# Patient Record
Sex: FEMALE | Race: WHITE | Employment: OTHER
[De-identification: names, ages, dates, MRNs, and addresses within clinical notes are randomized per-mention and may not be internally consistent; named-entity substitution may affect disease eponyms.]

---

## 2017-01-12 PROBLEM — R31.29 MICROHEMATURIA: Status: ACTIVE | Noted: 2017-01-12

## 2017-02-09 ENCOUNTER — OFFICE VISIT (OUTPATIENT)
Dept: GASTROENTEROLOGY | Facility: CLINIC | Age: 62
End: 2017-02-09

## 2017-02-09 VITALS
HEART RATE: 66 BPM | TEMPERATURE: 97.8 F | SYSTOLIC BLOOD PRESSURE: 143 MMHG | HEIGHT: 63 IN | DIASTOLIC BLOOD PRESSURE: 90 MMHG | OXYGEN SATURATION: 100 % | BODY MASS INDEX: 32.9 KG/M2 | WEIGHT: 185.7 LBS

## 2017-02-09 DIAGNOSIS — R10.33 PERIUMBILICAL ABDOMINAL PAIN: ICD-10-CM

## 2017-02-09 DIAGNOSIS — K56.609 SMALL BOWEL OBSTRUCTION (HCC): ICD-10-CM

## 2017-02-09 DIAGNOSIS — R19.7 DIARRHEA, UNSPECIFIED TYPE: Primary | ICD-10-CM

## 2017-02-09 PROCEDURE — 99214 OFFICE O/P EST MOD 30 MIN: CPT | Performed by: INTERNAL MEDICINE

## 2017-02-09 RX ORDER — SERTRALINE HYDROCHLORIDE 100 MG/1
100 TABLET, FILM COATED ORAL DAILY
COMMUNITY
Start: 2017-02-08

## 2017-02-09 ASSESSMENT — ENCOUNTER SYMPTOMS
RESPIRATORY NEGATIVE: 1
DIARRHEA: 1
BLOOD IN STOOL: 0
RECTAL PAIN: 0
FACIAL SWELLING: 0
CONSTIPATION: 0
RHINORRHEA: 0
SORE THROAT: 0
TROUBLE SWALLOWING: 1
BACK PAIN: 1
NAUSEA: 0
ANAL BLEEDING: 0
SHORTNESS OF BREATH: 0
ABDOMINAL PAIN: 1
COUGH: 0
SINUS PRESSURE: 0
ABDOMINAL DISTENTION: 0

## 2017-02-21 ENCOUNTER — TELEPHONE (OUTPATIENT)
Dept: GASTROENTEROLOGY | Facility: CLINIC | Age: 62
End: 2017-02-21

## 2017-10-21 ENCOUNTER — HOSPITAL ENCOUNTER (EMERGENCY)
Age: 62
Discharge: HOME | End: 2017-10-21
Attending: EMERGENCY MEDICINE
Payer: COMMERCIAL

## 2017-10-21 ENCOUNTER — APPOINTMENT (OUTPATIENT)
Dept: CT IMAGING | Age: 62
End: 2017-10-21
Payer: COMMERCIAL

## 2017-10-21 VITALS
OXYGEN SATURATION: 99 % | RESPIRATION RATE: 20 BRPM | BODY MASS INDEX: 30.74 KG/M2 | SYSTOLIC BLOOD PRESSURE: 129 MMHG | HEIGHT: 63 IN | TEMPERATURE: 97.9 F | WEIGHT: 173.5 LBS | DIASTOLIC BLOOD PRESSURE: 82 MMHG | HEART RATE: 78 BPM

## 2017-10-21 DIAGNOSIS — A08.4 VIRAL GASTROENTERITIS: Primary | ICD-10-CM

## 2017-10-21 LAB
ABSOLUTE EOS #: 0.3 K/UL (ref 0–0.4)
ABSOLUTE IMMATURE GRANULOCYTE: ABNORMAL K/UL (ref 0–0.3)
ABSOLUTE LYMPH #: 2.8 K/UL (ref 1–4.8)
ABSOLUTE MONO #: 0.6 K/UL (ref 0.2–0.8)
ANION GAP SERPL CALCULATED.3IONS-SCNC: 17 MMOL/L (ref 9–17)
BASOPHILS # BLD: 0 %
BASOPHILS ABSOLUTE: 0 K/UL (ref 0–0.2)
BUN BLDV-MCNC: 17 MG/DL (ref 8–23)
BUN/CREAT BLD: 13 (ref 9–20)
CALCIUM SERPL-MCNC: 10.3 MG/DL (ref 8.6–10.4)
CHLORIDE BLD-SCNC: 103 MMOL/L (ref 98–107)
CO2: 17 MMOL/L (ref 20–31)
CREAT SERPL-MCNC: 1.26 MG/DL (ref 0.5–0.9)
DIFFERENTIAL TYPE: ABNORMAL
EOSINOPHILS RELATIVE PERCENT: 2 %
GFR AFRICAN AMERICAN: 52 ML/MIN
GFR NON-AFRICAN AMERICAN: 43 ML/MIN
GFR SERPL CREATININE-BSD FRML MDRD: ABNORMAL ML/MIN/{1.73_M2}
GFR SERPL CREATININE-BSD FRML MDRD: ABNORMAL ML/MIN/{1.73_M2}
GLUCOSE BLD-MCNC: 108 MG/DL (ref 70–99)
HCT VFR BLD CALC: 46.5 % (ref 36–46)
HEMOGLOBIN: 15.4 G/DL (ref 12–16)
IMMATURE GRANULOCYTES: ABNORMAL %
LYMPHOCYTES # BLD: 24 %
MCH RBC QN AUTO: 28.8 PG (ref 26–34)
MCHC RBC AUTO-ENTMCNC: 33.1 G/DL (ref 31–37)
MCV RBC AUTO: 86.9 FL (ref 80–100)
MONOCYTES # BLD: 5 %
PDW BLD-RTO: 15.6 % (ref 11.5–14.5)
PLATELET # BLD: 316 K/UL (ref 130–400)
PLATELET ESTIMATE: ABNORMAL
PMV BLD AUTO: 8.5 FL (ref 6–12)
POTASSIUM SERPL-SCNC: 5.3 MMOL/L (ref 3.7–5.3)
RBC # BLD: 5.36 M/UL (ref 4–5.2)
RBC # BLD: ABNORMAL 10*6/UL
SEG NEUTROPHILS: 69 %
SEGMENTED NEUTROPHILS ABSOLUTE COUNT: 7.9 K/UL (ref 1.8–7.7)
SODIUM BLD-SCNC: 137 MMOL/L (ref 135–144)
WBC # BLD: 11.6 K/UL (ref 3.5–11)
WBC # BLD: ABNORMAL 10*3/UL

## 2017-10-21 PROCEDURE — 74177 CT ABD & PELVIS W/CONTRAST: CPT

## 2017-10-21 PROCEDURE — 85025 COMPLETE CBC W/AUTO DIFF WBC: CPT

## 2017-10-21 PROCEDURE — 96374 THER/PROPH/DIAG INJ IV PUSH: CPT

## 2017-10-21 PROCEDURE — 6360000004 HC RX CONTRAST MEDICATION: Performed by: EMERGENCY MEDICINE

## 2017-10-21 PROCEDURE — 6360000002 HC RX W HCPCS: Performed by: EMERGENCY MEDICINE

## 2017-10-21 PROCEDURE — 80048 BASIC METABOLIC PNL TOTAL CA: CPT

## 2017-10-21 PROCEDURE — 2580000003 HC RX 258: Performed by: EMERGENCY MEDICINE

## 2017-10-21 PROCEDURE — 99284 EMERGENCY DEPT VISIT MOD MDM: CPT

## 2017-10-21 RX ORDER — ONDANSETRON 4 MG/1
4 TABLET, ORALLY DISINTEGRATING ORAL EVERY 6 HOURS PRN
Qty: 12 TABLET | Refills: 0 | Status: SHIPPED | OUTPATIENT
Start: 2017-10-21 | End: 2018-09-27

## 2017-10-21 RX ORDER — ONDANSETRON 2 MG/ML
4 INJECTION INTRAMUSCULAR; INTRAVENOUS ONCE
Status: COMPLETED | OUTPATIENT
Start: 2017-10-21 | End: 2017-10-21

## 2017-10-21 RX ORDER — 0.9 % SODIUM CHLORIDE 0.9 %
50 INTRAVENOUS SOLUTION INTRAVENOUS ONCE
Status: COMPLETED | OUTPATIENT
Start: 2017-10-21 | End: 2017-10-21

## 2017-10-21 RX ORDER — SODIUM CHLORIDE 9 MG/ML
INJECTION, SOLUTION INTRAVENOUS CONTINUOUS
Status: DISCONTINUED | OUTPATIENT
Start: 2017-10-21 | End: 2017-10-21 | Stop reason: HOSPADM

## 2017-10-21 RX ORDER — SODIUM CHLORIDE 0.9 % (FLUSH) 0.9 %
10 SYRINGE (ML) INJECTION
Status: COMPLETED | OUTPATIENT
Start: 2017-10-21 | End: 2017-10-21

## 2017-10-21 RX ADMIN — SODIUM CHLORIDE 50 ML: 9 INJECTION, SOLUTION INTRAVENOUS at 10:33

## 2017-10-21 RX ADMIN — ONDANSETRON 4 MG: 2 INJECTION INTRAMUSCULAR; INTRAVENOUS at 10:07

## 2017-10-21 RX ADMIN — IOPAMIDOL 125 ML: 755 INJECTION, SOLUTION INTRAVENOUS at 10:34

## 2017-10-21 RX ADMIN — Medication 10 ML: at 10:34

## 2017-10-21 RX ADMIN — SODIUM CHLORIDE 1000 ML: 9 INJECTION, SOLUTION INTRAVENOUS at 10:03

## 2017-10-21 ASSESSMENT — ENCOUNTER SYMPTOMS
CONSTIPATION: 0
COLOR CHANGE: 0
EYE DISCHARGE: 0
DIARRHEA: 0
ABDOMINAL PAIN: 1
EYE REDNESS: 0
SHORTNESS OF BREATH: 0
FACIAL SWELLING: 0
COUGH: 0
NAUSEA: 1
VOMITING: 0

## 2017-10-21 ASSESSMENT — PAIN SCALES - GENERAL
PAINLEVEL_OUTOF10: 8
PAINLEVEL_OUTOF10: 7

## 2017-10-21 NOTE — LETTER
[facility] ED  [place] [id number]  Phone: [phone]             October 21, 2017    Patient: Christian Renner   YOB: 1955   Date of Visit: 10/21/2017       To Whom It May Concern:    Roshan Rehman was seen and treated in our emergency department on 10/21/2017. She is to be off work 10/21 and 1/22. Can return to work on 10/23.       Sincerely,   Dr Marlon Gomez        Signature:__________________________________

## 2017-10-21 NOTE — ED PROVIDER NOTES
[facility])        SURGICAL HISTORY           Procedure Laterality Date    ABDOMEN SURGERY      APPENDECTOMY      CHOLECYSTECTOMY      COLECTOMY  2003    COLONOSCOPY  7/30/2003    Dr Emily uMller diverticulosis and signs of diverticulitis    COLONOSCOPY  5/21/2010    stricture at about  35 cm from the anus. Could not advance even gastroscope    COLONOSCOPY  10/17/2014    small bowel and rectum normal    SMALL INTESTINE SURGERY      entire large intestine removed, entire small intestine intact    TUBAL LIGATION      UPPER GASTROINTESTINAL ENDOSCOPY  10/17/2014    normal         FAMILY HISTORY           Problem Relation Age of Onset    Cancer Mother     Heart Disease Father      Family Status   Relation Status    Mother     Father         SOCIAL HISTORY      reports that she has been smoking Cigarettes. She has a 20.00 pack-year smoking history. She has never used smokeless tobacco. She reports that she does not drink alcohol or use drugs. REVIEW OF SYSTEMS    (2-9 systems for level 4, 10 or more for level 5)     Review of Systems   Constitutional: Negative for chills, fatigue and fever. HENT: Negative for congestion, ear discharge and facial swelling. Eyes: Negative for discharge and redness. Respiratory: Negative for cough and shortness of breath. Cardiovascular: Negative for chest pain. Gastrointestinal: Positive for abdominal pain and nausea. Negative for constipation, diarrhea and vomiting. Genitourinary: Negative for dysuria and hematuria. Musculoskeletal: Negative for arthralgias. Skin: Negative for color change and rash. Neurological: Positive for dizziness. Negative for syncope, numbness and headaches. Hematological: Negative for adenopathy. Psychiatric/Behavioral: Negative for confusion. The patient is not nervous/anxious. Except as noted above the remainder of the review of systems was reviewed and negative.      PHYSICAL EXAM    (up to 7 for level 4, 8 or more for level 5)     Vitals:    10/21/17 0943   BP: 129/82   Pulse: 78   Resp: 20   Temp: 97.9 °F (36.6 °C)   TempSrc: Oral   SpO2: 99%   Weight: 173 lb 8 oz (78.7 kg)   Height: 5' 3\" (1.6 m)       Physical Exam   Constitutional: She is oriented to person, place, and time. She appears well-developed and well-nourished. No distress. HENT:   Head: Normocephalic and atraumatic. Eyes: Right eye exhibits no discharge. Left eye exhibits no discharge. No scleral icterus. Neck: Neck supple. Cardiovascular: Normal rate and regular rhythm. Pulmonary/Chest: Effort normal and breath sounds normal. No stridor. No respiratory distress. She has no wheezes. She has no rales. Abdominal: Soft. She exhibits no distension and no mass. There is tenderness. There is no rebound and no guarding. She has mild diffuse tenderness across her lower abdomen. Musculoskeletal: Normal range of motion. Lymphadenopathy:     She has no cervical adenopathy. Neurological: She is alert and oriented to person, place, and time. Skin: Skin is warm and dry. No rash noted. She is not diaphoretic. No erythema. Psychiatric: She has a normal mood and affect. Her behavior is normal.   Vitals reviewed.         DIAGNOSTIC RESULTS     EKG: All EKG's are interpreted by the Emergency Department Physician who either signs or Co-signs this chart in the absence of a cardiologist.    RADIOLOGY:   Non-plain film images such as CT, Ultrasound and MRI are read by the radiologist. [place] radiographic images are visualized and preliminarily interpreted by the emergency physician with the below findings:    Interpretation per the Radiologist below, if available at the time of this note:    Ct Abdomen Pelvis W Iv Contrast    Result Date: 10/21/2017  EXAMINATION: CT OF THE ABDOMEN AND PELVIS WITH CONTRAST 10/21/2017 10:52 am TECHNIQUE: CT of the abdomen and pelvis was performed with the administration of intravenous contrast. Multiplanar reformatted images are

## 2018-01-12 ENCOUNTER — APPOINTMENT (OUTPATIENT)
Dept: CT IMAGING | Age: 63
DRG: 683 | End: 2018-01-12

## 2018-01-12 ENCOUNTER — HOSPITAL ENCOUNTER (INPATIENT)
Age: 63
LOS: 3 days | Discharge: HOME | DRG: 683 | End: 2018-01-15
Attending: FAMILY MEDICINE | Admitting: INTERNAL MEDICINE

## 2018-01-12 DIAGNOSIS — E86.0 DEHYDRATION: Primary | ICD-10-CM

## 2018-01-12 LAB
ABSOLUTE EOS #: 0.2 K/UL (ref 0–0.4)
ABSOLUTE IMMATURE GRANULOCYTE: ABNORMAL K/UL (ref 0–0.3)
ABSOLUTE LYMPH #: 3.6 K/UL (ref 1–4.8)
ABSOLUTE MONO #: 0.7 K/UL (ref 0.2–0.8)
ALBUMIN SERPL-MCNC: 4.9 G/DL (ref 3.5–5.2)
ALBUMIN/GLOBULIN RATIO: ABNORMAL (ref 1–2.5)
ALP BLD-CCNC: 106 U/L (ref 35–104)
ALT SERPL-CCNC: 13 U/L (ref 5–33)
AMYLASE: 128 U/L (ref 28–100)
ANION GAP SERPL CALCULATED.3IONS-SCNC: 24 MMOL/L (ref 9–17)
AST SERPL-CCNC: 16 U/L
BASOPHILS # BLD: 1 % (ref 0–2)
BASOPHILS ABSOLUTE: 0.1 K/UL (ref 0–0.2)
BILIRUB SERPL-MCNC: 0.26 MG/DL (ref 0.3–1.2)
BUN BLDV-MCNC: 49 MG/DL (ref 8–23)
BUN/CREAT BLD: 17 (ref 9–20)
CALCIUM SERPL-MCNC: 9.8 MG/DL (ref 8.6–10.4)
CHLORIDE BLD-SCNC: 97 MMOL/L (ref 98–107)
CO2: 13 MMOL/L (ref 20–31)
CREAT SERPL-MCNC: 2.81 MG/DL (ref 0.5–0.9)
DIFFERENTIAL TYPE: ABNORMAL
EOSINOPHILS RELATIVE PERCENT: 2 % (ref 1–4)
GFR AFRICAN AMERICAN: 21 ML/MIN
GFR NON-AFRICAN AMERICAN: 17 ML/MIN
GFR SERPL CREATININE-BSD FRML MDRD: ABNORMAL ML/MIN/{1.73_M2}
GFR SERPL CREATININE-BSD FRML MDRD: ABNORMAL ML/MIN/{1.73_M2}
GLUCOSE BLD-MCNC: 109 MG/DL (ref 70–99)
HCT VFR BLD CALC: 50 % (ref 36–46)
HEMOGLOBIN: 16.6 G/DL (ref 12–16)
IMMATURE GRANULOCYTES: ABNORMAL %
LACTIC ACID: 0.6 MMOL/L (ref 0.5–2.2)
LACTIC ACID: 1 MMOL/L (ref 0.5–2.2)
LIPASE: 64 U/L (ref 13–60)
LYMPHOCYTES # BLD: 31 % (ref 24–44)
MCH RBC QN AUTO: 29.2 PG (ref 26–34)
MCHC RBC AUTO-ENTMCNC: 33.3 G/DL (ref 31–37)
MCV RBC AUTO: 87.6 FL (ref 80–100)
MONOCYTES # BLD: 6 % (ref 1–7)
PDW BLD-RTO: 15.4 % (ref 11.5–14.5)
PLATELET # BLD: 361 K/UL (ref 130–400)
PLATELET ESTIMATE: ABNORMAL
PMV BLD AUTO: 8.8 FL (ref 6–12)
POTASSIUM SERPL-SCNC: 4.7 MMOL/L (ref 3.7–5.3)
RBC # BLD: 5.7 M/UL (ref 4–5.2)
RBC # BLD: ABNORMAL 10*6/UL
SEG NEUTROPHILS: 60 % (ref 36–66)
SEGMENTED NEUTROPHILS ABSOLUTE COUNT: 7.2 K/UL (ref 1.8–7.7)
SODIUM BLD-SCNC: 134 MMOL/L (ref 135–144)
TOTAL PROTEIN: 9 G/DL (ref 6.4–8.3)
WBC # BLD: 11.8 K/UL (ref 3.5–11)
WBC # BLD: ABNORMAL 10*3/UL

## 2018-01-12 PROCEDURE — 2060000000 HC ICU INTERMEDIATE R&B

## 2018-01-12 PROCEDURE — 83690 ASSAY OF LIPASE: CPT

## 2018-01-12 PROCEDURE — 6370000000 HC RX 637 (ALT 250 FOR IP): Performed by: INTERNAL MEDICINE

## 2018-01-12 PROCEDURE — 96375 TX/PRO/DX INJ NEW DRUG ADDON: CPT

## 2018-01-12 PROCEDURE — 96374 THER/PROPH/DIAG INJ IV PUSH: CPT

## 2018-01-12 PROCEDURE — 6360000002 HC RX W HCPCS: Performed by: FAMILY MEDICINE

## 2018-01-12 PROCEDURE — 6360000004 HC RX CONTRAST MEDICATION: Performed by: FAMILY MEDICINE

## 2018-01-12 PROCEDURE — 80053 COMPREHEN METABOLIC PANEL: CPT

## 2018-01-12 PROCEDURE — 2580000003 HC RX 258: Performed by: FAMILY MEDICINE

## 2018-01-12 PROCEDURE — 83605 ASSAY OF LACTIC ACID: CPT

## 2018-01-12 PROCEDURE — 6360000002 HC RX W HCPCS: Performed by: INTERNAL MEDICINE

## 2018-01-12 PROCEDURE — 74176 CT ABD & PELVIS W/O CONTRAST: CPT

## 2018-01-12 PROCEDURE — 2580000003 HC RX 258: Performed by: INTERNAL MEDICINE

## 2018-01-12 PROCEDURE — 85025 COMPLETE CBC W/AUTO DIFF WBC: CPT

## 2018-01-12 PROCEDURE — 99285 EMERGENCY DEPT VISIT HI MDM: CPT

## 2018-01-12 PROCEDURE — 82150 ASSAY OF AMYLASE: CPT

## 2018-01-12 RX ORDER — HEPARIN SODIUM 5000 [USP'U]/ML
5000 INJECTION, SOLUTION INTRAVENOUS; SUBCUTANEOUS EVERY 8 HOURS SCHEDULED
Status: DISCONTINUED | OUTPATIENT
Start: 2018-01-12 | End: 2018-01-15 | Stop reason: HOSPADM

## 2018-01-12 RX ORDER — 0.9 % SODIUM CHLORIDE 0.9 %
1000 INTRAVENOUS SOLUTION INTRAVENOUS ONCE
Status: COMPLETED | OUTPATIENT
Start: 2018-01-12 | End: 2018-01-12

## 2018-01-12 RX ORDER — ATORVASTATIN CALCIUM 40 MG/1
40 TABLET, FILM COATED ORAL NIGHTLY
Status: DISCONTINUED | OUTPATIENT
Start: 2018-01-12 | End: 2018-01-15 | Stop reason: HOSPADM

## 2018-01-12 RX ORDER — MORPHINE SULFATE 4 MG/ML
4 INJECTION, SOLUTION INTRAMUSCULAR; INTRAVENOUS
Status: COMPLETED | OUTPATIENT
Start: 2018-01-12 | End: 2018-01-13

## 2018-01-12 RX ORDER — ASPIRIN 81 MG/1
81 TABLET ORAL DAILY
Status: DISCONTINUED | OUTPATIENT
Start: 2018-01-13 | End: 2018-01-15 | Stop reason: HOSPADM

## 2018-01-12 RX ORDER — ONDANSETRON 2 MG/ML
4 INJECTION INTRAMUSCULAR; INTRAVENOUS EVERY 30 MIN PRN
Status: COMPLETED | OUTPATIENT
Start: 2018-01-12 | End: 2018-01-13

## 2018-01-12 RX ORDER — METOPROLOL SUCCINATE 25 MG/1
25 TABLET, EXTENDED RELEASE ORAL DAILY
Status: CANCELLED | OUTPATIENT
Start: 2018-01-12

## 2018-01-12 RX ORDER — ONDANSETRON 2 MG/ML
4 INJECTION INTRAMUSCULAR; INTRAVENOUS ONCE
Status: COMPLETED | OUTPATIENT
Start: 2018-01-12 | End: 2018-01-12

## 2018-01-12 RX ORDER — SODIUM CHLORIDE 9 MG/ML
INJECTION, SOLUTION INTRAVENOUS CONTINUOUS
Status: DISCONTINUED | OUTPATIENT
Start: 2018-01-12 | End: 2018-01-15 | Stop reason: HOSPADM

## 2018-01-12 RX ORDER — SODIUM CHLORIDE 0.9 % (FLUSH) 0.9 %
10 SYRINGE (ML) INJECTION PRN
Status: DISCONTINUED | OUTPATIENT
Start: 2018-01-12 | End: 2018-01-15 | Stop reason: HOSPADM

## 2018-01-12 RX ORDER — MORPHINE SULFATE 4 MG/ML
4 INJECTION, SOLUTION INTRAMUSCULAR; INTRAVENOUS
Status: DISCONTINUED | OUTPATIENT
Start: 2018-01-12 | End: 2018-01-15 | Stop reason: HOSPADM

## 2018-01-12 RX ORDER — ONDANSETRON 2 MG/ML
4 INJECTION INTRAMUSCULAR; INTRAVENOUS EVERY 6 HOURS PRN
Status: DISCONTINUED | OUTPATIENT
Start: 2018-01-12 | End: 2018-01-15 | Stop reason: HOSPADM

## 2018-01-12 RX ORDER — SODIUM CHLORIDE 0.9 % (FLUSH) 0.9 %
10 SYRINGE (ML) INJECTION EVERY 12 HOURS SCHEDULED
Status: DISCONTINUED | OUTPATIENT
Start: 2018-01-12 | End: 2018-01-15 | Stop reason: HOSPADM

## 2018-01-12 RX ADMIN — SODIUM CHLORIDE: 9 INJECTION, SOLUTION INTRAVENOUS at 22:08

## 2018-01-12 RX ADMIN — IOHEXOL 50 ML: 240 INJECTION, SOLUTION INTRATHECAL; INTRAVASCULAR; INTRAVENOUS; ORAL at 15:55

## 2018-01-12 RX ADMIN — MORPHINE SULFATE 4 MG: 4 INJECTION INTRAVENOUS at 17:20

## 2018-01-12 RX ADMIN — SODIUM CHLORIDE 1000 ML: 9 INJECTION, SOLUTION INTRAVENOUS at 14:39

## 2018-01-12 RX ADMIN — ATORVASTATIN CALCIUM 40 MG: 40 TABLET, FILM COATED ORAL at 22:36

## 2018-01-12 RX ADMIN — MORPHINE SULFATE 4 MG: 4 INJECTION, SOLUTION INTRAMUSCULAR; INTRAVENOUS at 15:00

## 2018-01-12 RX ADMIN — ONDANSETRON 4 MG: 2 INJECTION, SOLUTION INTRAMUSCULAR; INTRAVENOUS at 17:20

## 2018-01-12 RX ADMIN — HEPARIN SODIUM 5000 UNITS: 5000 INJECTION, SOLUTION INTRAVENOUS; SUBCUTANEOUS at 22:37

## 2018-01-12 RX ADMIN — Medication 10 ML: at 22:36

## 2018-01-12 RX ADMIN — ONDANSETRON 4 MG: 2 INJECTION INTRAMUSCULAR; INTRAVENOUS at 14:59

## 2018-01-12 ASSESSMENT — PAIN SCALES - GENERAL
PAINLEVEL_OUTOF10: 9
PAINLEVEL_OUTOF10: 9
PAINLEVEL_OUTOF10: 8
PAINLEVEL_OUTOF10: 8

## 2018-01-12 ASSESSMENT — PAIN DESCRIPTION - ORIENTATION: ORIENTATION: LOWER;MID

## 2018-01-12 ASSESSMENT — ENCOUNTER SYMPTOMS
VOMITING: 0
NAUSEA: 0
EYES NEGATIVE: 1
RESPIRATORY NEGATIVE: 1
ABDOMINAL PAIN: 1
ALLERGIC/IMMUNOLOGIC NEGATIVE: 1

## 2018-01-12 ASSESSMENT — PAIN DESCRIPTION - FREQUENCY: FREQUENCY: CONTINUOUS

## 2018-01-12 ASSESSMENT — PAIN DESCRIPTION - DESCRIPTORS: DESCRIPTORS: BURNING;CONSTANT;RADIATING

## 2018-01-12 ASSESSMENT — PAIN DESCRIPTION - PAIN TYPE: TYPE: ACUTE PAIN

## 2018-01-12 ASSESSMENT — PAIN DESCRIPTION - LOCATION
LOCATION: ABDOMEN
LOCATION: ABDOMEN

## 2018-01-12 NOTE — ED PROVIDER NOTES
components within normal limits   LIPASE - Abnormal; Notable for the following:     Lipase 64 (*)     All other components within normal limits   COMPREHENSIVE METABOLIC PANEL W/ REFLEX TO MG FOR LOW K - Abnormal; Notable for the following:     BUN 45 (*)     CREATININE 1.95 (*)     Bun/Cre Ratio 23 (*)     Potassium 5.4 (*)     CO2 16 (*)     GFR Non- 26 (*)     GFR  32 (*)     All other components within normal limits   CBC WITH AUTO DIFFERENTIAL - Abnormal; Notable for the following:     RDW 14.9 (*)     Seg Neutrophils 75 (*)     Lymphocytes 18 (*)     All other components within normal limits   ELECROPHORESIS PROTEIN, SERUM WITHOUT REFLEX TO IMMUNOFIXATION - Abnormal; Notable for the following: Total Protein 6.0 (*)     Total Prot. Sum 6.0 (*)     All other components within normal limits   COMP METABOLIC W/ BILI PROFILE - Abnormal; Notable for the following:      Total Bilirubin 0.29 (*)     BUN 25 (*)     Calcium 8.4 (*)     CREATININE 1.15 (*)     Chloride 113 (*)     CO2 16 (*)     GFR Non- 48 (*)     GFR  58 (*)     All other components within normal limits   CBC WITH AUTO DIFFERENTIAL - Abnormal; Notable for the following:     RDW 15.0 (*)     All other components within normal limits   CBC WITH AUTO DIFFERENTIAL - Abnormal; Notable for the following:     RBC 3.94 (*)     Hemoglobin 11.4 (*)     Hematocrit 35.0 (*)     RDW 15.3 (*)     Lymphocytes 45 (*)     All other components within normal limits   COMP METABOLIC W/ BILI PROFILE - Abnormal; Notable for the following:     Alb 3.4 (*)     Total Bilirubin 0.23 (*)     Calcium 8.2 (*)     CREATININE 1.15 (*)     Total Protein 6.1 (*)     Chloride 113 (*)     CO2 16 (*)     GFR Non- 48 (*)     GFR  58 (*)     All other components within normal limits   CULTURE STOOL   C DIFF TOXIN B BY RT PCR   LACTIC ACID   LACTIC ACID   SHRUTHI   PTH, INTACT   PHOSPHORUS

## 2018-01-13 LAB
ABSOLUTE EOS #: 0.1 K/UL (ref 0–0.4)
ABSOLUTE IMMATURE GRANULOCYTE: ABNORMAL K/UL (ref 0–0.3)
ABSOLUTE LYMPH #: 1.9 K/UL (ref 1–4.8)
ABSOLUTE MONO #: 0.6 K/UL (ref 0.2–0.8)
ALBUMIN SERPL-MCNC: 4 G/DL (ref 3.5–5.2)
ALBUMIN/GLOBULIN RATIO: ABNORMAL (ref 1–2.5)
ALP BLD-CCNC: 100 U/L (ref 35–104)
ALT SERPL-CCNC: 20 U/L (ref 5–33)
ANION GAP SERPL CALCULATED.3IONS-SCNC: 15 MMOL/L (ref 9–17)
AST SERPL-CCNC: 26 U/L
BASOPHILS # BLD: 0 % (ref 0–2)
BASOPHILS ABSOLUTE: 0 K/UL (ref 0–0.2)
BILIRUB SERPL-MCNC: 0.35 MG/DL (ref 0.3–1.2)
BUN BLDV-MCNC: 45 MG/DL (ref 8–23)
BUN/CREAT BLD: 23 (ref 9–20)
CALCIUM SERPL-MCNC: 8.7 MG/DL (ref 8.6–10.4)
CHLORIDE BLD-SCNC: 106 MMOL/L (ref 98–107)
CO2: 16 MMOL/L (ref 20–31)
CREAT SERPL-MCNC: 1.95 MG/DL (ref 0.5–0.9)
DIFFERENTIAL TYPE: ABNORMAL
EOSINOPHILS RELATIVE PERCENT: 1 % (ref 1–4)
GFR AFRICAN AMERICAN: 32 ML/MIN
GFR NON-AFRICAN AMERICAN: 26 ML/MIN
GFR SERPL CREATININE-BSD FRML MDRD: ABNORMAL ML/MIN/{1.73_M2}
GFR SERPL CREATININE-BSD FRML MDRD: ABNORMAL ML/MIN/{1.73_M2}
GLUCOSE BLD-MCNC: 86 MG/DL (ref 70–99)
HCT VFR BLD CALC: 41.3 % (ref 36–46)
HEMOGLOBIN: 13.6 G/DL (ref 12–16)
IMMATURE GRANULOCYTES: ABNORMAL %
LYMPHOCYTES # BLD: 18 % (ref 24–44)
MCH RBC QN AUTO: 29.1 PG (ref 26–34)
MCHC RBC AUTO-ENTMCNC: 33 G/DL (ref 31–37)
MCV RBC AUTO: 88.2 FL (ref 80–100)
MONOCYTES # BLD: 6 % (ref 1–7)
PDW BLD-RTO: 14.9 % (ref 11.5–14.5)
PHOSPHORUS: 4.1 MG/DL (ref 2.6–4.5)
PLATELET # BLD: 270 K/UL (ref 130–400)
PLATELET ESTIMATE: ABNORMAL
PMV BLD AUTO: 8.6 FL (ref 6–12)
POTASSIUM SERPL-SCNC: 5.4 MMOL/L (ref 3.7–5.3)
PTH INTACT: 34.08 PG/ML (ref 15–65)
RBC # BLD: 4.68 M/UL (ref 4–5.2)
RBC # BLD: ABNORMAL 10*6/UL
SEG NEUTROPHILS: 75 % (ref 36–66)
SEGMENTED NEUTROPHILS ABSOLUTE COUNT: 7.7 K/UL (ref 1.8–7.7)
SODIUM BLD-SCNC: 137 MMOL/L (ref 135–144)
TOTAL PROTEIN: 7 G/DL (ref 6.4–8.3)
TSH SERPL DL<=0.05 MIU/L-ACNC: 1.34 MIU/L (ref 0.3–5)
WBC # BLD: 10.4 K/UL (ref 3.5–11)
WBC # BLD: ABNORMAL 10*3/UL

## 2018-01-13 PROCEDURE — 83970 ASSAY OF PARATHORMONE: CPT

## 2018-01-13 PROCEDURE — 84166 PROTEIN E-PHORESIS/URINE/CSF: CPT

## 2018-01-13 PROCEDURE — 84156 ASSAY OF PROTEIN URINE: CPT

## 2018-01-13 PROCEDURE — 87505 NFCT AGENT DETECTION GI: CPT

## 2018-01-13 PROCEDURE — 80053 COMPREHEN METABOLIC PANEL: CPT

## 2018-01-13 PROCEDURE — 84100 ASSAY OF PHOSPHORUS: CPT

## 2018-01-13 PROCEDURE — 86038 ANTINUCLEAR ANTIBODIES: CPT

## 2018-01-13 PROCEDURE — 6360000002 HC RX W HCPCS: Performed by: INTERNAL MEDICINE

## 2018-01-13 PROCEDURE — 2060000000 HC ICU INTERMEDIATE R&B

## 2018-01-13 PROCEDURE — 85025 COMPLETE CBC W/AUTO DIFF WBC: CPT

## 2018-01-13 PROCEDURE — 2580000003 HC RX 258: Performed by: INTERNAL MEDICINE

## 2018-01-13 PROCEDURE — 84155 ASSAY OF PROTEIN SERUM: CPT

## 2018-01-13 PROCEDURE — 36415 COLL VENOUS BLD VENIPUNCTURE: CPT

## 2018-01-13 PROCEDURE — 84165 PROTEIN E-PHORESIS SERUM: CPT

## 2018-01-13 PROCEDURE — 6370000000 HC RX 637 (ALT 250 FOR IP): Performed by: INTERNAL MEDICINE

## 2018-01-13 PROCEDURE — 87493 C DIFF AMPLIFIED PROBE: CPT

## 2018-01-13 PROCEDURE — 84443 ASSAY THYROID STIM HORMONE: CPT

## 2018-01-13 PROCEDURE — 6360000002 HC RX W HCPCS: Performed by: FAMILY MEDICINE

## 2018-01-13 RX ORDER — PANTOPRAZOLE SODIUM 40 MG/1
40 TABLET, DELAYED RELEASE ORAL
Status: DISCONTINUED | OUTPATIENT
Start: 2018-01-14 | End: 2018-01-15 | Stop reason: HOSPADM

## 2018-01-13 RX ADMIN — SERTRALINE HYDROCHLORIDE 50 MG: 50 TABLET ORAL at 08:28

## 2018-01-13 RX ADMIN — SODIUM CHLORIDE: 9 INJECTION, SOLUTION INTRAVENOUS at 13:07

## 2018-01-13 RX ADMIN — HEPARIN SODIUM 5000 UNITS: 5000 INJECTION, SOLUTION INTRAVENOUS; SUBCUTANEOUS at 21:55

## 2018-01-13 RX ADMIN — ATORVASTATIN CALCIUM 40 MG: 40 TABLET, FILM COATED ORAL at 21:54

## 2018-01-13 RX ADMIN — ASPIRIN 81 MG: 81 TABLET, COATED ORAL at 08:28

## 2018-01-13 RX ADMIN — MORPHINE SULFATE 4 MG: 4 INJECTION INTRAVENOUS at 00:19

## 2018-01-13 RX ADMIN — HEPARIN SODIUM 5000 UNITS: 5000 INJECTION, SOLUTION INTRAVENOUS; SUBCUTANEOUS at 14:56

## 2018-01-13 RX ADMIN — HEPARIN SODIUM 5000 UNITS: 5000 INJECTION, SOLUTION INTRAVENOUS; SUBCUTANEOUS at 05:54

## 2018-01-13 RX ADMIN — ONDANSETRON 4 MG: 2 INJECTION INTRAMUSCULAR; INTRAVENOUS at 00:18

## 2018-01-13 RX ADMIN — SODIUM CHLORIDE: 9 INJECTION, SOLUTION INTRAVENOUS at 22:05

## 2018-01-13 ASSESSMENT — PAIN DESCRIPTION - LOCATION: LOCATION: ABDOMEN

## 2018-01-13 ASSESSMENT — PAIN SCALES - GENERAL: PAINLEVEL_OUTOF10: 8

## 2018-01-13 ASSESSMENT — PAIN DESCRIPTION - ORIENTATION: ORIENTATION: LOWER

## 2018-01-13 ASSESSMENT — PAIN DESCRIPTION - PAIN TYPE: TYPE: ACUTE PAIN

## 2018-01-13 ASSESSMENT — PAIN DESCRIPTION - FREQUENCY: FREQUENCY: CONTINUOUS

## 2018-01-13 NOTE — H&P
sounds, no masses or organomegaly  Extremities: no edema and good pulses no Jorje sign  Neurologic: Alert and oriented ×3 no focal deficit    Vitals:  /69   Pulse 68   Temp 97.9 °F (36.6 °C) (Oral)   Resp 16   Ht 5' 4\" (1.626 m)   Wt 169 lb (76.7 kg)   SpO2 100%   BMI 29.01 kg/m²       LABS:  CBC:   Lab Results   Component Value Date    WBC 10.4 01/13/2018    RBC 4.68 01/13/2018    RBC 4.09 04/14/2012    HGB 13.6 01/13/2018    HCT 41.3 01/13/2018    MCV 88.2 01/13/2018    MCH 29.1 01/13/2018    MCHC 33.0 01/13/2018    RDW 14.9 01/13/2018     01/13/2018     04/14/2012    MPV 8.6 01/13/2018     CMP:    Lab Results   Component Value Date     01/13/2018    K 5.4 01/13/2018     01/13/2018    CO2 16 01/13/2018    BUN 45 01/13/2018    CREATININE 1.95 01/13/2018    GFRAA 32 01/13/2018    LABGLOM 26 01/13/2018    GLUCOSE 86 01/13/2018    GLUCOSE 123 04/14/2012    PROT 7.0 01/13/2018    LABALBU 4.0 01/13/2018    CALCIUM 8.7 01/13/2018    BILITOT 0.35 01/13/2018    ALKPHOS 100 01/13/2018    AST 26 01/13/2018    ALT 20 01/13/2018         ASSESSMENT:    Acute kidney injury  Diarrhea with abdominal pain  Smoker  Coronary artery disease native heart native arteries no angina  Dehydration  Patient Active Problem List   Diagnosis    Diarrhea    Smoker    CAD (coronary artery disease)    Abdominal pain    SBO (small bowel obstruction)    RADHA (acute kidney injury) ([facility] [address]    Enteritis    Microhematuria    Intestinal obstruction    Periumbilical abdominal pain    Dehydration       PLAN:    Admit to telemetry unit IV fluids we'll check stools for C. diff and C&S home meds reviewed restarted general surgery consultation obtained , advise to avoid nephrotoxic drug see orders DVT prophylaxisCheck TSH check protein electrophoresis pain and a PTH and phosphorus            Louie May MD  5:01 PM  1/13/2018

## 2018-01-13 NOTE — PROGRESS NOTES
Received report from [facility], RN. Patient to arrive to floor shortly.

## 2018-01-13 NOTE — FLOWSHEET NOTE
Patient appeared to be in pain, patient states about her pain. Patient mostly wanted to rest. Patient requests prayer. Patient shares no major needs or worries.  shared in presence and prayer. 01/13/18 1150   Encounter Summary   Services provided to: Patient   Referral/Consult From: [facility] Family members   Continue Visiting (1-13-18)   Complexity of Encounter Moderate   Length of Encounter 15 minutes   Spiritual Assessment Completed Yes   Routine   Type Initial   Assessment Passive   Intervention Explored feelings, thoughts, concerns;Prayer;Discussed illness/injury and it's impact; Discussed belief system/Cheondoism practices/balaji   Outcome Expressed gratitude

## 2018-01-13 NOTE — CONSULTS
[place] [address]      Patient's Name/ Date of Birth/ Gender: Adrian Gamboa / 1955 (58 y.o.) / female     Referring Physician: Louie May MD    Consulting Physician: Dr. Marilou Samuel    Chief Complaint   Patient presents with    Abdominal Pain       History of present Illness: Adrian Gamboa is a 58 y.o. female, seen in consultation for several day history of lower abdominal pain with diarrhea, no sick contact, sudden onset. Known to service for multiple surgeries. Pt reports she has been having watery diarrhea for several days, since admission diarrhea and lower abdominal pain have improved. No nausea or fevers. No other complaints at this time. Past Medical History:  has a past medical history of Anxiety; Arthritis; CAD (coronary artery disease); Chronic kidney disease; COPD (chronic obstructive pulmonary disease) ([facility] Depression; Diverticulosis; GERD (gastroesophageal reflux disease); Heart palpitations; History of blood transfusion; Hyperlipidemia; Hypertension; and PSVT (paroxysmal supraventricular tachycardia) ([facility] Past Surgical History:   Past Surgical History:   Procedure Laterality Date    ABDOMEN SURGERY      APPENDECTOMY      CHOLECYSTECTOMY      COLECTOMY  2003    COLONOSCOPY  7/30/2003    Dr Muna Carpenter diverticulosis and signs of diverticulitis    COLONOSCOPY  5/21/2010    stricture at about  35 cm from the anus. Could not advance even gastroscope    COLONOSCOPY  10/17/2014    small bowel and rectum normal    SMALL INTESTINE SURGERY      entire large intestine removed, entire small intestine intact    TUBAL LIGATION      UPPER GASTROINTESTINAL ENDOSCOPY  10/17/2014    normal       Social History:  reports that she has been smoking Cigarettes. She has a 20.00 pack-year smoking history. She has never used smokeless tobacco. She reports that she does not drink alcohol or use drugs.     Family History: family history includes Cancer in her mother; Heart 01/12/18 2208    Vital Signs:  Vitals:    01/13/18 0444   BP: 110/61   Pulse: 71   Resp: 17   Temp: 97.7 °F (36.5 °C)   SpO2: 100%       Physical Exam:  Vital signs and Nurse's note reviewed  Gen:  A&Ox3, NAD  HEENT: NCAT, PERRLA, EOMI, no scleral icterus, oral mucosa moist  Neck: Trachea midline without obvious masses or lesions  Chest: Symmetric rise with inhalation, no evidence of trauma  CVS: S1S2  Resp: Good bilateral air entry, no audible wheeze or rhonchi  Abd: soft, no distention, mildly tender diffusely in lower abdomen without peritoneal signs. Ext: No clubbing, cyanosis, edema, peripheral pulses 2+ Rad/Fem/DP/PT  CNS: Moves all extremities, no gross focal motor deficits  Skin: No erythema or ulcerations     Labs:   CBC:   Recent Labs      01/12/18   1418   WBC  11.8*   HGB  16.6*   PLT  361     BMP:    Recent Labs      01/12/18   1418   NA  134*   K  4.7   CL  97*   CO2  13*   BUN  49*   CREATININE  2.81*   GLUCOSE  109*     Hepatic:   Recent Labs      01/12/18   1418   AST  16   ALT  13   ALKPHOS  106*   BILITOT  0.26*   LIPASE  64*   AMYLASE  128*     Coagulation:   Recent Labs      01/12/18   1418   PROT  9.0*         Imaging:  Ct Abdomen Pelvis Wo Iv Contrast    Result Date: 1/12/2018  EXAMINATION: CT OF THE ABDOMEN AND PELVIS WITHOUT CONTRAST 1/12/2018 3:55 pm TECHNIQUE: CT of the abdomen and pelvis was performed without the administration of intravenous contrast. Multiplanar reformatted images are provided for review. Dose modulation, iterative reconstruction, and/or weight based adjustment of the mA/kV was utilized to reduce the radiation dose to as low as reasonably achievable. COMPARISON: 10/21/2017 HISTORY: ORDERING SYSTEM PROVIDED HISTORY: ABDOMINAL PAIN TECHNOLOGIST PROVIDED HISTORY: Oral contrast only FINDINGS: Lower Chest: Lung bases are clear. No pleural effusion. Organs: Liver, pancreas, spleen and the adrenal glands are unremarkable. Evidence of a cholecystectomy.   Dilated common bile duct, stable. Status post cholecystectomy. No renal calculi or hydronephrosis. Perinephric fat planes are preserved. GI/Bowel: Evidence of colectomy. Surgical sutures are noted in the region of sigmoid colon related to surgical anastomosis. Mild dilatation of the fluid containing small bowel loops in the abdomen and pelvis, new finding. No mesenteric edema or intraperitoneal fluid is identified. Pelvis: No pelvic mass, lymphadenopathy or free fluid. Urinary bladder is unremarkable. Peritoneum/Retroperitoneum: Abdominal aorta is normal in caliber without evidence for aneurysm. No retroperitoneal lymphadenopathy. Bones/Soft Tissues: Degenerative disc space narrowing at level of L5-S1. Paravertebral soft tissues are unremarkable. 1.  Dilatation of the fluid containing small bowel loops, a new finding. No definite transition point is identified. Evidence of a cholecystectomy and surgical anastomosis at the level of a sigmoid colon. Please correlate with bowel sounds. 2.  Stable common bile duct dilatation. Status post cholecystectomy. Impression:    Clint Sarmiento is a 58 y.o. female with     Lower abdominal pain  Mild leukocytosis without fever  Multiple comorbid conditions    Recommendation:    1. No acute indications for surgical intervention at this time, favor enteritis. Continue IVF hydration, monitor UOP. Monitor on CLD. 2. Encourage ambulation, IS while in bed  3. Supportive care      Katelin Starr  1/13/2018      Attending Physician Statement  I have discussed the case, including pertinent history and exam findings with the resident. I agree with the assessment, plan and orders as documented by the resident. Patient seen and examined.   Continue IV hydration and serial abdominal exams

## 2018-01-13 NOTE — PLAN OF CARE
Problem: Pain:  Goal: Pain level will decrease  Pain level will decrease   Outcome: Ongoing  Patient pain levels are assessed using 0-10 pain rating scale. Patient is given pain medication according to orders (see e-Mar). External stimulus is reduced by keeping lighting low and patient door half-closed.      Problem: Nausea/Vomiting:  Goal: Absence of nausea/vomiting  Absence of nausea/vomiting  Outcome: Ongoing

## 2018-01-14 LAB
ABSOLUTE EOS #: 0.2 K/UL (ref 0–0.4)
ABSOLUTE IMMATURE GRANULOCYTE: ABNORMAL K/UL (ref 0–0.3)
ABSOLUTE LYMPH #: 2 K/UL (ref 1–4.8)
ABSOLUTE MONO #: 0.3 K/UL (ref 0.2–0.8)
ALBUMIN SERPL-MCNC: 3.6 G/DL (ref 3.5–5.2)
ALBUMIN/GLOBULIN RATIO: ABNORMAL (ref 1–2.5)
ALP BLD-CCNC: 95 U/L (ref 35–104)
ALT SERPL-CCNC: 20 U/L (ref 5–33)
ANION GAP SERPL CALCULATED.3IONS-SCNC: 11 MMOL/L (ref 9–17)
AST SERPL-CCNC: 20 U/L
BASOPHILS # BLD: 1 % (ref 0–2)
BASOPHILS ABSOLUTE: 0 K/UL (ref 0–0.2)
BILIRUB SERPL-MCNC: 0.29 MG/DL (ref 0.3–1.2)
BILIRUBIN DIRECT: <0.08 MG/DL
BILIRUBIN, INDIRECT: ABNORMAL MG/DL (ref 0–1)
BUN BLDV-MCNC: 25 MG/DL (ref 8–23)
C DIFFICILE TOXINS, PCR: NORMAL
CALCIUM SERPL-MCNC: 8.4 MG/DL (ref 8.6–10.4)
CAMPYLOBACTER PCR: NORMAL
CHLORIDE BLD-SCNC: 113 MMOL/L (ref 98–107)
CO2: 16 MMOL/L (ref 20–31)
CREAT SERPL-MCNC: 1.15 MG/DL (ref 0.5–0.9)
DIFFERENTIAL TYPE: ABNORMAL
EOSINOPHILS RELATIVE PERCENT: 4 % (ref 1–4)
GFR AFRICAN AMERICAN: 58 ML/MIN
GFR NON-AFRICAN AMERICAN: 48 ML/MIN
GFR SERPL CREATININE-BSD FRML MDRD: ABNORMAL ML/MIN/{1.73_M2}
GFR SERPL CREATININE-BSD FRML MDRD: ABNORMAL ML/MIN/{1.73_M2}
GLUCOSE BLD-MCNC: 81 MG/DL (ref 70–99)
HCT VFR BLD CALC: 37.6 % (ref 36–46)
HEMOGLOBIN: 12.6 G/DL (ref 12–16)
IMMATURE GRANULOCYTES: ABNORMAL %
LYMPHOCYTES # BLD: 42 % (ref 24–44)
MCH RBC QN AUTO: 29.7 PG (ref 26–34)
MCHC RBC AUTO-ENTMCNC: 33.6 G/DL (ref 31–37)
MCV RBC AUTO: 88.4 FL (ref 80–100)
MONOCYTES # BLD: 7 % (ref 1–7)
PDW BLD-RTO: 15 % (ref 11.5–14.5)
PLATELET # BLD: 219 K/UL (ref 130–400)
PLATELET ESTIMATE: ABNORMAL
PMV BLD AUTO: 8.6 FL (ref 6–12)
POTASSIUM SERPL-SCNC: 5.1 MMOL/L (ref 3.7–5.3)
RBC # BLD: 4.26 M/UL (ref 4–5.2)
RBC # BLD: ABNORMAL 10*6/UL
SALMONELLA PCR: NORMAL
SEG NEUTROPHILS: 46 % (ref 36–66)
SEGMENTED NEUTROPHILS ABSOLUTE COUNT: 2.2 K/UL (ref 1.8–7.7)
SHIGATOXIN GENE PCR: NORMAL
SHIGELLA SP PCR: NORMAL
SODIUM BLD-SCNC: 140 MMOL/L (ref 135–144)
SPECIMEN DESCRIPTION: NORMAL
SPECIMEN: NORMAL
TOTAL PROTEIN: 6.4 G/DL (ref 6.4–8.3)
WBC # BLD: 4.7 K/UL (ref 3.5–11)
WBC # BLD: ABNORMAL 10*3/UL

## 2018-01-14 PROCEDURE — 36415 COLL VENOUS BLD VENIPUNCTURE: CPT

## 2018-01-14 PROCEDURE — 80053 COMPREHEN METABOLIC PANEL: CPT

## 2018-01-14 PROCEDURE — 82248 BILIRUBIN DIRECT: CPT

## 2018-01-14 PROCEDURE — 2060000000 HC ICU INTERMEDIATE R&B

## 2018-01-14 PROCEDURE — 6360000002 HC RX W HCPCS: Performed by: INTERNAL MEDICINE

## 2018-01-14 PROCEDURE — 85025 COMPLETE CBC W/AUTO DIFF WBC: CPT

## 2018-01-14 PROCEDURE — 6370000000 HC RX 637 (ALT 250 FOR IP): Performed by: INTERNAL MEDICINE

## 2018-01-14 PROCEDURE — 2580000003 HC RX 258: Performed by: INTERNAL MEDICINE

## 2018-01-14 RX ORDER — PANTOPRAZOLE SODIUM 40 MG/1
40 TABLET, DELAYED RELEASE ORAL DAILY
Status: DISCONTINUED | OUTPATIENT
Start: 2018-01-14 | End: 2018-01-14

## 2018-01-14 RX ORDER — LOPERAMIDE HYDROCHLORIDE 2 MG/1
2 CAPSULE ORAL 3 TIMES DAILY PRN
Status: DISCONTINUED | OUTPATIENT
Start: 2018-01-14 | End: 2018-01-15 | Stop reason: HOSPADM

## 2018-01-14 RX ADMIN — HEPARIN SODIUM 5000 UNITS: 5000 INJECTION, SOLUTION INTRAVENOUS; SUBCUTANEOUS at 06:34

## 2018-01-14 RX ADMIN — ASPIRIN 81 MG: 81 TABLET, COATED ORAL at 08:41

## 2018-01-14 RX ADMIN — SERTRALINE HYDROCHLORIDE 50 MG: 50 TABLET ORAL at 08:41

## 2018-01-14 RX ADMIN — PANTOPRAZOLE SODIUM 40 MG: 40 TABLET, DELAYED RELEASE ORAL at 06:39

## 2018-01-14 RX ADMIN — SODIUM CHLORIDE: 9 INJECTION, SOLUTION INTRAVENOUS at 13:09

## 2018-01-14 RX ADMIN — HEPARIN SODIUM 5000 UNITS: 5000 INJECTION, SOLUTION INTRAVENOUS; SUBCUTANEOUS at 22:39

## 2018-01-14 RX ADMIN — ATORVASTATIN CALCIUM 40 MG: 40 TABLET, FILM COATED ORAL at 22:36

## 2018-01-14 RX ADMIN — HEPARIN SODIUM 5000 UNITS: 5000 INJECTION, SOLUTION INTRAVENOUS; SUBCUTANEOUS at 15:21

## 2018-01-15 VITALS
HEIGHT: 64 IN | HEART RATE: 69 BPM | RESPIRATION RATE: 16 BRPM | WEIGHT: 169 LBS | DIASTOLIC BLOOD PRESSURE: 76 MMHG | BODY MASS INDEX: 28.85 KG/M2 | SYSTOLIC BLOOD PRESSURE: 138 MMHG | OXYGEN SATURATION: 99 % | TEMPERATURE: 98.4 F

## 2018-01-15 LAB
ABSOLUTE EOS #: 0.2 K/UL (ref 0–0.4)
ABSOLUTE IMMATURE GRANULOCYTE: ABNORMAL K/UL (ref 0–0.3)
ABSOLUTE LYMPH #: 2.4 K/UL (ref 1–4.8)
ABSOLUTE MONO #: 0.3 K/UL (ref 0.2–0.8)
ALBUMIN (CALCULATED): 3.7 G/DL (ref 3.2–5.2)
ALBUMIN PERCENT: 62 % (ref 45–65)
ALBUMIN SERPL-MCNC: 3.4 G/DL (ref 3.5–5.2)
ALBUMIN/GLOBULIN RATIO: ABNORMAL (ref 1–2.5)
ALP BLD-CCNC: 81 U/L (ref 35–104)
ALPHA 1 PERCENT: 3 % (ref 3–6)
ALPHA 2 PERCENT: 13 % (ref 6–13)
ALPHA-1-GLOBULIN: 0.2 G/DL (ref 0.1–0.4)
ALPHA-2-GLOBULIN: 0.8 G/DL (ref 0.5–0.9)
ALT SERPL-CCNC: 15 U/L (ref 5–33)
ANION GAP SERPL CALCULATED.3IONS-SCNC: 11 MMOL/L (ref 9–17)
ANTI-NUCLEAR ANTIBODY (ANA): NEGATIVE
AST SERPL-CCNC: 15 U/L
BASOPHILS # BLD: 1 % (ref 0–2)
BASOPHILS ABSOLUTE: 0 K/UL (ref 0–0.2)
BETA GLOBULIN: 0.7 G/DL (ref 0.5–1.1)
BETA PERCENT: 12 % (ref 11–19)
BILIRUB SERPL-MCNC: 0.23 MG/DL (ref 0.3–1.2)
BILIRUBIN DIRECT: <0.08 MG/DL
BILIRUBIN, INDIRECT: ABNORMAL MG/DL (ref 0–1)
BUN BLDV-MCNC: 17 MG/DL (ref 8–23)
CALCIUM SERPL-MCNC: 8.2 MG/DL (ref 8.6–10.4)
CHLORIDE BLD-SCNC: 113 MMOL/L (ref 98–107)
CO2: 16 MMOL/L (ref 20–31)
CREAT SERPL-MCNC: 1.15 MG/DL (ref 0.5–0.9)
DIFFERENTIAL TYPE: ABNORMAL
EOSINOPHILS RELATIVE PERCENT: 4 % (ref 1–4)
GAMMA GLOBULIN %: 10 % (ref 9–20)
GAMMA GLOBULIN: 0.6 G/DL (ref 0.5–1.5)
GFR AFRICAN AMERICAN: 58 ML/MIN
GFR NON-AFRICAN AMERICAN: 48 ML/MIN
GFR SERPL CREATININE-BSD FRML MDRD: ABNORMAL ML/MIN/{1.73_M2}
GFR SERPL CREATININE-BSD FRML MDRD: ABNORMAL ML/MIN/{1.73_M2}
GLUCOSE BLD-MCNC: 82 MG/DL (ref 70–99)
HCT VFR BLD CALC: 35 % (ref 36–46)
HEMOGLOBIN: 11.4 G/DL (ref 12–16)
IMMATURE GRANULOCYTES: ABNORMAL %
LYMPHOCYTES # BLD: 45 % (ref 24–44)
MCH RBC QN AUTO: 28.9 PG (ref 26–34)
MCHC RBC AUTO-ENTMCNC: 32.6 G/DL (ref 31–37)
MCV RBC AUTO: 88.8 FL (ref 80–100)
MONOCYTES # BLD: 6 % (ref 1–7)
P E INTERPRETATION, U: NORMAL
PATHOLOGIST: ABNORMAL
PATHOLOGIST: NORMAL
PDW BLD-RTO: 15.3 % (ref 11.5–14.5)
PLATELET # BLD: 213 K/UL (ref 130–400)
PLATELET ESTIMATE: ABNORMAL
PMV BLD AUTO: 8.6 FL (ref 6–12)
POTASSIUM SERPL-SCNC: 4.4 MMOL/L (ref 3.7–5.3)
PROTEIN ELECTROPHORESIS, SERUM: ABNORMAL
RBC # BLD: 3.94 M/UL (ref 4–5.2)
RBC # BLD: ABNORMAL 10*6/UL
SEG NEUTROPHILS: 44 % (ref 36–66)
SEGMENTED NEUTROPHILS ABSOLUTE COUNT: 2.3 K/UL (ref 1.8–7.7)
SODIUM BLD-SCNC: 140 MMOL/L (ref 135–144)
SPECIMEN TYPE: NORMAL
TOTAL PROT. SUM,%: 100 % (ref 98–102)
TOTAL PROT. SUM: 6 G/DL (ref 6.3–8.2)
TOTAL PROTEIN: 6 G/DL (ref 6.4–8.3)
TOTAL PROTEIN: 6.1 G/DL (ref 6.4–8.3)
URINE TOTAL PROTEIN: 14 MG/DL
WBC # BLD: 5.2 K/UL (ref 3.5–11)
WBC # BLD: ABNORMAL 10*3/UL

## 2018-01-15 PROCEDURE — 6370000000 HC RX 637 (ALT 250 FOR IP): Performed by: INTERNAL MEDICINE

## 2018-01-15 PROCEDURE — 6360000002 HC RX W HCPCS: Performed by: INTERNAL MEDICINE

## 2018-01-15 PROCEDURE — 2580000003 HC RX 258: Performed by: INTERNAL MEDICINE

## 2018-01-15 PROCEDURE — 80053 COMPREHEN METABOLIC PANEL: CPT

## 2018-01-15 PROCEDURE — 85025 COMPLETE CBC W/AUTO DIFF WBC: CPT

## 2018-01-15 PROCEDURE — 36415 COLL VENOUS BLD VENIPUNCTURE: CPT

## 2018-01-15 PROCEDURE — 82248 BILIRUBIN DIRECT: CPT

## 2018-01-15 RX ORDER — LOPERAMIDE HYDROCHLORIDE 2 MG/1
2 CAPSULE ORAL 3 TIMES DAILY PRN
Qty: 20 CAPSULE | Refills: 0 | Status: SHIPPED | OUTPATIENT
Start: 2018-01-15 | End: 2018-01-22

## 2018-01-15 RX ORDER — SODIUM BICARBONATE 650 MG/1
650 TABLET ORAL 2 TIMES DAILY
Qty: 60 TABLET | Refills: 0 | Status: SHIPPED | OUTPATIENT
Start: 2018-01-15 | End: 2018-06-01 | Stop reason: ALTCHOICE

## 2018-01-15 RX ADMIN — HEPARIN SODIUM 5000 UNITS: 5000 INJECTION, SOLUTION INTRAVENOUS; SUBCUTANEOUS at 14:51

## 2018-01-15 RX ADMIN — SERTRALINE 75 MG: 25 TABLET, FILM COATED ORAL at 08:59

## 2018-01-15 RX ADMIN — HEPARIN SODIUM 5000 UNITS: 5000 INJECTION, SOLUTION INTRAVENOUS; SUBCUTANEOUS at 06:29

## 2018-01-15 RX ADMIN — SODIUM CHLORIDE: 9 INJECTION, SOLUTION INTRAVENOUS at 08:59

## 2018-01-15 RX ADMIN — PANTOPRAZOLE SODIUM 40 MG: 40 TABLET, DELAYED RELEASE ORAL at 06:29

## 2018-01-15 RX ADMIN — ASPIRIN 81 MG: 81 TABLET, COATED ORAL at 08:59

## 2018-01-15 NOTE — PLAN OF CARE
Problem: Risk for Impaired Skin Integrity  Goal: Tissue integrity - skin and mucous membranes  Structural intactness and normal physiological function of skin and  mucous membranes. Outcome: Ongoing  Skin No rashes or nodules noted. . Mucus membranes moist. Patient turned and repositioned as needed. Continue to monitor skin for breakdown.

## 2018-01-16 NOTE — DISCHARGE SUMMARY
Physician Discharge Summary     Patient ID:  Itzel Fallon  [id number]  58 y.o.  1955    Admit date: 1/12/2018    Discharge date and time:  1/15/2018    Admission Diagnoses:   Patient Active Problem List   Diagnosis    Diarrhea    Smoker    CAD (coronary artery disease)    Abdominal pain    SBO (small bowel obstruction)    [facility]) ([facility] [address]    Enteritis    Microhematuria    Intestinal obstruction    Periumbilical abdominal pain    Dehydration       Discharge Diagnoses: Acute kidney injury  Dehydration  Chronic diarrhea  Coronary artery disease native heart native arteries no angina  Chronic smoker  Non-anion gap metabolic acidosis    Consults: general surgery    Procedures: None    Hospital Course: Patient admitted with severe diarrhea and dehydration IV fluids and stool for C. diff and C&S were obtained were both negative clear liquid diet and advanced to regular diet patient did fairly well with no major complications during her stay was discharged discharge in a stable improved condition chart surgery consultation was obtained    Discharge Exam:  See progress note from today    Disposition: home  Stable improved  Patient Instructions:   Current Discharge Medication List      START taking these medications    Details   loperamide (IMODIUM) 2 MG capsule Take 1 capsule by mouth 3 times daily as needed for Diarrhea  Qty: 20 capsule, Refills: 0      sodium bicarbonate 650 MG tablet Take 1 tablet by mouth 2 times daily  Qty: 60 tablet, Refills: 0         CONTINUE these medications which have NOT CHANGED    Details   ondansetron (ZOFRAN ODT) 4 MG disintegrating tablet Take 1 tablet by mouth every 6 hours as needed for Nausea or Vomiting  Qty: 12 tablet, Refills: 0      sertraline (ZOLOFT) 50 MG tablet Take 1.5 tablets by mouth daily Takes 1.5 tabs (=75mg) daily      ASPIR-LOW 81 MG EC tablet Take 81 mg by mouth daily      metoprolol succinate (TOPROL XL) 25 MG extended release tablet Take 25

## 2018-04-11 PROBLEM — E86.0 DEHYDRATION: Status: RESOLVED | Noted: 2018-01-12 | Resolved: 2018-04-11

## 2018-06-01 ENCOUNTER — APPOINTMENT (OUTPATIENT)
Dept: CT IMAGING | Age: 63
DRG: 247 | End: 2018-06-01
Payer: MEDICAID

## 2018-06-01 ENCOUNTER — HOSPITAL ENCOUNTER (INPATIENT)
Age: 63
LOS: 3 days | Discharge: HOME | DRG: 247 | End: 2018-06-04
Attending: EMERGENCY MEDICINE | Admitting: INTERNAL MEDICINE
Payer: MEDICAID

## 2018-06-01 ENCOUNTER — APPOINTMENT (OUTPATIENT)
Dept: GENERAL RADIOLOGY | Age: 63
DRG: 247 | End: 2018-06-01
Payer: MEDICAID

## 2018-06-01 DIAGNOSIS — K56.609 SMALL BOWEL OBSTRUCTION (HCC): Primary | ICD-10-CM

## 2018-06-01 LAB
-: ABNORMAL
ABSOLUTE EOS #: 0.2 K/UL (ref 0–0.4)
ABSOLUTE IMMATURE GRANULOCYTE: ABNORMAL K/UL (ref 0–0.3)
ABSOLUTE LYMPH #: 2.2 K/UL (ref 1–4.8)
ABSOLUTE MONO #: 0.6 K/UL (ref 0.2–0.8)
ALBUMIN SERPL-MCNC: 4.1 G/DL (ref 3.5–5.2)
ALBUMIN/GLOBULIN RATIO: NORMAL (ref 1–2.5)
ALP BLD-CCNC: 97 U/L (ref 35–104)
ALT SERPL-CCNC: 15 U/L (ref 5–33)
AMORPHOUS: ABNORMAL
ANION GAP SERPL CALCULATED.3IONS-SCNC: 16 MMOL/L (ref 9–17)
AST SERPL-CCNC: 20 U/L
BACTERIA: ABNORMAL
BASOPHILS # BLD: 1 % (ref 0–2)
BASOPHILS ABSOLUTE: 0.1 K/UL (ref 0–0.2)
BILIRUB SERPL-MCNC: 0.78 MG/DL (ref 0.3–1.2)
BILIRUBIN DIRECT: 0.1 MG/DL
BILIRUBIN URINE: NEGATIVE
BILIRUBIN, INDIRECT: 0.68 MG/DL (ref 0–1)
BNP INTERPRETATION: ABNORMAL
BUN BLDV-MCNC: 18 MG/DL (ref 8–23)
BUN/CREAT BLD: 11 (ref 9–20)
CALCIUM SERPL-MCNC: 9.7 MG/DL (ref 8.6–10.4)
CASTS UA: ABNORMAL /LPF
CHLORIDE BLD-SCNC: 102 MMOL/L (ref 98–107)
CO2: 16 MMOL/L (ref 20–31)
COLOR: YELLOW
COMMENT UA: ABNORMAL
CREAT SERPL-MCNC: 1.63 MG/DL (ref 0.5–0.9)
CRYSTALS, UA: ABNORMAL /HPF
DIFFERENTIAL TYPE: ABNORMAL
EKG ATRIAL RATE: 79 BPM
EKG P AXIS: 70 DEGREES
EKG P-R INTERVAL: 136 MS
EKG Q-T INTERVAL: 366 MS
EKG QRS DURATION: 68 MS
EKG QTC CALCULATION (BAZETT): 419 MS
EKG R AXIS: 52 DEGREES
EKG T AXIS: 64 DEGREES
EKG VENTRICULAR RATE: 79 BPM
EOSINOPHILS RELATIVE PERCENT: 2 % (ref 1–4)
EPITHELIAL CELLS UA: ABNORMAL /HPF (ref 0–5)
GFR AFRICAN AMERICAN: 39 ML/MIN
GFR NON-AFRICAN AMERICAN: 32 ML/MIN
GFR SERPL CREATININE-BSD FRML MDRD: ABNORMAL ML/MIN/{1.73_M2}
GFR SERPL CREATININE-BSD FRML MDRD: ABNORMAL ML/MIN/{1.73_M2}
GLOBULIN: NORMAL G/DL (ref 1.5–3.8)
GLUCOSE BLD-MCNC: 95 MG/DL (ref 70–99)
GLUCOSE URINE: NEGATIVE
HCT VFR BLD CALC: 42.2 % (ref 36–46)
HEMOGLOBIN: 14 G/DL (ref 12–16)
IMMATURE GRANULOCYTES: ABNORMAL %
KETONES, URINE: NEGATIVE
LEUKOCYTE ESTERASE, URINE: ABNORMAL
LIPASE: 22 U/L (ref 13–60)
LYMPHOCYTES # BLD: 20 % (ref 24–44)
MAGNESIUM: 2.3 MG/DL (ref 1.6–2.6)
MCH RBC QN AUTO: 29.3 PG (ref 26–34)
MCHC RBC AUTO-ENTMCNC: 33.2 G/DL (ref 31–37)
MCV RBC AUTO: 88.3 FL (ref 80–100)
MONOCYTES # BLD: 5 % (ref 1–7)
MUCUS: ABNORMAL
NITRITE, URINE: NEGATIVE
NRBC AUTOMATED: ABNORMAL PER 100 WBC
OTHER OBSERVATIONS UA: ABNORMAL
PDW BLD-RTO: 14.4 % (ref 11.5–14.5)
PH UA: 5 (ref 5–8)
PLATELET # BLD: 308 K/UL (ref 130–400)
PLATELET ESTIMATE: ABNORMAL
PMV BLD AUTO: ABNORMAL FL (ref 6–12)
POTASSIUM SERPL-SCNC: 5 MMOL/L (ref 3.7–5.3)
PRO-BNP: 654 PG/ML
PROTEIN UA: NEGATIVE
RBC # BLD: 4.78 M/UL (ref 4–5.2)
RBC # BLD: ABNORMAL 10*6/UL
RBC UA: ABNORMAL /HPF (ref 0–2)
RENAL EPITHELIAL, UA: ABNORMAL /HPF
SEG NEUTROPHILS: 72 % (ref 36–66)
SEGMENTED NEUTROPHILS ABSOLUTE COUNT: 8 K/UL (ref 1.8–7.7)
SODIUM BLD-SCNC: 134 MMOL/L (ref 135–144)
SPECIFIC GRAVITY UA: 1 (ref 1–1.03)
TOTAL PROTEIN: 7.6 G/DL (ref 6.4–8.3)
TRICHOMONAS: ABNORMAL
TURBIDITY: CLEAR
URINE HGB: ABNORMAL
UROBILINOGEN, URINE: NORMAL
WBC # BLD: 11.1 K/UL (ref 3.5–11)
WBC # BLD: ABNORMAL 10*3/UL
WBC UA: ABNORMAL /HPF (ref 0–5)
YEAST: ABNORMAL

## 2018-06-01 PROCEDURE — 99285 EMERGENCY DEPT VISIT HI MDM: CPT

## 2018-06-01 PROCEDURE — 2580000003 HC RX 258: Performed by: INTERNAL MEDICINE

## 2018-06-01 PROCEDURE — 83735 ASSAY OF MAGNESIUM: CPT

## 2018-06-01 PROCEDURE — 96374 THER/PROPH/DIAG INJ IV PUSH: CPT

## 2018-06-01 PROCEDURE — 81001 URINALYSIS AUTO W/SCOPE: CPT

## 2018-06-01 PROCEDURE — 80076 HEPATIC FUNCTION PANEL: CPT

## 2018-06-01 PROCEDURE — 80048 BASIC METABOLIC PNL TOTAL CA: CPT

## 2018-06-01 PROCEDURE — 93005 ELECTROCARDIOGRAM TRACING: CPT

## 2018-06-01 PROCEDURE — 87086 URINE CULTURE/COLONY COUNT: CPT

## 2018-06-01 PROCEDURE — 6360000002 HC RX W HCPCS: Performed by: NURSE PRACTITIONER

## 2018-06-01 PROCEDURE — 83880 ASSAY OF NATRIURETIC PEPTIDE: CPT

## 2018-06-01 PROCEDURE — 74176 CT ABD & PELVIS W/O CONTRAST: CPT

## 2018-06-01 PROCEDURE — 2060000000 HC ICU INTERMEDIATE R&B

## 2018-06-01 PROCEDURE — 96376 TX/PRO/DX INJ SAME DRUG ADON: CPT

## 2018-06-01 PROCEDURE — 74022 RADEX COMPL AQT ABD SERIES: CPT

## 2018-06-01 PROCEDURE — 96375 TX/PRO/DX INJ NEW DRUG ADDON: CPT

## 2018-06-01 PROCEDURE — 85025 COMPLETE CBC W/AUTO DIFF WBC: CPT

## 2018-06-01 PROCEDURE — 2580000003 HC RX 258: Performed by: NURSE PRACTITIONER

## 2018-06-01 PROCEDURE — 83690 ASSAY OF LIPASE: CPT

## 2018-06-01 PROCEDURE — 6360000002 HC RX W HCPCS: Performed by: INTERNAL MEDICINE

## 2018-06-01 RX ORDER — 0.9 % SODIUM CHLORIDE 0.9 %
10 VIAL (ML) INJECTION DAILY
Status: DISCONTINUED | OUTPATIENT
Start: 2018-06-02 | End: 2018-06-04 | Stop reason: HOSPADM

## 2018-06-01 RX ORDER — 0.9 % SODIUM CHLORIDE 0.9 %
1000 INTRAVENOUS SOLUTION INTRAVENOUS ONCE
Status: COMPLETED | OUTPATIENT
Start: 2018-06-01 | End: 2018-06-01

## 2018-06-01 RX ORDER — FENTANYL CITRATE 50 UG/ML
50 INJECTION, SOLUTION INTRAMUSCULAR; INTRAVENOUS ONCE
Status: COMPLETED | OUTPATIENT
Start: 2018-06-01 | End: 2018-06-01

## 2018-06-01 RX ORDER — ISOSORBIDE MONONITRATE 30 MG/1
30 TABLET, EXTENDED RELEASE ORAL DAILY
Status: ON HOLD | COMMUNITY
End: 2020-01-30

## 2018-06-01 RX ORDER — LISINOPRIL 2.5 MG/1
2.5 TABLET ORAL DAILY
Status: ON HOLD | COMMUNITY
End: 2018-06-04 | Stop reason: HOSPADM

## 2018-06-01 RX ORDER — ALBUTEROL SULFATE 2.5 MG/3ML
2.5 SOLUTION RESPIRATORY (INHALATION)
Status: DISCONTINUED | OUTPATIENT
Start: 2018-06-01 | End: 2018-06-04 | Stop reason: HOSPADM

## 2018-06-01 RX ORDER — PANTOPRAZOLE SODIUM 40 MG/10ML
40 INJECTION, POWDER, LYOPHILIZED, FOR SOLUTION INTRAVENOUS DAILY
Status: DISCONTINUED | OUTPATIENT
Start: 2018-06-02 | End: 2018-06-02

## 2018-06-01 RX ORDER — SODIUM CHLORIDE 0.9 % (FLUSH) 0.9 %
10 SYRINGE (ML) INJECTION PRN
Status: DISCONTINUED | OUTPATIENT
Start: 2018-06-01 | End: 2018-06-04 | Stop reason: HOSPADM

## 2018-06-01 RX ORDER — SODIUM CHLORIDE 0.9 % (FLUSH) 0.9 %
10 SYRINGE (ML) INJECTION EVERY 12 HOURS SCHEDULED
Status: DISCONTINUED | OUTPATIENT
Start: 2018-06-01 | End: 2018-06-04 | Stop reason: HOSPADM

## 2018-06-01 RX ORDER — ONDANSETRON 4 MG/1
4 TABLET, ORALLY DISINTEGRATING ORAL EVERY 6 HOURS PRN
Status: DISCONTINUED | OUTPATIENT
Start: 2018-06-01 | End: 2018-06-04 | Stop reason: HOSPADM

## 2018-06-01 RX ORDER — ONDANSETRON 2 MG/ML
4 INJECTION INTRAMUSCULAR; INTRAVENOUS EVERY 6 HOURS PRN
Status: DISCONTINUED | OUTPATIENT
Start: 2018-06-01 | End: 2018-06-04 | Stop reason: HOSPADM

## 2018-06-01 RX ORDER — MORPHINE SULFATE 4 MG/ML
4 INJECTION, SOLUTION INTRAMUSCULAR; INTRAVENOUS ONCE
Status: DISCONTINUED | OUTPATIENT
Start: 2018-06-01 | End: 2018-06-01

## 2018-06-01 RX ORDER — FENTANYL CITRATE 50 UG/ML
25 INJECTION, SOLUTION INTRAMUSCULAR; INTRAVENOUS EVERY 4 HOURS PRN
Status: DISCONTINUED | OUTPATIENT
Start: 2018-06-01 | End: 2018-06-04 | Stop reason: HOSPADM

## 2018-06-01 RX ORDER — ATORVASTATIN CALCIUM 80 MG/1
80 TABLET, FILM COATED ORAL DAILY
COMMUNITY

## 2018-06-01 RX ORDER — HEPARIN SODIUM 5000 [USP'U]/ML
5000 INJECTION, SOLUTION INTRAVENOUS; SUBCUTANEOUS EVERY 8 HOURS SCHEDULED
Status: DISCONTINUED | OUTPATIENT
Start: 2018-06-01 | End: 2018-06-04 | Stop reason: HOSPADM

## 2018-06-01 RX ORDER — SODIUM CHLORIDE 9 MG/ML
INJECTION, SOLUTION INTRAVENOUS CONTINUOUS
Status: DISCONTINUED | OUTPATIENT
Start: 2018-06-01 | End: 2018-06-04 | Stop reason: HOSPADM

## 2018-06-01 RX ORDER — ONDANSETRON 2 MG/ML
4 INJECTION INTRAMUSCULAR; INTRAVENOUS EVERY 6 HOURS PRN
Status: DISCONTINUED | OUTPATIENT
Start: 2018-06-01 | End: 2018-06-01

## 2018-06-01 RX ORDER — ONDANSETRON 2 MG/ML
4 INJECTION INTRAMUSCULAR; INTRAVENOUS ONCE
Status: COMPLETED | OUTPATIENT
Start: 2018-06-01 | End: 2018-06-01

## 2018-06-01 RX ADMIN — Medication 10 ML: at 22:52

## 2018-06-01 RX ADMIN — FENTANYL CITRATE 50 MCG: 50 INJECTION, SOLUTION INTRAMUSCULAR; INTRAVENOUS at 16:36

## 2018-06-01 RX ADMIN — SODIUM CHLORIDE 1000 ML: 9 INJECTION, SOLUTION INTRAVENOUS at 12:19

## 2018-06-01 RX ADMIN — HEPARIN SODIUM 5000 UNITS: 5000 INJECTION, SOLUTION INTRAVENOUS; SUBCUTANEOUS at 22:58

## 2018-06-01 RX ADMIN — ONDANSETRON 4 MG: 2 INJECTION INTRAMUSCULAR; INTRAVENOUS at 12:20

## 2018-06-01 RX ADMIN — FENTANYL CITRATE 25 MCG: 50 INJECTION, SOLUTION INTRAMUSCULAR; INTRAVENOUS at 22:50

## 2018-06-01 RX ADMIN — SODIUM CHLORIDE: 9 INJECTION, SOLUTION INTRAVENOUS at 22:48

## 2018-06-01 RX ADMIN — FENTANYL CITRATE 50 MCG: 50 INJECTION, SOLUTION INTRAMUSCULAR; INTRAVENOUS at 13:18

## 2018-06-01 RX ADMIN — SODIUM CHLORIDE 1000 ML: 9 INJECTION, SOLUTION INTRAVENOUS at 15:20

## 2018-06-01 ASSESSMENT — ENCOUNTER SYMPTOMS
BACK PAIN: 0
SORE THROAT: 0
VOMITING: 0
PHOTOPHOBIA: 0
ABDOMINAL PAIN: 1
SHORTNESS OF BREATH: 1
COLOR CHANGE: 0
NAUSEA: 0
COUGH: 0
DIARRHEA: 1
EYE PAIN: 0

## 2018-06-01 ASSESSMENT — PAIN DESCRIPTION - DESCRIPTORS
DESCRIPTORS: CONSTANT;CRAMPING
DESCRIPTORS: ACHING;DULL

## 2018-06-01 ASSESSMENT — PAIN SCALES - GENERAL
PAINLEVEL_OUTOF10: 9
PAINLEVEL_OUTOF10: 8
PAINLEVEL_OUTOF10: 9
PAINLEVEL_OUTOF10: 7
PAINLEVEL_OUTOF10: 9
PAINLEVEL_OUTOF10: 8

## 2018-06-01 ASSESSMENT — PAIN DESCRIPTION - PROGRESSION
CLINICAL_PROGRESSION: RESOLVED
CLINICAL_PROGRESSION: NOT CHANGED
CLINICAL_PROGRESSION: NOT CHANGED
CLINICAL_PROGRESSION: GRADUALLY IMPROVING

## 2018-06-01 ASSESSMENT — PAIN DESCRIPTION - PAIN TYPE
TYPE: ACUTE PAIN
TYPE: ACUTE PAIN

## 2018-06-01 ASSESSMENT — PAIN DESCRIPTION - LOCATION
LOCATION: ABDOMEN
LOCATION: ABDOMEN

## 2018-06-02 PROBLEM — R10.33 PERIUMBILICAL ABDOMINAL PAIN: Status: RESOLVED | Noted: 2017-02-09 | Resolved: 2018-06-02

## 2018-06-02 PROBLEM — R31.29 MICROHEMATURIA: Status: RESOLVED | Noted: 2017-01-12 | Resolved: 2018-06-02

## 2018-06-02 LAB
ABSOLUTE EOS #: 0.2 K/UL (ref 0–0.4)
ABSOLUTE IMMATURE GRANULOCYTE: ABNORMAL K/UL (ref 0–0.3)
ABSOLUTE LYMPH #: 1.9 K/UL (ref 1–4.8)
ABSOLUTE MONO #: 0.4 K/UL (ref 0.2–0.8)
ALBUMIN SERPL-MCNC: 3.5 G/DL (ref 3.5–5.2)
ALBUMIN/GLOBULIN RATIO: ABNORMAL (ref 1–2.5)
ALP BLD-CCNC: 79 U/L (ref 35–104)
ALT SERPL-CCNC: 11 U/L (ref 5–33)
ANION GAP SERPL CALCULATED.3IONS-SCNC: 13 MMOL/L (ref 9–17)
AST SERPL-CCNC: 15 U/L
BASOPHILS # BLD: 1 % (ref 0–2)
BASOPHILS ABSOLUTE: 0.1 K/UL (ref 0–0.2)
BILIRUB SERPL-MCNC: 0.32 MG/DL (ref 0.3–1.2)
BUN BLDV-MCNC: 22 MG/DL (ref 8–23)
BUN/CREAT BLD: 18 (ref 9–20)
CALCIUM SERPL-MCNC: 8.4 MG/DL (ref 8.6–10.4)
CHLORIDE BLD-SCNC: 112 MMOL/L (ref 98–107)
CO2: 14 MMOL/L (ref 20–31)
CREAT SERPL-MCNC: 1.24 MG/DL (ref 0.5–0.9)
CULTURE: NORMAL
CULTURE: NORMAL
DIFFERENTIAL TYPE: ABNORMAL
EOSINOPHILS RELATIVE PERCENT: 4 % (ref 1–4)
GFR AFRICAN AMERICAN: 53 ML/MIN
GFR NON-AFRICAN AMERICAN: 44 ML/MIN
GFR SERPL CREATININE-BSD FRML MDRD: ABNORMAL ML/MIN/{1.73_M2}
GFR SERPL CREATININE-BSD FRML MDRD: ABNORMAL ML/MIN/{1.73_M2}
GLUCOSE BLD-MCNC: 81 MG/DL (ref 70–99)
HCT VFR BLD CALC: 34.8 % (ref 36–46)
HEMOGLOBIN: 11.9 G/DL (ref 12–16)
IMMATURE GRANULOCYTES: ABNORMAL %
LYMPHOCYTES # BLD: 32 % (ref 24–44)
Lab: NORMAL
MCH RBC QN AUTO: 29.7 PG (ref 26–34)
MCHC RBC AUTO-ENTMCNC: 34.1 G/DL (ref 31–37)
MCV RBC AUTO: 86.9 FL (ref 80–100)
MONOCYTES # BLD: 7 % (ref 1–7)
MYOGLOBIN: 25 NG/ML (ref 25–58)
MYOGLOBIN: 28 NG/ML (ref 25–58)
MYOGLOBIN: 28 NG/ML (ref 25–58)
NRBC AUTOMATED: ABNORMAL PER 100 WBC
PDW BLD-RTO: 14.4 % (ref 11.5–14.5)
PLATELET # BLD: 233 K/UL (ref 130–400)
PLATELET ESTIMATE: ABNORMAL
PMV BLD AUTO: ABNORMAL FL (ref 6–12)
POTASSIUM SERPL-SCNC: 5 MMOL/L (ref 3.7–5.3)
RBC # BLD: 4.01 M/UL (ref 4–5.2)
RBC # BLD: ABNORMAL 10*6/UL
SEG NEUTROPHILS: 56 % (ref 36–66)
SEGMENTED NEUTROPHILS ABSOLUTE COUNT: 3.4 K/UL (ref 1.8–7.7)
SODIUM BLD-SCNC: 139 MMOL/L (ref 135–144)
SPECIMEN DESCRIPTION: NORMAL
SPECIMEN DESCRIPTION: NORMAL
STATUS: NORMAL
TOTAL PROTEIN: 6.6 G/DL (ref 6.4–8.3)
TROPONIN INTERP: ABNORMAL
TROPONIN T: 0.33 NG/ML
TROPONIN T: 0.42 NG/ML
TROPONIN T: 0.43 NG/ML
WBC # BLD: 6 K/UL (ref 3.5–11)
WBC # BLD: ABNORMAL 10*3/UL

## 2018-06-02 PROCEDURE — 80053 COMPREHEN METABOLIC PANEL: CPT

## 2018-06-02 PROCEDURE — C9113 INJ PANTOPRAZOLE SODIUM, VIA: HCPCS | Performed by: INTERNAL MEDICINE

## 2018-06-02 PROCEDURE — 85025 COMPLETE CBC W/AUTO DIFF WBC: CPT

## 2018-06-02 PROCEDURE — 36415 COLL VENOUS BLD VENIPUNCTURE: CPT

## 2018-06-02 PROCEDURE — 84484 ASSAY OF TROPONIN QUANT: CPT

## 2018-06-02 PROCEDURE — 6360000002 HC RX W HCPCS: Performed by: INTERNAL MEDICINE

## 2018-06-02 PROCEDURE — 2580000003 HC RX 258: Performed by: INTERNAL MEDICINE

## 2018-06-02 PROCEDURE — 6370000000 HC RX 637 (ALT 250 FOR IP): Performed by: INTERNAL MEDICINE

## 2018-06-02 PROCEDURE — 83874 ASSAY OF MYOGLOBIN: CPT

## 2018-06-02 PROCEDURE — 2060000000 HC ICU INTERMEDIATE R&B

## 2018-06-02 RX ORDER — METOPROLOL SUCCINATE 25 MG/1
25 TABLET, EXTENDED RELEASE ORAL DAILY
Status: DISCONTINUED | OUTPATIENT
Start: 2018-06-02 | End: 2018-06-04 | Stop reason: HOSPADM

## 2018-06-02 RX ORDER — ATORVASTATIN CALCIUM 80 MG/1
80 TABLET, FILM COATED ORAL DAILY
Status: DISCONTINUED | OUTPATIENT
Start: 2018-06-02 | End: 2018-06-04 | Stop reason: HOSPADM

## 2018-06-02 RX ORDER — ISOSORBIDE MONONITRATE 30 MG/1
30 TABLET, EXTENDED RELEASE ORAL DAILY
Status: DISCONTINUED | OUTPATIENT
Start: 2018-06-02 | End: 2018-06-04 | Stop reason: HOSPADM

## 2018-06-02 RX ORDER — METOPROLOL SUCCINATE 25 MG/1
25 TABLET, EXTENDED RELEASE ORAL DAILY
Status: DISCONTINUED | OUTPATIENT
Start: 2018-06-02 | End: 2018-06-02

## 2018-06-02 RX ORDER — PANTOPRAZOLE SODIUM 40 MG/1
40 TABLET, DELAYED RELEASE ORAL
Status: DISCONTINUED | OUTPATIENT
Start: 2018-06-03 | End: 2018-06-04 | Stop reason: HOSPADM

## 2018-06-02 RX ADMIN — Medication 10 ML: at 21:12

## 2018-06-02 RX ADMIN — FENTANYL CITRATE 25 MCG: 50 INJECTION, SOLUTION INTRAMUSCULAR; INTRAVENOUS at 09:39

## 2018-06-02 RX ADMIN — Medication 10 ML: at 10:00

## 2018-06-02 RX ADMIN — TICAGRELOR 90 MG: 90 TABLET ORAL at 09:41

## 2018-06-02 RX ADMIN — ATORVASTATIN CALCIUM 80 MG: 80 TABLET, FILM COATED ORAL at 15:43

## 2018-06-02 RX ADMIN — HEPARIN SODIUM 5000 UNITS: 5000 INJECTION, SOLUTION INTRAVENOUS; SUBCUTANEOUS at 05:40

## 2018-06-02 RX ADMIN — Medication 10 ML: at 09:42

## 2018-06-02 RX ADMIN — HEPARIN SODIUM 5000 UNITS: 5000 INJECTION, SOLUTION INTRAVENOUS; SUBCUTANEOUS at 21:13

## 2018-06-02 RX ADMIN — FENTANYL CITRATE 25 MCG: 50 INJECTION, SOLUTION INTRAMUSCULAR; INTRAVENOUS at 17:49

## 2018-06-02 RX ADMIN — FENTANYL CITRATE 25 MCG: 50 INJECTION, SOLUTION INTRAMUSCULAR; INTRAVENOUS at 21:12

## 2018-06-02 RX ADMIN — METOPROLOL SUCCINATE 25 MG: 25 TABLET, FILM COATED, EXTENDED RELEASE ORAL at 15:43

## 2018-06-02 RX ADMIN — TICAGRELOR 90 MG: 90 TABLET ORAL at 21:13

## 2018-06-02 RX ADMIN — SODIUM CHLORIDE: 9 INJECTION, SOLUTION INTRAVENOUS at 05:40

## 2018-06-02 RX ADMIN — SODIUM CHLORIDE: 9 INJECTION, SOLUTION INTRAVENOUS at 20:20

## 2018-06-02 RX ADMIN — FENTANYL CITRATE 25 MCG: 50 INJECTION, SOLUTION INTRAMUSCULAR; INTRAVENOUS at 02:28

## 2018-06-02 RX ADMIN — HEPARIN SODIUM 5000 UNITS: 5000 INJECTION, SOLUTION INTRAVENOUS; SUBCUTANEOUS at 15:44

## 2018-06-02 RX ADMIN — PANTOPRAZOLE SODIUM 40 MG: 40 INJECTION, POWDER, FOR SOLUTION INTRAVENOUS at 09:41

## 2018-06-02 RX ADMIN — SERTRALINE 75 MG: 25 TABLET, FILM COATED ORAL at 15:41

## 2018-06-02 RX ADMIN — ISOSORBIDE MONONITRATE 30 MG: 30 TABLET ORAL at 15:43

## 2018-06-02 ASSESSMENT — PAIN SCALES - GENERAL
PAINLEVEL_OUTOF10: 9
PAINLEVEL_OUTOF10: 8
PAINLEVEL_OUTOF10: 8
PAINLEVEL_OUTOF10: 9
PAINLEVEL_OUTOF10: 9

## 2018-06-02 ASSESSMENT — PAIN DESCRIPTION - LOCATION
LOCATION: ABDOMEN

## 2018-06-02 ASSESSMENT — PAIN DESCRIPTION - PAIN TYPE
TYPE: ACUTE PAIN
TYPE: ACUTE PAIN
TYPE: CHRONIC PAIN

## 2018-06-02 ASSESSMENT — PAIN DESCRIPTION - PROGRESSION: CLINICAL_PROGRESSION: RESOLVED

## 2018-06-02 ASSESSMENT — PAIN DESCRIPTION - DESCRIPTORS
DESCRIPTORS: ACHING;DULL
DESCRIPTORS: ACHING;DULL

## 2018-06-03 PROBLEM — R77.8 ELEVATED TROPONIN: Status: ACTIVE | Noted: 2018-06-03

## 2018-06-03 LAB
ABSOLUTE EOS #: 0.2 K/UL (ref 0–0.4)
ABSOLUTE IMMATURE GRANULOCYTE: ABNORMAL K/UL (ref 0–0.3)
ABSOLUTE LYMPH #: 1.4 K/UL (ref 1–4.8)
ABSOLUTE MONO #: 0.4 K/UL (ref 0.2–0.8)
ANION GAP SERPL CALCULATED.3IONS-SCNC: 14 MMOL/L (ref 9–17)
BASOPHILS # BLD: 1 % (ref 0–2)
BASOPHILS ABSOLUTE: 0.1 K/UL (ref 0–0.2)
BUN BLDV-MCNC: 14 MG/DL (ref 8–23)
BUN/CREAT BLD: 14 (ref 9–20)
CALCIUM SERPL-MCNC: 8.3 MG/DL (ref 8.6–10.4)
CHLORIDE BLD-SCNC: 111 MMOL/L (ref 98–107)
CO2: 13 MMOL/L (ref 20–31)
CREAT SERPL-MCNC: 1.02 MG/DL (ref 0.5–0.9)
DIFFERENTIAL TYPE: ABNORMAL
EOSINOPHILS RELATIVE PERCENT: 3 % (ref 1–4)
GFR AFRICAN AMERICAN: >60 ML/MIN
GFR NON-AFRICAN AMERICAN: 55 ML/MIN
GFR SERPL CREATININE-BSD FRML MDRD: ABNORMAL ML/MIN/{1.73_M2}
GFR SERPL CREATININE-BSD FRML MDRD: ABNORMAL ML/MIN/{1.73_M2}
GLUCOSE BLD-MCNC: 84 MG/DL (ref 70–99)
HCT VFR BLD CALC: 34.5 % (ref 36–46)
HEMOGLOBIN: 11.7 G/DL (ref 12–16)
IMMATURE GRANULOCYTES: ABNORMAL %
LYMPHOCYTES # BLD: 19 % (ref 24–44)
MCH RBC QN AUTO: 30.1 PG (ref 26–34)
MCHC RBC AUTO-ENTMCNC: 33.9 G/DL (ref 31–37)
MCV RBC AUTO: 88.8 FL (ref 80–100)
MONOCYTES # BLD: 5 % (ref 1–7)
NRBC AUTOMATED: ABNORMAL PER 100 WBC
PDW BLD-RTO: 14.4 % (ref 11.5–14.5)
PLATELET # BLD: 226 K/UL (ref 130–400)
PLATELET ESTIMATE: ABNORMAL
PMV BLD AUTO: ABNORMAL FL (ref 6–12)
POTASSIUM SERPL-SCNC: 4.4 MMOL/L (ref 3.7–5.3)
RBC # BLD: 3.88 M/UL (ref 4–5.2)
RBC # BLD: ABNORMAL 10*6/UL
SEG NEUTROPHILS: 72 % (ref 36–66)
SEGMENTED NEUTROPHILS ABSOLUTE COUNT: 5.4 K/UL (ref 1.8–7.7)
SODIUM BLD-SCNC: 138 MMOL/L (ref 135–144)
WBC # BLD: 7.5 K/UL (ref 3.5–11)
WBC # BLD: ABNORMAL 10*3/UL

## 2018-06-03 PROCEDURE — 2060000000 HC ICU INTERMEDIATE R&B

## 2018-06-03 PROCEDURE — 6370000000 HC RX 637 (ALT 250 FOR IP): Performed by: INTERNAL MEDICINE

## 2018-06-03 PROCEDURE — 6370000000 HC RX 637 (ALT 250 FOR IP): Performed by: SURGERY

## 2018-06-03 PROCEDURE — 2580000003 HC RX 258: Performed by: INTERNAL MEDICINE

## 2018-06-03 PROCEDURE — 36415 COLL VENOUS BLD VENIPUNCTURE: CPT

## 2018-06-03 PROCEDURE — 6360000002 HC RX W HCPCS: Performed by: INTERNAL MEDICINE

## 2018-06-03 PROCEDURE — 93005 ELECTROCARDIOGRAM TRACING: CPT

## 2018-06-03 PROCEDURE — 80048 BASIC METABOLIC PNL TOTAL CA: CPT

## 2018-06-03 PROCEDURE — 85025 COMPLETE CBC W/AUTO DIFF WBC: CPT

## 2018-06-03 RX ORDER — SODIUM BICARBONATE 650 MG/1
650 TABLET ORAL 2 TIMES DAILY
Status: DISCONTINUED | OUTPATIENT
Start: 2018-06-03 | End: 2018-06-04 | Stop reason: HOSPADM

## 2018-06-03 RX ADMIN — FENTANYL CITRATE 25 MCG: 50 INJECTION, SOLUTION INTRAMUSCULAR; INTRAVENOUS at 01:47

## 2018-06-03 RX ADMIN — ISOSORBIDE MONONITRATE 30 MG: 30 TABLET ORAL at 10:45

## 2018-06-03 RX ADMIN — PANTOPRAZOLE SODIUM 40 MG: 40 TABLET, DELAYED RELEASE ORAL at 10:45

## 2018-06-03 RX ADMIN — HEPARIN SODIUM 5000 UNITS: 5000 INJECTION, SOLUTION INTRAVENOUS; SUBCUTANEOUS at 15:28

## 2018-06-03 RX ADMIN — Medication 10 ML: at 21:04

## 2018-06-03 RX ADMIN — SODIUM BICARBONATE 650 MG: 650 TABLET ORAL at 21:02

## 2018-06-03 RX ADMIN — TICAGRELOR 90 MG: 90 TABLET ORAL at 21:02

## 2018-06-03 RX ADMIN — BENZOCAINE AND MENTHOL 1 LOZENGE: 15; 3.6 LOZENGE ORAL at 09:48

## 2018-06-03 RX ADMIN — TICAGRELOR 90 MG: 90 TABLET ORAL at 10:45

## 2018-06-03 RX ADMIN — HEPARIN SODIUM 5000 UNITS: 5000 INJECTION, SOLUTION INTRAVENOUS; SUBCUTANEOUS at 21:04

## 2018-06-03 RX ADMIN — HEPARIN SODIUM 5000 UNITS: 5000 INJECTION, SOLUTION INTRAVENOUS; SUBCUTANEOUS at 05:52

## 2018-06-03 RX ADMIN — METOPROLOL SUCCINATE 25 MG: 25 TABLET, FILM COATED, EXTENDED RELEASE ORAL at 10:45

## 2018-06-03 RX ADMIN — FENTANYL CITRATE 25 MCG: 50 INJECTION, SOLUTION INTRAMUSCULAR; INTRAVENOUS at 05:54

## 2018-06-03 RX ADMIN — SERTRALINE 75 MG: 25 TABLET, FILM COATED ORAL at 10:45

## 2018-06-03 RX ADMIN — SODIUM BICARBONATE 650 MG: 650 TABLET ORAL at 15:28

## 2018-06-03 RX ADMIN — ATORVASTATIN CALCIUM 80 MG: 80 TABLET, FILM COATED ORAL at 10:44

## 2018-06-03 ASSESSMENT — PAIN SCALES - GENERAL
PAINLEVEL_OUTOF10: 0
PAINLEVEL_OUTOF10: 5
PAINLEVEL_OUTOF10: 8
PAINLEVEL_OUTOF10: 0

## 2018-06-03 ASSESSMENT — PAIN DESCRIPTION - DESCRIPTORS: DESCRIPTORS: ACHING;DULL

## 2018-06-03 ASSESSMENT — PAIN DESCRIPTION - PAIN TYPE: TYPE: CHRONIC PAIN

## 2018-06-03 ASSESSMENT — PAIN DESCRIPTION - LOCATION: LOCATION: ABDOMEN

## 2018-06-04 VITALS
SYSTOLIC BLOOD PRESSURE: 118 MMHG | HEIGHT: 64 IN | WEIGHT: 172.1 LBS | HEART RATE: 48 BPM | RESPIRATION RATE: 19 BRPM | DIASTOLIC BLOOD PRESSURE: 67 MMHG | BODY MASS INDEX: 29.38 KG/M2 | TEMPERATURE: 98.4 F | OXYGEN SATURATION: 100 %

## 2018-06-04 LAB
ANION GAP SERPL CALCULATED.3IONS-SCNC: 15 MMOL/L (ref 9–17)
BUN BLDV-MCNC: 16 MG/DL (ref 8–23)
BUN/CREAT BLD: 16 (ref 9–20)
CALCIUM SERPL-MCNC: 8.6 MG/DL (ref 8.6–10.4)
CHLORIDE BLD-SCNC: 111 MMOL/L (ref 98–107)
CO2: 14 MMOL/L (ref 20–31)
CREAT SERPL-MCNC: 1.02 MG/DL (ref 0.5–0.9)
EKG ATRIAL RATE: 55 BPM
EKG P AXIS: 34 DEGREES
EKG P-R INTERVAL: 152 MS
EKG Q-T INTERVAL: 444 MS
EKG QRS DURATION: 74 MS
EKG QTC CALCULATION (BAZETT): 424 MS
EKG R AXIS: -8 DEGREES
EKG T AXIS: 3 DEGREES
EKG VENTRICULAR RATE: 55 BPM
GFR AFRICAN AMERICAN: >60 ML/MIN
GFR NON-AFRICAN AMERICAN: 55 ML/MIN
GFR SERPL CREATININE-BSD FRML MDRD: ABNORMAL ML/MIN/{1.73_M2}
GFR SERPL CREATININE-BSD FRML MDRD: ABNORMAL ML/MIN/{1.73_M2}
GLUCOSE BLD-MCNC: 81 MG/DL (ref 70–99)
POTASSIUM SERPL-SCNC: 4.1 MMOL/L (ref 3.7–5.3)
SODIUM BLD-SCNC: 140 MMOL/L (ref 135–144)

## 2018-06-04 PROCEDURE — G8988 SELF CARE GOAL STATUS: HCPCS

## 2018-06-04 PROCEDURE — 97161 PT EVAL LOW COMPLEX 20 MIN: CPT

## 2018-06-04 PROCEDURE — 97535 SELF CARE MNGMENT TRAINING: CPT

## 2018-06-04 PROCEDURE — 6360000002 HC RX W HCPCS: Performed by: INTERNAL MEDICINE

## 2018-06-04 PROCEDURE — G8978 MOBILITY CURRENT STATUS: HCPCS

## 2018-06-04 PROCEDURE — 2580000003 HC RX 258: Performed by: INTERNAL MEDICINE

## 2018-06-04 PROCEDURE — 97116 GAIT TRAINING THERAPY: CPT

## 2018-06-04 PROCEDURE — 80048 BASIC METABOLIC PNL TOTAL CA: CPT

## 2018-06-04 PROCEDURE — G8980 MOBILITY D/C STATUS: HCPCS

## 2018-06-04 PROCEDURE — 97165 OT EVAL LOW COMPLEX 30 MIN: CPT

## 2018-06-04 PROCEDURE — 6370000000 HC RX 637 (ALT 250 FOR IP): Performed by: INTERNAL MEDICINE

## 2018-06-04 PROCEDURE — G8989 SELF CARE D/C STATUS: HCPCS

## 2018-06-04 PROCEDURE — G8979 MOBILITY GOAL STATUS: HCPCS

## 2018-06-04 PROCEDURE — G8987 SELF CARE CURRENT STATUS: HCPCS

## 2018-06-04 PROCEDURE — 36415 COLL VENOUS BLD VENIPUNCTURE: CPT

## 2018-06-04 RX ORDER — SODIUM BICARBONATE 650 MG/1
650 TABLET ORAL 2 TIMES DAILY
Qty: 60 TABLET | Refills: 0 | Status: ON HOLD | OUTPATIENT
Start: 2018-06-04 | End: 2019-07-15 | Stop reason: SDUPTHER

## 2018-06-04 RX ADMIN — METOPROLOL SUCCINATE 25 MG: 25 TABLET, FILM COATED, EXTENDED RELEASE ORAL at 09:15

## 2018-06-04 RX ADMIN — HEPARIN SODIUM 5000 UNITS: 5000 INJECTION, SOLUTION INTRAVENOUS; SUBCUTANEOUS at 05:48

## 2018-06-04 RX ADMIN — TICAGRELOR 90 MG: 90 TABLET ORAL at 09:14

## 2018-06-04 RX ADMIN — Medication 10 ML: at 09:19

## 2018-06-04 RX ADMIN — SODIUM BICARBONATE 650 MG: 650 TABLET ORAL at 09:14

## 2018-06-04 RX ADMIN — SERTRALINE 75 MG: 25 TABLET, FILM COATED ORAL at 09:14

## 2018-06-04 RX ADMIN — HEPARIN SODIUM 5000 UNITS: 5000 INJECTION, SOLUTION INTRAVENOUS; SUBCUTANEOUS at 13:43

## 2018-06-04 RX ADMIN — PANTOPRAZOLE SODIUM 40 MG: 40 TABLET, DELAYED RELEASE ORAL at 05:48

## 2018-06-04 RX ADMIN — ISOSORBIDE MONONITRATE 30 MG: 30 TABLET ORAL at 09:14

## 2018-06-04 RX ADMIN — ATORVASTATIN CALCIUM 80 MG: 80 TABLET, FILM COATED ORAL at 09:15

## 2018-07-03 PROBLEM — R77.8 ELEVATED TROPONIN: Status: RESOLVED | Noted: 2018-06-03 | Resolved: 2018-07-03

## 2018-09-27 ENCOUNTER — APPOINTMENT (OUTPATIENT)
Dept: CT IMAGING | Age: 63
DRG: 247 | End: 2018-09-27
Payer: MEDICAID

## 2018-09-27 ENCOUNTER — HOSPITAL ENCOUNTER (INPATIENT)
Age: 63
LOS: 4 days | Discharge: HOME | DRG: 247 | End: 2018-10-01
Attending: EMERGENCY MEDICINE | Admitting: INTERNAL MEDICINE
Payer: MEDICAID

## 2018-09-27 DIAGNOSIS — R10.9 ABDOMINAL PAIN, UNSPECIFIED ABDOMINAL LOCATION: Primary | ICD-10-CM

## 2018-09-27 DIAGNOSIS — K56.609 SBO (SMALL BOWEL OBSTRUCTION) (HCC): ICD-10-CM

## 2018-09-27 DIAGNOSIS — E86.0 DEHYDRATION: ICD-10-CM

## 2018-09-27 LAB
-: ABNORMAL
-: NORMAL
ABSOLUTE EOS #: 0.29 K/UL (ref 0–0.4)
ABSOLUTE IMMATURE GRANULOCYTE: ABNORMAL K/UL (ref 0–0.3)
ABSOLUTE LYMPH #: 3.26 K/UL (ref 1–4.8)
ABSOLUTE MONO #: 0.77 K/UL (ref 0.2–0.8)
ALBUMIN SERPL-MCNC: 4.3 G/DL (ref 3.5–5.2)
ALBUMIN/GLOBULIN RATIO: NORMAL (ref 1–2.5)
ALP BLD-CCNC: 97 U/L (ref 35–104)
ALT SERPL-CCNC: 14 U/L (ref 5–33)
AMORPHOUS: ABNORMAL
ANION GAP SERPL CALCULATED.3IONS-SCNC: 13 MMOL/L (ref 9–17)
ANION GAP SERPL CALCULATED.3IONS-SCNC: 17 MMOL/L (ref 9–17)
AST SERPL-CCNC: 14 U/L
BACTERIA: ABNORMAL
BASOPHILS # BLD: 0 %
BASOPHILS ABSOLUTE: 0 K/UL (ref 0–0.2)
BILIRUB SERPL-MCNC: 0.32 MG/DL (ref 0.3–1.2)
BILIRUBIN DIRECT: 0.1 MG/DL
BILIRUBIN URINE: NEGATIVE
BILIRUBIN, INDIRECT: 0.22 MG/DL (ref 0–1)
BUN BLDV-MCNC: 31 MG/DL (ref 8–23)
BUN BLDV-MCNC: 32 MG/DL (ref 8–23)
BUN/CREAT BLD: 17 (ref 9–20)
BUN/CREAT BLD: 24 (ref 9–20)
C DIFFICILE TOXINS, PCR: NORMAL
CALCIUM SERPL-MCNC: 8.8 MG/DL (ref 8.6–10.4)
CALCIUM SERPL-MCNC: 9.3 MG/DL (ref 8.6–10.4)
CAMPYLOBACTER PCR: NORMAL
CASTS UA: ABNORMAL /LPF
CHLORIDE BLD-SCNC: 102 MMOL/L (ref 98–107)
CHLORIDE BLD-SCNC: 107 MMOL/L (ref 98–107)
CO2: 13 MMOL/L (ref 20–31)
CO2: 16 MMOL/L (ref 20–31)
COLOR: YELLOW
COMMENT UA: ABNORMAL
CREAT SERPL-MCNC: 1.31 MG/DL (ref 0.5–0.9)
CREAT SERPL-MCNC: 1.89 MG/DL (ref 0.5–0.9)
CRYSTALS, UA: ABNORMAL /HPF
DIFFERENTIAL TYPE: ABNORMAL
EOSINOPHILS RELATIVE PERCENT: 3 % (ref 1–4)
EPITHELIAL CELLS UA: ABNORMAL /HPF (ref 0–5)
GFR AFRICAN AMERICAN: 33 ML/MIN
GFR AFRICAN AMERICAN: 50 ML/MIN
GFR NON-AFRICAN AMERICAN: 27 ML/MIN
GFR NON-AFRICAN AMERICAN: 41 ML/MIN
GFR SERPL CREATININE-BSD FRML MDRD: ABNORMAL ML/MIN/{1.73_M2}
GLOBULIN: NORMAL G/DL (ref 1.5–3.8)
GLUCOSE BLD-MCNC: 80 MG/DL (ref 70–99)
GLUCOSE BLD-MCNC: 99 MG/DL (ref 70–99)
GLUCOSE URINE: NEGATIVE
HCT VFR BLD CALC: 44.3 % (ref 36–46)
HEMOGLOBIN: 15.4 G/DL (ref 12–16)
IMMATURE GRANULOCYTES: ABNORMAL %
KETONES, URINE: NEGATIVE
LEUKOCYTE ESTERASE, URINE: NEGATIVE
LIPASE: 40 U/L (ref 13–60)
LYMPHOCYTES # BLD: 34 % (ref 24–44)
MCH RBC QN AUTO: 29.1 PG (ref 26–34)
MCHC RBC AUTO-ENTMCNC: 34.8 G/DL (ref 31–37)
MCV RBC AUTO: 83.6 FL (ref 80–100)
MONOCYTES # BLD: 8 % (ref 1–7)
MUCUS: ABNORMAL
NITRITE, URINE: POSITIVE
NRBC AUTOMATED: ABNORMAL PER 100 WBC
OTHER OBSERVATIONS UA: ABNORMAL
PDW BLD-RTO: 15.8 % (ref 11.5–14.5)
PH UA: 5.5 (ref 5–8)
PLATELET # BLD: 498 K/UL (ref 130–400)
PLATELET ESTIMATE: ABNORMAL
PMV BLD AUTO: 10.7 FL (ref 6–12)
POTASSIUM SERPL-SCNC: 4.3 MMOL/L (ref 3.7–5.3)
POTASSIUM SERPL-SCNC: 5.9 MMOL/L (ref 3.7–5.3)
PROTEIN UA: NEGATIVE
RBC # BLD: 5.3 M/UL (ref 4–5.2)
RBC # BLD: ABNORMAL 10*6/UL
RBC UA: ABNORMAL /HPF (ref 0–2)
REASON FOR REJECTION: NORMAL
RENAL EPITHELIAL, UA: ABNORMAL /HPF
SALMONELLA PCR: NORMAL
SEG NEUTROPHILS: 55 % (ref 36–66)
SEGMENTED NEUTROPHILS ABSOLUTE COUNT: 5.28 K/UL (ref 1.8–7.7)
SHIGATOXIN GENE PCR: NORMAL
SHIGELLA SP PCR: NORMAL
SODIUM BLD-SCNC: 131 MMOL/L (ref 135–144)
SODIUM BLD-SCNC: 137 MMOL/L (ref 135–144)
SPECIFIC GRAVITY UA: 1.01 (ref 1–1.03)
SPECIMEN DESCRIPTION: NORMAL
SPECIMEN: NORMAL
TOTAL PROTEIN: 7.7 G/DL (ref 6.4–8.3)
TRICHOMONAS: ABNORMAL
TURBIDITY: ABNORMAL
URINE HGB: ABNORMAL
UROBILINOGEN, URINE: NORMAL
WBC # BLD: 9.6 K/UL (ref 3.5–11)
WBC # BLD: ABNORMAL 10*3/UL
WBC UA: ABNORMAL /HPF (ref 0–5)
YEAST: ABNORMAL
ZZ NTE CLEAN UP: ORDERED TEST: NORMAL
ZZ NTE WITH NAME CLEAN UP: SPECIMEN SOURCE: NORMAL

## 2018-09-27 PROCEDURE — 6360000002 HC RX W HCPCS: Performed by: EMERGENCY MEDICINE

## 2018-09-27 PROCEDURE — 80048 BASIC METABOLIC PNL TOTAL CA: CPT

## 2018-09-27 PROCEDURE — 0D9670Z DRAINAGE OF STOMACH WITH DRAINAGE DEVICE, VIA NATURAL OR ARTIFICIAL OPENING: ICD-10-PCS | Performed by: STUDENT IN AN ORGANIZED HEALTH CARE EDUCATION/TRAINING PROGRAM

## 2018-09-27 PROCEDURE — 6360000002 HC RX W HCPCS: Performed by: INTERNAL MEDICINE

## 2018-09-27 PROCEDURE — 96361 HYDRATE IV INFUSION ADD-ON: CPT

## 2018-09-27 PROCEDURE — 87493 C DIFF AMPLIFIED PROBE: CPT

## 2018-09-27 PROCEDURE — 6370000000 HC RX 637 (ALT 250 FOR IP): Performed by: EMERGENCY MEDICINE

## 2018-09-27 PROCEDURE — 87077 CULTURE AEROBIC IDENTIFY: CPT

## 2018-09-27 PROCEDURE — 83690 ASSAY OF LIPASE: CPT

## 2018-09-27 PROCEDURE — 96374 THER/PROPH/DIAG INJ IV PUSH: CPT

## 2018-09-27 PROCEDURE — 80076 HEPATIC FUNCTION PANEL: CPT

## 2018-09-27 PROCEDURE — 99285 EMERGENCY DEPT VISIT HI MDM: CPT

## 2018-09-27 PROCEDURE — 87505 NFCT AGENT DETECTION GI: CPT

## 2018-09-27 PROCEDURE — 96376 TX/PRO/DX INJ SAME DRUG ADON: CPT

## 2018-09-27 PROCEDURE — C9113 INJ PANTOPRAZOLE SODIUM, VIA: HCPCS | Performed by: INTERNAL MEDICINE

## 2018-09-27 PROCEDURE — 81001 URINALYSIS AUTO W/SCOPE: CPT

## 2018-09-27 PROCEDURE — 96375 TX/PRO/DX INJ NEW DRUG ADDON: CPT

## 2018-09-27 PROCEDURE — 87186 SC STD MICRODIL/AGAR DIL: CPT

## 2018-09-27 PROCEDURE — 87086 URINE CULTURE/COLONY COUNT: CPT

## 2018-09-27 PROCEDURE — 74176 CT ABD & PELVIS W/O CONTRAST: CPT

## 2018-09-27 PROCEDURE — 2580000003 HC RX 258: Performed by: EMERGENCY MEDICINE

## 2018-09-27 PROCEDURE — 85025 COMPLETE CBC W/AUTO DIFF WBC: CPT

## 2018-09-27 PROCEDURE — 36415 COLL VENOUS BLD VENIPUNCTURE: CPT

## 2018-09-27 PROCEDURE — 2580000003 HC RX 258: Performed by: INTERNAL MEDICINE

## 2018-09-27 PROCEDURE — 2060000000 HC ICU INTERMEDIATE R&B

## 2018-09-27 PROCEDURE — 6370000000 HC RX 637 (ALT 250 FOR IP): Performed by: STUDENT IN AN ORGANIZED HEALTH CARE EDUCATION/TRAINING PROGRAM

## 2018-09-27 RX ORDER — 0.9 % SODIUM CHLORIDE 0.9 %
1000 INTRAVENOUS SOLUTION INTRAVENOUS ONCE
Status: COMPLETED | OUTPATIENT
Start: 2018-09-27 | End: 2018-09-27

## 2018-09-27 RX ORDER — SODIUM CHLORIDE 0.9 % (FLUSH) 0.9 %
10 SYRINGE (ML) INJECTION PRN
Status: DISCONTINUED | OUTPATIENT
Start: 2018-09-27 | End: 2018-09-27 | Stop reason: SDUPTHER

## 2018-09-27 RX ORDER — PANTOPRAZOLE SODIUM 40 MG/10ML
40 INJECTION, POWDER, LYOPHILIZED, FOR SOLUTION INTRAVENOUS DAILY
Status: DISCONTINUED | OUTPATIENT
Start: 2018-09-27 | End: 2018-10-01 | Stop reason: HOSPADM

## 2018-09-27 RX ORDER — SODIUM CHLORIDE 0.9 % (FLUSH) 0.9 %
10 SYRINGE (ML) INJECTION PRN
Status: DISCONTINUED | OUTPATIENT
Start: 2018-09-27 | End: 2018-10-01 | Stop reason: HOSPADM

## 2018-09-27 RX ORDER — ONDANSETRON 2 MG/ML
4 INJECTION INTRAMUSCULAR; INTRAVENOUS EVERY 6 HOURS PRN
Status: DISCONTINUED | OUTPATIENT
Start: 2018-09-27 | End: 2018-10-01 | Stop reason: HOSPADM

## 2018-09-27 RX ORDER — SODIUM CHLORIDE 0.9 % (FLUSH) 0.9 %
10 SYRINGE (ML) INJECTION EVERY 12 HOURS SCHEDULED
Status: DISCONTINUED | OUTPATIENT
Start: 2018-09-27 | End: 2018-10-01 | Stop reason: HOSPADM

## 2018-09-27 RX ORDER — FENTANYL CITRATE 50 UG/ML
50 INJECTION, SOLUTION INTRAMUSCULAR; INTRAVENOUS ONCE
Status: COMPLETED | OUTPATIENT
Start: 2018-09-27 | End: 2018-09-27

## 2018-09-27 RX ORDER — ONDANSETRON 4 MG/1
4 TABLET, ORALLY DISINTEGRATING ORAL EVERY 6 HOURS PRN
Status: DISCONTINUED | OUTPATIENT
Start: 2018-09-27 | End: 2018-10-01 | Stop reason: HOSPADM

## 2018-09-27 RX ORDER — 0.9 % SODIUM CHLORIDE 0.9 %
10 VIAL (ML) INJECTION DAILY
Status: DISCONTINUED | OUTPATIENT
Start: 2018-09-27 | End: 2018-10-01 | Stop reason: HOSPADM

## 2018-09-27 RX ORDER — FENTANYL CITRATE 50 UG/ML
25 INJECTION, SOLUTION INTRAMUSCULAR; INTRAVENOUS
Status: DISCONTINUED | OUTPATIENT
Start: 2018-09-27 | End: 2018-10-01 | Stop reason: HOSPADM

## 2018-09-27 RX ORDER — LISINOPRIL 2.5 MG/1
5 TABLET ORAL NIGHTLY
COMMUNITY
End: 2022-06-24

## 2018-09-27 RX ORDER — ALBUTEROL SULFATE 2.5 MG/3ML
2.5 SOLUTION RESPIRATORY (INHALATION)
Status: DISCONTINUED | OUTPATIENT
Start: 2018-09-27 | End: 2018-10-01 | Stop reason: HOSPADM

## 2018-09-27 RX ORDER — ONDANSETRON 2 MG/ML
4 INJECTION INTRAMUSCULAR; INTRAVENOUS EVERY 6 HOURS PRN
Status: DISCONTINUED | OUTPATIENT
Start: 2018-09-27 | End: 2018-09-27 | Stop reason: SDUPTHER

## 2018-09-27 RX ORDER — SODIUM CHLORIDE 9 MG/ML
INJECTION, SOLUTION INTRAVENOUS CONTINUOUS
Status: DISCONTINUED | OUTPATIENT
Start: 2018-09-27 | End: 2018-10-01 | Stop reason: HOSPADM

## 2018-09-27 RX ORDER — ONDANSETRON 2 MG/ML
4 INJECTION INTRAMUSCULAR; INTRAVENOUS ONCE
Status: COMPLETED | OUTPATIENT
Start: 2018-09-27 | End: 2018-09-27

## 2018-09-27 RX ORDER — SODIUM CHLORIDE 9 MG/ML
INJECTION, SOLUTION INTRAVENOUS CONTINUOUS
Status: DISCONTINUED | OUTPATIENT
Start: 2018-09-27 | End: 2018-09-27

## 2018-09-27 RX ORDER — SODIUM CHLORIDE 0.9 % (FLUSH) 0.9 %
10 SYRINGE (ML) INJECTION EVERY 12 HOURS SCHEDULED
Status: DISCONTINUED | OUTPATIENT
Start: 2018-09-27 | End: 2018-09-27 | Stop reason: SDUPTHER

## 2018-09-27 RX ADMIN — SODIUM CHLORIDE: 9 INJECTION, SOLUTION INTRAVENOUS at 12:46

## 2018-09-27 RX ADMIN — FENTANYL CITRATE 50 MCG: 50 INJECTION, SOLUTION INTRAMUSCULAR; INTRAVENOUS at 11:48

## 2018-09-27 RX ADMIN — PANTOPRAZOLE SODIUM 40 MG: 40 INJECTION, POWDER, FOR SOLUTION INTRAVENOUS at 20:43

## 2018-09-27 RX ADMIN — FENTANYL CITRATE 25 MCG: 50 INJECTION, SOLUTION INTRAMUSCULAR; INTRAVENOUS at 23:32

## 2018-09-27 RX ADMIN — ONDANSETRON 4 MG: 2 INJECTION INTRAMUSCULAR; INTRAVENOUS at 11:46

## 2018-09-27 RX ADMIN — Medication 10 ML: at 20:43

## 2018-09-27 RX ADMIN — LIDOCAINE HYDROCHLORIDE 5 ML: 20 JELLY TOPICAL at 15:10

## 2018-09-27 RX ADMIN — SODIUM CHLORIDE 1000 ML: 9 INJECTION, SOLUTION INTRAVENOUS at 11:45

## 2018-09-27 RX ADMIN — CHLORASEPTIC 1 SPRAY: 1.5 LIQUID ORAL at 20:47

## 2018-09-27 RX ADMIN — SODIUM CHLORIDE: 9 INJECTION, SOLUTION INTRAVENOUS at 20:30

## 2018-09-27 RX ADMIN — FENTANYL CITRATE 50 MCG: 50 INJECTION, SOLUTION INTRAMUSCULAR; INTRAVENOUS at 14:22

## 2018-09-27 RX ADMIN — FENTANYL CITRATE 25 MCG: 50 INJECTION, SOLUTION INTRAMUSCULAR; INTRAVENOUS at 20:29

## 2018-09-27 ASSESSMENT — PAIN SCALES - GENERAL
PAINLEVEL_OUTOF10: 8
PAINLEVEL_OUTOF10: 9
PAINLEVEL_OUTOF10: 3
PAINLEVEL_OUTOF10: 10
PAINLEVEL_OUTOF10: 8
PAINLEVEL_OUTOF10: 4
PAINLEVEL_OUTOF10: 6

## 2018-09-27 ASSESSMENT — PAIN DESCRIPTION - FREQUENCY: FREQUENCY: CONTINUOUS

## 2018-09-27 ASSESSMENT — PAIN DESCRIPTION - PROGRESSION
CLINICAL_PROGRESSION: RAPIDLY IMPROVING
CLINICAL_PROGRESSION: GRADUALLY IMPROVING

## 2018-09-27 ASSESSMENT — PAIN DESCRIPTION - DESCRIPTORS: DESCRIPTORS: STABBING;CONSTANT

## 2018-09-27 ASSESSMENT — PAIN SCALES - WONG BAKER: WONGBAKER_NUMERICALRESPONSE: 10

## 2018-09-27 ASSESSMENT — PAIN DESCRIPTION - LOCATION: LOCATION: ABDOMEN

## 2018-09-27 NOTE — CONSULTS
about  35 cm from the anus. Could not advance even gastroscope    COLONOSCOPY  10/17/2014    small bowel and rectum normal    SMALL INTESTINE SURGERY      entire large intestine removed, entire small intestine intact    TUBAL LIGATION      UPPER GASTROINTESTINAL ENDOSCOPY  10/17/2014    normal       Medications Prior to Admission:   Prescriptions Prior to Admission: lisinopril (PRINIVIL;ZESTRIL) 2.5 MG tablet, Take 2.5 mg by mouth nightly  albuterol-ipratropium (COMBIVENT RESPIMAT)  MCG/ACT AERS inhaler, Inhale 1 puff into the lungs 3 times daily (patient states to be only using as needed due to inconvenience 9/27/18)  ALBUTEROL IN, Inhale 1 vial into the lungs daily as needed (exacerbation) Nebulized solution in nebulizer. ticagrelor (BRILINTA) 90 MG TABS tablet, Take 90 mg by mouth 2 times daily   isosorbide mononitrate (IMDUR) 30 MG extended release tablet, Take 30 mg by mouth daily  atorvastatin (LIPITOR) 80 MG tablet, Take 80 mg by mouth daily  sertraline (ZOLOFT) 50 MG tablet, Take 1.5 tablets by mouth daily Takes 1.5 tabs (=75mg) daily  ASPIR-LOW 81 MG EC tablet, Take 81 mg by mouth daily  metoprolol succinate (TOPROL XL) 25 MG extended release tablet, Take 25 mg by mouth daily  pantoprazole (PROTONIX) 40 MG tablet, Take 40 mg by mouth daily  sodium bicarbonate 650 MG tablet, Take 1 tablet by mouth 2 times daily    Allergies:  Keflex [cephalexin]; Atenolol; Codeine; Dilaudid [hydromorphone hcl]; Other; Percocet [oxycodone-acetaminophen]; Phenergan [promethazine hcl]; Sodium hypochlorite; Erythromycin; and Prednisone    Social History:   Social History     Social History    Marital status:      Spouse name: N/A    Number of children: N/A    Years of education: N/A     Occupational History    Not on file.      Social History Main Topics    Smoking status: Former Smoker     Packs/day: 0.50     Years: 40.00     Types: Cigarettes    Smokeless tobacco: Never Used      Comment: pt states she administration of intravenous contrast. Multiplanar reformatted images are provided for review. Dose modulation, iterative reconstruction, and/or weight based adjustment of the mA/kV was utilized to reduce the radiation dose to as low as reasonably achievable. COMPARISON: 06/01/2018 HISTORY: ORDERING SYSTEM PROVIDED HISTORY: abd pain, rule out obstruction FINDINGS: Lower Chest: The visualized heart and lungs show no acute abnormalities. Organs: Cholecystectomy. The liver, spleen, pancreas, kidneys, adrenal glands show no significant abnormalities. GI/Bowel: There is limited evaluation due to absence of oral contrast.  The under distended stomach is grossly normal.  Multiple dilated fluid-filled small bowel loops noted in the mid abdomen and pelvis not too dissimilar from prior although slightly more prominent on the current study. .  No clear transition point although there are some small bowel loops which are not as dilated. .  There is a history of total colectomy. Bowel anastomosis is in the lower pelvis. Pelvis: Uterus grossly normal.  Urinary bladder unremarkable. Peritoneum/Retroperitoneum: No free intraperitoneal fluid. No significant lymphadenopathy. Bones/Soft Tissues: No acute abnormality of the bones. The superficial soft tissues show no significant abnormalities. Multiple fluid-filled mildly dilated small bowel loops in the mid abdomen and pelvis similar to prior but slightly more prominent. There are some other small bowel loops which are not as distended however it is difficult to clearly define a transition point. A low grade obstruction is possible although a chronic ileus pattern is likely or even an enteritis in the appropriate clinical setting. If there remains concern for small bowel obstruction, a small bowel follow-through barium study exam may be considered. ASSESSMENT   Active Problems:    SBO (small bowel obstruction)  Resolved Problems:    * No resolved hospital problems.

## 2018-09-27 NOTE — ED NOTES
Writer attempted to call report to PCU but RN unavailable at this time and to call writer back. Aneesh Hinton, Charge RN notified.       Elvis Peterson RN  09/27/18 [id number]

## 2018-09-27 NOTE — PROGRESS NOTES
(ZOLOFT) 50 MG tablet Take 1.5 tablets by mouth daily Takes 1.5 tabs (=75mg) daily       ASPIR-LOW 81 MG EC tablet Take 81 mg by mouth daily       metoprolol succinate (TOPROL XL) 25 MG extended release tablet Take 25 mg by mouth daily       pantoprazole (PROTONIX) 40 MG tablet Take 40 mg by mouth daily       sodium bicarbonate 650 MG tablet Take 1 tablet by mouth 2 times daily

## 2018-09-27 NOTE — H&P
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   9 stents total    Chronic kidney disease     COPD (chronic obstructive pulmonary disease) (HCC)     Depression     Diverticulosis     GERD (gastroesophageal reflux disease)     Heart attack ([facility] [address] 05/2018    Heart palpitations     History of blood transfusion     Hyperlipidemia     Hypertension     PSVT (paroxysmal supraventricular tachycardia) ([facility] [address] [facility]          Past Surgical History:   Procedure Laterality Date    ABDOMEN SURGERY      APPENDECTOMY      CAROTID STENT PLACEMENT      CHOLECYSTECTOMY      COLECTOMY  2003    COLONOSCOPY  7/30/2003    Dr Liss Keating diverticulosis and signs of diverticulitis    COLONOSCOPY  5/21/2010    stricture at about  35 cm from the anus.   Could not advance even gastroscope    COLONOSCOPY  10/17/2014    small bowel and rectum normal    SMALL INTESTINE SURGERY      entire large intestine removed, entire small intestine intact    TUBAL LIGATION      UPPER GASTROINTESTINAL ENDOSCOPY  10/17/2014    normal       Medications Prior to Admission:    Prescriptions Prior to Admission: lisinopril (PRINIVIL;ZESTRIL) 2.5 MG tablet, Take 2.5 mg by mouth nightly  albuterol-ipratropium (COMBIVENT RESPIMAT)  MCG/ACT AERS inhaler, Inhale 1 puff into the lungs 3 times daily alert and oriented to person, place and time and in no acute distress  Skin: warm and dry, no rash or erythema  Head: normocephalic and atraumatic  Eyes: pupils equal, round, and reactive to light, conjunctivae normal and sclera anicteric    Neck: neck supple and non tender without mass   Pulmonary/Chest: clear to auscultation bilaterally- no wheezes, rales or rhonchi, normal air movement, no respiratory distress  Cardiovascular: normal rate, regular rhythm, normal S1 and S2, no gallops, intact distal pulses and no carotid bruits  Abdomen: soft, diffuse tenderness no rebound normal bowel sounds, no masses or organomegaly  Extremities: no edema and  Good pulses no Jorje sign    Neurologic: Alert and oriented ×3 with no focal deficit    Vitals:  /65   Pulse 67   Temp 97.9 °F (36.6 °C) (Axillary)   Resp 18   Ht 5' 4\" (1.626 m)   Wt 172 lb 14.4 oz (78.4 kg)   SpO2 99%   BMI 29.68 kg/m²         LABS:  CBC:   Lab Results   Component Value Date    WBC 9.6 09/27/2018    RBC 5.30 09/27/2018    RBC 4.09 04/14/2012    HGB 15.4 09/27/2018    HCT 44.3 09/27/2018    MCV 83.6 09/27/2018    MCH 29.1 09/27/2018    MCHC 34.8 09/27/2018    RDW 15.8 09/27/2018     09/27/2018     04/14/2012    MPV 10.7 09/27/2018     CMP:    Lab Results   Component Value Date     09/27/2018    K 5.9 09/27/2018     09/27/2018    CO2 16 09/27/2018    BUN 32 09/27/2018    CREATININE 1.89 09/27/2018    GFRAA 33 09/27/2018    LABGLOM 27 09/27/2018    GLUCOSE 99 09/27/2018    GLUCOSE 123 04/14/2012    PROT 7.7 09/27/2018    LABALBU 4.3 09/27/2018    CALCIUM 9.3 09/27/2018    BILITOT 0.32 09/27/2018    ALKPHOS 97 09/27/2018    AST 14 09/27/2018    ALT 14 09/27/2018         ASSESSMENT:    Acute kidney injury  Partial small bowel obstruction  Coronary artery disease stable  Abdominal pain  Metabolic acidosis  Chronic diarrhea  Patient Active Problem List   Diagnosis    Acute kidney injury ([facility] [address]    CAD (coronary artery

## 2018-09-27 NOTE — ED PROVIDER NOTES
within normal range or not returned as of this dictation. EMERGENCY DEPARTMENT COURSE and DIFFERENTIAL DIAGNOSIS/MDM:   Vitals:    Vitals:    09/27/18 1104   BP: 110/73   Pulse: 93   Resp: 16   Temp: 97.5 °F (36.4 °C)   TempSrc: Oral   SpO2: 99%   Weight: 172 lb 14.4 oz (78.4 kg)   Height: 5' 4\" (1.626 m)       3:15 PM:  Blood work reveals worsening of kidney function consistent with dehydration. Abdomen series ileus are most likely bowel obstruction with no perforation. Patient is started and IV fluids and her pain is treated symptomatically. The plan is to insert a nasogastric 2 for gastric decompression. Urinalysis who reports nitrite positive urine which will be cultured. Patient does not report any urinary symptoms at this time. MDM    CRITICAL CARE TIME   Total Critical Care time was 30 minutes, excluding separately reportable procedures. There was a high probability of clinically significant/life threatening deterioration in the patient's condition which required my urgent intervention. CONSULTS:  IP CONSULT TO INTERNAL MEDICINE  IP CONSULT TO GENERAL SURGERY    PROCEDURES:  Unless otherwise noted below, none     Procedures    FINAL IMPRESSION      1. Abdominal pain, unspecified abdominal location    2. SBO (small bowel obstruction) ([facility] [address]    3. Dehydration          DISPOSITION/PLAN   DISPOSITION Admitted 09/27/2018 03:36:55 PM      PATIENT REFERRED TO:  Evonne Sesay MD  [address] [place] [id number]  [phone]            DISCHARGE MEDICATIONS:  New Prescriptions    No medications on file          Problem List:  Patient Active Problem List   Diagnosis Code    Smoker F17.200    Acute kidney injury ([facility] [address] N17.9    CAD (coronary artery disease) I25.10    Abdominal pain R10.9    SBO (small bowel obstruction) T80.031    Metabolic acidosis V26.2           Summation      Patient Course:  Stable. Admitted.     ED Medications administered this visit:    Medications   0.9 % sodium

## 2018-09-28 ENCOUNTER — APPOINTMENT (OUTPATIENT)
Dept: GENERAL RADIOLOGY | Age: 63
DRG: 247 | End: 2018-09-28
Payer: MEDICAID

## 2018-09-28 LAB
ABSOLUTE EOS #: 0.2 K/UL (ref 0–0.4)
ABSOLUTE IMMATURE GRANULOCYTE: ABNORMAL K/UL (ref 0–0.3)
ABSOLUTE LYMPH #: 2.2 K/UL (ref 1–4.8)
ABSOLUTE MONO #: 0.5 K/UL (ref 0.2–0.8)
ALBUMIN SERPL-MCNC: 3.7 G/DL (ref 3.5–5.2)
ALBUMIN/GLOBULIN RATIO: ABNORMAL (ref 1–2.5)
ALP BLD-CCNC: 78 U/L (ref 35–104)
ALT SERPL-CCNC: 11 U/L (ref 5–33)
ANION GAP SERPL CALCULATED.3IONS-SCNC: 10 MMOL/L (ref 9–17)
AST SERPL-CCNC: 14 U/L
BASOPHILS # BLD: 1 % (ref 0–2)
BASOPHILS ABSOLUTE: 0.1 K/UL (ref 0–0.2)
BILIRUB SERPL-MCNC: 0.24 MG/DL (ref 0.3–1.2)
BUN BLDV-MCNC: 29 MG/DL (ref 8–23)
BUN/CREAT BLD: 23 (ref 9–20)
CALCIUM SERPL-MCNC: 8.2 MG/DL (ref 8.6–10.4)
CHLORIDE BLD-SCNC: 113 MMOL/L (ref 98–107)
CO2: 15 MMOL/L (ref 20–31)
CREAT SERPL-MCNC: 1.28 MG/DL (ref 0.5–0.9)
DIFFERENTIAL TYPE: ABNORMAL
EKG ATRIAL RATE: 63 BPM
EKG P AXIS: 56 DEGREES
EKG P-R INTERVAL: 154 MS
EKG Q-T INTERVAL: 426 MS
EKG QRS DURATION: 78 MS
EKG QTC CALCULATION (BAZETT): 435 MS
EKG R AXIS: 18 DEGREES
EKG T AXIS: 30 DEGREES
EKG VENTRICULAR RATE: 63 BPM
EOSINOPHILS RELATIVE PERCENT: 3 % (ref 1–4)
GFR AFRICAN AMERICAN: 51 ML/MIN
GFR NON-AFRICAN AMERICAN: 42 ML/MIN
GFR SERPL CREATININE-BSD FRML MDRD: ABNORMAL ML/MIN/{1.73_M2}
GFR SERPL CREATININE-BSD FRML MDRD: ABNORMAL ML/MIN/{1.73_M2}
GLUCOSE BLD-MCNC: 80 MG/DL (ref 70–99)
HCT VFR BLD CALC: 35.9 % (ref 36–46)
HEMOGLOBIN: 12.1 G/DL (ref 12–16)
IMMATURE GRANULOCYTES: ABNORMAL %
LV EF: 60 %
LVEF MODALITY: NORMAL
LYMPHOCYTES # BLD: 33 % (ref 24–44)
MCH RBC QN AUTO: 29.3 PG (ref 26–34)
MCHC RBC AUTO-ENTMCNC: 33.8 G/DL (ref 31–37)
MCV RBC AUTO: 86.6 FL (ref 80–100)
MONOCYTES # BLD: 8 % (ref 1–7)
NRBC AUTOMATED: ABNORMAL PER 100 WBC
PDW BLD-RTO: 15.2 % (ref 11.5–14.5)
PLATELET # BLD: 224 K/UL (ref 130–400)
PLATELET ESTIMATE: ABNORMAL
PMV BLD AUTO: 8.5 FL (ref 6–12)
POTASSIUM SERPL-SCNC: 5 MMOL/L (ref 3.7–5.3)
RBC # BLD: 4.15 M/UL (ref 4–5.2)
RBC # BLD: ABNORMAL 10*6/UL
SEG NEUTROPHILS: 55 % (ref 36–66)
SEGMENTED NEUTROPHILS ABSOLUTE COUNT: 3.7 K/UL (ref 1.8–7.7)
SODIUM BLD-SCNC: 138 MMOL/L (ref 135–144)
TOTAL PROTEIN: 6.4 G/DL (ref 6.4–8.3)
TROPONIN INTERP: NORMAL
TROPONIN T: <0.03 NG/ML
WBC # BLD: 6.7 K/UL (ref 3.5–11)
WBC # BLD: ABNORMAL 10*3/UL

## 2018-09-28 PROCEDURE — 84484 ASSAY OF TROPONIN QUANT: CPT

## 2018-09-28 PROCEDURE — 36415 COLL VENOUS BLD VENIPUNCTURE: CPT

## 2018-09-28 PROCEDURE — 2580000003 HC RX 258: Performed by: INTERNAL MEDICINE

## 2018-09-28 PROCEDURE — 2060000000 HC ICU INTERMEDIATE R&B

## 2018-09-28 PROCEDURE — 6370000000 HC RX 637 (ALT 250 FOR IP): Performed by: INTERNAL MEDICINE

## 2018-09-28 PROCEDURE — 6370000000 HC RX 637 (ALT 250 FOR IP): Performed by: NURSE PRACTITIONER

## 2018-09-28 PROCEDURE — 93306 TTE W/DOPPLER COMPLETE: CPT

## 2018-09-28 PROCEDURE — 6360000002 HC RX W HCPCS: Performed by: INTERNAL MEDICINE

## 2018-09-28 PROCEDURE — 99254 IP/OBS CNSLTJ NEW/EST MOD 60: CPT | Performed by: INTERNAL MEDICINE

## 2018-09-28 PROCEDURE — 85025 COMPLETE CBC W/AUTO DIFF WBC: CPT

## 2018-09-28 PROCEDURE — 74022 RADEX COMPL AQT ABD SERIES: CPT

## 2018-09-28 PROCEDURE — C9113 INJ PANTOPRAZOLE SODIUM, VIA: HCPCS | Performed by: INTERNAL MEDICINE

## 2018-09-28 PROCEDURE — 80053 COMPREHEN METABOLIC PANEL: CPT

## 2018-09-28 PROCEDURE — 93005 ELECTROCARDIOGRAM TRACING: CPT

## 2018-09-28 RX ORDER — CHOLESTYRAMINE LIGHT 4 G/5.7G
4 POWDER, FOR SUSPENSION ORAL 2 TIMES DAILY
Status: DISCONTINUED | OUTPATIENT
Start: 2018-09-28 | End: 2018-09-29

## 2018-09-28 RX ORDER — ASPIRIN 300 MG/1
300 SUPPOSITORY RECTAL DAILY
Status: DISCONTINUED | OUTPATIENT
Start: 2018-09-28 | End: 2018-09-29

## 2018-09-28 RX ORDER — CIPROFLOXACIN 2 MG/ML
200 INJECTION, SOLUTION INTRAVENOUS EVERY 12 HOURS
Status: DISCONTINUED | OUTPATIENT
Start: 2018-09-28 | End: 2018-09-30

## 2018-09-28 RX ORDER — ASPIRIN 300 MG/1
300 SUPPOSITORY RECTAL EVERY 6 HOURS PRN
Status: DISCONTINUED | OUTPATIENT
Start: 2018-09-28 | End: 2018-09-29

## 2018-09-28 RX ADMIN — CHOLESTYRAMINE 4 G: 4 POWDER, FOR SUSPENSION ORAL at 11:30

## 2018-09-28 RX ADMIN — FENTANYL CITRATE 25 MCG: 50 INJECTION, SOLUTION INTRAMUSCULAR; INTRAVENOUS at 03:42

## 2018-09-28 RX ADMIN — FENTANYL CITRATE 25 MCG: 50 INJECTION, SOLUTION INTRAMUSCULAR; INTRAVENOUS at 11:48

## 2018-09-28 RX ADMIN — SODIUM CHLORIDE: 9 INJECTION, SOLUTION INTRAVENOUS at 16:58

## 2018-09-28 RX ADMIN — FENTANYL CITRATE 25 MCG: 50 INJECTION, SOLUTION INTRAMUSCULAR; INTRAVENOUS at 06:14

## 2018-09-28 RX ADMIN — Medication 10 ML: at 08:38

## 2018-09-28 RX ADMIN — FENTANYL CITRATE 25 MCG: 50 INJECTION, SOLUTION INTRAMUSCULAR; INTRAVENOUS at 22:46

## 2018-09-28 RX ADMIN — Medication 10 ML: at 08:39

## 2018-09-28 RX ADMIN — SODIUM CHLORIDE: 9 INJECTION, SOLUTION INTRAVENOUS at 00:54

## 2018-09-28 RX ADMIN — ONDANSETRON HYDROCHLORIDE 4 MG: 2 INJECTION, SOLUTION INTRAMUSCULAR; INTRAVENOUS at 19:55

## 2018-09-28 RX ADMIN — FENTANYL CITRATE 25 MCG: 50 INJECTION, SOLUTION INTRAMUSCULAR; INTRAVENOUS at 19:55

## 2018-09-28 RX ADMIN — Medication 10 ML: at 19:56

## 2018-09-28 RX ADMIN — FENTANYL CITRATE 25 MCG: 50 INJECTION, SOLUTION INTRAMUSCULAR; INTRAVENOUS at 08:37

## 2018-09-28 RX ADMIN — CHOLESTYRAMINE 4 G: 4 POWDER, FOR SUSPENSION ORAL at 21:27

## 2018-09-28 RX ADMIN — FENTANYL CITRATE 25 MCG: 50 INJECTION, SOLUTION INTRAMUSCULAR; INTRAVENOUS at 16:55

## 2018-09-28 RX ADMIN — ASPIRIN 300 MG: 300 SUPPOSITORY RECTAL at 10:26

## 2018-09-28 RX ADMIN — PANTOPRAZOLE SODIUM 40 MG: 40 INJECTION, POWDER, FOR SOLUTION INTRAVENOUS at 08:38

## 2018-09-28 RX ADMIN — FENTANYL CITRATE 25 MCG: 50 INJECTION, SOLUTION INTRAMUSCULAR; INTRAVENOUS at 13:49

## 2018-09-28 RX ADMIN — ONDANSETRON HYDROCHLORIDE 4 MG: 2 INJECTION, SOLUTION INTRAMUSCULAR; INTRAVENOUS at 11:48

## 2018-09-28 RX ADMIN — CIPROFLOXACIN 200 MG: 2 INJECTION, SOLUTION INTRAVENOUS at 08:37

## 2018-09-28 RX ADMIN — CIPROFLOXACIN 200 MG: 2 INJECTION, SOLUTION INTRAVENOUS at 20:02

## 2018-09-28 ASSESSMENT — PAIN DESCRIPTION - DESCRIPTORS
DESCRIPTORS: ACHING;CRAMPING

## 2018-09-28 ASSESSMENT — PAIN DESCRIPTION - LOCATION
LOCATION: ABDOMEN

## 2018-09-28 ASSESSMENT — PAIN DESCRIPTION - ORIENTATION
ORIENTATION: ANTERIOR

## 2018-09-28 ASSESSMENT — PAIN SCALES - GENERAL
PAINLEVEL_OUTOF10: 0
PAINLEVEL_OUTOF10: 9
PAINLEVEL_OUTOF10: 8
PAINLEVEL_OUTOF10: 10
PAINLEVEL_OUTOF10: 8
PAINLEVEL_OUTOF10: 4
PAINLEVEL_OUTOF10: 5
PAINLEVEL_OUTOF10: 7
PAINLEVEL_OUTOF10: 0
PAINLEVEL_OUTOF10: 0
PAINLEVEL_OUTOF10: 9
PAINLEVEL_OUTOF10: 5
PAINLEVEL_OUTOF10: 7
PAINLEVEL_OUTOF10: 0
PAINLEVEL_OUTOF10: 0
PAINLEVEL_OUTOF10: 7

## 2018-09-28 ASSESSMENT — PAIN DESCRIPTION - PAIN TYPE
TYPE: ACUTE PAIN

## 2018-09-28 ASSESSMENT — PAIN DESCRIPTION - FREQUENCY
FREQUENCY: CONTINUOUS
FREQUENCY: INTERMITTENT

## 2018-09-28 NOTE — CONSULTS
(exacerbation) Nebulized solution in nebulizer. Yes Historical Provider, MD   ticagrelor (BRILINTA) 90 MG TABS tablet Take 90 mg by mouth 2 times daily    Yes Historical Provider, MD   isosorbide mononitrate (IMDUR) 30 MG extended release tablet Take 30 mg by mouth daily   Yes Historical Provider, MD   atorvastatin (LIPITOR) 80 MG tablet Take 80 mg by mouth daily   Yes Historical Provider, MD   sertraline (ZOLOFT) 50 MG tablet Take 1.5 tablets by mouth daily Takes 1.5 tabs (=75mg) daily 2/8/17  Yes Historical Provider, MD   ASPIR-LOW 81 MG EC tablet Take 81 mg by mouth daily 11/22/16  Yes Historical Provider, MD   metoprolol succinate (TOPROL XL) 25 MG extended release tablet Take 25 mg by mouth daily 11/22/16  Yes Historical Provider, MD   pantoprazole (PROTONIX) 40 MG tablet Take 40 mg by mouth daily 11/22/16  Yes Historical Provider, MD   sodium bicarbonate 650 MG tablet Take 1 tablet by mouth 2 times daily 6/4/18   Melly Valles MD       CURRENT MEDICATIONS:  Scheduled Meds:   [START ON 9/29/2018] influenza virus vaccine  0.5 mL Intramuscular Once    ciprofloxacin  200 mg Intravenous Q12H    sodium chloride flush  10 mL Intravenous 2 times per day    pantoprazole  40 mg Intravenous Daily    And    sodium chloride (PF)  10 mL Intravenous Daily     Continuous Infusions:   sodium chloride 150 mL/hr at 09/28/18 0054     PRN Meds:lidocaine, sodium chloride flush, phenol, fentanNYL, albuterol, magnesium hydroxide, ondansetron **OR** ondansetron    SOCIAL HISTORY:     Tobacco:   reports that she has quit smoking. Her smoking use included Cigarettes. She has a 20.00 pack-year smoking history. She has never used smokeless tobacco.  Alcohol:   reports that she does not drink alcohol. Illicit drugs:  reports that she does not use drugs.     FAMILY HISTORY:     Family History   Problem Relation Age of Onset    Cancer Mother     Heart Disease Father        REVIEW OF SYSTEMS:    Constitutional: No fever, no chills, no lethargy, no weakness. HEENT:  No headache, otalgia, itchy eyes, nasal discharge or sore throat. Cardiac:  No chest pain, dyspnea, orthopnea or PND. Chest:   No cough, phlegm or wheezing. Abdomen:  See gi hpi  Neuro:  No focal weakness, abnormal movements or seizure like activity. Skin:   No rashes, no itching. :   No hematuria, no pyuria, no dysuria, no flank pain. Extremities:  No swelling or joint pains. ROS was otherwise negative except as mentioned in the [address] PHYSICAL EXAM:    BP (!) 122/57   Pulse 76   Temp 98.1 °F (36.7 °C) (Oral)   Resp 16   Ht 5' 4\" (1.626 m)   Wt 172 lb 14.4 oz (78.4 kg)   SpO2 98%   BMI 29.68 kg/m²     GENERAL:   Well developed, Well nourished, No apparent distress  HEAD:   Normocephalic, Atraumatic  EENT:   EOMI, Sclera not icteric, Oropharynx moist  NECK:   Supple, Trachea midline  LUNGS:  CTA Bilaterally  HEART:  RRR, No murmur  ABDOMEN:   Soft, bilateral lower quad is tender, Nondistended, BS WNL ng with maroon colored drainage  EXT:   No clubbing. No cyanosis. No edema. SKIN:   No rashes. No jaundice. No stigmata of liver disease.     MUSC/SKEL:   Adequate muscle bulk for patient's age, No significant synovitis, No deformities  NEURO:  A&O x Three, CN II- XII grossly intact    LABS AND IMAGING:    CBC  Recent Labs      09/27/18   1144  09/28/18   0528   WBC  9.6  6.7   HGB  15.4  12.1   HCT  44.3  35.9*   MCV  83.6  86.6   PLT  498*  224       BMP  Recent Labs      09/27/18   1210  09/27/18   2030  09/28/18   0528   NA  131*  137  138   K  5.9*  4.3  5.0   CL  102  107  113*   CO2  16*  13*  15*   BUN  32*  31*  29*   CREATININE  1.89*  1.31*  1.28*   GLUCOSE  99  80  80   CALCIUM  9.3  8.8  8.2*       LFTS  Recent Labs      09/27/18   1210  09/28/18   0528   ALKPHOS  97  78   ALT  14  11   AST  14  14   PROT  7.7  6.4   BILITOT  0.32  0.24*   BILIDIR  0.10   --    LABALBU  4.3  3.7   Results for Matt Bhat (MRN [id number]) as of 9/28/2018 08:11   Ref. Range 9/27/2018 14:14   CULTURE STOOL Unknown Rpt   Campylobacter PCR Latest Ref Range: CAMNEG  NEGATIVE: No Camp. ..   C DIFF TOXIN B BY RT PCR Unknown Rpt   Shigatoxin Gene PCR Latest Ref Range: STXNEG  NEGATIVE: No Shig. .. Shigella Sp PCR Latest Ref Range: SHINEG  NEGATIVE: No Shig. .. C Difficile tox, pcr Latest Ref Range: CDTNEG  NEGATIVE: C diffi. .. AMYLASE/LIPASE/AMMONIA  Recent Labs      09/27/18   1210   LIPASE  40     FINDINGS:ct abd 9/27/18   Lower Chest: The visualized heart and lungs show no acute abnormalities.       Organs: Cholecystectomy.  The liver, spleen, pancreas, kidneys, adrenal   glands show no significant abnormalities.       GI/Bowel: There is limited evaluation due to absence of oral contrast.  The   under distended stomach is grossly normal.  Multiple dilated fluid-filled   small bowel loops noted in the mid abdomen and pelvis not too dissimilar from   prior although slightly more prominent on the current study. Hao Sly clear   transition point although there are some small bowel loops which are not as   dilated. Joon Saez is a history of total colectomy.  Bowel anastomosis is in   the lower pelvis.       Pelvis: Uterus grossly normal.  Urinary bladder unremarkable.       Peritoneum/Retroperitoneum: No free intraperitoneal fluid.  No significant   lymphadenopathy.       Bones/Soft Tissues: No acute abnormality of the bones.  The superficial soft   tissues show no significant abnormalities.           Impression   Multiple fluid-filled mildly dilated small bowel loops in the mid abdomen and   pelvis similar to prior but slightly more prominent.  There are some other   small bowel loops which are not as distended however it is difficult to   clearly define a transition point.  A low grade obstruction is possible   although a chronic ileus pattern is likely or even an enteritis in the   appropriate clinical setting.  If there remains concern for small bowel   obstruction, a cholestyramine  She may need MRE or CTE later to confirm the length of for small bowel and there is any small bowel disease        Electronically signed by Riki Pallas, MD

## 2018-09-29 ENCOUNTER — APPOINTMENT (OUTPATIENT)
Dept: GENERAL RADIOLOGY | Age: 63
DRG: 247 | End: 2018-09-29
Payer: MEDICAID

## 2018-09-29 LAB
ABSOLUTE EOS #: 0.2 K/UL (ref 0–0.4)
ABSOLUTE IMMATURE GRANULOCYTE: ABNORMAL K/UL (ref 0–0.3)
ABSOLUTE LYMPH #: 1.9 K/UL (ref 1–4.8)
ABSOLUTE MONO #: 0.4 K/UL (ref 0.2–0.8)
ANION GAP SERPL CALCULATED.3IONS-SCNC: 11 MMOL/L (ref 9–17)
BASOPHILS # BLD: 1 % (ref 0–2)
BASOPHILS ABSOLUTE: 0 K/UL (ref 0–0.2)
BUN BLDV-MCNC: 20 MG/DL (ref 8–23)
BUN/CREAT BLD: 20 (ref 9–20)
CALCIUM SERPL-MCNC: 8 MG/DL (ref 8.6–10.4)
CHLORIDE BLD-SCNC: 113 MMOL/L (ref 98–107)
CO2: 15 MMOL/L (ref 20–31)
CREAT SERPL-MCNC: 1.01 MG/DL (ref 0.5–0.9)
CULTURE: ABNORMAL
DIFFERENTIAL TYPE: ABNORMAL
EOSINOPHILS RELATIVE PERCENT: 2 % (ref 1–4)
GFR AFRICAN AMERICAN: >60 ML/MIN
GFR NON-AFRICAN AMERICAN: 55 ML/MIN
GFR SERPL CREATININE-BSD FRML MDRD: ABNORMAL ML/MIN/{1.73_M2}
GFR SERPL CREATININE-BSD FRML MDRD: ABNORMAL ML/MIN/{1.73_M2}
GLUCOSE BLD-MCNC: 64 MG/DL (ref 70–99)
HCT VFR BLD CALC: 32.7 % (ref 36–46)
HEMOGLOBIN: 10.9 G/DL (ref 12–16)
IMMATURE GRANULOCYTES: ABNORMAL %
LACTIC ACID: 1.2 MMOL/L (ref 0.5–2.2)
LYMPHOCYTES # BLD: 29 % (ref 24–44)
Lab: ABNORMAL
MCH RBC QN AUTO: 29 PG (ref 26–34)
MCHC RBC AUTO-ENTMCNC: 33.3 G/DL (ref 31–37)
MCV RBC AUTO: 87.2 FL (ref 80–100)
MONOCYTES # BLD: 6 % (ref 1–7)
NRBC AUTOMATED: ABNORMAL PER 100 WBC
ORGANISM: ABNORMAL
PDW BLD-RTO: 13.9 % (ref 11.5–14.5)
PLATELET # BLD: 205 K/UL (ref 130–400)
PLATELET ESTIMATE: ABNORMAL
PMV BLD AUTO: ABNORMAL FL (ref 6–12)
POTASSIUM SERPL-SCNC: 4.4 MMOL/L (ref 3.7–5.3)
RBC # BLD: 3.75 M/UL (ref 4–5.2)
RBC # BLD: ABNORMAL 10*6/UL
SEG NEUTROPHILS: 62 % (ref 36–66)
SEGMENTED NEUTROPHILS ABSOLUTE COUNT: 4 K/UL (ref 1.8–7.7)
SODIUM BLD-SCNC: 139 MMOL/L (ref 135–144)
SPECIMEN DESCRIPTION: ABNORMAL
STATUS: ABNORMAL
WBC # BLD: 6.5 K/UL (ref 3.5–11)
WBC # BLD: ABNORMAL 10*3/UL

## 2018-09-29 PROCEDURE — 6360000002 HC RX W HCPCS: Performed by: INTERNAL MEDICINE

## 2018-09-29 PROCEDURE — 36415 COLL VENOUS BLD VENIPUNCTURE: CPT

## 2018-09-29 PROCEDURE — 6370000000 HC RX 637 (ALT 250 FOR IP): Performed by: INTERNAL MEDICINE

## 2018-09-29 PROCEDURE — 99233 SBSQ HOSP IP/OBS HIGH 50: CPT | Performed by: INTERNAL MEDICINE

## 2018-09-29 PROCEDURE — 85025 COMPLETE CBC W/AUTO DIFF WBC: CPT

## 2018-09-29 PROCEDURE — G0008 ADMIN INFLUENZA VIRUS VAC: HCPCS | Performed by: INTERNAL MEDICINE

## 2018-09-29 PROCEDURE — 83605 ASSAY OF LACTIC ACID: CPT

## 2018-09-29 PROCEDURE — 2060000000 HC ICU INTERMEDIATE R&B

## 2018-09-29 PROCEDURE — 2580000003 HC RX 258: Performed by: INTERNAL MEDICINE

## 2018-09-29 PROCEDURE — 72100 X-RAY EXAM L-S SPINE 2/3 VWS: CPT

## 2018-09-29 PROCEDURE — 90686 IIV4 VACC NO PRSV 0.5 ML IM: CPT | Performed by: INTERNAL MEDICINE

## 2018-09-29 PROCEDURE — 80048 BASIC METABOLIC PNL TOTAL CA: CPT

## 2018-09-29 PROCEDURE — 6370000000 HC RX 637 (ALT 250 FOR IP): Performed by: NURSE PRACTITIONER

## 2018-09-29 PROCEDURE — C9113 INJ PANTOPRAZOLE SODIUM, VIA: HCPCS | Performed by: INTERNAL MEDICINE

## 2018-09-29 RX ORDER — ATORVASTATIN CALCIUM 80 MG/1
80 TABLET, FILM COATED ORAL DAILY
Status: DISCONTINUED | OUTPATIENT
Start: 2018-09-29 | End: 2018-10-01 | Stop reason: HOSPADM

## 2018-09-29 RX ORDER — IPRATROPIUM BROMIDE AND ALBUTEROL SULFATE 2.5; .5 MG/3ML; MG/3ML
1 SOLUTION RESPIRATORY (INHALATION) 3 TIMES DAILY
Status: CANCELLED | OUTPATIENT
Start: 2018-09-29

## 2018-09-29 RX ORDER — CYCLOBENZAPRINE HCL 5 MG
5 TABLET ORAL 2 TIMES DAILY PRN
Status: DISCONTINUED | OUTPATIENT
Start: 2018-09-29 | End: 2018-10-01 | Stop reason: HOSPADM

## 2018-09-29 RX ORDER — LISINOPRIL 2.5 MG/1
2.5 TABLET ORAL NIGHTLY
Status: DISCONTINUED | OUTPATIENT
Start: 2018-09-29 | End: 2018-10-01 | Stop reason: HOSPADM

## 2018-09-29 RX ORDER — ISOSORBIDE MONONITRATE 30 MG/1
30 TABLET, EXTENDED RELEASE ORAL DAILY
Status: DISCONTINUED | OUTPATIENT
Start: 2018-09-29 | End: 2018-10-01 | Stop reason: HOSPADM

## 2018-09-29 RX ORDER — ASPIRIN 81 MG/1
81 TABLET ORAL DAILY
Status: DISCONTINUED | OUTPATIENT
Start: 2018-09-29 | End: 2018-10-01 | Stop reason: HOSPADM

## 2018-09-29 RX ADMIN — FENTANYL CITRATE 25 MCG: 50 INJECTION, SOLUTION INTRAMUSCULAR; INTRAVENOUS at 08:57

## 2018-09-29 RX ADMIN — CIPROFLOXACIN 200 MG: 2 INJECTION, SOLUTION INTRAVENOUS at 08:52

## 2018-09-29 RX ADMIN — FENTANYL CITRATE 25 MCG: 50 INJECTION, SOLUTION INTRAMUSCULAR; INTRAVENOUS at 05:53

## 2018-09-29 RX ADMIN — SERTRALINE 75 MG: 25 TABLET, FILM COATED ORAL at 13:10

## 2018-09-29 RX ADMIN — PANTOPRAZOLE SODIUM 40 MG: 40 INJECTION, POWDER, FOR SOLUTION INTRAVENOUS at 08:54

## 2018-09-29 RX ADMIN — ASPIRIN 300 MG: 300 SUPPOSITORY RECTAL at 08:54

## 2018-09-29 RX ADMIN — CIPROFLOXACIN 200 MG: 2 INJECTION, SOLUTION INTRAVENOUS at 20:30

## 2018-09-29 RX ADMIN — Medication 10 ML: at 08:55

## 2018-09-29 RX ADMIN — INFLUENZA A VIRUS A/MICHIGAN/45/2015 X-275 (H1N1) ANTIGEN (FORMALDEHYDE INACTIVATED), INFLUENZA A VIRUS A/SINGAPORE/INFIMH-16-0019/2016 IVR-186 (H3N2) ANTIGEN (FORMALDEHYDE INACTIVATED), INFLUENZA B VIRUS B/PHUKET/3073/2013 ANTIGEN (FORMALDEHYDE INACTIVATED), AND INFLUENZA B VIRUS B/MARYLAND/15/2016 BX-69A ANTIGEN (FORMALDEHYDE INACTIVATED) 0.5 ML: 15; 15; 15; 15 INJECTION, SUSPENSION INTRAMUSCULAR at 16:19

## 2018-09-29 RX ADMIN — CHOLESTYRAMINE 4 G: 4 POWDER, FOR SUSPENSION ORAL at 08:57

## 2018-09-29 RX ADMIN — Medication 10 ML: at 08:57

## 2018-09-29 RX ADMIN — Medication 10 ML: at 20:29

## 2018-09-29 RX ADMIN — LISINOPRIL 2.5 MG: 2.5 TABLET ORAL at 20:29

## 2018-09-29 RX ADMIN — FENTANYL CITRATE 25 MCG: 50 INJECTION, SOLUTION INTRAMUSCULAR; INTRAVENOUS at 03:53

## 2018-09-29 RX ADMIN — ATORVASTATIN CALCIUM 80 MG: 80 TABLET, FILM COATED ORAL at 20:30

## 2018-09-29 RX ADMIN — ISOSORBIDE MONONITRATE 30 MG: 30 TABLET ORAL at 13:10

## 2018-09-29 RX ADMIN — FENTANYL CITRATE 25 MCG: 50 INJECTION, SOLUTION INTRAMUSCULAR; INTRAVENOUS at 00:53

## 2018-09-29 RX ADMIN — FENTANYL CITRATE 25 MCG: 50 INJECTION, SOLUTION INTRAMUSCULAR; INTRAVENOUS at 12:08

## 2018-09-29 RX ADMIN — TICAGRELOR 90 MG: 90 TABLET ORAL at 20:30

## 2018-09-29 RX ADMIN — CYCLOBENZAPRINE HYDROCHLORIDE 5 MG: 5 TABLET, FILM COATED ORAL at 13:16

## 2018-09-29 ASSESSMENT — PAIN DESCRIPTION - FREQUENCY
FREQUENCY: CONTINUOUS
FREQUENCY: CONTINUOUS

## 2018-09-29 ASSESSMENT — PAIN SCALES - GENERAL
PAINLEVEL_OUTOF10: 0
PAINLEVEL_OUTOF10: 0
PAINLEVEL_OUTOF10: 8
PAINLEVEL_OUTOF10: 7
PAINLEVEL_OUTOF10: 0
PAINLEVEL_OUTOF10: 0
PAINLEVEL_OUTOF10: 9
PAINLEVEL_OUTOF10: 8
PAINLEVEL_OUTOF10: 10

## 2018-09-29 ASSESSMENT — PAIN DESCRIPTION - LOCATION
LOCATION: ABDOMEN
LOCATION: ABDOMEN

## 2018-09-29 ASSESSMENT — PAIN DESCRIPTION - ORIENTATION
ORIENTATION: ANTERIOR
ORIENTATION: ANTERIOR

## 2018-09-29 ASSESSMENT — PAIN DESCRIPTION - DESCRIPTORS
DESCRIPTORS: ACHING;CRAMPING
DESCRIPTORS: ACHING;CRAMPING

## 2018-09-29 ASSESSMENT — PAIN DESCRIPTION - PAIN TYPE
TYPE: ACUTE PAIN
TYPE: ACUTE PAIN

## 2018-09-29 NOTE — PROGRESS NOTES
influenza virus vaccine  0.5 mL Intramuscular Once    ciprofloxacin  200 mg Intravenous Q12H    cholestyramine light  4 g Per NG tube BID    sodium chloride flush  10 mL Intravenous 2 times per day    pantoprazole  40 mg Intravenous Daily    And    sodium chloride (PF)  10 mL Intravenous Daily       IV Infusions (if any):   sodium chloride 50 mL/hr at 09/29/18 1309       Diagnostics:   Telemetry:Sinus rhythm with occasional PVCs  Labs:   CBC:  Recent Labs      09/28/18   0528  09/29/18   0546   WBC  6.7  6.5   HGB  12.1  10.9*   HCT  35.9*  32.7*   PLT  224  205     Magnesium:No results for input(s): MG in the last 72 hours. BMP:  Recent Labs      09/28/18   0528  09/29/18   0546   NA  138  139   K  5.0  4.4   CALCIUM  8.2*  8.0*   CO2  15*  15*   BUN  29*  20   CREATININE  1.28*  1.01*   LABGLOM  42*  55*   GLUCOSE  80  64*     BNP:No results for input(s): BNP in the last 72 hours. PT/INR:No results for input(s): PROTIME, INR in the last 72 hours. APTT:No results for input(s): APTT in the last 72 hours. CARDIAC ENZYMES:  Recent Labs      09/28/18   0830   TROPONINT  <0.03     FASTING LIPID PANEL:  Lab Results   Component Value Date    HDL 34 05/16/2014    TRIG 171 05/16/2014     LIVER PROFILE:  Recent Labs      09/27/18   1210  09/28/18   0528   AST  14  14   ALT  14  11   LABALBU  4.3  3.7   ALKPHOS  97  78   BILITOT  0.32  0.24*   BILIDIR  0.10   --    IBILI  0.22   --    PROT  7.7  6.4   GLOB  NOT REPORTED   --    ALBUMIN  NOT REPORTED  NOT REPORTED        ASSESSMENT:  1. Coronary artery disease with prior stenting done in May 2018 at [facility], 7 stents were deployed according to the patient and her last dose of Brilinta was 2 days ago  2. Small bowel obstruction that seems to be resolving nasogastric tube was removed  3. Normal LV contractibility with ejection fraction of 60% and mild aortic regurgitation on echocardiogram done on 9/28/2018  4.  Dyslipidemia, treated    Patient Active Problem List

## 2018-09-29 NOTE — PROGRESS NOTES
Gastroenterology Progress Note    Smooth Domínguez is a 61 y.o. female patient. Hospitalization day:2    I am seeing the patient today for diarrhea, abdominal distention. SUBJECTIVE:      Patient did not have bowel movements this morning. She is known to have 4-5 bowel movements for a long time. She had subtotal colectomy with ileal rectal anastomosis. For this reason she used to have 4-5 bowel movements on daily basis for a long time. It appears patient is admitted with abdominal distention and managed as bowel obstruction. She has NG tube. At present this is clamped. On further discussion patient stated that she never had abdominal pain nausea vomiting when she came to the hospital.  However, CT scan revealed questionable dilated small bowel loops. Repeat abdominal x-ray yesterday did not reveal signs of bowel obstruction. Physical    VITALS:  BP (!) 114/48 Comment: RN notified @ 0651  Pulse 64   Temp 97.7 °F (36.5 °C) (Oral)   Resp 16   Ht 5' 4\" (1.626 m)   Wt 172 lb 14.4 oz (78.4 kg)   SpO2 99%   BMI 29.68 kg/m²   TEMPERATURE:  Current - Temp: 97.7 °F (36.5 °C); Max - Temp  Av.1 °F (36.7 °C)  Min: 97.7 °F (36.5 °C)  Max: 98.6 °F (37 °C)    General:  Alert and oriented,  No apparent distress  Skin- without jaundice  Eyes: anicteric sclera  Cardiac: RRR, Nl s1s2, without murmurs  Lungs CTA Bilaterally, normal effort  Abdomen soft, ND, NT, no HSM, Bowel sounds normal  Ext: without edema  Neuro: no asterixis   No abdominal distention.   Data    CBC:   Lab Results   Component Value Date    WBC 6.5 2018    RBC 3.75 2018    RBC 4.09 2012    HGB 10.9 2018    HCT 32.7 2018    MCV 87.2 2018    MCH 29.0 2018    MCHC 33.3 2018    RDW 13.9 2018     2018     2012    MPV NOT REPORTED 2018     Hepatic Function Panel:    Lab Results   Component Value Date    ALKPHOS 78 2018    ALT 11 2018    AST 14

## 2018-09-30 PROBLEM — K52.9 CHRONIC DIARRHEA: Status: ACTIVE | Noted: 2018-09-30

## 2018-09-30 LAB
ANION GAP SERPL CALCULATED.3IONS-SCNC: 11 MMOL/L (ref 9–17)
BUN BLDV-MCNC: 11 MG/DL (ref 8–23)
BUN/CREAT BLD: 12 (ref 9–20)
CALCIUM SERPL-MCNC: 8.3 MG/DL (ref 8.6–10.4)
CHLORIDE BLD-SCNC: 112 MMOL/L (ref 98–107)
CO2: 16 MMOL/L (ref 20–31)
CREAT SERPL-MCNC: 0.92 MG/DL (ref 0.5–0.9)
GFR AFRICAN AMERICAN: >60 ML/MIN
GFR NON-AFRICAN AMERICAN: >60 ML/MIN
GFR SERPL CREATININE-BSD FRML MDRD: ABNORMAL ML/MIN/{1.73_M2}
GFR SERPL CREATININE-BSD FRML MDRD: ABNORMAL ML/MIN/{1.73_M2}
GLUCOSE BLD-MCNC: 80 MG/DL (ref 70–99)
MAGNESIUM: 1.9 MG/DL (ref 1.6–2.6)
POTASSIUM SERPL-SCNC: 4.5 MMOL/L (ref 3.7–5.3)
SODIUM BLD-SCNC: 139 MMOL/L (ref 135–144)

## 2018-09-30 PROCEDURE — 6370000000 HC RX 637 (ALT 250 FOR IP): Performed by: INTERNAL MEDICINE

## 2018-09-30 PROCEDURE — 36415 COLL VENOUS BLD VENIPUNCTURE: CPT

## 2018-09-30 PROCEDURE — 83735 ASSAY OF MAGNESIUM: CPT

## 2018-09-30 PROCEDURE — 97530 THERAPEUTIC ACTIVITIES: CPT

## 2018-09-30 PROCEDURE — 1200000000 HC SEMI PRIVATE

## 2018-09-30 PROCEDURE — 97161 PT EVAL LOW COMPLEX 20 MIN: CPT

## 2018-09-30 PROCEDURE — 99232 SBSQ HOSP IP/OBS MODERATE 35: CPT | Performed by: INTERNAL MEDICINE

## 2018-09-30 PROCEDURE — 6370000000 HC RX 637 (ALT 250 FOR IP): Performed by: NURSE PRACTITIONER

## 2018-09-30 PROCEDURE — 2580000003 HC RX 258: Performed by: INTERNAL MEDICINE

## 2018-09-30 PROCEDURE — 97116 GAIT TRAINING THERAPY: CPT

## 2018-09-30 PROCEDURE — C9113 INJ PANTOPRAZOLE SODIUM, VIA: HCPCS | Performed by: INTERNAL MEDICINE

## 2018-09-30 PROCEDURE — 80048 BASIC METABOLIC PNL TOTAL CA: CPT

## 2018-09-30 PROCEDURE — 6360000002 HC RX W HCPCS: Performed by: INTERNAL MEDICINE

## 2018-09-30 RX ORDER — NITROFURANTOIN 25; 75 MG/1; MG/1
100 CAPSULE ORAL EVERY 12 HOURS SCHEDULED
Status: DISCONTINUED | OUTPATIENT
Start: 2018-09-30 | End: 2018-10-01 | Stop reason: HOSPADM

## 2018-09-30 RX ORDER — SODIUM BICARBONATE 650 MG/1
650 TABLET ORAL 2 TIMES DAILY
Status: DISCONTINUED | OUTPATIENT
Start: 2018-09-30 | End: 2018-10-01 | Stop reason: HOSPADM

## 2018-09-30 RX ORDER — METOPROLOL SUCCINATE 25 MG/1
25 TABLET, EXTENDED RELEASE ORAL DAILY
Status: DISCONTINUED | OUTPATIENT
Start: 2018-09-30 | End: 2018-10-01 | Stop reason: HOSPADM

## 2018-09-30 RX ORDER — MAGNESIUM SULFATE 1 G/100ML
1 INJECTION INTRAVENOUS PRN
Status: DISCONTINUED | OUTPATIENT
Start: 2018-09-30 | End: 2018-10-01 | Stop reason: HOSPADM

## 2018-09-30 RX ADMIN — ISOSORBIDE MONONITRATE 30 MG: 30 TABLET ORAL at 08:10

## 2018-09-30 RX ADMIN — NITROFURANTOIN MONOHYDRATE/MACROCRYSTALLINE 100 MG: 25; 75 CAPSULE ORAL at 11:45

## 2018-09-30 RX ADMIN — TICAGRELOR 90 MG: 90 TABLET ORAL at 08:10

## 2018-09-30 RX ADMIN — TICAGRELOR 90 MG: 90 TABLET ORAL at 19:55

## 2018-09-30 RX ADMIN — PANTOPRAZOLE SODIUM 40 MG: 40 INJECTION, POWDER, FOR SOLUTION INTRAVENOUS at 08:11

## 2018-09-30 RX ADMIN — SODIUM BICARBONATE 650 MG: 650 TABLET ORAL at 11:45

## 2018-09-30 RX ADMIN — SERTRALINE 75 MG: 25 TABLET, FILM COATED ORAL at 08:10

## 2018-09-30 RX ADMIN — CYCLOBENZAPRINE HYDROCHLORIDE 5 MG: 5 TABLET, FILM COATED ORAL at 01:40

## 2018-09-30 RX ADMIN — SODIUM BICARBONATE 650 MG: 650 TABLET ORAL at 19:55

## 2018-09-30 RX ADMIN — CIPROFLOXACIN 200 MG: 2 INJECTION, SOLUTION INTRAVENOUS at 08:11

## 2018-09-30 RX ADMIN — SODIUM CHLORIDE: 9 INJECTION, SOLUTION INTRAVENOUS at 01:40

## 2018-09-30 RX ADMIN — NITROFURANTOIN MONOHYDRATE/MACROCRYSTALLINE 100 MG: 25; 75 CAPSULE ORAL at 19:55

## 2018-09-30 RX ADMIN — CYCLOBENZAPRINE HYDROCHLORIDE 5 MG: 5 TABLET, FILM COATED ORAL at 14:49

## 2018-09-30 RX ADMIN — ATORVASTATIN CALCIUM 80 MG: 80 TABLET, FILM COATED ORAL at 19:55

## 2018-09-30 RX ADMIN — LISINOPRIL 2.5 MG: 2.5 TABLET ORAL at 19:55

## 2018-09-30 RX ADMIN — ASPIRIN 81 MG: 81 TABLET, COATED ORAL at 08:10

## 2018-09-30 RX ADMIN — METOPROLOL SUCCINATE 25 MG: 25 TABLET, FILM COATED, EXTENDED RELEASE ORAL at 11:45

## 2018-09-30 RX ADMIN — Medication 10 ML: at 08:14

## 2018-09-30 ASSESSMENT — PAIN DESCRIPTION - PROGRESSION

## 2018-09-30 ASSESSMENT — PAIN SCALES - WONG BAKER
WONGBAKER_NUMERICALRESPONSE: 10

## 2018-09-30 NOTE — PROGRESS NOTES
PM last night and patient was asymptomatic and no recurrence    Labs:   CBC:  Recent Labs      09/28/18   0528  09/29/18   0546   WBC  6.7  6.5   HGB  12.1  10.9*   HCT  35.9*  32.7*   PLT  224  205     Magnesium:No results for input(s): MG in the last 72 hours. BMP:  Recent Labs      09/29/18   0546  09/30/18   0503   NA  139  139   K  4.4  4.5   CALCIUM  8.0*  8.3*   CO2  15*  16*   BUN  20  11   CREATININE  1.01*  0.92*   LABGLOM  55*  >60   GLUCOSE  64*  80     BNP:No results for input(s): BNP in the last 72 hours. PT/INR:No results for input(s): PROTIME, INR in the last 72 hours. APTT:No results for input(s): APTT in the last 72 hours. CARDIAC ENZYMES:  Recent Labs      09/28/18   0830   TROPONINT  <0.03     FASTING LIPID PANEL:  Lab Results   Component Value Date    HDL 34 05/16/2014    TRIG 171 05/16/2014     LIVER PROFILE:  Recent Labs      09/27/18   1210  09/28/18   0528   AST  14  14   ALT  14  11   LABALBU  4.3  3.7   ALKPHOS  97  78   BILITOT  0.32  0.24*   BILIDIR  0.10   --    IBILI  0.22   --    PROT  7.7  6.4   GLOB  NOT REPORTED   --    ALBUMIN  NOT REPORTED  NOT REPORTED        ASSESSMENT:  1. Coronary artery disease with prior stenting done in May 2018 at [facility], 7 stents were deployed according to the patient and Cathlene Balding was restarted yesterday and 9/29/2018 and held for 2 days prior - no clinical angina  2. Small bowel obstruction that seems to be resolving nasogastric tube was removed  3. 6 beat nonsustained V. tach, asymptomatic-preserved LV systolic function and mild aortic regurgitation on echocardiogram done on 9/28/2018  4. Dyslipidemia, treated    PLAN:  1. Continue current cardiac medications. 2. Patient is back on dual antiplatelet therapy  3. Asymptomatic for 6 beat nonsustained V. tach, add magnesium level on AM labs and replace if low per sliding scale and restart her Toprol-XL as at home  4. Continue to monitor  Discussed with patient and Dr. Muna Moy.     [facility], APRN - CNP     I reviewed above clinical information and reviewed case with my NP and agree with her assessment and plan.     Ortega Gottlieb MD

## 2018-09-30 NOTE — PROGRESS NOTES
Gastroenterology Progress Note    Shorty Vaughan is a 61 y.o. female patient. Hospitalization Day:3    I am seeing the patient today for   Abdominal distention, loose bowels. SUBJECTIVE:      Since yesterday patient is tolerating liquid diet. No abdominal pain. Nausea vomiting resolved. She has history of chronic loose bowels. See has subtotal colectomy with ileo anal anastomosis in the past.  I think this is the etiology for her diarrhea. Discussed with the patient regarding  These issues. Physical    VITALS:  /61   Pulse 75   Temp 97.7 °F (36.5 °C) (Oral)   Resp 16   Ht 5' 4\" (1.626 m)   Wt 172 lb 14.4 oz (78.4 kg)   SpO2 98%   BMI 29.68 kg/m²   TEMPERATURE:  Current - Temp: 97.7 °F (36.5 °C); Max - Temp  Av.1 °F (36.7 °C)  Min: 97.7 °F (36.5 °C)  Max: 98.3 °F (36.8 °C)    General:  Alert and oriented,  No apparent   Distress. Skin- without jaundice  Eyes: anicteric sclera  Cardiac: RRR, Nl s1s2, without murmurs  Lungs CTA Bilaterally, normal effort  Abdomen soft, ND, NT, no HSM, Bowel sounds normal  Ext: without edema  Neuro: no asterixis     Data    Data Review:    Recent Labs      18   0528  18   0546   WBC  6.7  6.5   HGB  12.1  10.9*   HCT  35.9*  32.7*   MCV  86.6  87.2   PLT  224  205     Recent Labs      18   0528  18   0546  18   0503   NA  138  139  139   K  5.0  4.4  4.5   CL  113*  113*  112*   CO2  15*  15*  16*   BUN  29*  20  11   CREATININE  1.28*  1.01*  0.92*     Recent Labs      18   0528   AST  14   ALT  11   BILITOT  0.24*   ALKPHOS  78     No results for input(s): LIPASE, AMYLASE in the last 72 hours. No results for input(s): PROTIME, INR in the last 72 hours. No results for input(s): PTT in the last 72 hours.     Radiology Review:        ASSESSMENT:  Principal Problem:    Acute kidney injury ([address]  Active Problems:    CAD (coronary artery disease)    Abdominal pain    SBO (small bowel obstruction)    Metabolic

## 2018-09-30 NOTE — PROGRESS NOTES
y/o female with partial small bowel obstruction vs ileus 2/2 gastroenteritis and UTI. Patient with history of multiple abdominal surgeries with iliorectal anastomosis. Stool studies reviewed, negative.     - UTI   - RADHA 2/2 hypovolemia; improving     Plan  1. UTI: Cipro. Sensitivities show cipro is resistant. Antibiotics per primary team   2. Ok for diet advancement today as tolerated. 3. Strict I&O's  4. GI recs appreciated   5. Will continue non-operative management. From a surgery standpoint, ok for discharge once tolerating low fiber diet. Electronically signed by Ramon Can DO  on 9/30/2018 at 6:20 AM       General Surgery Attending Attestation Note    Patient was seen and examined. I agree with the above resident's exam, assessment and plan.      Feeling much better  Having bowel movements  Abdominal pain has resolved  Back pain is improved with flexeril  Ok to advance to low fiber diet  Surgically stable for discharge  Follow up with Dr. Lili Alfred in 1-2 weeks    [address], [address] [place] [address], [facility], [address]  Office: [phone]  Pager: [phone]

## 2018-09-30 NOTE — PROGRESS NOTES
AST 14 09/28/2018    ALT 11 09/28/2018        Assessment:  Patient Active Problem List   Diagnosis    Diarrhea    Acute [facility] ([facility] [address]    CAD (coronary artery disease)    Abdominal pain    SBO (small bowel obstruction)    Metabolic acidosis     Acute kidney injury resolved C IV fluids  SBO resolves  Diarrhea  Coronary Artery disease native heart native arteries no angina stable home meds restarted  UTI E. coli sensitive to Cipro will DC start Macrobid   None anion gap metabolic acidosis secondary to diarrhea sodium bicarb addedPlan:  Degenerative disc disease lumbar spine.   Much better  Minutes reviewing continue DVT prophylaxis ambulate transferred regular floor,  home  hopefully tomorrow see orders    Esteban Balderrama MD  12:03 PM

## 2018-10-01 VITALS
HEIGHT: 64 IN | BODY MASS INDEX: 30.25 KG/M2 | RESPIRATION RATE: 20 BRPM | TEMPERATURE: 98.2 F | HEART RATE: 66 BPM | SYSTOLIC BLOOD PRESSURE: 119 MMHG | OXYGEN SATURATION: 100 % | WEIGHT: 177.2 LBS | DIASTOLIC BLOOD PRESSURE: 72 MMHG

## 2018-10-01 LAB
ANION GAP SERPL CALCULATED.3IONS-SCNC: 10 MMOL/L (ref 9–17)
BUN BLDV-MCNC: 8 MG/DL (ref 8–23)
BUN/CREAT BLD: 7 (ref 9–20)
CALCIUM SERPL-MCNC: 8.2 MG/DL (ref 8.6–10.4)
CHLORIDE BLD-SCNC: 117 MMOL/L (ref 98–107)
CO2: 17 MMOL/L (ref 20–31)
CREAT SERPL-MCNC: 1.09 MG/DL (ref 0.5–0.9)
GFR AFRICAN AMERICAN: >60 ML/MIN
GFR NON-AFRICAN AMERICAN: 51 ML/MIN
GFR SERPL CREATININE-BSD FRML MDRD: ABNORMAL ML/MIN/{1.73_M2}
GFR SERPL CREATININE-BSD FRML MDRD: ABNORMAL ML/MIN/{1.73_M2}
GLUCOSE BLD-MCNC: 87 MG/DL (ref 70–99)
POTASSIUM SERPL-SCNC: 3.6 MMOL/L (ref 3.7–5.3)
POTASSIUM SERPL-SCNC: 4 MMOL/L (ref 3.7–5.3)
SODIUM BLD-SCNC: 144 MMOL/L (ref 135–144)

## 2018-10-01 PROCEDURE — 99232 SBSQ HOSP IP/OBS MODERATE 35: CPT | Performed by: INTERNAL MEDICINE

## 2018-10-01 PROCEDURE — 80048 BASIC METABOLIC PNL TOTAL CA: CPT

## 2018-10-01 PROCEDURE — C9113 INJ PANTOPRAZOLE SODIUM, VIA: HCPCS | Performed by: INTERNAL MEDICINE

## 2018-10-01 PROCEDURE — 84132 ASSAY OF SERUM POTASSIUM: CPT

## 2018-10-01 PROCEDURE — 6360000002 HC RX W HCPCS: Performed by: INTERNAL MEDICINE

## 2018-10-01 PROCEDURE — 36415 COLL VENOUS BLD VENIPUNCTURE: CPT

## 2018-10-01 PROCEDURE — 2580000003 HC RX 258: Performed by: INTERNAL MEDICINE

## 2018-10-01 PROCEDURE — 6370000000 HC RX 637 (ALT 250 FOR IP): Performed by: INTERNAL MEDICINE

## 2018-10-01 PROCEDURE — 6370000000 HC RX 637 (ALT 250 FOR IP): Performed by: NURSE PRACTITIONER

## 2018-10-01 RX ORDER — NITROFURANTOIN 25; 75 MG/1; MG/1
100 CAPSULE ORAL EVERY 12 HOURS SCHEDULED
Qty: 12 CAPSULE | Refills: 0 | Status: SHIPPED | OUTPATIENT
Start: 2018-10-01 | End: 2018-10-07

## 2018-10-01 RX ORDER — POTASSIUM CHLORIDE 20 MEQ/1
40 TABLET, EXTENDED RELEASE ORAL ONCE
Status: COMPLETED | OUTPATIENT
Start: 2018-10-01 | End: 2018-10-01

## 2018-10-01 RX ADMIN — ISOSORBIDE MONONITRATE 30 MG: 30 TABLET ORAL at 10:36

## 2018-10-01 RX ADMIN — TICAGRELOR 90 MG: 90 TABLET ORAL at 10:40

## 2018-10-01 RX ADMIN — CYCLOBENZAPRINE HYDROCHLORIDE 5 MG: 5 TABLET, FILM COATED ORAL at 06:01

## 2018-10-01 RX ADMIN — METOPROLOL SUCCINATE 25 MG: 25 TABLET, FILM COATED, EXTENDED RELEASE ORAL at 10:36

## 2018-10-01 RX ADMIN — Medication 10 ML: at 10:37

## 2018-10-01 RX ADMIN — NITROFURANTOIN MONOHYDRATE/MACROCRYSTALLINE 100 MG: 25; 75 CAPSULE ORAL at 10:46

## 2018-10-01 RX ADMIN — ASPIRIN 81 MG: 81 TABLET, COATED ORAL at 10:36

## 2018-10-01 RX ADMIN — POTASSIUM CHLORIDE 40 MEQ: 20 TABLET, EXTENDED RELEASE ORAL at 11:29

## 2018-10-01 RX ADMIN — PANTOPRAZOLE SODIUM 40 MG: 40 INJECTION, POWDER, FOR SOLUTION INTRAVENOUS at 10:36

## 2018-10-01 RX ADMIN — SODIUM BICARBONATE 650 MG: 650 TABLET ORAL at 10:36

## 2018-10-01 RX ADMIN — SERTRALINE 75 MG: 25 TABLET, FILM COATED ORAL at 10:36

## 2018-10-01 ASSESSMENT — PAIN SCALES - WONG BAKER
WONGBAKER_NUMERICALRESPONSE: 10

## 2018-10-01 ASSESSMENT — PAIN DESCRIPTION - PROGRESSION
CLINICAL_PROGRESSION: GRADUALLY IMPROVING

## 2018-10-01 NOTE — PLAN OF CARE
Problem: Bowel/Gastric:  Goal: Occurrences of constipation will decrease  Occurrences of constipation will decrease   Outcome: Ongoing  Patient had one loose liquid stool today of 600ml. Encouraged patient to use call light before getting up to use restroom. Water pitcher filled and at patient's bedside. Encourage patient to take frequent drinks in order to maintain adequate hydration. Will continue to monitor patient's elimination pattern.

## 2018-10-01 NOTE — PROGRESS NOTES
CALCIUM 8.2 10/01/2018    BILITOT 0.24 09/28/2018    ALKPHOS 78 09/28/2018    AST 14 09/28/2018    ALT 11 09/28/2018        Assessment:  Patient Active Problem List   Diagnosis    Diarrhea    Smoker    Acute [facility] injury ([facility] [address]    CAD (coronary artery disease)    Abdominal pain    SBO (small bowel obstruction)    Metabolic acidosis    Chronic diarrhea     Partial small bowel obstruction resolved  Acute kidney injury resolved  Hypokalemia K supplement given  Non-anion metabolic acidosis  Coronary artery disease native heart native arteries no angina  UTI without hematuria continue with Macrobid  COPD stable same treatment  Chronic smoker a few cigarettes a day advised to quit smoking options discussed. Patient unwilling to quit  Major depression in remission no suicidal thoughts continue with present treatment  Plan:    It's labs reviewed increase oral intake home today and will see in the office in 1 week.   We will check BMP in 1 week finish the Macrobid see orders      Azucena Sanchez MD  6:42 PM

## 2018-10-01 NOTE — PROGRESS NOTES
Discharge teaching and instructions completed with patient and family using teachback method. AVS reviewed. Printed prescriptions given to patient. Patient voiced understanding regarding prescriptions, follow up appointments, and care of self at home. IV taken out. Heart monitor off. Discharged home with family. All questions answered.

## 2018-10-01 NOTE — CARE COORDINATION
Discharge planning    Patient chart reviewed. Per cardiology to continue with current meds and can be dc home today. GI can be dc also . Gen surgery states can go. Per RN patient is up and ambulating and has no needs for home. Patient has high readmission risk of 18 but according to office they will not make follow up appt unless patient is discharged. Since Dr Kwesi Quan does not round until later unable to make a follow up appt.

## 2018-10-01 NOTE — PROGRESS NOTES
Gastroenterology Progress Note    Shorty Vaughan is a 61 y.o. female patient. Hospitalization Day:4    I am seeing the patient today for abdominal distention, diarrhea. SUBJECTIVE:    Patient tolerating soft diet. She does not have significant diarrhea except that she has frequent bowel movements secondary to subtotal colectomy and ileal anal anastomosis. No nausea vomiting. Abdominal pain resolved. Overall she is doing well. No chest pain, shortness of breath, palpitations, cough etc.  Physical    VITALS:  /67   Pulse 59   Temp 98.1 °F (36.7 °C) (Oral)   Resp 20   Ht 5' 4\" (1.626 m)   Wt 177 lb 9.6 oz (80.6 kg)   SpO2 100%   BMI 30.48 kg/m²   TEMPERATURE:  Current - Temp: 98.1 °F (36.7 °C); Max - Temp  Av °F (36.7 °C)  Min: 97.7 °F (36.5 °C)  Max: 98.4 °F (36.9 °C)    General:  Alert and oriented,  No apparent distress  Skin- without jaundice  Eyes: anicteric sclera  Cardiac: RRR, Nl s1s2, without murmurs  Lungs CTA Bilaterally, normal effort  Abdomen soft, ND, NT, no HSM, Bowel sounds normal  Ext: without edema  Neuro: no asterixis     Data    Data Review:    Recent Labs      18   0546   WBC  6.5   HGB  10.9*   HCT  32.7*   MCV  87.2   PLT  205     Recent Labs      18   0546  18   0503  10/01/18   0538   NA  139  139  144   K  4.4  4.5  3.6*   CL  113*  112*  117*   CO2  15*  16*  17*   BUN  20  11  8   CREATININE  1.01*  0.92*  1.09*     No results for input(s): AST, ALT, ALB, BILIDIR, BILITOT, ALKPHOS in the last 72 hours. No results for input(s): LIPASE, AMYLASE in the last 72 hours. No results for input(s): PROTIME, INR in the last 72 hours. No results for input(s): PTT in the last 72 hours.     Radiology Review:        ASSESSMENT:  Principal Problem:    Acute kidney injury ([address]  Active Problems:    CAD (coronary artery disease)    Abdominal pain    SBO (small bowel obstruction)    Metabolic acidosis    Chronic diarrhea  Resolved Problems:    Smoker      The

## 2018-10-01 NOTE — PLAN OF CARE
Problem: Bowel/Gastric:  Goal: Ability to achieve a regular elimination pattern will improve  Ability to achieve a regular elimination pattern will improve   Outcome: Ongoing      Problem: Pain:  Goal: Control of acute pain  Control of acute pain   Outcome: Ongoing      Problem: Falls - Risk of:  Goal: Will remain free from falls  Will remain free from falls   Outcome: Ongoing  Pt fall risk, fall band present, falling star, safety alarm activated and in use as needed. Bed in lowest position, call light within reach. Hourly rounding performed. Pt encouraged to use call light. See Stuart Estrella fall risk assessment. Patient offered toileting assistance during rounding. Hourly rounds performed.

## 2018-10-07 PROBLEM — N39.0 URINARY TRACT INFECTION WITHOUT HEMATURIA: Status: ACTIVE | Noted: 2018-10-07

## 2018-10-11 ENCOUNTER — OFFICE VISIT (OUTPATIENT)
Dept: GASTROENTEROLOGY | Age: 63
End: 2018-10-11
Payer: MEDICAID

## 2018-10-11 VITALS
DIASTOLIC BLOOD PRESSURE: 80 MMHG | BODY MASS INDEX: 30.71 KG/M2 | WEIGHT: 178.9 LBS | HEART RATE: 82 BPM | SYSTOLIC BLOOD PRESSURE: 130 MMHG

## 2018-10-11 DIAGNOSIS — K52.9 CHRONIC DIARRHEA: Primary | ICD-10-CM

## 2018-10-11 PROCEDURE — 99214 OFFICE O/P EST MOD 30 MIN: CPT | Performed by: INTERNAL MEDICINE

## 2018-10-11 PROCEDURE — G8427 DOCREV CUR MEDS BY ELIG CLIN: HCPCS | Performed by: INTERNAL MEDICINE

## 2018-10-11 PROCEDURE — G8482 FLU IMMUNIZE ORDER/ADMIN: HCPCS | Performed by: INTERNAL MEDICINE

## 2018-10-11 PROCEDURE — 1111F DSCHRG MED/CURRENT MED MERGE: CPT | Performed by: INTERNAL MEDICINE

## 2018-10-11 PROCEDURE — G8417 CALC BMI ABV UP PARAM F/U: HCPCS | Performed by: INTERNAL MEDICINE

## 2018-10-11 PROCEDURE — G8598 ASA/ANTIPLAT THER USED: HCPCS | Performed by: INTERNAL MEDICINE

## 2018-10-11 PROCEDURE — 1036F TOBACCO NON-USER: CPT | Performed by: INTERNAL MEDICINE

## 2018-10-11 PROCEDURE — 3017F COLORECTAL CA SCREEN DOC REV: CPT | Performed by: INTERNAL MEDICINE

## 2018-10-11 ASSESSMENT — ENCOUNTER SYMPTOMS
DIARRHEA: 1
CHOKING: 0
COUGH: 0
NAUSEA: 0
ABDOMINAL PAIN: 0
RECTAL PAIN: 0
TROUBLE SWALLOWING: 1
SORE THROAT: 0
SINUS PAIN: 0
CONSTIPATION: 0
WHEEZING: 0
SINUS PRESSURE: 0
VOMITING: 0
ANAL BLEEDING: 0
BLOOD IN STOOL: 0
ABDOMINAL DISTENTION: 0
SHORTNESS OF BREATH: 1

## 2018-10-11 NOTE — PROGRESS NOTES
Subjective:      Patient ID: Eliu Guillen is a 61 y.o. female. HPI . Dr. Nicole Glass MD our mutual patient Eliu Guillen was seen  for   1. Chronic diarrhea      Patient seen for follow-up of recent hospitalization at [facility].      Patient had subtotal colectomy with ileal rectal anastomosis in the past.  For this reason patient has chronic diarrhea. Typically she has 4-5 bowel movements a day, liquid consistency without hematochezia. She does have intermittent abdominal distention bloating. No weight loss. On occasion she has nocturnal bowel movements. Recently patient was seen at [facility] emergency department and was admitted as small bowel obstruction. However H and did not have typical symptoms of small bowel obstruction including nausea vomiting. At that time she had frequent loose bowels. Imaging studies did reveal questionable narrowing at the anastomosis/ileoanal anastomosis. During this visit patient denies significant issues except frequent bowel movements which appears to be chronic. She is tolerating diet well. No nausea vomiting. Past Medical, Family, and Social History reviewed and does contribute to the patient presenting condition. patient\"s PMH/PSH,SH,PSYCH hx, MEDs, ALLERGIES, and ROS was all reviewed and updated ion the appropriate sections    Review of Systems   Constitutional: Positive for appetite change (don't have one). Negative for activity change. HENT: Positive for trouble swallowing (meats). Negative for postnasal drip, sinus pain, sinus pressure and sore throat. Eyes: Positive for visual disturbance (wears eye glasses). Respiratory: Positive for shortness of breath. Negative for cough, choking and wheezing. Cardiovascular: Negative. Gastrointestinal: Positive for diarrhea. Negative for abdominal distention, abdominal pain, anal bleeding, blood in stool, constipation, nausea, rectal pain and vomiting.    Endocrine: Negative. Genitourinary: Negative. Musculoskeletal: Negative. Skin: Negative. Allergic/Immunologic: Negative for environmental allergies and food allergies. Neurological: Positive for dizziness, light-headedness and numbness. Negative for weakness and headaches. Hematological: Bruises/bleeds easily. Psychiatric/Behavioral: Negative for sleep disturbance. The patient is nervous/anxious. Objective:   Physical Exam   Constitutional: She is oriented to person, place, and time. She appears well-developed and well-nourished. HENT:   Head: Normocephalic and atraumatic. No oral lesions   Eyes: Pupils are equal, round, and reactive to light. Conjunctivae are normal. No scleral icterus. Neck: Normal range of motion. Neck supple. No hepatojugular reflux and no JVD present. No tracheal deviation present. No thyromegaly present. Cardiovascular: Normal rate, regular rhythm, normal heart sounds and intact distal pulses. Pulmonary/Chest: Effort normal and breath sounds normal. No respiratory distress. She has no wheezes. She has no rales. Abdominal: Soft. Bowel sounds are normal. She exhibits no distension, no ascites and no mass. There is no hepatomegaly. There is no tenderness. There is no rebound. No hernia. Musculoskeletal: She exhibits no edema or tenderness. No joint swelling   Lymphadenopathy:     She has no cervical adenopathy. Neurological: She is alert and oriented to person, place, and time. No cranial nerve deficit. Skin: Skin is warm. No bruising, no ecchymosis and no rash noted. No erythema. Psychiatric: Thought content normal.   Nursing note and vitals reviewed. Assessment:       Diagnosis Orders   1. Chronic diarrhea  Tissue Transglutaminase, IgA           Plan:      Patient is explained regarding investigations and labs. Patient is reassured.   Given the CT findings of questionable narrowing at the ileo-rectal anastomosis, patient is advised sigmoidoscopy to

## 2018-10-19 LAB — TISSUE TRANSGLUTAMINASE IGA: <1.9

## 2018-11-06 DIAGNOSIS — K52.9 CHRONIC DIARRHEA: ICD-10-CM

## 2019-03-08 ENCOUNTER — TELEPHONE (OUTPATIENT)
Dept: GASTROENTEROLOGY | Age: 64
End: 2019-03-08

## 2019-03-22 ENCOUNTER — APPOINTMENT (OUTPATIENT)
Dept: GENERAL RADIOLOGY | Age: 64
DRG: 469 | End: 2019-03-22
Payer: COMMERCIAL

## 2019-03-22 ENCOUNTER — HOSPITAL ENCOUNTER (INPATIENT)
Age: 64
LOS: 2 days | Discharge: HOME | DRG: 469 | End: 2019-03-24
Attending: EMERGENCY MEDICINE | Admitting: INTERNAL MEDICINE
Payer: COMMERCIAL

## 2019-03-22 DIAGNOSIS — R19.7 DIARRHEA, UNSPECIFIED TYPE: ICD-10-CM

## 2019-03-22 DIAGNOSIS — R79.89 ELEVATED SERUM CREATININE: Primary | ICD-10-CM

## 2019-03-22 LAB
ABSOLUTE EOS #: 0.3 K/UL (ref 0–0.4)
ABSOLUTE IMMATURE GRANULOCYTE: ABNORMAL K/UL (ref 0–0.3)
ABSOLUTE LYMPH #: 2.4 K/UL (ref 1–4.8)
ABSOLUTE MONO #: 0.5 K/UL (ref 0.2–0.8)
ALBUMIN SERPL-MCNC: 4.8 G/DL (ref 3.5–5.2)
ALBUMIN/GLOBULIN RATIO: ABNORMAL (ref 1–2.5)
ALP BLD-CCNC: 101 U/L (ref 35–104)
ALT SERPL-CCNC: 19 U/L (ref 5–33)
AMYLASE: 101 U/L (ref 28–100)
ANION GAP SERPL CALCULATED.3IONS-SCNC: 19 MMOL/L (ref 9–17)
AST SERPL-CCNC: 22 U/L
BASOPHILS # BLD: 0 % (ref 0–2)
BASOPHILS ABSOLUTE: 0 K/UL (ref 0–0.2)
BILIRUB SERPL-MCNC: 0.37 MG/DL (ref 0.3–1.2)
BILIRUBIN DIRECT: <0.08 MG/DL
BILIRUBIN, INDIRECT: ABNORMAL MG/DL (ref 0–1)
BUN BLDV-MCNC: 23 MG/DL (ref 8–23)
BUN/CREAT BLD: 9 (ref 9–20)
CALCIUM SERPL-MCNC: 10.4 MG/DL (ref 8.6–10.4)
CHLORIDE BLD-SCNC: 102 MMOL/L (ref 98–107)
CO2: 15 MMOL/L (ref 20–31)
CREAT SERPL-MCNC: 2.49 MG/DL (ref 0.5–0.9)
DIFFERENTIAL TYPE: ABNORMAL
EOSINOPHILS RELATIVE PERCENT: 3 % (ref 1–4)
GFR AFRICAN AMERICAN: 24 ML/MIN
GFR NON-AFRICAN AMERICAN: 20 ML/MIN
GFR SERPL CREATININE-BSD FRML MDRD: ABNORMAL ML/MIN/{1.73_M2}
GFR SERPL CREATININE-BSD FRML MDRD: ABNORMAL ML/MIN/{1.73_M2}
GLOBULIN: ABNORMAL G/DL (ref 1.5–3.8)
GLUCOSE BLD-MCNC: 118 MG/DL (ref 70–99)
HCT VFR BLD CALC: 45.7 % (ref 36–46)
HEMOGLOBIN: 15.5 G/DL (ref 12–16)
IMMATURE GRANULOCYTES: ABNORMAL %
LIPASE: 39 U/L (ref 13–60)
LYMPHOCYTES # BLD: 21 % (ref 24–44)
MCH RBC QN AUTO: 29 PG (ref 26–34)
MCHC RBC AUTO-ENTMCNC: 33.9 G/DL (ref 31–37)
MCV RBC AUTO: 85.7 FL (ref 80–100)
MONOCYTES # BLD: 4 % (ref 1–7)
NRBC AUTOMATED: ABNORMAL PER 100 WBC
PDW BLD-RTO: 15.8 % (ref 11.5–14.5)
PLATELET # BLD: 319 K/UL (ref 130–400)
PLATELET ESTIMATE: ABNORMAL
PMV BLD AUTO: 8.8 FL (ref 6–12)
POTASSIUM SERPL-SCNC: 4.9 MMOL/L (ref 3.7–5.3)
RBC # BLD: 5.34 M/UL (ref 4–5.2)
RBC # BLD: ABNORMAL 10*6/UL
SEG NEUTROPHILS: 72 % (ref 36–66)
SEGMENTED NEUTROPHILS ABSOLUTE COUNT: 8.4 K/UL (ref 1.8–7.7)
SODIUM BLD-SCNC: 136 MMOL/L (ref 135–144)
TOTAL PROTEIN: 8.6 G/DL (ref 6.4–8.3)
WBC # BLD: 11.6 K/UL (ref 3.5–11)
WBC # BLD: ABNORMAL 10*3/UL

## 2019-03-22 PROCEDURE — 74022 RADEX COMPL AQT ABD SERIES: CPT

## 2019-03-22 PROCEDURE — 99284 EMERGENCY DEPT VISIT MOD MDM: CPT

## 2019-03-22 PROCEDURE — 6360000002 HC RX W HCPCS: Performed by: EMERGENCY MEDICINE

## 2019-03-22 PROCEDURE — 2580000003 HC RX 258: Performed by: EMERGENCY MEDICINE

## 2019-03-22 PROCEDURE — 80076 HEPATIC FUNCTION PANEL: CPT

## 2019-03-22 PROCEDURE — 85025 COMPLETE CBC W/AUTO DIFF WBC: CPT

## 2019-03-22 PROCEDURE — 1200000000 HC SEMI PRIVATE

## 2019-03-22 PROCEDURE — 6370000000 HC RX 637 (ALT 250 FOR IP): Performed by: INTERNAL MEDICINE

## 2019-03-22 PROCEDURE — 96374 THER/PROPH/DIAG INJ IV PUSH: CPT

## 2019-03-22 PROCEDURE — 6360000002 HC RX W HCPCS: Performed by: INTERNAL MEDICINE

## 2019-03-22 PROCEDURE — 87449 NOS EACH ORGANISM AG IA: CPT

## 2019-03-22 PROCEDURE — 96375 TX/PRO/DX INJ NEW DRUG ADDON: CPT

## 2019-03-22 PROCEDURE — G0378 HOSPITAL OBSERVATION PER HR: HCPCS

## 2019-03-22 PROCEDURE — 83690 ASSAY OF LIPASE: CPT

## 2019-03-22 PROCEDURE — 87324 CLOSTRIDIUM AG IA: CPT

## 2019-03-22 PROCEDURE — 80048 BASIC METABOLIC PNL TOTAL CA: CPT

## 2019-03-22 PROCEDURE — 96361 HYDRATE IV INFUSION ADD-ON: CPT

## 2019-03-22 PROCEDURE — 96376 TX/PRO/DX INJ SAME DRUG ADON: CPT

## 2019-03-22 PROCEDURE — 82150 ASSAY OF AMYLASE: CPT

## 2019-03-22 RX ORDER — PANTOPRAZOLE SODIUM 40 MG/1
40 TABLET, DELAYED RELEASE ORAL DAILY
Status: DISCONTINUED | OUTPATIENT
Start: 2019-03-23 | End: 2019-03-24 | Stop reason: HOSPADM

## 2019-03-22 RX ORDER — RANITIDINE 150 MG/1
1 TABLET ORAL EVERY EVENING
Status: ON HOLD | COMMUNITY
End: 2020-02-02 | Stop reason: HOSPADM

## 2019-03-22 RX ORDER — ALBUTEROL SULFATE 90 UG/1
2 AEROSOL, METERED RESPIRATORY (INHALATION) EVERY 6 HOURS PRN
Status: ON HOLD | COMMUNITY
End: 2019-07-15 | Stop reason: ALTCHOICE

## 2019-03-22 RX ORDER — ISOSORBIDE MONONITRATE 30 MG/1
30 TABLET, EXTENDED RELEASE ORAL DAILY
Status: DISCONTINUED | OUTPATIENT
Start: 2019-03-22 | End: 2019-03-24 | Stop reason: HOSPADM

## 2019-03-22 RX ORDER — NICOTINE 21 MG/24HR
1 PATCH, TRANSDERMAL 24 HOURS TRANSDERMAL DAILY
Status: DISCONTINUED | OUTPATIENT
Start: 2019-03-22 | End: 2019-03-24 | Stop reason: HOSPADM

## 2019-03-22 RX ORDER — ONDANSETRON 4 MG/1
4 TABLET, ORALLY DISINTEGRATING ORAL EVERY 6 HOURS PRN
Status: DISCONTINUED | OUTPATIENT
Start: 2019-03-22 | End: 2019-03-24 | Stop reason: HOSPADM

## 2019-03-22 RX ORDER — SERTRALINE HYDROCHLORIDE 100 MG/1
100 TABLET, FILM COATED ORAL DAILY
Status: DISCONTINUED | OUTPATIENT
Start: 2019-03-22 | End: 2019-03-24 | Stop reason: HOSPADM

## 2019-03-22 RX ORDER — METOPROLOL SUCCINATE 50 MG/1
50 TABLET, EXTENDED RELEASE ORAL DAILY
Status: DISCONTINUED | OUTPATIENT
Start: 2019-03-22 | End: 2019-03-24 | Stop reason: HOSPADM

## 2019-03-22 RX ORDER — IPRATROPIUM BROMIDE AND ALBUTEROL SULFATE 2.5; .5 MG/3ML; MG/3ML
1 SOLUTION RESPIRATORY (INHALATION) EVERY 6 HOURS PRN
Status: DISCONTINUED | OUTPATIENT
Start: 2019-03-22 | End: 2019-03-24 | Stop reason: HOSPADM

## 2019-03-22 RX ORDER — SODIUM CHLORIDE 0.9 % (FLUSH) 0.9 %
10 SYRINGE (ML) INJECTION EVERY 12 HOURS SCHEDULED
Status: DISCONTINUED | OUTPATIENT
Start: 2019-03-22 | End: 2019-03-24 | Stop reason: HOSPADM

## 2019-03-22 RX ORDER — ONDANSETRON 2 MG/ML
4 INJECTION INTRAMUSCULAR; INTRAVENOUS ONCE
Status: COMPLETED | OUTPATIENT
Start: 2019-03-22 | End: 2019-03-22

## 2019-03-22 RX ORDER — SODIUM BICARBONATE 650 MG/1
650 TABLET ORAL 2 TIMES DAILY
Status: DISCONTINUED | OUTPATIENT
Start: 2019-03-22 | End: 2019-03-24 | Stop reason: HOSPADM

## 2019-03-22 RX ORDER — ASPIRIN 81 MG/1
81 TABLET ORAL DAILY
Status: DISCONTINUED | OUTPATIENT
Start: 2019-03-22 | End: 2019-03-24 | Stop reason: HOSPADM

## 2019-03-22 RX ORDER — TRAMADOL HYDROCHLORIDE 50 MG/1
50 TABLET ORAL EVERY 8 HOURS PRN
Status: DISCONTINUED | OUTPATIENT
Start: 2019-03-22 | End: 2019-03-24 | Stop reason: HOSPADM

## 2019-03-22 RX ORDER — ATORVASTATIN CALCIUM 80 MG/1
80 TABLET, FILM COATED ORAL DAILY
Status: DISCONTINUED | OUTPATIENT
Start: 2019-03-22 | End: 2019-03-24 | Stop reason: HOSPADM

## 2019-03-22 RX ORDER — LOPERAMIDE HYDROCHLORIDE 2 MG/1
2 CAPSULE ORAL PRN
Status: ON HOLD | COMMUNITY
End: 2022-06-25

## 2019-03-22 RX ORDER — ONDANSETRON 2 MG/ML
4 INJECTION INTRAMUSCULAR; INTRAVENOUS EVERY 6 HOURS PRN
Status: DISCONTINUED | OUTPATIENT
Start: 2019-03-22 | End: 2019-03-22

## 2019-03-22 RX ORDER — MORPHINE SULFATE 2 MG/ML
1 INJECTION, SOLUTION INTRAMUSCULAR; INTRAVENOUS
Status: DISCONTINUED | OUTPATIENT
Start: 2019-03-22 | End: 2019-03-24 | Stop reason: HOSPADM

## 2019-03-22 RX ORDER — MORPHINE SULFATE 4 MG/ML
4 INJECTION, SOLUTION INTRAMUSCULAR; INTRAVENOUS ONCE
Status: COMPLETED | OUTPATIENT
Start: 2019-03-22 | End: 2019-03-22

## 2019-03-22 RX ORDER — SODIUM CHLORIDE 9 MG/ML
INJECTION, SOLUTION INTRAVENOUS CONTINUOUS
Status: DISCONTINUED | OUTPATIENT
Start: 2019-03-22 | End: 2019-03-24 | Stop reason: HOSPADM

## 2019-03-22 RX ORDER — ONDANSETRON 2 MG/ML
4 INJECTION INTRAMUSCULAR; INTRAVENOUS EVERY 6 HOURS PRN
Status: DISCONTINUED | OUTPATIENT
Start: 2019-03-22 | End: 2019-03-24 | Stop reason: HOSPADM

## 2019-03-22 RX ORDER — SODIUM CHLORIDE 0.9 % (FLUSH) 0.9 %
10 SYRINGE (ML) INJECTION PRN
Status: DISCONTINUED | OUTPATIENT
Start: 2019-03-22 | End: 2019-03-24 | Stop reason: HOSPADM

## 2019-03-22 RX ADMIN — ISOSORBIDE MONONITRATE 30 MG: 30 TABLET ORAL at 21:03

## 2019-03-22 RX ADMIN — TRAMADOL HYDROCHLORIDE 50 MG: 50 TABLET, COATED ORAL at 23:06

## 2019-03-22 RX ADMIN — MORPHINE SULFATE 4 MG: 4 INJECTION INTRAVENOUS at 09:56

## 2019-03-22 RX ADMIN — ATORVASTATIN CALCIUM 80 MG: 80 TABLET, FILM COATED ORAL at 20:57

## 2019-03-22 RX ADMIN — TICAGRELOR 90 MG: 90 TABLET ORAL at 20:58

## 2019-03-22 RX ADMIN — SODIUM CHLORIDE: 9 INJECTION, SOLUTION INTRAVENOUS at 09:52

## 2019-03-22 RX ADMIN — ONDANSETRON 4 MG: 2 INJECTION INTRAMUSCULAR; INTRAVENOUS at 09:56

## 2019-03-22 RX ADMIN — MORPHINE SULFATE 1 MG: 2 INJECTION, SOLUTION INTRAMUSCULAR; INTRAVENOUS at 13:10

## 2019-03-22 RX ADMIN — SERTRALINE 100 MG: 100 TABLET, FILM COATED ORAL at 21:03

## 2019-03-22 RX ADMIN — SODIUM BICARBONATE 650 MG: 650 TABLET ORAL at 20:57

## 2019-03-22 RX ADMIN — ASPIRIN 81 MG: 81 TABLET, COATED ORAL at 20:57

## 2019-03-22 RX ADMIN — MORPHINE SULFATE 1 MG: 2 INJECTION, SOLUTION INTRAMUSCULAR; INTRAVENOUS at 18:21

## 2019-03-22 RX ADMIN — MORPHINE SULFATE 1 MG: 2 INJECTION, SOLUTION INTRAMUSCULAR; INTRAVENOUS at 20:55

## 2019-03-22 RX ADMIN — METOPROLOL SUCCINATE 50 MG: 50 TABLET, FILM COATED, EXTENDED RELEASE ORAL at 20:57

## 2019-03-22 RX ADMIN — SODIUM CHLORIDE: 9 INJECTION, SOLUTION INTRAVENOUS at 15:15

## 2019-03-22 ASSESSMENT — PAIN DESCRIPTION - ORIENTATION
ORIENTATION: ANTERIOR;LOWER

## 2019-03-22 ASSESSMENT — PAIN DESCRIPTION - PROGRESSION
CLINICAL_PROGRESSION: NOT CHANGED
CLINICAL_PROGRESSION: NOT CHANGED

## 2019-03-22 ASSESSMENT — PAIN DESCRIPTION - ONSET
ONSET: ON-GOING

## 2019-03-22 ASSESSMENT — PAIN SCALES - GENERAL
PAINLEVEL_OUTOF10: 0
PAINLEVEL_OUTOF10: 9
PAINLEVEL_OUTOF10: 8
PAINLEVEL_OUTOF10: 10
PAINLEVEL_OUTOF10: 9
PAINLEVEL_OUTOF10: 8
PAINLEVEL_OUTOF10: 9
PAINLEVEL_OUTOF10: 9

## 2019-03-22 ASSESSMENT — ENCOUNTER SYMPTOMS
ABDOMINAL PAIN: 1
CONSTIPATION: 0
COLOR CHANGE: 0
EYE DISCHARGE: 0
VOMITING: 0
EYE REDNESS: 0
FACIAL SWELLING: 0
SHORTNESS OF BREATH: 0
DIARRHEA: 1
COUGH: 0

## 2019-03-22 ASSESSMENT — PAIN DESCRIPTION - PAIN TYPE
TYPE: ACUTE PAIN

## 2019-03-22 ASSESSMENT — PAIN DESCRIPTION - FREQUENCY
FREQUENCY: CONTINUOUS

## 2019-03-22 ASSESSMENT — PAIN DESCRIPTION - LOCATION
LOCATION: ABDOMEN

## 2019-03-22 NOTE — ED NOTES
Patient claims that intensity of pain has decreased advsieed by doctor Long Griffin that patient is being processed for admission.       Lucas Hernández RN  03/22/19 6023

## 2019-03-22 NOTE — ED NOTES
Patient brought into ec by private auto. taken to treatment area via wheelchair. advised by patient that she has experienced onset of pain in mid abd followed by episodes of n/v and diarrhea. . Patient expressed concern because of past hx of bowel obstructions. Upon arrivalpatient is alert/oriented. Continues to experience abd cramp like pain tongue/lips moderate dry. Claims last meal was 2 days ago. Patient being evaluated by doctor Gabby Green.       Jay Jarvis RN  03/22/19 [id number]

## 2019-03-22 NOTE — CONSULTS
General Surgery   Consultation        PATIENT NAME: Divine Gutierrez   YOB: 1955    ADMISSION DATE: 3/22/2019  8:56 AM     Admitting Provider: Dr. Beba Miller Physician: Dr. Moraes Deter: 3/22/2019    Consult Regarding:  Abdominal pain    HISTORY OF PRESENT ILLNESS:  The patient is a 61 y.o. female  who presents with a one day history of abdominal pain and loose stools. The patient has an extensive surgery. She has had a total abdominal colectomy with an ileorectal anastomosis. She has frequent episodes of loose stools and obstructions. She states yesterday she started experiencing abdominal pain which was associated with frequent loose stools. She denies any nausea or vomiting. On presentation she is afebrile and hemodynamically stable. Her abdomen is mildly tender without guarding. She continues to have loose stool after admission. Denies recent antibiotic use. Her WBC is slightly elevated at 11.6. A KUB shows no evidence of obstruction. She does have an RADHA with Creatinine of 2.49. She states she is making urine. Past Medical History:        Diagnosis Date    Anxiety     Arthritis     CAD (coronary artery disease)     MI.   9 stents total    Chronic kidney disease     COPD (chronic obstructive pulmonary disease) (HCC)     Depression     Diverticulosis     GERD (gastroesophageal reflux disease)     [facility] [address] [address] 05/2018    Heart palpitations     History of blood transfusion     Hyperlipidemia     Hypertension     PSVT (paroxysmal supraventricular tachycardia) [address] [address]        Past Surgical History:        Procedure Laterality Date    ABDOMEN SURGERY      APPENDECTOMY      CAROTID STENT PLACEMENT      CHOLECYSTECTOMY      COLECTOMY  2003    COLONOSCOPY  7/30/2003    Dr Jamil Lambert diverticulosis and signs of diverticulitis    COLONOSCOPY  5/21/2010    stricture at about  35 cm from the anus.   Could not advance even gastroscope    COLONOSCOPY  10/17/2014 small bowel and rectum normal    SMALL INTESTINE SURGERY      entire large intestine removed, entire small intestine intact    TUBAL LIGATION      UPPER GASTROINTESTINAL ENDOSCOPY  10/17/2014    normal       Medications Prior to Admission:   Medications Prior to Admission: RaNITidine HCl (ZANTAC 150 MAXIMUM STRENGTH PO), Take 1 tablet by mouth daily   albuterol sulfate  (90 Base) MCG/ACT inhaler, Inhale 2 puffs into the lungs every 6 hours as needed for Wheezing  albuterol-ipratropium (COMBIVENT RESPIMAT)  MCG/ACT AERS inhaler, Inhale 1 puff into the lungs every 6 hours as needed for Wheezing  loperamide (IMODIUM) 2 MG capsule, Take 2 mg by mouth as needed for Diarrhea  lisinopril (PRINIVIL;ZESTRIL) 2.5 MG tablet, Take 2.5 mg by mouth nightly  sodium bicarbonate 650 MG tablet, Take 1 tablet by mouth 2 times daily  ticagrelor (BRILINTA) 90 MG TABS tablet, Take 90 mg by mouth 2 times daily   isosorbide mononitrate (IMDUR) 30 MG extended release tablet, Take 30 mg by mouth daily  atorvastatin (LIPITOR) 80 MG tablet, Take 80 mg by mouth daily  sertraline (ZOLOFT) 100 MG tablet, Take 100 mg by mouth daily   ASPIR-LOW 81 MG EC tablet, Take 81 mg by mouth daily  metoprolol succinate (TOPROL XL) 50 MG extended release tablet, Take 50 mg by mouth daily   pantoprazole (PROTONIX) 40 MG tablet, Take 40 mg by mouth daily  [DISCONTINUED] albuterol-ipratropium (COMBIVENT RESPIMAT)  MCG/ACT AERS inhaler, Inhale 1 puff into the lungs 3 times daily (patient states to be only using as needed due to inconvenience 9/27/18)  [DISCONTINUED] ALBUTEROL IN, Inhale 1 vial into the lungs daily as needed (exacerbation) Nebulized solution in nebulizer. Allergies:  Keflex [cephalexin]; Atenolol; Codeine; Dilaudid [hydromorphone hcl]; Other; Percocet [oxycodone-acetaminophen]; Phenergan [promethazine hcl];  Sodium hypochlorite; Erythromycin; and Prednisone    Social History:   Social History     Socioeconomic History    Marital status:      Spouse name: Not on file    Number of children: Not on file    Years of education: Not on file    Highest education level: Not on file   Occupational History    Not on file   Social Needs    Financial resource strain: Not on file    Food insecurity:     Worry: Not on file     Inability: Not on file    Transportation needs:     Medical: Not on file     Non-medical: Not on file   Tobacco Use    Smoking status: Current Every Day Smoker     Packs/day: 1.00     Years: 40.00     Pack years: 40.00     Types: Cigarettes    Smokeless tobacco: Never Used   Substance and Sexual Activity    Alcohol use: No    Drug use: No    Sexual activity: Not on file   Lifestyle    Physical activity:     Days per week: Not on file     Minutes per session: Not on file    Stress: Not on file   Relationships    Social connections:     Talks on phone: Not on file     Gets together: Not on file     Attends Hoahaoism service: Not on file     Active member of club or organization: Not on file     Attends meetings of clubs or organizations: Not on file     Relationship status: Not on file    Intimate partner violence:     Fear of current or ex partner: Not on file     Emotionally abused: Not on file     Physically abused: Not on file     Forced sexual activity: Not on file   Other Topics Concern    Not on file   Social History Narrative    Not on file       Family History:       Problem Relation Age of Onset    Cancer Mother     Heart Disease Father        REVIEW OF SYSTEMS:    CONSTITUTIONAL:  No recent weight gain/loss. Energy level normal for pt. HEENT:  negative  CARDIOVASCULAR:  No chest pain  GASTROINTESTINAL:  abd pain, loose stools  GENITOURINARY:  No dysuria  HEMATOLOGIC/LYMPHATIC:  No easy bruising. No history of cancer  ENDOCRINE: negative  Review of systems negative unless above.     PHYSICAL EXAM:    VITALS:  /63   Pulse 54   Temp 97.6 °F (36.4 °C) (Oral)   Resp 16   Ht 5' 3\" (1.6 m)   Wt 180 lb (81.6 kg)   SpO2 99%   BMI 31.89 kg/m²   INTAKE/OUTPUT: .No intake or output data in the 24 hours ending 03/22/19 1302    CONSTITUTIONAL:  awake, alert, not distressed and mildly obese  ENT:  normocephalic/atraumatic, without obvious abnormality  NECK:  supple, symmetrical, trachea midline   LUNGS:  CTA bilaterally  CARDIOVASCULAR:  regular rate and rhythm and No Murmur  ABDOMEN: soft, non distended, mildly tender without guarding, no peritoneal signs  MUSCULOSKELETAL:  No joint swelling, deformity, or tenderness. , there is not obvious somatic dysfunction  NEUROLOGIC:  Mental Status Exam:  Level of Alertness:   alert  Orientation:   oriented to person, place, and time    CBC with Differential:    Lab Results   Component Value Date    WBC 11.6 03/22/2019    RBC 5.34 03/22/2019    RBC 4.09 04/14/2012    HGB 15.5 03/22/2019    HCT 45.7 03/22/2019     03/22/2019     04/14/2012    MCV 85.7 03/22/2019    MCH 29.0 03/22/2019    MCHC 33.9 03/22/2019    RDW 15.8 03/22/2019    LYMPHOPCT 21 03/22/2019    MONOPCT 4 03/22/2019    BASOPCT 0 03/22/2019    MONOSABS 0.50 03/22/2019    LYMPHSABS 2.40 03/22/2019    EOSABS 0.30 03/22/2019    BASOSABS 0.00 03/22/2019    DIFFTYPE NOT REPORTED 03/22/2019     BMP:    Lab Results   Component Value Date     03/22/2019    K 4.9 03/22/2019     03/22/2019    CO2 15 03/22/2019    BUN 23 03/22/2019    LABALBU 4.8 03/22/2019    CREATININE 2.49 03/22/2019    CALCIUM 10.4 03/22/2019    GFRAA 24 03/22/2019    LABGLOM 20 03/22/2019    GLUCOSE 118 03/22/2019    GLUCOSE 123 04/14/2012       Pertinent Radiology: KUB: No acute cardiopulmonary process. Nonspecific bowel gas pattern without evidence for obstruction. ASSESSMENT   Active Problems:    Elevated serum creatinine  Resolved Problems:    * No resolved hospital problems. *  Abdominal pain with loose stools  RADHA  Hx of total abdominal colectomy with ileorectal anastomosis    PLAN    1.  No acute surgical intervention indicated at this time. Will continue with conservative management  2. Follow up stool studies  3. IVF hydration  4. Monitor kidney function  5. Replacement electrolytes  6. Will continue to monitor. 7. Medical management per primary        Electronically signed by Nancy Tavarez DO  on 3/22/2019 at 1:02 PM   Attending Physician Statement  I have discussed the case, including pertinent history and exam findings with the resident. I agree with the assessment, plan and orders as documented by the resident. Dehydration  ARF probably secondary to dehydration  Will follow with you.

## 2019-03-22 NOTE — PROGRESS NOTES
Emergency Department Pharmacist Note    Patient presents to the ED with complaints of:   Chief Complaint   Patient presents with    Abdominal Pain    Nausea    Fatigue     Patient was seen by the ED pharmacist and offered admission counseling. Patient has received morphine and zofran at this time. She states pain level is decreased and she is no longer nauseated. These symptoms have occurred in the past and are likely due to significant GI history. Patient expresses concerns about cardiac history and home medication of Brilinta. Discussed with her that it is on her home med list but she will be NPO for a while. Alleviated her concerns. No further pharmacist interventions needed at this time.       Kyrie English, PharmD, [facility]  3/22/2019 11:10 AM

## 2019-03-22 NOTE — FLOWSHEET NOTE
Patient is sleeping as writer entered the room. Writer gave a silent prayer of healing, rest, and comfort. Spiritual care will follow up as needed or requested.      03/22/19 1517   Encounter Summary   Services provided to: Patient   Referral/Consult From: [place]   Continue Visiting   (3/22/2019)   Complexity of Encounter Low   Length of Encounter 15 minutes   Routine   Type Initial   Assessment Sleeping   Intervention Prayer

## 2019-03-22 NOTE — CARE COORDINATION
Case Management Initial Discharge Plan  Lucy Gutierrez,         Readmission Risk              Risk of Unplanned Readmission:        0             Met with:patient to discuss discharge plans. Information verified: address, contacts, phone number, , insurance Yes  PCP: Lucien Conrad MD  Date of last visit: one month ago    Insurance Provider: [address] [place] [address] Drive    Discharge Planning  Current Residence:     Living Arrangements:      Home has 2 stories/uses first floor-no steps into home stairs to climb  Support Systems:     Current Services PTA:    Supplier: none  Patient able to perform ADL's:Independent  DME used to aid ambulation prior to admission: none/during admissionTBD    Potential Assistance Needed:       Pharmacy: [facility]   Potential Assistance Purchasing Medications:     Does patient want to participate in local refill/ meds to beds program?       Patient agreeable to home care: No  [place] of choice provided:  n/a      Type of Home Care Services:     Patient expects to be discharged to:       Prior SNF/Rehab Placement and Facility: no  Agreeable to SNF/Rehab: No  [place] of choice provided: n/a   Evaluation: no    Expected Discharge date: Follow Up Appointment: Best Day/ Time:      Transportation provider: family  Transportation arrangements needed for discharge: No    Discharge Plan: Met with pt to discuss dc options. Pt lives alone. Her son lives next to her. She has a supportive dtr. She has had home care in past,but does not remember the name of the agency. Her plan is to return home. She declines need for home care.  [facility]        Electronically signed by Maron Favre, LSW on 3/22/19 at 11:42 AM

## 2019-03-22 NOTE — H&P
into the lungs every 6 hours as needed for Wheezing  loperamide (IMODIUM) 2 MG capsule, Take 2 mg by mouth as needed for Diarrhea  lisinopril (PRINIVIL;ZESTRIL) 2.5 MG tablet, Take 2.5 mg by mouth nightly  sodium bicarbonate 650 MG tablet, Take 1 tablet by mouth 2 times daily  ticagrelor (BRILINTA) 90 MG TABS tablet, Take 90 mg by mouth 2 times daily   isosorbide mononitrate (IMDUR) 30 MG extended release tablet, Take 30 mg by mouth daily  atorvastatin (LIPITOR) 80 MG tablet, Take 80 mg by mouth daily  sertraline (ZOLOFT) 100 MG tablet, Take 100 mg by mouth daily   ASPIR-LOW 81 MG EC tablet, Take 81 mg by mouth daily  metoprolol succinate (TOPROL XL) 50 MG extended release tablet, Take 50 mg by mouth daily   pantoprazole (PROTONIX) 40 MG tablet, Take 40 mg by mouth daily  [DISCONTINUED] albuterol-ipratropium (COMBIVENT RESPIMAT)  MCG/ACT AERS inhaler, Inhale 1 puff into the lungs 3 times daily (patient states to be only using as needed due to inconvenience 9/27/18)  [DISCONTINUED] ALBUTEROL IN, Inhale 1 vial into the lungs daily as needed (exacerbation) Nebulized solution in nebulizer. Allergies:    Keflex [cephalexin]; Atenolol; Codeine; Dilaudid [hydromorphone hcl]; Other; Percocet [oxycodone-acetaminophen]; Phenergan [promethazine hcl]; Sodium hypochlorite; Erythromycin; and Prednisone    Social History:    reports that she has been smoking cigarettes. She has a 40.00 pack-year smoking history. She has never used smokeless tobacco. She reports that she does not drink alcohol or use drugs. Family History:   family history includes Cancer in her mother; Heart Disease in her father.     REVIEW OF SYSTEMS:  Constitutional: negative, no fever no chills  Eyes: negative  Ears, nose, mouth, throat, and face: negative  Respiratory: negative, no cough no dyspnea  Cardiovascular: negative, no chest pain no palpitation  Gastrointestinal: negative, see hpi  Genitourinary:positive for neg  Integument/breast: negative  Hematologic/lymphatic: negative  Musculoskeletal:negative  Neurological: negative  Behavioral/Psych: negative  Endocrine: negative  Allergic/Immunologic: negative  PHYSICAL EXAM:  General Appearance: alert and oriented to person, place and time and in no acute distress  Skin: warm and dry, no rash or erythema  Head: normocephalic and atraumatic  Eyes: pupils equal, round, and reactive to light and conjunctivae normal    Neck: neck supple and non tender without mass   Pulmonary/Chest: clear to auscultation bilaterally- no wheezes, rales or rhonchi, normal air movement, no respiratory distress  Cardiovascular: normal rate, regular rhythm, normal S1 and S2, no gallops, intact distal pulses and no carotid bruits  Abdomen: soft, mild lower abdominal tenderness no rebound, non-distended, normal bowel sounds, no masses or organomegaly  Extremities: no edema and good pulses no pema sign    Neurologic: alert oriented x 3 no focal deficit    Vitals:  BP (!) 100/56   Pulse 58   Temp 97.3 °F (36.3 °C) (Oral)   Resp 16   Ht 5' 3\" (1.6 m)   Wt 179 lb 14.3 oz (81.6 kg)   SpO2 97%   BMI 31.87 kg/m²       LABS:  CBC:   Lab Results   Component Value Date    WBC 11.6 03/22/2019    RBC 5.34 03/22/2019    RBC 4.09 04/14/2012    HGB 15.5 03/22/2019    HCT 45.7 03/22/2019    MCV 85.7 03/22/2019    MCH 29.0 03/22/2019    MCHC 33.9 03/22/2019    RDW 15.8 03/22/2019     03/22/2019     04/14/2012    MPV 8.8 03/22/2019     CMP:    Lab Results   Component Value Date     03/22/2019    K 4.9 03/22/2019     03/22/2019    CO2 15 03/22/2019    BUN 23 03/22/2019    CREATININE 2.49 03/22/2019    GFRAA 24 03/22/2019    LABGLOM 20 03/22/2019    GLUCOSE 118 03/22/2019    GLUCOSE 123 04/14/2012    PROT 8.6 03/22/2019    LABALBU 4.8 03/22/2019    CALCIUM 10.4 03/22/2019    BILITOT 0.37 03/22/2019    ALKPHOS 101 03/22/2019    AST 22 03/22/2019    ALT 19 03/22/2019         ASSESSMENT:    Acute kidney injury  Acute diarrhea on chronic diarrhea  Abdominal pain  coronary artery disease negative for nephrolithiasis no angina  Chronic smoker  Patient Active Problem List   Diagnosis    Diarrhea    Smoker    Acute kidney [facility] ([facility] [address]    CAD (coronary artery disease)    Abdominal pain    SBO (small bowel obstruction)    Metabolic acidosis    Chronic diarrhea    Urinary tract infection without hematuria    Elevated serum creatinine       PLAN:    meds  labs reviewed DVT prophylaxis IV fluids avoid nephrotoxic drugs, nicotine replacement.  Home meds reviewed restarted  g surgery consult, see orders              Aman Dixon MD  7:53 PM  3/22/2019

## 2019-03-22 NOTE — PROGRESS NOTES
Transitions of Care Pharmacy Service   Medication Review    The patient's list of current home medications has been reviewed and updated. Source(s) of information: Patient/SureScirpts    Sertraline increased to 100 mg daily, metoprolol succinate increased to 50 mg daily    Please feel free to call with any questions about this encounter. Thank you. Jacky Hernandez [facility]  Transitions of Care Pharmacy Service  Phone:  [phone]  Fax: [phone]          Prior to Admission medications    Medication Sig Start Date End Date Taking?  Authorizing Provider   RaNITidine HCl (ZANTAC 150 MAXIMUM STRENGTH PO) Take 1 tablet by mouth daily    Yes Historical Provider, MD   albuterol sulfate  (90 Base) MCG/ACT inhaler Inhale 2 puffs into the lungs every 6 hours as needed for Wheezing   Yes Historical Provider, MD   albuterol-ipratropium (COMBIVENT RESPIMAT)  MCG/ACT AERS inhaler Inhale 1 puff into the lungs every 6 hours as needed for Wheezing   Yes Historical Provider, MD   loperamide (IMODIUM) 2 MG capsule Take 2 mg by mouth as needed for Diarrhea   Yes Historical Provider, MD   lisinopril (PRINIVIL;ZESTRIL) 2.5 MG tablet Take 2.5 mg by mouth nightly   Yes Historical Provider, MD   sodium bicarbonate 650 MG tablet Take 1 tablet by mouth 2 times daily 6/4/18  Yes Artis Taveras MD   ticagrelor (BRILINTA) 90 MG TABS tablet Take 90 mg by mouth 2 times daily    Yes Historical Provider, MD   isosorbide mononitrate (IMDUR) 30 MG extended release tablet Take 30 mg by mouth daily   Yes Historical Provider, MD   atorvastatin (LIPITOR) 80 MG tablet Take 80 mg by mouth daily   Yes Historical Provider, MD   sertraline (ZOLOFT) 100 MG tablet Take 100 mg by mouth daily  2/8/17  Yes Historical Provider, MD   ASPIR-LOW 81 MG EC tablet Take 81 mg by mouth daily 11/22/16  Yes Historical Provider, MD   metoprolol succinate (TOPROL XL) 50 MG extended release tablet Take 50 mg by mouth daily  11/22/16  Yes Historical Provider, MD   pantoprazole (PROTONIX) 40 MG tablet Take 40 mg by mouth daily 11/22/16  Yes Historical Provider, MD

## 2019-03-23 LAB
ABSOLUTE EOS #: 0.2 K/UL (ref 0–0.4)
ABSOLUTE IMMATURE GRANULOCYTE: ABNORMAL K/UL (ref 0–0.3)
ABSOLUTE LYMPH #: 2.4 K/UL (ref 1–4.8)
ABSOLUTE MONO #: 0.4 K/UL (ref 0.2–0.8)
ANION GAP SERPL CALCULATED.3IONS-SCNC: 11 MMOL/L (ref 9–17)
BASOPHILS # BLD: 0 % (ref 0–2)
BASOPHILS ABSOLUTE: 0 K/UL (ref 0–0.2)
BUN BLDV-MCNC: 24 MG/DL (ref 8–23)
BUN/CREAT BLD: 13 (ref 9–20)
C DIFF AG + TOXIN: NORMAL
CALCIUM SERPL-MCNC: 8.6 MG/DL (ref 8.6–10.4)
CHLORIDE BLD-SCNC: 110 MMOL/L (ref 98–107)
CO2: 17 MMOL/L (ref 20–31)
CREAT SERPL-MCNC: 1.8 MG/DL (ref 0.5–0.9)
DIFFERENTIAL TYPE: ABNORMAL
EOSINOPHILS RELATIVE PERCENT: 2 % (ref 1–4)
GFR AFRICAN AMERICAN: 34 ML/MIN
GFR NON-AFRICAN AMERICAN: 28 ML/MIN
GFR SERPL CREATININE-BSD FRML MDRD: ABNORMAL ML/MIN/{1.73_M2}
GFR SERPL CREATININE-BSD FRML MDRD: ABNORMAL ML/MIN/{1.73_M2}
GLUCOSE BLD-MCNC: 96 MG/DL (ref 70–99)
HCT VFR BLD CALC: 35.1 % (ref 36–46)
HEMOGLOBIN: 11.7 G/DL (ref 12–16)
IMMATURE GRANULOCYTES: ABNORMAL %
LYMPHOCYTES # BLD: 33 % (ref 24–44)
MCH RBC QN AUTO: 29.2 PG (ref 26–34)
MCHC RBC AUTO-ENTMCNC: 33.4 G/DL (ref 31–37)
MCV RBC AUTO: 87.3 FL (ref 80–100)
MONOCYTES # BLD: 6 % (ref 1–7)
NRBC AUTOMATED: ABNORMAL PER 100 WBC
PDW BLD-RTO: 15.6 % (ref 11.5–14.5)
PLATELET # BLD: 277 K/UL (ref 130–400)
PLATELET ESTIMATE: ABNORMAL
PMV BLD AUTO: 8.9 FL (ref 6–12)
POTASSIUM SERPL-SCNC: 5 MMOL/L (ref 3.7–5.3)
RBC # BLD: 4.01 M/UL (ref 4–5.2)
RBC # BLD: ABNORMAL 10*6/UL
SEG NEUTROPHILS: 59 % (ref 36–66)
SEGMENTED NEUTROPHILS ABSOLUTE COUNT: 4.3 K/UL (ref 1.8–7.7)
SODIUM BLD-SCNC: 138 MMOL/L (ref 135–144)
SPECIMEN DESCRIPTION: NORMAL
WBC # BLD: 7.3 K/UL (ref 3.5–11)
WBC # BLD: ABNORMAL 10*3/UL

## 2019-03-23 PROCEDURE — 96372 THER/PROPH/DIAG INJ SC/IM: CPT

## 2019-03-23 PROCEDURE — G0378 HOSPITAL OBSERVATION PER HR: HCPCS

## 2019-03-23 PROCEDURE — 6370000000 HC RX 637 (ALT 250 FOR IP): Performed by: INTERNAL MEDICINE

## 2019-03-23 PROCEDURE — 80048 BASIC METABOLIC PNL TOTAL CA: CPT

## 2019-03-23 PROCEDURE — 85025 COMPLETE CBC W/AUTO DIFF WBC: CPT

## 2019-03-23 PROCEDURE — 6360000002 HC RX W HCPCS: Performed by: INTERNAL MEDICINE

## 2019-03-23 PROCEDURE — 1200000000 HC SEMI PRIVATE

## 2019-03-23 PROCEDURE — 2580000003 HC RX 258: Performed by: INTERNAL MEDICINE

## 2019-03-23 PROCEDURE — 36415 COLL VENOUS BLD VENIPUNCTURE: CPT

## 2019-03-23 RX ORDER — HEPARIN SODIUM 5000 [USP'U]/ML
5000 INJECTION, SOLUTION INTRAVENOUS; SUBCUTANEOUS EVERY 8 HOURS SCHEDULED
Status: DISCONTINUED | OUTPATIENT
Start: 2019-03-23 | End: 2019-03-24 | Stop reason: HOSPADM

## 2019-03-23 RX ADMIN — TICAGRELOR 90 MG: 90 TABLET ORAL at 21:05

## 2019-03-23 RX ADMIN — PANTOPRAZOLE SODIUM 40 MG: 40 TABLET, DELAYED RELEASE ORAL at 06:39

## 2019-03-23 RX ADMIN — SERTRALINE 100 MG: 100 TABLET, FILM COATED ORAL at 08:32

## 2019-03-23 RX ADMIN — METOPROLOL SUCCINATE 50 MG: 50 TABLET, FILM COATED, EXTENDED RELEASE ORAL at 08:33

## 2019-03-23 RX ADMIN — SODIUM CHLORIDE: 9 INJECTION, SOLUTION INTRAVENOUS at 21:05

## 2019-03-23 RX ADMIN — SODIUM CHLORIDE: 9 INJECTION, SOLUTION INTRAVENOUS at 11:13

## 2019-03-23 RX ADMIN — TICAGRELOR 90 MG: 90 TABLET ORAL at 08:33

## 2019-03-23 RX ADMIN — TRAMADOL HYDROCHLORIDE 50 MG: 50 TABLET, COATED ORAL at 08:33

## 2019-03-23 RX ADMIN — HEPARIN SODIUM 5000 UNITS: 5000 INJECTION INTRAVENOUS; SUBCUTANEOUS at 21:30

## 2019-03-23 RX ADMIN — ASPIRIN 81 MG: 81 TABLET, COATED ORAL at 08:32

## 2019-03-23 RX ADMIN — ATORVASTATIN CALCIUM 80 MG: 80 TABLET, FILM COATED ORAL at 21:05

## 2019-03-23 RX ADMIN — TRAMADOL HYDROCHLORIDE 50 MG: 50 TABLET, COATED ORAL at 17:36

## 2019-03-23 RX ADMIN — ISOSORBIDE MONONITRATE 30 MG: 30 TABLET ORAL at 08:32

## 2019-03-23 RX ADMIN — SODIUM BICARBONATE 650 MG: 650 TABLET ORAL at 21:05

## 2019-03-23 RX ADMIN — SODIUM BICARBONATE 650 MG: 650 TABLET ORAL at 08:32

## 2019-03-23 ASSESSMENT — PAIN SCALES - GENERAL
PAINLEVEL_OUTOF10: 1
PAINLEVEL_OUTOF10: 7
PAINLEVEL_OUTOF10: 4
PAINLEVEL_OUTOF10: 0
PAINLEVEL_OUTOF10: 3

## 2019-03-23 ASSESSMENT — PAIN - FUNCTIONAL ASSESSMENT: PAIN_FUNCTIONAL_ASSESSMENT: ACTIVITIES ARE NOT PREVENTED

## 2019-03-23 ASSESSMENT — PAIN DESCRIPTION - PROGRESSION: CLINICAL_PROGRESSION: NOT CHANGED

## 2019-03-23 ASSESSMENT — PAIN DESCRIPTION - LOCATION: LOCATION: ABDOMEN

## 2019-03-23 ASSESSMENT — PAIN DESCRIPTION - PAIN TYPE: TYPE: ACUTE PAIN

## 2019-03-23 ASSESSMENT — PAIN DESCRIPTION - DESCRIPTORS: DESCRIPTORS: JABBING;SHARP

## 2019-03-23 ASSESSMENT — PAIN DESCRIPTION - FREQUENCY: FREQUENCY: INTERMITTENT

## 2019-03-23 ASSESSMENT — PAIN DESCRIPTION - ORIENTATION: ORIENTATION: LOWER

## 2019-03-23 ASSESSMENT — PAIN DESCRIPTION - ONSET: ONSET: ON-GOING

## 2019-03-23 NOTE — PROGRESS NOTES
Subjective:     Follow-up dang  Doing fairly well further abdominal pain no nausea vomiting less diarrhea still watery stools  ROS  No fever no chills no  GI complaints except less diarrhea, no cardiopulmonary complaints, no TIA no bleeding no headache no sore throat no skin lesions, no fatigue, no polyuria, polydipsia no hypoglycemia  physical exam  General Appearance: alert and oriented to person, place and time and in no acute distress  Skin: warm and dry, no rash or erythema  Head: normocephalic and atraumatic  Eyes: pupils equal, round, and reactive to light, conjunctivae normal and sclera anicteric  Neck: neck supple and non tender without mass   Pulmonary/Chest: clear to auscultation bilaterally- no wheezes, rales or rhonchi, normal air movement, no respiratory distress  Cardiovascular: normal rate, regular rhythm, normal S1 and S2, no gallops, intact distal pulses and no carotid bruits  Abdomen: soft, non-tender, non-distended, normal bowel sounds, no masses or organomegaly  Extremities: no edema and good pulses no Homans sign    Neurologic: Alert and oriented ×3 with no focal deficit    BP (!) 106/55   Pulse 52   Temp 97.5 °F (36.4 °C) (Oral)   Resp 16   Ht 5' 3\" (1.6 m)   Wt 179 lb 14.3 oz (81.6 kg)   SpO2 100%   BMI 31.87 kg/m²     CBC:   Lab Results   Component Value Date    WBC 7.3 03/23/2019    RBC 4.01 03/23/2019    RBC 4.09 04/14/2012    HGB 11.7 03/23/2019    HCT 35.1 03/23/2019    MCV 87.3 03/23/2019    MCH 29.2 03/23/2019    MCHC 33.4 03/23/2019    RDW 15.6 03/23/2019     03/23/2019     04/14/2012    MPV 8.9 03/23/2019     CMP:    Lab Results   Component Value Date     03/23/2019    K 5.0 03/23/2019     03/23/2019    CO2 17 03/23/2019    BUN 24 03/23/2019    CREATININE 1.80 03/23/2019    GFRAA 34 03/23/2019    LABGLOM 28 03/23/2019    GLUCOSE 96 03/23/2019    GLUCOSE 123 04/14/2012    PROT 8.6 03/22/2019    LABALBU 4.8 03/22/2019    CALCIUM 8.6 03/23/2019 BILITOT 0.37 03/22/2019    ALKPHOS 101 03/22/2019    AST 22 03/22/2019    ALT 19 03/22/2019        Assessment:  Patient Active Problem List   Diagnosis    Diarrhea    Smoker    Acute kidney injury ([address]    CAD (coronary artery disease)    Abdominal pain    SBO (small bowel obstruction)    Metabolic acidosis    Chronic diarrhea    Urinary tract infection without hematuria    Elevated serum creatinine     Diarrhea resolving  Acute kidney injury  Artery artery disease native heart date of arteries no angina  Abdominal pain resolved  Smoker smoking cessation anesthetized Plan:    That's labs reviewed this diet decrease IV fluids ambulate continue with DVT prophylaxis , see orders      Faith Mullen MD  4:28 PM

## 2019-03-23 NOTE — PROGRESS NOTES
Notified Dr Jm Pryor that patient is stating her pain is unrelieved by morphine. Writer asked patient what do she use when at home to relieve her pain and she stated morphine and tramadol.  New order for 50mg PO Q 6hr tramadol placed

## 2019-03-23 NOTE — PLAN OF CARE
Having good lower abdominal pain control from ultram every 8 hours  Problem: Pain:  Goal: Control of acute pain  Description  Control of acute pain  3/23/2019 1116 by Mac Love RN  Outcome: Ongoing  3/22/2019 2317 by Anahy Avilez RN  Outcome: Ongoing

## 2019-03-23 NOTE — PLAN OF CARE
Tolerating clear liquids without emesis Diet advanced to full  Problem: Nausea/Vomiting:  Goal: Absence of nausea/vomiting  Description  Absence of nausea/vomiting  Outcome: Ongoing

## 2019-03-23 NOTE — PROGRESS NOTES
General Surgery:  Daily Progress Note               PATIENT NAME: Natali Kim     TODAY'S DATE: 3/23/2019, 7:31 AM    SUBJECTIVE:     Pt seen and examined at bedside. No acute events overnight. Pt states overall pain is well controlled and improved. Denies fever, chills, nausea, vomiting, chest pain, and SOB. Tolerated CLD.    + Activity. + flatus, + BM. OBJECTIVE:   VITALS:  BP (!) 106/55   Pulse 52   Temp 97.5 °F (36.4 °C) (Oral)   Resp 16   Ht 5' 3\" (1.6 m)   Wt 179 lb 14.3 oz (81.6 kg)   SpO2 100%   BMI 31.87 kg/m²      INTAKE/OUTPUT:      Intake/Output Summary (Last 24 hours) at 3/23/2019 0731  Last data filed at 3/23/2019 0640  Gross per 24 hour   Intake 4032 ml   Output 100 ml   Net 3932 ml       PHYSICAL EXAM:  General Appearance:  awake, alert, oriented, in no acute distress  HEENT:  Normocephalic, atraumatic, mucus membranes moist   Skin:  Skin color, texture, turgor normal. No rashes or lesions. Lungs:  Normal expansion. Clear to auscultation. No rales, rhonchi, or wheezing.   Heart:  Heart regular rate and rhythm  Abdomen:  soft, ND, NTTP    Data:  CBC with Differential:    Lab Results   Component Value Date    WBC 7.3 03/23/2019    RBC 4.01 03/23/2019    RBC 4.09 04/14/2012    HGB 11.7 03/23/2019    HCT 35.1 03/23/2019     03/23/2019     04/14/2012    MCV 87.3 03/23/2019    MCH 29.2 03/23/2019    MCHC 33.4 03/23/2019    RDW 15.6 03/23/2019    LYMPHOPCT 33 03/23/2019    MONOPCT 6 03/23/2019    BASOPCT 0 03/23/2019    MONOSABS 0.40 03/23/2019    LYMPHSABS 2.40 03/23/2019    EOSABS 0.20 03/23/2019    BASOSABS 0.00 03/23/2019    DIFFTYPE NOT REPORTED 03/23/2019     BMP:    Lab Results   Component Value Date     03/23/2019    K 5.0 03/23/2019     03/23/2019    CO2 17 03/23/2019    BUN 24 03/23/2019    LABALBU 4.8 03/22/2019    CREATININE 1.80 03/23/2019    CALCIUM 8.6 03/23/2019    GFRAA 34 03/23/2019    LABGLOM 28 03/23/2019    GLUCOSE 96 03/23/2019

## 2019-03-23 NOTE — PROGRESS NOTES
Verbalized had 5 loose stools light brown in color Was informed to let nurse see one but says she forgot

## 2019-03-24 VITALS
DIASTOLIC BLOOD PRESSURE: 70 MMHG | HEIGHT: 63 IN | BODY MASS INDEX: 31.88 KG/M2 | TEMPERATURE: 97.7 F | RESPIRATION RATE: 16 BRPM | OXYGEN SATURATION: 99 % | WEIGHT: 179.9 LBS | HEART RATE: 65 BPM | SYSTOLIC BLOOD PRESSURE: 135 MMHG

## 2019-03-24 LAB
ABSOLUTE EOS #: 0.2 K/UL (ref 0–0.4)
ABSOLUTE IMMATURE GRANULOCYTE: ABNORMAL K/UL (ref 0–0.3)
ABSOLUTE LYMPH #: 2.5 K/UL (ref 1–4.8)
ABSOLUTE MONO #: 0.3 K/UL (ref 0.2–0.8)
ANION GAP SERPL CALCULATED.3IONS-SCNC: 10 MMOL/L (ref 9–17)
ANION GAP SERPL CALCULATED.3IONS-SCNC: 11 MMOL/L (ref 9–17)
BASOPHILS # BLD: 1 % (ref 0–2)
BASOPHILS ABSOLUTE: 0 K/UL (ref 0–0.2)
BUN BLDV-MCNC: 12 MG/DL (ref 8–23)
BUN BLDV-MCNC: 14 MG/DL (ref 8–23)
BUN/CREAT BLD: 11 (ref 9–20)
BUN/CREAT BLD: 11 (ref 9–20)
CALCIUM SERPL-MCNC: 8.5 MG/DL (ref 8.6–10.4)
CALCIUM SERPL-MCNC: 8.6 MG/DL (ref 8.6–10.4)
CHLORIDE BLD-SCNC: 108 MMOL/L (ref 98–107)
CHLORIDE BLD-SCNC: 115 MMOL/L (ref 98–107)
CO2: 17 MMOL/L (ref 20–31)
CO2: 19 MMOL/L (ref 20–31)
CREAT SERPL-MCNC: 1.11 MG/DL (ref 0.5–0.9)
CREAT SERPL-MCNC: 1.22 MG/DL (ref 0.5–0.9)
DIFFERENTIAL TYPE: ABNORMAL
EOSINOPHILS RELATIVE PERCENT: 3 % (ref 1–4)
GFR AFRICAN AMERICAN: 54 ML/MIN
GFR AFRICAN AMERICAN: >60 ML/MIN
GFR NON-AFRICAN AMERICAN: 45 ML/MIN
GFR NON-AFRICAN AMERICAN: 50 ML/MIN
GFR SERPL CREATININE-BSD FRML MDRD: ABNORMAL ML/MIN/{1.73_M2}
GLUCOSE BLD-MCNC: 77 MG/DL (ref 70–99)
GLUCOSE BLD-MCNC: 85 MG/DL (ref 70–99)
HCT VFR BLD CALC: 33.6 % (ref 36–46)
HEMOGLOBIN: 11.1 G/DL (ref 12–16)
IMMATURE GRANULOCYTES: ABNORMAL %
LYMPHOCYTES # BLD: 47 % (ref 24–44)
MCH RBC QN AUTO: 28.8 PG (ref 26–34)
MCHC RBC AUTO-ENTMCNC: 33.1 G/DL (ref 31–37)
MCV RBC AUTO: 87 FL (ref 80–100)
MONOCYTES # BLD: 6 % (ref 1–7)
NRBC AUTOMATED: ABNORMAL PER 100 WBC
PDW BLD-RTO: 15.5 % (ref 11.5–14.5)
PLATELET # BLD: 215 K/UL (ref 130–400)
PLATELET ESTIMATE: ABNORMAL
PMV BLD AUTO: 9.1 FL (ref 6–12)
POTASSIUM SERPL-SCNC: 4.5 MMOL/L (ref 3.7–5.3)
POTASSIUM SERPL-SCNC: 4.8 MMOL/L (ref 3.7–5.3)
POTASSIUM SERPL-SCNC: 5.5 MMOL/L (ref 3.7–5.3)
RBC # BLD: 3.86 M/UL (ref 4–5.2)
RBC # BLD: ABNORMAL 10*6/UL
SEG NEUTROPHILS: 43 % (ref 36–66)
SEGMENTED NEUTROPHILS ABSOLUTE COUNT: 2.3 K/UL (ref 1.8–7.7)
SODIUM BLD-SCNC: 137 MMOL/L (ref 135–144)
SODIUM BLD-SCNC: 143 MMOL/L (ref 135–144)
WBC # BLD: 5.3 K/UL (ref 3.5–11)
WBC # BLD: ABNORMAL 10*3/UL

## 2019-03-24 PROCEDURE — 80048 BASIC METABOLIC PNL TOTAL CA: CPT

## 2019-03-24 PROCEDURE — 84132 ASSAY OF SERUM POTASSIUM: CPT

## 2019-03-24 PROCEDURE — 85025 COMPLETE CBC W/AUTO DIFF WBC: CPT

## 2019-03-24 PROCEDURE — 6370000000 HC RX 637 (ALT 250 FOR IP): Performed by: INTERNAL MEDICINE

## 2019-03-24 PROCEDURE — 36415 COLL VENOUS BLD VENIPUNCTURE: CPT

## 2019-03-24 PROCEDURE — 2580000003 HC RX 258: Performed by: INTERNAL MEDICINE

## 2019-03-24 PROCEDURE — 6360000002 HC RX W HCPCS: Performed by: INTERNAL MEDICINE

## 2019-03-24 PROCEDURE — 96372 THER/PROPH/DIAG INJ SC/IM: CPT

## 2019-03-24 PROCEDURE — G0378 HOSPITAL OBSERVATION PER HR: HCPCS

## 2019-03-24 RX ADMIN — HEPARIN SODIUM 5000 UNITS: 5000 INJECTION INTRAVENOUS; SUBCUTANEOUS at 06:00

## 2019-03-24 RX ADMIN — SODIUM BICARBONATE 650 MG: 650 TABLET ORAL at 09:36

## 2019-03-24 RX ADMIN — TICAGRELOR 90 MG: 90 TABLET ORAL at 09:36

## 2019-03-24 RX ADMIN — SERTRALINE 100 MG: 100 TABLET, FILM COATED ORAL at 09:36

## 2019-03-24 RX ADMIN — TRAMADOL HYDROCHLORIDE 50 MG: 50 TABLET, COATED ORAL at 01:36

## 2019-03-24 RX ADMIN — METOPROLOL SUCCINATE 50 MG: 50 TABLET, FILM COATED, EXTENDED RELEASE ORAL at 09:36

## 2019-03-24 RX ADMIN — ISOSORBIDE MONONITRATE 30 MG: 30 TABLET ORAL at 09:36

## 2019-03-24 RX ADMIN — ASPIRIN 81 MG: 81 TABLET, COATED ORAL at 09:36

## 2019-03-24 RX ADMIN — SODIUM CHLORIDE: 9 INJECTION, SOLUTION INTRAVENOUS at 09:37

## 2019-03-24 ASSESSMENT — PAIN DESCRIPTION - PROGRESSION
CLINICAL_PROGRESSION: RAPIDLY IMPROVING

## 2019-03-24 ASSESSMENT — PAIN - FUNCTIONAL ASSESSMENT: PAIN_FUNCTIONAL_ASSESSMENT: ACTIVITIES ARE NOT PREVENTED

## 2019-03-24 ASSESSMENT — PAIN DESCRIPTION - LOCATION: LOCATION: ABDOMEN

## 2019-03-24 ASSESSMENT — PAIN DESCRIPTION - ORIENTATION: ORIENTATION: LOWER

## 2019-03-24 ASSESSMENT — PAIN DESCRIPTION - PAIN TYPE: TYPE: ACUTE PAIN

## 2019-03-24 ASSESSMENT — PAIN DESCRIPTION - DESCRIPTORS: DESCRIPTORS: SORE

## 2019-03-24 ASSESSMENT — PAIN DESCRIPTION - FREQUENCY: FREQUENCY: INTERMITTENT

## 2019-03-24 ASSESSMENT — PAIN SCALES - GENERAL
PAINLEVEL_OUTOF10: 10
PAINLEVEL_OUTOF10: 1

## 2019-03-24 ASSESSMENT — PAIN DESCRIPTION - ONSET: ONSET: ON-GOING

## 2019-03-24 NOTE — PLAN OF CARE
Fall precautions in place Using call light appropriately Remains alert and oriented Up per self with steady gait  Problem: Falls - Risk of:  Goal: Will remain free from falls  Description  Will remain free from falls  Outcome: Ongoing

## 2019-03-24 NOTE — PROGRESS NOTES
Subjective:     Follow-up RADHA  Tolerating diets no nausea vomiting still with diarrhea but much better no abdominal pains  ROS  No fever no chills no GI or  complaints at present except for the diarrhea no cardiopulmonary complaints no TIA no bleeding no headache no sore throat no skin lesions, no fatigue, no polyuria no hypoglycemia  physical exam  General Appearance: alert and oriented to person, place and time and in no acute distress  Skin: warm and dry, no rash or erythema  Head: normocephalic and atraumatic  Eyes: pupils equal, round, and reactive to light, conjunctivae normal and sclera anicteric  Neck: neck supple and non tender without mass   Pulmonary/Chest: clear to auscultation bilaterally- no wheezes, rales or rhonchi, normal air movement, no respiratory distress  Cardiovascular: normal rate, regular rhythm, normal S1 and S2, no gallops, intact distal pulses and no carotid bruits  Abdomen: soft, non-tender, non-distended, normal bowel sounds, no masses or organomegaly  Extremities: no edema and  Good pulses no Homans sign  Neurologic: Alert and oriented ×3 with no focal deficit    /70   Pulse 65   Temp 97.7 °F (36.5 °C) (Oral)   Resp 16   Ht 5' 3\" (1.6 m)   Wt 179 lb 14.3 oz (81.6 kg)   SpO2 99%   BMI 31.87 kg/m²     CBC:   Lab Results   Component Value Date    WBC 5.3 03/24/2019    RBC 3.86 03/24/2019    RBC 4.09 04/14/2012    HGB 11.1 03/24/2019    HCT 33.6 03/24/2019    MCV 87.0 03/24/2019    MCH 28.8 03/24/2019    MCHC 33.1 03/24/2019    RDW 15.5 03/24/2019     03/24/2019     04/14/2012    MPV 9.1 03/24/2019     CMP:    Lab Results   Component Value Date     03/24/2019    K 4.8 03/24/2019     03/24/2019    CO2 17 03/24/2019    BUN 14 03/24/2019    CREATININE 1.22 03/24/2019    GFRAA 54 03/24/2019    LABGLOM 45 03/24/2019    GLUCOSE 77 03/24/2019    GLUCOSE 123 04/14/2012    PROT 8.6 03/22/2019    LABALBU 4.8 03/22/2019    CALCIUM 8.6 03/24/2019    BILITOT 0.37 03/22/2019    ALKPHOS 101 03/22/2019    AST 22 03/22/2019    ALT 19 03/22/2019        Assessment:  Patient Active Problem List   Diagnosis    Diarrhea    Smoker    Acute kidney injury ([address]    CAD (coronary artery disease)    Abdominal pain    SBO (small bowel obstruction)    Metabolic acidosis    Chronic diarrhea    Urinary tract infection without hematuria    Elevated serum creatinine     Diarrhea better    Acute kidney injury resolving  Coronary artery disease native heart native arteries no angina stable chronic smoker smoking cessation and advised  Abdominal pain resolved  Non anion gap Metabolic acidosis   Plan:    Medications labs reviewed recheck lites at 3:00 BMP if better will discharge home today start of her home medications including sodium bicarb    Yury Self MD  12:53 PM

## 2019-03-24 NOTE — DISCHARGE INSTR - COC
SBO (small bowel obstruction) C17.968    Metabolic acidosis W13.2    Chronic diarrhea K52.9    Urinary tract infection without hematuria N39.0    Elevated serum creatinine R79.89       Isolation/Infection:   Isolation          C Diff Contact  Contact        Patient Infection Status     Infection Encounter Level? Onset Date Added Added By Resolved Resolved By Review Date    MDRO (multi-drug resistant organism) No  10/01/18 Dmitri Whalen RN       E. Coli - urine 2018            Nurse Assessment:  Last Vital Signs: /70   Pulse 65   Temp 97.7 °F (36.5 °C) (Oral)   Resp 16   Ht 5' 3\" (1.6 m)   Wt 179 lb 14.3 oz (81.6 kg)   SpO2 99%   BMI 31.87 kg/m²     Last documented pain score (0-10 scale): Pain Level: 1  Last Weight:   Wt Readings from Last 1 Encounters:   19 179 lb 14.3 oz (81.6 kg)     Mental Status:  {IP PT MENTAL STATUS:26356}    IV Access:  { BEV IV ACCESS:644333596}    Nursing Mobility/ADLs:  Walking   {[facility] DME NAGO:594784536}  Transfer  {[facility] DME GHEA:818403851}  Bathing  {[facility] DME OMM}  Dressing  {P DME AGUL:094437116}  Toileting  {P DME USOY:509733186}  Feeding  {[facility] DME DSII:832459299}  Med Admin  {[facility] DME IEHV:315931168}  Med Delivery   { BEV MED Delivery:428857924}    Wound Care Documentation and Therapy:        Elimination:  Continence:   · Bowel: {YES / JA:84610}  · Bladder: {YES / HC:56858}  Urinary Catheter: {Urinary Catheter:637867400}   Colostomy/Ileostomy/Ileal Conduit: {YES / O}       Date of Last BM: ***    Intake/Output Summary (Last 24 hours) at 3/24/2019 1208  Last data filed at 3/24/2019 1037  Gross per 24 hour   Intake 1853 ml   Output 801 ml   Net 1052 ml     I/O last 3 completed shifts: In: 1853 [P.O.:840;  I.V.:1013]  Out: 1600 [Urine:1600]    Safety Concerns:     [address] BEV Safety Concerns:629825250}    Impairments/Disabilities:      50Jos[address] Luis PABLO Impairments/Disabilities:195487679}    Nutrition Therapy:  Current Nutrition Therapy:   Mallika PABLO Diet

## 2019-03-24 NOTE — PROGRESS NOTES
General Surgery:  Daily Progress Note               PATIENT NAME: Ely Rangel     TODAY'S DATE: 3/24/2019, 6:35 AM    SUBJECTIVE:     Pt seen and examined at bedside. No acute events overnight. Pt states overall pain is well controlled and improved. Denies fever, chills, nausea, vomiting, chest pain, and SOB. Tolerated FLD.  + Activity. + flatus, +5 BM. OBJECTIVE:   VITALS:  /62   Pulse 66   Temp 98.1 °F (36.7 °C) (Oral)   Resp 18   Ht 5' 3\" (1.6 m)   Wt 179 lb 14.3 oz (81.6 kg)   SpO2 98%   BMI 31.87 kg/m²      INTAKE/OUTPUT:      Intake/Output Summary (Last 24 hours) at 3/24/2019 6437  Last data filed at 3/23/2019 1736  Gross per 24 hour   Intake 3558 ml   Output 1600 ml   Net 1958 ml       PHYSICAL EXAM:  General Appearance:  awake, alert, oriented, in no acute distress  HEENT:  Normocephalic, atraumatic, mucus membranes moist   Skin:  Skin color, texture, turgor normal. No rashes or lesions. Lungs:  Normal expansion. Clear to auscultation. No rales, rhonchi, or wheezing.   Heart:  Heart regular rate and rhythm  Abdomen:  soft, ND, NTTP    Data:  CBC with Differential:    Lab Results   Component Value Date    WBC 5.3 03/24/2019    RBC 3.86 03/24/2019    RBC 4.09 04/14/2012    HGB 11.1 03/24/2019    HCT 33.6 03/24/2019     03/24/2019     04/14/2012    MCV 87.0 03/24/2019    MCH 28.8 03/24/2019    MCHC 33.1 03/24/2019    RDW 15.5 03/24/2019    LYMPHOPCT 47 03/24/2019    MONOPCT 6 03/24/2019    BASOPCT 1 03/24/2019    MONOSABS 0.30 03/24/2019    LYMPHSABS 2.50 03/24/2019    EOSABS 0.20 03/24/2019    BASOSABS 0.00 03/24/2019    DIFFTYPE NOT REPORTED 03/24/2019     BMP:    Lab Results   Component Value Date     03/24/2019    K 4.8 03/24/2019     03/24/2019    CO2 17 03/24/2019    BUN 14 03/24/2019    LABALBU 4.8 03/22/2019    CREATININE 1.22 03/24/2019    CALCIUM 8.6 03/24/2019    GFRAA 54 03/24/2019    LABGLOM 45 03/24/2019    GLUCOSE 77 03/24/2019    GLUCOSE 123 04/14/2012       Radiology Review:  No new    ASSESSMENT:  Active Hospital Problems    Diagnosis Date Noted    Elevated serum creatinine [R79.89] 03/22/2019       61 y.o. female with hx abdominal colectomy with ileorectal anastomosis; RADHA    Plan:  1. Continue supportive care  2. [id number] [address] Cervantes for general diet  3. F/U labs, Cr improving  4. No surgical intervention at this time    Electronically signed by Triston Roblero DO  on 3/24/2019 at 6:35 AM   Attending Physician Statement  I have discussed the case, including pertinent history and exam findings with the resident. I agree with the assessment, plan and orders as documented by the resident.   Doing well  Surgically stable

## 2019-03-24 NOTE — PLAN OF CARE
Tolerating solid food without emesis or increasing abdominal pain  Problem: Nausea/Vomiting:  Goal: Absence of nausea/vomiting  Description  Absence of nausea/vomiting  Outcome: Ongoing

## 2019-03-24 NOTE — DISCHARGE SUMMARY
Physician Discharge Summary     Patient ID:  Meño Courtney  [id number]  61 y.o.  1955    Admit date: 3/22/2019    Discharge date and time:  3/24/2019    Admission Diagnoses:   Patient Active Problem List   Diagnosis    Diarrhea    Smoker    Acute kidney injury ([address]    CAD (coronary artery disease)    Abdominal pain    SBO (small bowel obstruction)    Metabolic acidosis    Chronic diarrhea    Urinary tract infection without hematuria    Elevated serum creatinine       Discharge Diagnoses: Diarrhea  Acute kidney injury  Coronary artery disease native heart native arteries no angina  Abdominal pain resolved  None anion gap Metabolic acidosis  Chronic smoker    Consults: general surgery    Procedures: [facility] Course: Patient with known chronic diarrhea has been exacerbated more diarrhea in the last few days prior to admission has been feeling fatigued was found to have acute kidney injury admitted with IV fluids renal ultrasound patient did fairly well with no major complications during her stay was discharged home in improved condition to brionna efollowed up in the office [id number]    Discharge Exam:  See progress note from today    Disposition: home  Stable and improved  Patient Instructions:   Current Discharge Medication List      CONTINUE these medications which have NOT CHANGED    Details   RaNITidine HCl (ZANTAC 150 MAXIMUM STRENGTH PO) Take 1 tablet by mouth daily       albuterol sulfate  (90 Base) MCG/ACT inhaler Inhale 2 puffs into the lungs every 6 hours as needed for Wheezing      albuterol-ipratropium (COMBIVENT RESPIMAT)  MCG/ACT AERS inhaler Inhale 1 puff into the lungs every 6 hours as needed for Wheezing      loperamide (IMODIUM) 2 MG capsule Take 2 mg by mouth as needed for Diarrhea      lisinopril (PRINIVIL;ZESTRIL) 2.5 MG tablet Take 2.5 mg by mouth nightly      sodium bicarbonate 650 MG tablet Take 1 tablet by mouth 2 times daily  Qty: 60 tablet, Refills: 0 ticagrelor (BRILINTA) 90 MG TABS tablet Take 90 mg by mouth 2 times daily       isosorbide mononitrate (IMDUR) 30 MG extended release tablet Take 30 mg by mouth daily      atorvastatin (LIPITOR) 80 MG tablet Take 80 mg by mouth daily      sertraline (ZOLOFT) 100 MG tablet Take 100 mg by mouth daily       ASPIR-LOW 81 MG EC tablet Take 81 mg by mouth daily      metoprolol succinate (TOPROL XL) 50 MG extended release tablet Take 50 mg by mouth daily       pantoprazole (PROTONIX) 40 MG tablet Take 40 mg by mouth daily         STOP taking these medications       ALBUTEROL IN Comments:   Reason for Stopping:             Activity: activity as tolerated  Diet: cardiac diet    Follow-up with PCP in 1 week. Signed:   Esteban Mistry MD  3/24/2019  12:58 PM

## 2019-04-02 ENCOUNTER — OFFICE VISIT (OUTPATIENT)
Dept: GASTROENTEROLOGY | Age: 64
End: 2019-04-02
Payer: COMMERCIAL

## 2019-04-02 VITALS
HEIGHT: 64 IN | SYSTOLIC BLOOD PRESSURE: 137 MMHG | WEIGHT: 186 LBS | BODY MASS INDEX: 31.76 KG/M2 | DIASTOLIC BLOOD PRESSURE: 81 MMHG | HEART RATE: 75 BPM

## 2019-04-02 DIAGNOSIS — R13.14 PHARYNGOESOPHAGEAL DYSPHAGIA: Primary | ICD-10-CM

## 2019-04-02 PROCEDURE — 99215 OFFICE O/P EST HI 40 MIN: CPT | Performed by: INTERNAL MEDICINE

## 2019-04-02 ASSESSMENT — ENCOUNTER SYMPTOMS
CONSTIPATION: 0
VOMITING: 0
SINUS PRESSURE: 0
EYE PAIN: 0
EYE REDNESS: 0
SINUS PAIN: 0
CHEST TIGHTNESS: 0
NAUSEA: 0
TROUBLE SWALLOWING: 1
ABDOMINAL PAIN: 0
RECTAL PAIN: 0
SHORTNESS OF BREATH: 1
ANAL BLEEDING: 0
BLOOD IN STOOL: 0
BACK PAIN: 1
ABDOMINAL DISTENTION: 0
WHEEZING: 0
DIARRHEA: 1

## 2019-04-02 NOTE — PROGRESS NOTES
Subjective:      Patient ID: Roxy Flores is a 61 y.o. female. HPI     Dr. Kyara Jackson MD our mutual patient Roxy Flores was seen  for   1. Pharyngoesophageal dysphagia     . Patient seen with the symptoms of GERD and dysphagia. Dysphagia appears to be new symptom. Patient states that she has dysphagia for solids in the last 12 months. However in the last couple of months she is having symptoms on daily basis. Basing on the history the symptoms appears to be progressively getting worse. When the food gets stuck in the esophagus she has symptoms of complete obstruction of esophagus. However weight drinking plenty of liquids the obstruction resolves. At present She has symptoms on daily basis. No symptoms suggestive of extra esophageal manifestation of GERD. She does not have weight loss. No dyspeptic symptoms. Patient was [facility] ago with the symptoms of diarrhea. Patient was managed conservatively and was discharged. Her labs reviewed appears to be nonspecific. Patient has total colectomy and ileorectal anastomosis. No prior history of inflammatory bowel disease. Patient usually has 4-5 bowel movements a day. Patient has this issue for long time. It appears to be this is secondary to total colectomy. Past Medical, Family, and Social History reviewed and does contribute to the patient presenting condition. patient\"s PMH/PSH,SH,PSYCH hx, MEDs, ALLERGIES, and ROS was all reviewed and updated ion the appropriate sections        Review of Systems   Constitutional: Positive for unexpected weight change (increased). Negative for appetite change and fatigue. HENT: Positive for trouble swallowing. Negative for sinus pressure and sinus pain. Eyes: Positive for visual disturbance (glasses). Negative for pain and redness. Respiratory: Positive for shortness of breath. Negative for chest tightness and wheezing.     Cardiovascular: Negative for chest pain, palpitations and leg swelling. Gastrointestinal: Positive for diarrhea. Negative for abdominal distention, abdominal pain, anal bleeding, blood in stool, constipation, nausea, rectal pain and vomiting. Endocrine: Negative for cold intolerance and heat intolerance. Genitourinary: Negative for difficulty urinating, frequency and urgency. Musculoskeletal: Positive for arthralgias and back pain. Negative for neck pain. Skin: Negative. Allergic/Immunologic: Negative for environmental allergies and food allergies. Neurological: Negative for dizziness, weakness and headaches. Hematological: Bruises/bleeds easily. Psychiatric/Behavioral: Negative for agitation and sleep disturbance. The patient is not nervous/anxious. Objective:   Physical Exam   Constitutional: She is oriented to person, place, and time. She appears well-developed and well-nourished. HENT:   Head: Normocephalic and atraumatic. No oral lesions   Eyes: Pupils are equal, round, and reactive to light. Conjunctivae are normal. No scleral icterus. Neck: Normal range of motion. Neck supple. No hepatojugular reflux and no JVD present. No tracheal deviation present. No thyromegaly present. Cardiovascular: Normal rate, regular rhythm, normal heart sounds and intact distal pulses. Pulmonary/Chest: Effort normal and breath sounds normal. No respiratory distress. She has no wheezes. She has no rales. Abdominal: Soft. Bowel sounds are normal. She exhibits no distension, no ascites and no mass. There is no hepatomegaly. There is no tenderness. There is no rebound. No hernia. Musculoskeletal: She exhibits no edema or tenderness. No joint swelling   Lymphadenopathy:     She has no cervical adenopathy. Neurological: She is alert and oriented to person, place, and time. No cranial nerve deficit. Skin: Skin is warm. No bruising, no ecchymosis and no rash noted. No erythema.    Psychiatric: Thought content normal.   Nursing note and vitals reviewed. Assessment:       Diagnosis Orders   1. Pharyngoesophageal dysphagia  EGD           Plan:      Discussed with the patient regarding various possibilities of dysphagia. Need to rule out esophageal stricture, esophagitis, motility problems etc.  She needs EGD and this will be arranged. Basing on the results we will decide whether she needs esophagogram.  Discussed with the patient regarding chronic diarrhea and management. She may need anti-motility drugs. Patient is aware of this and has antimotility medications. About discussed with the patient, she understood and agreed.

## 2019-04-04 ENCOUNTER — TELEPHONE (OUTPATIENT)
Dept: GASTROENTEROLOGY | Age: 64
End: 2019-04-04

## 2019-04-04 NOTE — TELEPHONE ENCOUNTER
Received response from Dr Xiomara Acevedo to not hold brilinita or aspirin due to mi with multiple stents. Dr Angela Montalvo agreed to proceed with egd. Patient informed and understands.

## 2019-04-24 ENCOUNTER — ANESTHESIA EVENT (OUTPATIENT)
Dept: OPERATING ROOM | Age: 64
End: 2019-04-24
Payer: COMMERCIAL

## 2019-04-24 RX ORDER — SODIUM CHLORIDE 9 MG/ML
INJECTION, SOLUTION INTRAVENOUS CONTINUOUS
Status: CANCELLED | OUTPATIENT
Start: 2019-04-25

## 2019-04-25 ENCOUNTER — HOSPITAL ENCOUNTER (OUTPATIENT)
Age: 64
Setting detail: OUTPATIENT SURGERY
Discharge: HOME | End: 2019-04-25
Attending: INTERNAL MEDICINE | Admitting: INTERNAL MEDICINE
Payer: COMMERCIAL

## 2019-04-25 ENCOUNTER — ANESTHESIA (OUTPATIENT)
Dept: OPERATING ROOM | Age: 64
End: 2019-04-25
Payer: COMMERCIAL

## 2019-04-25 VITALS
DIASTOLIC BLOOD PRESSURE: 75 MMHG | SYSTOLIC BLOOD PRESSURE: 142 MMHG | RESPIRATION RATE: 13 BRPM | OXYGEN SATURATION: 100 %

## 2019-04-25 VITALS
BODY MASS INDEX: 31.8 KG/M2 | WEIGHT: 186.29 LBS | DIASTOLIC BLOOD PRESSURE: 69 MMHG | SYSTOLIC BLOOD PRESSURE: 141 MMHG | OXYGEN SATURATION: 99 % | TEMPERATURE: 97.9 F | HEART RATE: 63 BPM | HEIGHT: 64 IN | RESPIRATION RATE: 19 BRPM

## 2019-04-25 PROCEDURE — 7100000011 HC PHASE II RECOVERY - ADDTL 15 MIN: Performed by: INTERNAL MEDICINE

## 2019-04-25 PROCEDURE — 7100000010 HC PHASE II RECOVERY - FIRST 15 MIN: Performed by: INTERNAL MEDICINE

## 2019-04-25 PROCEDURE — 6360000002 HC RX W HCPCS: Performed by: NURSE ANESTHETIST, CERTIFIED REGISTERED

## 2019-04-25 PROCEDURE — 3609017100 HC EGD: Performed by: INTERNAL MEDICINE

## 2019-04-25 PROCEDURE — 3700000001 HC ADD 15 MINUTES (ANESTHESIA): Performed by: INTERNAL MEDICINE

## 2019-04-25 PROCEDURE — 3700000000 HC ANESTHESIA ATTENDED CARE: Performed by: INTERNAL MEDICINE

## 2019-04-25 PROCEDURE — 2709999900 HC NON-CHARGEABLE SUPPLY: Performed by: INTERNAL MEDICINE

## 2019-04-25 PROCEDURE — 2580000003 HC RX 258: Performed by: ANESTHESIOLOGY

## 2019-04-25 PROCEDURE — 2500000003 HC RX 250 WO HCPCS: Performed by: NURSE ANESTHETIST, CERTIFIED REGISTERED

## 2019-04-25 RX ORDER — SODIUM CHLORIDE 0.9 % (FLUSH) 0.9 %
10 SYRINGE (ML) INJECTION PRN
Status: DISCONTINUED | OUTPATIENT
Start: 2019-04-25 | End: 2019-04-25 | Stop reason: HOSPADM

## 2019-04-25 RX ORDER — SODIUM CHLORIDE 0.9 % (FLUSH) 0.9 %
10 SYRINGE (ML) INJECTION EVERY 12 HOURS SCHEDULED
Status: DISCONTINUED | OUTPATIENT
Start: 2019-04-25 | End: 2019-04-25 | Stop reason: HOSPADM

## 2019-04-25 RX ORDER — LIDOCAINE HYDROCHLORIDE 20 MG/ML
INJECTION, SOLUTION EPIDURAL; INFILTRATION; INTRACAUDAL; PERINEURAL PRN
Status: DISCONTINUED | OUTPATIENT
Start: 2019-04-25 | End: 2019-04-25 | Stop reason: SDUPTHER

## 2019-04-25 RX ORDER — SODIUM CHLORIDE, SODIUM LACTATE, POTASSIUM CHLORIDE, CALCIUM CHLORIDE 600; 310; 30; 20 MG/100ML; MG/100ML; MG/100ML; MG/100ML
INJECTION, SOLUTION INTRAVENOUS CONTINUOUS
Status: DISCONTINUED | OUTPATIENT
Start: 2019-04-26 | End: 2019-04-25 | Stop reason: HOSPADM

## 2019-04-25 RX ORDER — PROPOFOL 10 MG/ML
INJECTION, EMULSION INTRAVENOUS PRN
Status: DISCONTINUED | OUTPATIENT
Start: 2019-04-25 | End: 2019-04-25 | Stop reason: SDUPTHER

## 2019-04-25 RX ORDER — LIDOCAINE HYDROCHLORIDE 10 MG/ML
1 INJECTION, SOLUTION EPIDURAL; INFILTRATION; INTRACAUDAL; PERINEURAL
Status: DISCONTINUED | OUTPATIENT
Start: 2019-04-26 | End: 2019-04-25 | Stop reason: HOSPADM

## 2019-04-25 RX ADMIN — LIDOCAINE HYDROCHLORIDE 100 MG: 20 INJECTION, SOLUTION EPIDURAL; INFILTRATION; INTRACAUDAL; PERINEURAL at 08:26

## 2019-04-25 RX ADMIN — PROPOFOL 50 MG: 10 INJECTION, EMULSION INTRAVENOUS at 08:34

## 2019-04-25 RX ADMIN — PROPOFOL 50 MG: 10 INJECTION, EMULSION INTRAVENOUS at 08:32

## 2019-04-25 RX ADMIN — PROPOFOL 20 MG: 10 INJECTION, EMULSION INTRAVENOUS at 08:36

## 2019-04-25 RX ADMIN — PROPOFOL 50 MG: 10 INJECTION, EMULSION INTRAVENOUS at 08:30

## 2019-04-25 RX ADMIN — PROPOFOL 50 MG: 10 INJECTION, EMULSION INTRAVENOUS at 08:28

## 2019-04-25 RX ADMIN — SODIUM CHLORIDE, POTASSIUM CHLORIDE, SODIUM LACTATE AND CALCIUM CHLORIDE: 600; 310; 30; 20 INJECTION, SOLUTION INTRAVENOUS at 08:09

## 2019-04-25 ASSESSMENT — PULMONARY FUNCTION TESTS
PIF_VALUE: 1

## 2019-04-25 ASSESSMENT — PAIN SCALES - GENERAL
PAINLEVEL_OUTOF10: 0

## 2019-04-25 ASSESSMENT — PAIN - FUNCTIONAL ASSESSMENT: PAIN_FUNCTIONAL_ASSESSMENT: 0-10

## 2019-04-25 ASSESSMENT — PAIN DESCRIPTION - DESCRIPTORS: DESCRIPTORS: SHARP

## 2019-04-25 NOTE — H&P
History and Physical Update    Pt Name: Moreno Potts  MRN: [id number]  YOB: 1955  Date of evaluation: 4/25/2019      [x] I have reviewed the Gastroenterology Note by Dr Janet Penny in [address] [place] [address] Rd dated 4/2/19 which meets the criteria for an Interval History and Physical note and is attached below. [x] I have examined  Lucy Gutierrez  There are no changes to the patient who is scheduled for an EGD by Dr Janet Penny for dysphagia  The patient denies health changes, fever, chills, productive cough, SOB, abdominal pain, or unexplained weight loss. Hx CAD with past MI 5/27/19 requiring angioplasty with ABELINO stent x5. Follows with Cardiologist at [address]   On long term anticoagulants and told by cardiologist not to stop. Last ASA 81mg and Brilinta 4/24/19. Anesthesiologist and Dr Janet Penny notified. Vital signs: BP (!) 146/69   Pulse 65   Temp 98 °F (36.7 °C) (Oral)   Resp 18   Ht 5' 4\" (1.626 m)   Wt 186 lb 4.6 oz (84.5 kg)   SpO2 100%   BMI 31.98 kg/m²     Allergies:  Keflex [cephalexin]; Atenolol; Codeine; Dilaudid [hydromorphone hcl]; Percocet [oxycodone-acetaminophen]; Phenergan [promethazine hcl]; Sodium hypochlorite; Erythromycin; and Prednisone    Medications:    Prior to Admission medications    Medication Sig Start Date End Date Taking?  Authorizing Provider   RaNITidine HCl (ZANTAC 150 MAXIMUM STRENGTH PO) Take 1 tablet by mouth daily    Yes Historical Provider, MD   albuterol-ipratropium (COMBIVENT RESPIMAT)  MCG/ACT AERS inhaler Inhale 1 puff into the lungs every 6 hours as needed for Wheezing   Yes Historical Provider, MD   loperamide (IMODIUM) 2 MG capsule Take 2 mg by mouth as needed for Diarrhea   Yes Historical Provider, MD   lisinopril (PRINIVIL;ZESTRIL) 2.5 MG tablet Take 2.5 mg by mouth nightly   Yes Historical Provider, MD   sodium bicarbonate 650 MG tablet Take 1 tablet by mouth 2 times daily 6/4/18  Yes James Hui MD   ticagrelor (BRILINTA) 90 MG TABS tablet Take 90 mg by mouth 2 times daily    Yes Historical Provider, MD   isosorbide mononitrate (IMDUR) 30 MG extended release tablet Take 30 mg by mouth daily   Yes Historical Provider, MD   atorvastatin (LIPITOR) 80 MG tablet Take 80 mg by mouth daily   Yes Historical Provider, MD   sertraline (ZOLOFT) 100 MG tablet Take 100 mg by mouth daily  2/8/17  Yes Historical Provider, MD   ASPIR-LOW 81 MG EC tablet Take 81 mg by mouth daily 11/22/16  Yes Historical Provider, MD   metoprolol succinate (TOPROL XL) 50 MG extended release tablet Take 50 mg by mouth daily  11/22/16  Yes Historical Provider, MD   pantoprazole (PROTONIX) 40 MG tablet Take 40 mg by mouth daily 11/22/16  Yes Historical Provider, MD   albuterol sulfate  (90 Base) MCG/ACT inhaler Inhale 2 puffs into the lungs every 6 hours as needed for Wheezing    Historical Provider, MD       This is a 61 y.o. female who is pleasant, cooperative, alert and oriented x3, in no acute distress. Raspy voice     Heart: Heart sounds are normal.  HR 65 regular rate and rhythm without murmur, gallop or rub. Lungs: + nonproductive cough. Normal respiratory effort, unlabored at rest and clear to auscultation without wheezes or rales bilaterally   Abdomen: Round, soft, nontender, nondistended with bowel sounds. Labs:  No results for input(s): HGB, HCT, WBC, MCV, PLT, NA, K, CL, CO2, BUN, CREATININE, GLUCOSE, INR, PROTIME, APTT, AST, ALT, LABALBU, HCG in the last 720 hours. DERICK Pettit-BC  Electronically signed 4/25/2019 at 7:54 AM        Alejandro Saenz MD   Physician   Gastroenterology   Progress Notes      Signed   Encounter Date:  4/2/2019               Signed                  Show:Clear all  [x]Manual[x]Template[]Copied    Added by:  Gladis Jensen MD      []Tanisha for details      Subjective:      Patient ID: Flash Rojo is a 61 y.o. female.      HPI      Dr. Yury Self MD our mutual patient Flash Rojo was seen for   1. Pharyngoesophageal dysphagia     . Patient seen with the symptoms of GERD and dysphagia. Dysphagia appears to be new symptom. Patient states that she has dysphagia for solids in the last 12 months. However in the last couple of months she is having symptoms on daily basis. Basing on the history the symptoms appears to be progressively getting worse. When the food gets stuck in the esophagus she has symptoms of complete obstruction of esophagus. However weight drinking plenty of liquids the obstruction resolves. At present She has symptoms on daily basis. No symptoms suggestive of extra esophageal manifestation of GERD. She does not have weight loss. No dyspeptic symptoms. Patient was at [facility] 10 days ago with the symptoms of diarrhea. Patient was managed conservatively and was discharged. Her labs reviewed appears to be nonspecific. Patient has total colectomy and ileorectal anastomosis. No prior history of inflammatory bowel disease. Patient usually has 4-5 bowel movements a day. Patient has this issue for long time. It appears to be this is secondary to total colectomy. Past Medical, Family, and Social History reviewed and does contribute to the patient presenting condition. patient\"s PMH/PSH,SH,PSYCH hx, MEDs, ALLERGIES, and ROS was all reviewed and updated ion the appropriate sections           Review of Systems   Constitutional: Positive for unexpected weight change (increased). Negative for appetite change and fatigue. HENT: Positive for trouble swallowing. Negative for sinus pressure and sinus pain. Eyes: Positive for visual disturbance (glasses). Negative for pain and redness. Respiratory: Positive for shortness of breath. Negative for chest tightness and wheezing. Cardiovascular: Negative for chest pain, palpitations and leg swelling. Gastrointestinal: Positive for diarrhea.  Negative for abdominal distention, abdominal pain, Plan:   Discussed with the patient regarding various possibilities of dysphagia. Need to rule out esophageal stricture, esophagitis, motility problems etc.  She needs EGD and this will be arranged. Basing on the results we will decide whether she needs esophagogram.  Discussed with the patient regarding chronic diarrhea and management. She may need anti-motility drugs. Patient is aware of this and has antimotility medications. About discussed with the patient, she understood and agreed. ·   Office Visit on 4/2/2019   ·     ·   Revision History   ·     ·   Detailed Report   ·     ·   Note shared with patient   Progress Notes Info     Author Note Status Last Update User   Jose Hudson MD Signed Jose Hudson MD   Last Update Date/Time: 4/2/2019 10:00 PM   Chart Review Routing History     Routing history could not be found for this note. This is because the note has never been routed or because communication record creation was suppressed.

## 2019-04-25 NOTE — OP NOTE
Recommendations/Plan:   1. F/U Biopsies  2. F/U In Office as instructed  3.  Discussed with the family                   Electronically signed by Aleida Friedman MD  on 4/25/2019 at 8:39 AM

## 2019-04-25 NOTE — ANESTHESIA PRE PROCEDURE
Department of Anesthesiology  Preprocedure Note       Name:  Henry Galindo   Age:  61 y.o.  :  1955                                          MRN:  [id number]         Date:  2019      Surgeon: Chilango Hood):  Ruola Escobar MD    Procedure: EGD ESOPHAGOGASTRODUODENOSCOPY (N/A )    Medications prior to admission:   Prior to Admission medications    Medication Sig Start Date End Date Taking?  Authorizing Provider   RaNITidine HCl (ZANTAC 150 MAXIMUM STRENGTH PO) Take 1 tablet by mouth daily    Yes Historical Provider, MD   albuterol-ipratropium (COMBIVENT RESPIMAT)  MCG/ACT AERS inhaler Inhale 1 puff into the lungs every 6 hours as needed for Wheezing   Yes Historical Provider, MD   loperamide (IMODIUM) 2 MG capsule Take 2 mg by mouth as needed for Diarrhea   Yes Historical Provider, MD   lisinopril (PRINIVIL;ZESTRIL) 2.5 MG tablet Take 2.5 mg by mouth nightly   Yes Historical Provider, MD   sodium bicarbonate 650 MG tablet Take 1 tablet by mouth 2 times daily 18  Yes Martha Sahu MD   ticagrelor (BRILINTA) 90 MG TABS tablet Take 90 mg by mouth 2 times daily    Yes Historical Provider, MD   isosorbide mononitrate (IMDUR) 30 MG extended release tablet Take 30 mg by mouth daily   Yes Historical Provider, MD   atorvastatin (LIPITOR) 80 MG tablet Take 80 mg by mouth daily   Yes Historical Provider, MD   sertraline (ZOLOFT) 100 MG tablet Take 100 mg by mouth daily  17  Yes Historical Provider, MD   ASPIR-LOW 81 MG EC tablet Take 81 mg by mouth daily 16  Yes Historical Provider, MD   metoprolol succinate (TOPROL XL) 50 MG extended release tablet Take 50 mg by mouth daily  16  Yes Historical Provider, MD   pantoprazole (PROTONIX) 40 MG tablet Take 40 mg by mouth daily 16  Yes Historical Provider, MD   albuterol sulfate  (90 Base) MCG/ACT inhaler Inhale 2 puffs into the lungs every 6 hours as needed for Wheezing    Historical Provider, MD       Current medications: Current Facility-Administered Medications   Medication Dose Route Frequency Provider Last Rate Last Dose    [START ON 4/26/2019] lactated ringers infusion   Intravenous Continuous Levi Soriano DO        sodium chloride flush 0.9 % injection 10 mL  10 mL Intravenous 2 times per day Levi Soriano, DO        sodium chloride flush 0.9 % injection 10 mL  10 mL Intravenous PRN Daniel Bon, DO        [START ON 4/26/2019] lidocaine PF 1 % injection 1 mL  1 mL Intradermal Once PRN Daniel Bon, DO           Allergies: Allergies   Allergen Reactions    Keflex [Cephalexin] Hives    Atenolol Other (See Comments)     Pt doesn't know reaction    Codeine Other (See Comments)     Told to avoid by GI MD    Dilaudid [Hydromorphone Hcl] Other (See Comments)     Hallucinations      Percocet [Oxycodone-Acetaminophen] Other (See Comments)     GI physician recommended she avoid    Phenergan [Promethazine Hcl] Other (See Comments)     hallucinations    Sodium Hypochlorite      Free text allergies = detergent/bedding    Erythromycin Nausea And Vomiting     Possible hives also    Prednisone Palpitations     Causes tachycardia. Can take with a beta-blocker.        Problem List:    Patient Active Problem List   Diagnosis Code    Diarrhea R19.7    Smoker F17.200    Acute kidney injury ([facility] [address] N17.9    CAD (coronary artery disease) I25.10    Abdominal pain R10.9    SBO (small bowel obstruction) H71.366    Metabolic acidosis N95.8    Chronic diarrhea K52.9    Urinary tract infection without hematuria N39.0    Elevated serum creatinine R79.89       Past Medical History:        Diagnosis Date    Anxiety     Arthritis     CAD (coronary artery disease)     MI.   9 stents total    Chronic kidney disease     COPD (chronic obstructive pulmonary disease) ([facility] [address]     Depression     Diverticulosis     GERD (gastroesophageal reflux disease)     Heart attack ([facility] [address] 05/2018    Heart palpitations     History of blood transfusion     Hyperlipidemia     Hypertension     PSVT (paroxysmal supraventricular tachycardia) ([facility] [address]        Past Surgical History:        Procedure Laterality Date    ABDOMEN SURGERY      APPENDECTOMY      CAROTID STENT PLACEMENT      CHOLECYSTECTOMY      COLECTOMY  2003    COLONOSCOPY  7/30/2003    Dr Conchetta Ormond diverticulosis and signs of diverticulitis    COLONOSCOPY  5/21/2010    stricture at about  35 cm from the anus. Could not advance even gastroscope    COLONOSCOPY  10/17/2014    small bowel and rectum normal    SMALL INTESTINE SURGERY      entire large intestine removed, entire small intestine intact    TUBAL LIGATION      UPPER GASTROINTESTINAL ENDOSCOPY  10/17/2014    normal       Social History:    Social History     Tobacco Use    Smoking status: Current Every Day Smoker     Packs/day: 1.00     Years: 40.00     Pack years: 40.00     Types: Cigarettes    Smokeless tobacco: Never Used   Substance Use Topics    Alcohol use: No                                Ready to quit: Not Answered  Counseling given: Not Answered      Vital Signs (Current):   Vitals:    04/25/19 0742 04/25/19 0743   BP: (!) 146/69    Pulse: 65    Resp: 18    Temp: 98 °F (36.7 °C)    TempSrc: Oral Oral   SpO2: 100%    Weight:  186 lb 4.6 oz (84.5 kg)   Height:  5' 4\" (1.626 m)                                              BP Readings from Last 3 Encounters:   04/25/19 (!) 146/69   04/02/19 137/81   03/24/19 135/70       NPO Status: Time of last liquid consumption: 2000                        Time of last solid consumption: 1900                        Date of last liquid consumption: 04/24/19                        Date of last solid food consumption: 04/24/19    BMI:   Wt Readings from Last 3 Encounters:   04/25/19 186 lb 4.6 oz (84.5 kg)   04/02/19 186 lb (84.4 kg)   03/22/19 179 lb 14.3 oz (81.6 kg)     Body mass index is 31.98 kg/m².     CBC:   Lab Results   Component Value Date    WBC 5.3 03/24/2019    RBC 3.86 03/24/2019    RBC 4.09 04/14/2012    HGB 11.1 03/24/2019    HCT 33.6 03/24/2019    MCV 87.0 03/24/2019    RDW 15.5 03/24/2019     03/24/2019     04/14/2012       CMP:   Lab Results   Component Value Date     03/24/2019    K 4.5 03/24/2019     03/24/2019    CO2 19 03/24/2019    BUN 12 03/24/2019    CREATININE 1.11 03/24/2019    GFRAA >60 03/24/2019    LABGLOM 50 03/24/2019    GLUCOSE 85 03/24/2019    GLUCOSE 123 04/14/2012    PROT 8.6 03/22/2019    CALCIUM 8.5 03/24/2019    BILITOT 0.37 03/22/2019    ALKPHOS 101 03/22/2019    AST 22 03/22/2019    ALT 19 03/22/2019       POC Tests: No results for input(s): POCGLU, POCNA, POCK, POCCL, POCBUN, POCHEMO, POCHCT in the last 72 hours. Coags:   Lab Results   Component Value Date    PROTIME 9.6 12/18/2012    INR 0.9 12/18/2012    APTT 27.8 12/18/2012       HCG (If Applicable):   Lab Results   Component Value Date    HCG NEGATIVE 12/15/2015        ABGs: No results found for: PHART, PO2ART, SHS2ASW, JMW6PTR, BEART, B2WWGVTT     Type & Screen (If Applicable):  No results found for: [facility]    Anesthesia Evaluation  Patient summary reviewed and Nursing notes reviewed  Airway: Mallampati: II  TM distance: >3 FB   Neck ROM: full  Mouth opening: > = 3 FB Dental: normal exam         Pulmonary:normal exam  breath sounds clear to auscultation                             Cardiovascular:  Exercise tolerance: good (>4 METS),           Rhythm: regular  Rate: normal                    Neuro/Psych:               GI/Hepatic/Renal:             Endo/Other:                     Abdominal:       Abdomen: soft. Vascular:                                        Anesthesia Plan      general and MAC     ASA 4       Induction: intravenous. MIPS: Postoperative opioids intended and Prophylactic antiemetics administered. Anesthetic plan and risks discussed with patient.     Use of blood products discussed with patient whom consented to blood products. Plan discussed with attending and CRNA.     Attending anesthesiologist reviewed and agrees with Génesis Bo MD   4/25/2019

## 2019-04-25 NOTE — ANESTHESIA POSTPROCEDURE EVALUATION
Department of Anesthesiology  Postprocedure Note    Patient: Roxy Flores  MRN: [id number]  YOB: 1955  Date of evaluation: 4/25/2019  Time:  11:39 AM     Procedure Summary     Date:  04/25/19 Room / Location:  [facility] OR    Anesthesia Start:  0818 Anesthesia Stop:  Randolph Hernandez    Procedure:  EGD ESOPHAGOGASTRODUODENOSCOPY (N/A ) Diagnosis:  (DX DYSPHAGIA)    Surgeon:  Jose Hudson MD Responsible Provider:  Kasi Benson MD    Anesthesia Type:  general, MAC ASA Status:  4          Anesthesia Type: general, MAC    Bernard Phase I:      Bernard Phase II: Bernard Score: 9    Last vitals: Reviewed and per EMR flowsheets.        Anesthesia Post Evaluation    Patient location during evaluation: PACU  Patient participation: complete - patient participated  Level of consciousness: awake  Pain score: 1  Airway patency: patent  Nausea & Vomiting: no nausea and no vomiting  Complications: no  Cardiovascular status: blood pressure returned to baseline  Respiratory status: acceptable  Hydration status: euvolemic

## 2019-07-14 ENCOUNTER — APPOINTMENT (OUTPATIENT)
Dept: GENERAL RADIOLOGY | Age: 64
DRG: 248 | End: 2019-07-14
Payer: COMMERCIAL

## 2019-07-14 ENCOUNTER — HOSPITAL ENCOUNTER (INPATIENT)
Age: 64
LOS: 3 days | Discharge: HOME | DRG: 248 | End: 2019-07-17
Attending: EMERGENCY MEDICINE | Admitting: INTERNAL MEDICINE
Payer: COMMERCIAL

## 2019-07-14 ENCOUNTER — APPOINTMENT (OUTPATIENT)
Dept: CT IMAGING | Age: 64
DRG: 248 | End: 2019-07-14
Payer: COMMERCIAL

## 2019-07-14 DIAGNOSIS — N28.9 RENAL INSUFFICIENCY: Primary | ICD-10-CM

## 2019-07-14 DIAGNOSIS — E86.0 DEHYDRATION: ICD-10-CM

## 2019-07-14 DIAGNOSIS — R55 NEAR SYNCOPE: ICD-10-CM

## 2019-07-14 LAB
-: ABNORMAL
-: NORMAL
ABSOLUTE EOS #: 0.17 K/UL (ref 0–0.44)
ABSOLUTE IMMATURE GRANULOCYTE: 0.04 K/UL (ref 0–0.3)
ABSOLUTE LYMPH #: 1.85 K/UL (ref 1.1–3.7)
ABSOLUTE MONO #: 0.62 K/UL (ref 0.1–1.2)
ALBUMIN SERPL-MCNC: 4.6 G/DL (ref 3.5–5.2)
ALBUMIN/GLOBULIN RATIO: ABNORMAL (ref 1–2.5)
ALP BLD-CCNC: 120 U/L (ref 35–104)
ALT SERPL-CCNC: 15 U/L (ref 5–33)
AMORPHOUS: ABNORMAL
ANION GAP SERPL CALCULATED.3IONS-SCNC: 18 MMOL/L (ref 9–17)
AST SERPL-CCNC: 21 U/L
BACTERIA: ABNORMAL
BASOPHILS # BLD: 1 % (ref 0–2)
BASOPHILS ABSOLUTE: 0.07 K/UL (ref 0–0.2)
BILIRUB SERPL-MCNC: 0.32 MG/DL (ref 0.3–1.2)
BILIRUBIN DIRECT: 0.09 MG/DL
BILIRUBIN URINE: ABNORMAL
BILIRUBIN, INDIRECT: 0.23 MG/DL (ref 0–1)
BUN BLDV-MCNC: 20 MG/DL (ref 8–23)
BUN/CREAT BLD: 11 (ref 9–20)
CALCIUM SERPL-MCNC: 10 MG/DL (ref 8.6–10.4)
CASTS UA: ABNORMAL /LPF
CHLORIDE BLD-SCNC: 101 MMOL/L (ref 98–107)
CO2: 17 MMOL/L (ref 20–31)
COLOR: ABNORMAL
COMMENT UA: ABNORMAL
CREAT SERPL-MCNC: 1.81 MG/DL (ref 0.5–0.9)
CRYSTALS, UA: ABNORMAL /HPF
DIFFERENTIAL TYPE: ABNORMAL
EOSINOPHILS RELATIVE PERCENT: 2 % (ref 1–4)
EPITHELIAL CELLS UA: ABNORMAL /HPF (ref 0–5)
GFR AFRICAN AMERICAN: 34 ML/MIN
GFR NON-AFRICAN AMERICAN: 28 ML/MIN
GFR SERPL CREATININE-BSD FRML MDRD: ABNORMAL ML/MIN/{1.73_M2}
GFR SERPL CREATININE-BSD FRML MDRD: ABNORMAL ML/MIN/{1.73_M2}
GLOBULIN: ABNORMAL G/DL (ref 1.5–3.8)
GLUCOSE BLD-MCNC: 100 MG/DL (ref 70–99)
GLUCOSE URINE: NEGATIVE
HCT VFR BLD CALC: 45.1 % (ref 36.3–47.1)
HEMOGLOBIN: 14.7 G/DL (ref 11.9–15.1)
IMMATURE GRANULOCYTES: 0 %
KETONES, URINE: NEGATIVE
LACTIC ACID: 1.1 MMOL/L (ref 0.5–2.2)
LEUKOCYTE ESTERASE, URINE: ABNORMAL
LIPASE: 46 U/L (ref 13–60)
LYMPHOCYTES # BLD: 19 % (ref 24–43)
MCH RBC QN AUTO: 28.4 PG (ref 25.2–33.5)
MCHC RBC AUTO-ENTMCNC: 32.6 G/DL (ref 28.4–34.8)
MCV RBC AUTO: 87.2 FL (ref 82.6–102.9)
MONOCYTES # BLD: 6 % (ref 3–12)
MUCUS: ABNORMAL
MYOGLOBIN: 45 NG/ML (ref 25–58)
MYOGLOBIN: 55 NG/ML (ref 25–58)
MYOGLOBIN: 72 NG/ML (ref 25–58)
NITRITE, URINE: NEGATIVE
NRBC AUTOMATED: 0 PER 100 WBC
OTHER OBSERVATIONS UA: ABNORMAL
PDW BLD-RTO: 14.8 % (ref 11.8–14.4)
PH UA: 8.5 (ref 5–8)
PLATELET # BLD: 301 K/UL (ref 138–453)
PLATELET ESTIMATE: ABNORMAL
PMV BLD AUTO: 10.3 FL (ref 8.1–13.5)
POTASSIUM SERPL-SCNC: 5.3 MMOL/L (ref 3.7–5.3)
PROTEIN UA: ABNORMAL
RBC # BLD: 5.17 M/UL (ref 3.95–5.11)
RBC # BLD: ABNORMAL 10*6/UL
RBC UA: ABNORMAL /HPF (ref 0–2)
REASON FOR REJECTION: NORMAL
RENAL EPITHELIAL, UA: ABNORMAL /HPF
SEG NEUTROPHILS: 72 % (ref 36–65)
SEGMENTED NEUTROPHILS ABSOLUTE COUNT: 6.98 K/UL (ref 1.5–8.1)
SODIUM BLD-SCNC: 136 MMOL/L (ref 135–144)
SPECIFIC GRAVITY UA: 1.01 (ref 1–1.03)
TOTAL PROTEIN: 8.7 G/DL (ref 6.4–8.3)
TRICHOMONAS: ABNORMAL
TROPONIN INTERP: ABNORMAL
TROPONIN INTERP: NORMAL
TROPONIN INTERP: NORMAL
TROPONIN T: ABNORMAL NG/ML
TROPONIN T: NORMAL NG/ML
TROPONIN T: NORMAL NG/ML
TROPONIN, HIGH SENSITIVITY: 13 NG/L (ref 0–14)
TROPONIN, HIGH SENSITIVITY: 13 NG/L (ref 0–14)
TROPONIN, HIGH SENSITIVITY: 14 NG/L (ref 0–14)
TURBIDITY: ABNORMAL
URINE HGB: ABNORMAL
UROBILINOGEN, URINE: ABNORMAL
WBC # BLD: 9.7 K/UL (ref 3.5–11.3)
WBC # BLD: ABNORMAL 10*3/UL
WBC UA: ABNORMAL /HPF (ref 0–5)
YEAST: ABNORMAL
ZZ NTE CLEAN UP: ORDERED TEST: NORMAL
ZZ NTE WITH NAME CLEAN UP: SPECIMEN SOURCE: NORMAL

## 2019-07-14 PROCEDURE — 2580000003 HC RX 258: Performed by: INTERNAL MEDICINE

## 2019-07-14 PROCEDURE — 87449 NOS EACH ORGANISM AG IA: CPT

## 2019-07-14 PROCEDURE — 96374 THER/PROPH/DIAG INJ IV PUSH: CPT

## 2019-07-14 PROCEDURE — 93005 ELECTROCARDIOGRAM TRACING: CPT | Performed by: NURSE PRACTITIONER

## 2019-07-14 PROCEDURE — 6370000000 HC RX 637 (ALT 250 FOR IP): Performed by: INTERNAL MEDICINE

## 2019-07-14 PROCEDURE — 96376 TX/PRO/DX INJ SAME DRUG ADON: CPT

## 2019-07-14 PROCEDURE — G0378 HOSPITAL OBSERVATION PER HR: HCPCS

## 2019-07-14 PROCEDURE — 71045 X-RAY EXAM CHEST 1 VIEW: CPT

## 2019-07-14 PROCEDURE — 83690 ASSAY OF LIPASE: CPT

## 2019-07-14 PROCEDURE — 96375 TX/PRO/DX INJ NEW DRUG ADDON: CPT

## 2019-07-14 PROCEDURE — 2580000003 HC RX 258: Performed by: EMERGENCY MEDICINE

## 2019-07-14 PROCEDURE — 83605 ASSAY OF LACTIC ACID: CPT

## 2019-07-14 PROCEDURE — 2580000003 HC RX 258: Performed by: NURSE PRACTITIONER

## 2019-07-14 PROCEDURE — 84484 ASSAY OF TROPONIN QUANT: CPT

## 2019-07-14 PROCEDURE — 74176 CT ABD & PELVIS W/O CONTRAST: CPT

## 2019-07-14 PROCEDURE — 83874 ASSAY OF MYOGLOBIN: CPT

## 2019-07-14 PROCEDURE — 6360000002 HC RX W HCPCS: Performed by: NURSE PRACTITIONER

## 2019-07-14 PROCEDURE — 99285 EMERGENCY DEPT VISIT HI MDM: CPT

## 2019-07-14 PROCEDURE — 80048 BASIC METABOLIC PNL TOTAL CA: CPT

## 2019-07-14 PROCEDURE — 96372 THER/PROPH/DIAG INJ SC/IM: CPT

## 2019-07-14 PROCEDURE — 94640 AIRWAY INHALATION TREATMENT: CPT

## 2019-07-14 PROCEDURE — 85025 COMPLETE CBC W/AUTO DIFF WBC: CPT

## 2019-07-14 PROCEDURE — 87324 CLOSTRIDIUM AG IA: CPT

## 2019-07-14 PROCEDURE — 80076 HEPATIC FUNCTION PANEL: CPT

## 2019-07-14 PROCEDURE — 6360000002 HC RX W HCPCS: Performed by: INTERNAL MEDICINE

## 2019-07-14 PROCEDURE — 81001 URINALYSIS AUTO W/SCOPE: CPT

## 2019-07-14 PROCEDURE — 2060000000 HC ICU INTERMEDIATE R&B

## 2019-07-14 PROCEDURE — 6360000002 HC RX W HCPCS: Performed by: EMERGENCY MEDICINE

## 2019-07-14 PROCEDURE — 36415 COLL VENOUS BLD VENIPUNCTURE: CPT

## 2019-07-14 RX ORDER — FENTANYL CITRATE 50 UG/ML
25 INJECTION, SOLUTION INTRAMUSCULAR; INTRAVENOUS
Status: DISCONTINUED | OUTPATIENT
Start: 2019-07-14 | End: 2019-07-17 | Stop reason: HOSPADM

## 2019-07-14 RX ORDER — ATORVASTATIN CALCIUM 80 MG/1
80 TABLET, FILM COATED ORAL DAILY
Status: DISCONTINUED | OUTPATIENT
Start: 2019-07-14 | End: 2019-07-17 | Stop reason: HOSPADM

## 2019-07-14 RX ORDER — ISOSORBIDE MONONITRATE 30 MG/1
30 TABLET, EXTENDED RELEASE ORAL DAILY
Status: DISCONTINUED | OUTPATIENT
Start: 2019-07-14 | End: 2019-07-17 | Stop reason: HOSPADM

## 2019-07-14 RX ORDER — MORPHINE SULFATE 2 MG/ML
2 INJECTION, SOLUTION INTRAMUSCULAR; INTRAVENOUS ONCE
Status: COMPLETED | OUTPATIENT
Start: 2019-07-14 | End: 2019-07-14

## 2019-07-14 RX ORDER — FENTANYL CITRATE 50 UG/ML
25 INJECTION, SOLUTION INTRAMUSCULAR; INTRAVENOUS ONCE
Status: COMPLETED | OUTPATIENT
Start: 2019-07-14 | End: 2019-07-14

## 2019-07-14 RX ORDER — METOPROLOL SUCCINATE 50 MG/1
50 TABLET, EXTENDED RELEASE ORAL DAILY
Status: DISCONTINUED | OUTPATIENT
Start: 2019-07-14 | End: 2019-07-17 | Stop reason: HOSPADM

## 2019-07-14 RX ORDER — SERTRALINE HYDROCHLORIDE 100 MG/1
100 TABLET, FILM COATED ORAL DAILY
Status: DISCONTINUED | OUTPATIENT
Start: 2019-07-14 | End: 2019-07-17 | Stop reason: HOSPADM

## 2019-07-14 RX ORDER — 0.9 % SODIUM CHLORIDE 0.9 %
1000 INTRAVENOUS SOLUTION INTRAVENOUS ONCE
Status: COMPLETED | OUTPATIENT
Start: 2019-07-14 | End: 2019-07-14

## 2019-07-14 RX ORDER — ONDANSETRON 2 MG/ML
4 INJECTION INTRAMUSCULAR; INTRAVENOUS EVERY 6 HOURS PRN
Status: DISCONTINUED | OUTPATIENT
Start: 2019-07-14 | End: 2019-07-17 | Stop reason: HOSPADM

## 2019-07-14 RX ORDER — SODIUM CHLORIDE 0.9 % (FLUSH) 0.9 %
10 SYRINGE (ML) INJECTION EVERY 12 HOURS SCHEDULED
Status: DISCONTINUED | OUTPATIENT
Start: 2019-07-14 | End: 2019-07-17 | Stop reason: HOSPADM

## 2019-07-14 RX ORDER — ONDANSETRON 2 MG/ML
8 INJECTION INTRAMUSCULAR; INTRAVENOUS ONCE
Status: COMPLETED | OUTPATIENT
Start: 2019-07-14 | End: 2019-07-14

## 2019-07-14 RX ORDER — ASPIRIN 81 MG/1
81 TABLET ORAL DAILY
Status: DISCONTINUED | OUTPATIENT
Start: 2019-07-14 | End: 2019-07-17 | Stop reason: HOSPADM

## 2019-07-14 RX ORDER — HEPARIN SODIUM 5000 [USP'U]/ML
5000 INJECTION, SOLUTION INTRAVENOUS; SUBCUTANEOUS EVERY 8 HOURS SCHEDULED
Status: DISCONTINUED | OUTPATIENT
Start: 2019-07-14 | End: 2019-07-17 | Stop reason: HOSPADM

## 2019-07-14 RX ORDER — LISINOPRIL 2.5 MG/1
2.5 TABLET ORAL NIGHTLY
Status: DISCONTINUED | OUTPATIENT
Start: 2019-07-14 | End: 2019-07-17 | Stop reason: HOSPADM

## 2019-07-14 RX ORDER — 0.9 % SODIUM CHLORIDE 0.9 %
500 INTRAVENOUS SOLUTION INTRAVENOUS ONCE
Status: COMPLETED | OUTPATIENT
Start: 2019-07-14 | End: 2019-07-14

## 2019-07-14 RX ORDER — SODIUM CHLORIDE 9 MG/ML
INJECTION, SOLUTION INTRAVENOUS CONTINUOUS
Status: DISCONTINUED | OUTPATIENT
Start: 2019-07-14 | End: 2019-07-17 | Stop reason: HOSPADM

## 2019-07-14 RX ORDER — ONDANSETRON 2 MG/ML
4 INJECTION INTRAMUSCULAR; INTRAVENOUS ONCE
Status: DISCONTINUED | OUTPATIENT
Start: 2019-07-14 | End: 2019-07-14

## 2019-07-14 RX ORDER — IPRATROPIUM BROMIDE AND ALBUTEROL SULFATE 2.5; .5 MG/3ML; MG/3ML
1 SOLUTION RESPIRATORY (INHALATION) 4 TIMES DAILY
Status: DISCONTINUED | OUTPATIENT
Start: 2019-07-14 | End: 2019-07-17 | Stop reason: HOSPADM

## 2019-07-14 RX ORDER — PANTOPRAZOLE SODIUM 40 MG/1
40 TABLET, DELAYED RELEASE ORAL DAILY
Status: DISCONTINUED | OUTPATIENT
Start: 2019-07-14 | End: 2019-07-17 | Stop reason: HOSPADM

## 2019-07-14 RX ORDER — ALBUTEROL SULFATE 90 UG/1
2 AEROSOL, METERED RESPIRATORY (INHALATION) EVERY 6 HOURS PRN
Status: DISCONTINUED | OUTPATIENT
Start: 2019-07-14 | End: 2019-07-17 | Stop reason: HOSPADM

## 2019-07-14 RX ORDER — SODIUM CHLORIDE 0.9 % (FLUSH) 0.9 %
10 SYRINGE (ML) INJECTION PRN
Status: DISCONTINUED | OUTPATIENT
Start: 2019-07-14 | End: 2019-07-17 | Stop reason: HOSPADM

## 2019-07-14 RX ORDER — ONDANSETRON 2 MG/ML
4 INJECTION INTRAMUSCULAR; INTRAVENOUS EVERY 6 HOURS PRN
Status: DISCONTINUED | OUTPATIENT
Start: 2019-07-14 | End: 2019-07-14

## 2019-07-14 RX ORDER — ONDANSETRON 4 MG/1
4 TABLET, ORALLY DISINTEGRATING ORAL EVERY 6 HOURS PRN
Status: DISCONTINUED | OUTPATIENT
Start: 2019-07-14 | End: 2019-07-17 | Stop reason: HOSPADM

## 2019-07-14 RX ADMIN — MORPHINE SULFATE 2 MG: 2 INJECTION, SOLUTION INTRAMUSCULAR; INTRAVENOUS at 14:03

## 2019-07-14 RX ADMIN — IPRATROPIUM BROMIDE AND ALBUTEROL SULFATE 1 AMPULE: .5; 3 SOLUTION RESPIRATORY (INHALATION) at 20:21

## 2019-07-14 RX ADMIN — ASPIRIN 81 MG: 81 TABLET, COATED ORAL at 17:33

## 2019-07-14 RX ADMIN — HEPARIN SODIUM 5000 UNITS: 5000 INJECTION INTRAVENOUS; SUBCUTANEOUS at 21:04

## 2019-07-14 RX ADMIN — FENTANYL CITRATE 25 MCG: 50 INJECTION, SOLUTION INTRAMUSCULAR; INTRAVENOUS at 16:31

## 2019-07-14 RX ADMIN — SODIUM CHLORIDE 500 ML: 9 INJECTION, SOLUTION INTRAVENOUS at 13:31

## 2019-07-14 RX ADMIN — FENTANYL CITRATE 25 MCG: 50 INJECTION INTRAMUSCULAR; INTRAVENOUS at 23:08

## 2019-07-14 RX ADMIN — SERTRALINE HYDROCHLORIDE 100 MG: 100 TABLET ORAL at 17:33

## 2019-07-14 RX ADMIN — SODIUM CHLORIDE: 9 INJECTION, SOLUTION INTRAVENOUS at 17:08

## 2019-07-14 RX ADMIN — ATORVASTATIN CALCIUM 80 MG: 80 TABLET, FILM COATED ORAL at 17:34

## 2019-07-14 RX ADMIN — SODIUM CHLORIDE 1000 ML: 9 INJECTION, SOLUTION INTRAVENOUS at 16:00

## 2019-07-14 RX ADMIN — METOPROLOL SUCCINATE 50 MG: 50 TABLET, EXTENDED RELEASE ORAL at 17:33

## 2019-07-14 RX ADMIN — TICAGRELOR 90 MG: 90 TABLET ORAL at 21:03

## 2019-07-14 RX ADMIN — ONDANSETRON 8 MG: 2 INJECTION INTRAMUSCULAR; INTRAVENOUS at 14:02

## 2019-07-14 RX ADMIN — PANTOPRAZOLE SODIUM 40 MG: 40 TABLET, DELAYED RELEASE ORAL at 17:33

## 2019-07-14 RX ADMIN — ISOSORBIDE MONONITRATE 30 MG: 30 TABLET ORAL at 17:34

## 2019-07-14 RX ADMIN — FENTANYL CITRATE 25 MCG: 50 INJECTION INTRAMUSCULAR; INTRAVENOUS at 21:01

## 2019-07-14 RX ADMIN — Medication 10 ML: at 21:02

## 2019-07-14 ASSESSMENT — ENCOUNTER SYMPTOMS
BLOOD IN STOOL: 0
ABDOMINAL PAIN: 1
DIARRHEA: 1
BACK PAIN: 0
NAUSEA: 1
SHORTNESS OF BREATH: 0
COUGH: 0
PHOTOPHOBIA: 0
CHOKING: 0

## 2019-07-14 ASSESSMENT — PAIN SCALES - GENERAL
PAINLEVEL_OUTOF10: 6
PAINLEVEL_OUTOF10: 8
PAINLEVEL_OUTOF10: 6
PAINLEVEL_OUTOF10: 8
PAINLEVEL_OUTOF10: 5
PAINLEVEL_OUTOF10: 0
PAINLEVEL_OUTOF10: 4

## 2019-07-14 ASSESSMENT — PAIN DESCRIPTION - PAIN TYPE: TYPE: ACUTE PAIN

## 2019-07-14 NOTE — CONSULTS
General Surgery: Consult Note        PATIENT NAME: Lucy Gutierrez   YOB: 1955    ADMISSION DATE: 7/14/2019 12:47 PM     Admitting Provider: Dr. Radu Mora Physician: Dr. Brenden Arroyo: 7/14/2019    Consult Regarding:  SBO vs ileus    HISTORY OF PRESENT ILLNESS:  The patient is a 61 y.o. female who was admitted on 7/14/19 for diarrhea and RADHA. Patient is well-known to our service; multiple abdominal surgeries with Dr. 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 Additionally has a hx of CAD s/p multiple stents, CKD, HTN, HLD, COPD. Patient states that on Wednesday night, she developed watery diarrhea after she ate a salad, which she usually does not eat; has been told not to foods with fiber. She continued to have diarrhea until prior to arrival, several times a day. States she was up all night last night. Her daughter brought her into the ED due to weakness, confusion and diarrhea. Denies any nausea or vomiting. States that this feels different than her prior bowel obstructions. Some lower abdominal pain that began last night. Denies fevers, chills, SOB, chest pain, dysuria, hematuria, hematochezia. CT abd/pelvis with mildly dilated loops of small bowel and narrowing at the anastomosis. WBC 9.7. Cr. 1.81, baseline 1-1.2. Lactic acid 1.1. Trop 14. At bedside, patient is in no distress, appears dehydrated but resting comfortably. Afebrile. HR 62, SBP .        Past Medical History:        Diagnosis Date    Anxiety     Arthritis     CAD (coronary artery disease)     MI.   9 stents total    Chronic kidney disease     COPD (chronic obstructive pulmonary disease) (HCC)     Depression     Diverticulosis     GERD (gastroesophageal reflux disease)     Heart attack ([facility] [address] 05/2018    Heart palpitations     History of blood transfusion     Hyperlipidemia     Hypertension     PSVT (paroxysmal supraventricular tachycardia) ([facility] [address]        Past Surgical History:        Procedure Laterality Date    ABDOMEN SURGERY      APPENDECTOMY      CAROTID STENT PLACEMENT      CHOLECYSTECTOMY      COLECTOMY  2003    COLONOSCOPY  7/30/2003    Dr Polly Fletcher diverticulosis and signs of diverticulitis    COLONOSCOPY  5/21/2010    stricture at about  35 cm from the anus. Could not advance even gastroscope    COLONOSCOPY  10/17/2014    small bowel and rectum normal    SMALL INTESTINE SURGERY      entire large intestine removed, entire small intestine intact    TUBAL LIGATION      UPPER GASTROINTESTINAL ENDOSCOPY  10/17/2014    normal    UPPER GASTROINTESTINAL ENDOSCOPY  04/25/2019    UPPER GASTROINTESTINAL ENDOSCOPY  04/25/2019    UPPER GASTROINTESTINAL ENDOSCOPY N/A 4/25/2019    EGD ESOPHAGOGASTRODUODENOSCOPY performed by Antwon Bartholomew MD at [address] [place] [address]       Medications Prior to Admission:   Not in a hospital admission. Allergies:  Keflex [cephalexin]; Atenolol; Codeine; Dilaudid [hydromorphone hcl]; Percocet [oxycodone-acetaminophen]; Phenergan [promethazine hcl];  Sodium hypochlorite; Erythromycin; and Prednisone    Social History:   Social History     Socioeconomic History    Marital status:      Spouse name: Not on file    Number of children: Not on file    Years of education: Not on file    Highest education level: Not on file   Occupational History    Not on file   Social Needs    Financial resource strain: Not on file    Food insecurity:     Worry: Not on file     Inability: Not on file    Transportation needs:     Medical: Not on file     Non-medical: Not on file   Tobacco Use    Smoking status: Current Every Day Smoker     Packs/day: 1.00     Years: 40.00     Pack years: 40.00     Types: Cigarettes    Smokeless tobacco: Never Used   Substance and Sexual Activity    hydronephrosis. No obvious acute abnormality of the kidneys are identified. There is no free fluid in the upper abdomen. GI/Bowel: No gross abnormality of the upper abdomen is identified. There is a small amount of fluid in the pelvis with stranding in the mesentery in the pelvis. There are fluid-filled mildly dilated loops of small bowel in the lower pelvis that are slightly more prominent. This is concerning for a partial small bowel obstruction. The patient has had a colectomy this appears to be near the colectomy site. There may be mild narrowing at the surgical site. Pelvis: Small amount of fluid in the pelvis with stranding of the mesentery. Peritoneum/Retroperitoneum: No dominant lymphadenopathy is identified in the abdomen or pelvis. Bones/Soft Tissues: There are minimal degenerative changes at L5-S1. No acute osseous abnormalities appreciated. Mild dilatation of fluid-filled small bowel loops in the lower pelvis suggestive of a partial small bowel obstruction. The patient has had prior colonic surgery. There appears to be mild narrowing at the transition site. There appears to be a small amount of fluid or stranding in the mesentery. Xr Chest Portable    Result Date: 7/14/2019  EXAMINATION: ONE XRAY VIEW OF THE CHEST 7/14/2019 2:00 pm COMPARISON: 01/12/2017 HISTORY: ORDERING SYSTEM PROVIDED HISTORY: Chest Pain TECHNOLOGIST PROVIDED HISTORY: Chest Pain Acuity: Acute Type of Exam: Unknown Initial evaluation. FINDINGS: The trachea is midline. The cardiac silhouette is unremarkable. The lungs are clear without evidence of infiltrate, mass, or pneumothorax. There is no pleural fluid. No acute cardiopulmonary process.          ASSESSMENT:  Active Hospital Problems    Diagnosis Date Noted    Acute kidney injury ([address] [N17.9]        3. 61 y.o F with diarrhea x5days without associated nausea or vomiting; hx of total colectomy with ileoanal anastomosis and multiple hospitalizations for sbo vs ileus    Plan:  1. Continue medical mgmt and supportive care per primary  2. No acute surgical intervention at this time. Patient appears to have an enteritis type picture rather than a bowel obstruction. 3. Recommend aggressive IVF hydration with NS due to profound GI losses. 4. Replace electrolytes PRN  5. Keep NPO for now - no NGT necessary at this time  6. Serial abdominal examinations  7. Will continue to monitor and follow      Electronically signed by Elenita Tracy DO  on 7/14/2019 at 4:55 PM     Attending Physician Statement  I have discussed the case, including pertinent history and exam findings with the resident. I agree with the assessment, plan and orders as documented by the resident.

## 2019-07-14 NOTE — ED PROVIDER NOTES
04/25/2019    UPPER GASTROINTESTINAL ENDOSCOPY N/A 4/25/2019    EGD ESOPHAGOGASTRODUODENOSCOPY performed by Saji Santana MD at [place] [address] HISTORY           Problem Relation Age of Onset    Cancer Mother     Heart Disease Father      Family Status   Relation Name Status    Mother  (Not Specified)    Father  (Not Specified)        SOCIAL HISTORY      reports that she has been smoking cigarettes. She has a 40.00 pack-year smoking history. She has never used smokeless tobacco. She reports that she does not drink alcohol or use drugs. REVIEW OF SYSTEMS    (2-9 systems for level 4, 10 or more for level 5)     Review of Systems   Constitutional: Positive for fatigue. Negative for diaphoresis and fever. HENT:        Resolved throat tightness   Eyes: Negative for photophobia and visual disturbance. Respiratory: Negative for cough, choking and shortness of breath. Cardiovascular: Negative for chest pain and palpitations. Gastrointestinal: Positive for abdominal pain, diarrhea and nausea. Negative for blood in stool. Genitourinary: Positive for decreased urine volume. Negative for dysuria and flank pain. Musculoskeletal: Negative for back pain and myalgias. Neurological: Positive for light-headedness. Negative for weakness, numbness and headaches. Except as noted above the remainder of the review of systems was reviewed and negative. PHYSICAL EXAM    (up to 7 for level 4, 8 or more for level 5)     Vitals:    07/14/19 1431 07/14/19 1446 07/14/19 1501 07/14/19 1603   BP: (!) 107/50 (!) 95/58 (!) 89/54 86/66   Pulse: 62 65 62 59   Resp:       Temp:       SpO2: 98% 96% 94% 95%   Weight:       Height:             Physical Exam   Constitutional: She is oriented to person, place, and time. She appears well-developed and well-nourished. HENT:   Mouth/Throat: Oropharynx is clear and moist. No oropharyngeal exudate. Eyes: Conjunctivae are normal. No scleral icterus.

## 2019-07-15 LAB
ABSOLUTE EOS #: 0.22 K/UL (ref 0–0.44)
ABSOLUTE IMMATURE GRANULOCYTE: 0.01 K/UL (ref 0–0.3)
ABSOLUTE LYMPH #: 2.98 K/UL (ref 1.1–3.7)
ABSOLUTE MONO #: 0.48 K/UL (ref 0.1–1.2)
ALBUMIN SERPL-MCNC: 3.5 G/DL (ref 3.5–5.2)
ALBUMIN/GLOBULIN RATIO: ABNORMAL (ref 1–2.5)
ALP BLD-CCNC: 84 U/L (ref 35–104)
ALT SERPL-CCNC: 11 U/L (ref 5–33)
ANION GAP SERPL CALCULATED.3IONS-SCNC: 12 MMOL/L (ref 9–17)
AST SERPL-CCNC: 16 U/L
BASOPHILS # BLD: 1 % (ref 0–2)
BASOPHILS ABSOLUTE: 0.05 K/UL (ref 0–0.2)
BILIRUB SERPL-MCNC: 0.21 MG/DL (ref 0.3–1.2)
BUN BLDV-MCNC: 17 MG/DL (ref 8–23)
BUN/CREAT BLD: 13 (ref 9–20)
CALCIUM SERPL-MCNC: 8.2 MG/DL (ref 8.6–10.4)
CHLORIDE BLD-SCNC: 110 MMOL/L (ref 98–107)
CO2: 17 MMOL/L (ref 20–31)
CREAT SERPL-MCNC: 1.34 MG/DL (ref 0.5–0.9)
DIFFERENTIAL TYPE: ABNORMAL
EKG ATRIAL RATE: 64 BPM
EKG P AXIS: 59 DEGREES
EKG P-R INTERVAL: 142 MS
EKG Q-T INTERVAL: 424 MS
EKG QRS DURATION: 68 MS
EKG QTC CALCULATION (BAZETT): 437 MS
EKG R AXIS: 25 DEGREES
EKG T AXIS: 38 DEGREES
EKG VENTRICULAR RATE: 64 BPM
EOSINOPHILS RELATIVE PERCENT: 3 % (ref 1–4)
GFR AFRICAN AMERICAN: 48 ML/MIN
GFR NON-AFRICAN AMERICAN: 40 ML/MIN
GFR SERPL CREATININE-BSD FRML MDRD: ABNORMAL ML/MIN/{1.73_M2}
GFR SERPL CREATININE-BSD FRML MDRD: ABNORMAL ML/MIN/{1.73_M2}
GLUCOSE BLD-MCNC: 88 MG/DL (ref 70–99)
HCT VFR BLD CALC: 36.4 % (ref 36.3–47.1)
HEMOGLOBIN: 11.4 G/DL (ref 11.9–15.1)
IMMATURE GRANULOCYTES: 0 %
LYMPHOCYTES # BLD: 37 % (ref 24–43)
MCH RBC QN AUTO: 28.6 PG (ref 25.2–33.5)
MCHC RBC AUTO-ENTMCNC: 31.3 G/DL (ref 28.4–34.8)
MCV RBC AUTO: 91.2 FL (ref 82.6–102.9)
MONOCYTES # BLD: 6 % (ref 3–12)
MYOGLOBIN: 40 NG/ML (ref 25–58)
NRBC AUTOMATED: 0 PER 100 WBC
PDW BLD-RTO: 15.1 % (ref 11.8–14.4)
PLATELET # BLD: 212 K/UL (ref 138–453)
PLATELET ESTIMATE: ABNORMAL
PMV BLD AUTO: 10.4 FL (ref 8.1–13.5)
POTASSIUM SERPL-SCNC: 5.1 MMOL/L (ref 3.7–5.3)
RBC # BLD: 3.99 M/UL (ref 3.95–5.11)
RBC # BLD: ABNORMAL 10*6/UL
SEG NEUTROPHILS: 53 % (ref 36–65)
SEGMENTED NEUTROPHILS ABSOLUTE COUNT: 4.27 K/UL (ref 1.5–8.1)
SODIUM BLD-SCNC: 139 MMOL/L (ref 135–144)
TOTAL PROTEIN: 6.2 G/DL (ref 6.4–8.3)
TROPONIN INTERP: NORMAL
TROPONIN T: NORMAL NG/ML
TROPONIN, HIGH SENSITIVITY: 13 NG/L (ref 0–14)
WBC # BLD: 8 K/UL (ref 3.5–11.3)
WBC # BLD: ABNORMAL 10*3/UL

## 2019-07-15 PROCEDURE — 94640 AIRWAY INHALATION TREATMENT: CPT

## 2019-07-15 PROCEDURE — 87449 NOS EACH ORGANISM AG IA: CPT

## 2019-07-15 PROCEDURE — 83874 ASSAY OF MYOGLOBIN: CPT

## 2019-07-15 PROCEDURE — 6370000000 HC RX 637 (ALT 250 FOR IP): Performed by: INTERNAL MEDICINE

## 2019-07-15 PROCEDURE — 93010 ELECTROCARDIOGRAM REPORT: CPT | Performed by: INTERNAL MEDICINE

## 2019-07-15 PROCEDURE — 96372 THER/PROPH/DIAG INJ SC/IM: CPT

## 2019-07-15 PROCEDURE — 87493 C DIFF AMPLIFIED PROBE: CPT

## 2019-07-15 PROCEDURE — 2060000000 HC ICU INTERMEDIATE R&B

## 2019-07-15 PROCEDURE — G0378 HOSPITAL OBSERVATION PER HR: HCPCS

## 2019-07-15 PROCEDURE — 84484 ASSAY OF TROPONIN QUANT: CPT

## 2019-07-15 PROCEDURE — 96376 TX/PRO/DX INJ SAME DRUG ADON: CPT

## 2019-07-15 PROCEDURE — 85025 COMPLETE CBC W/AUTO DIFF WBC: CPT

## 2019-07-15 PROCEDURE — 6360000002 HC RX W HCPCS: Performed by: INTERNAL MEDICINE

## 2019-07-15 PROCEDURE — 2580000003 HC RX 258: Performed by: INTERNAL MEDICINE

## 2019-07-15 PROCEDURE — 80053 COMPREHEN METABOLIC PANEL: CPT

## 2019-07-15 PROCEDURE — 36415 COLL VENOUS BLD VENIPUNCTURE: CPT

## 2019-07-15 PROCEDURE — 87324 CLOSTRIDIUM AG IA: CPT

## 2019-07-15 RX ORDER — SODIUM BICARBONATE 650 MG/1
650 TABLET ORAL 2 TIMES DAILY
COMMUNITY

## 2019-07-15 RX ADMIN — HEPARIN SODIUM 5000 UNITS: 5000 INJECTION INTRAVENOUS; SUBCUTANEOUS at 16:42

## 2019-07-15 RX ADMIN — IPRATROPIUM BROMIDE AND ALBUTEROL SULFATE 1 AMPULE: .5; 3 SOLUTION RESPIRATORY (INHALATION) at 11:12

## 2019-07-15 RX ADMIN — ASPIRIN 81 MG: 81 TABLET, COATED ORAL at 09:59

## 2019-07-15 RX ADMIN — IPRATROPIUM BROMIDE AND ALBUTEROL SULFATE 1 AMPULE: .5; 3 SOLUTION RESPIRATORY (INHALATION) at 14:49

## 2019-07-15 RX ADMIN — TICAGRELOR 90 MG: 90 TABLET ORAL at 09:59

## 2019-07-15 RX ADMIN — LISINOPRIL 2.5 MG: 2.5 TABLET ORAL at 21:03

## 2019-07-15 RX ADMIN — HEPARIN SODIUM 5000 UNITS: 5000 INJECTION INTRAVENOUS; SUBCUTANEOUS at 06:24

## 2019-07-15 RX ADMIN — FENTANYL CITRATE 25 MCG: 50 INJECTION INTRAMUSCULAR; INTRAVENOUS at 02:51

## 2019-07-15 RX ADMIN — HEPARIN SODIUM 5000 UNITS: 5000 INJECTION INTRAVENOUS; SUBCUTANEOUS at 20:56

## 2019-07-15 RX ADMIN — SODIUM CHLORIDE: 9 INJECTION, SOLUTION INTRAVENOUS at 20:57

## 2019-07-15 RX ADMIN — TICAGRELOR 90 MG: 90 TABLET ORAL at 20:55

## 2019-07-15 RX ADMIN — SODIUM CHLORIDE: 9 INJECTION, SOLUTION INTRAVENOUS at 01:42

## 2019-07-15 RX ADMIN — PANTOPRAZOLE SODIUM 40 MG: 40 TABLET, DELAYED RELEASE ORAL at 09:59

## 2019-07-15 RX ADMIN — SERTRALINE HYDROCHLORIDE 100 MG: 100 TABLET ORAL at 09:59

## 2019-07-15 RX ADMIN — ATORVASTATIN CALCIUM 80 MG: 80 TABLET, FILM COATED ORAL at 09:59

## 2019-07-15 RX ADMIN — IPRATROPIUM BROMIDE AND ALBUTEROL SULFATE 1 AMPULE: .5; 3 SOLUTION RESPIRATORY (INHALATION) at 07:35

## 2019-07-15 RX ADMIN — METOPROLOL SUCCINATE 50 MG: 50 TABLET, EXTENDED RELEASE ORAL at 09:59

## 2019-07-15 RX ADMIN — ISOSORBIDE MONONITRATE 30 MG: 30 TABLET ORAL at 09:59

## 2019-07-15 ASSESSMENT — PAIN SCALES - GENERAL
PAINLEVEL_OUTOF10: 8
PAINLEVEL_OUTOF10: 0

## 2019-07-15 NOTE — PROGRESS NOTES
Transitions of Care Pharmacy Service   Medication Review    The patient's list of current home medications has been reviewed. Source(s) of information: patient, Per Pena     Based on information provided by the above source(s), I have updated the patient's home med list as described below. I changed or updated the following medications on the patient's home medication list:  Discontinued Albuterol Inhaler - therapy complete (pt ran out of medication)     Added None      Adjusted   Combivent Respimat 20/100mcg/act - changed from 1Q6H PRN wheezing to 1TID  Zantac 150mg - changed from 1QD to 1QHS      Other Notes Patient uses [facility], medication will be mailed out to patient on July 31st            Please feel free to call me with any questions about this encounter. Thank you. This note will be reviewed and co-signed by the Transitions of Care Pharmacist. The pharmacist will review inpatient orders and contact the physician about any discrepancies. Darryn Alvarenga, pharmacy technician  Transitions [facility] Pharmacy Service  Phone:  [phone]  Fax: [phone]      Electronically signed by Darryn Alvarenga on 7/15/2019 at 2:05 PM       Prior to Admission medications    Medication Sig Start Date End Date Taking?  Authorizing Provider   sodium bicarbonate 650 MG tablet Take 650 mg by mouth 2 times daily       ranitidine (ZANTAC 150 MAXIMUM STRENGTH) 150 MG tablet Take 1 tablet by mouth every evening        albuterol-ipratropium (COMBIVENT RESPIMAT)  MCG/ACT AERS inhaler Inhale 1 puff into the lungs 3 times daily        loperamide (IMODIUM) 2 MG capsule Take 2 mg by mouth as needed for Diarrhea       lisinopril (PRINIVIL;ZESTRIL) 2.5 MG tablet Take 2.5 mg by mouth nightly       ticagrelor (BRILINTA) 90 MG TABS tablet Take 90 mg by mouth 2 times daily        isosorbide mononitrate (IMDUR) 30 MG extended release tablet Take 30 mg by mouth daily       atorvastatin (LIPITOR) 80 MG tablet Take 80 mg by mouth daily       sertraline (ZOLOFT) 100 MG tablet Take 100 mg by mouth daily        ASPIR-LOW 81 MG EC tablet Take 81 mg by mouth daily       metoprolol succinate (TOPROL XL) 50 MG extended release tablet Take 50 mg by mouth daily        pantoprazole (PROTONIX) 40 MG tablet Take 40 mg by mouth daily

## 2019-07-15 NOTE — PLAN OF CARE
Problem: Falls - Risk of:  Goal: Will remain free from falls  Description  Will remain free from falls. Fall risk assessment completed. Patient instructed to use call light. Bed locked and in lowest position, side rails up 2/4, call light and bedside table within reach, clutter removed, and non-skid footwear on when pt out of bed. Hourly rounds will continue. Outcome: Ongoing     Problem: Diarrhea:  Goal: Establishment of normal bowel function will improve to within specified parameters  Description  Establishment of normal bowel function will improve to within specified parameters. Pt states that frequency of bowel movements have slowed down, however, consistency of stool continues to be liquid. C Diff toxin sent and pending. Will continue to monitor.    Outcome: Ongoing

## 2019-07-15 NOTE — PROGRESS NOTES
General Surgery:  Daily Progress Note                 PATIENT NAME: Ras Pryor     TODAY'S DATE: 7/15/2019, 5:47 AM    SUBJECTIVE:     Pt seen and examined at bedside. No acute overnight events. Pt continued to have watery diarrhea x3 overnight. States that it is slowing down. Denies any abdominal pain, nausea or vomiting, fevers or chills, SOB or chest pain. Voiding without difficulty. Ambulating. OBJECTIVE:   VITALS:  /63   Pulse 57   Temp 98.2 °F (36.8 °C) (Oral)   Resp 15   Ht 5' 4\" (1.626 m)   Wt 180 lb 12.8 oz (82 kg)   SpO2 98%   BMI 31.03 kg/m²      INTAKE/OUTPUT:    No intake or output data in the 24 hours ending 07/15/19 0547    PHYSICAL EXAM:  CONSTITUTIONAL:  awake, alert, not distressed and moderately obese  HEENT: Normocephalic/atraumatic, without obvious abnormality. CARDIOVASCULAR: Regular rate and rhythm   LUNGS: normal effort with symmetric rise and fall of chest wall  ABDOMEN: obese, soft, nondistended, nontender to palpationwithout guarding or rebound tenderness. MUSCULOSKELETAL: Muscle strength intact in all extremities bilaterally. NEUROLOGIC: CN II- XII intact. Gross motor intact without focal weakness. SKIN: No cyanosis, rashes, or edema noted. Decreased skin turgor      Data:  CBC:   Recent Labs     07/14/19  1322   WBC 9.7   HGB 14.7        Chemistry:   Recent Labs     07/14/19  1300  07/14/19  1746 07/14/19  2112 07/15/19  0003     --   --   --   --    K 5.3  --   --   --   --      --   --   --   --    CO2 17*  --   --   --   --    GLUCOSE 100*  --   --   --   --    BUN 20  --   --   --   --    CREATININE 1.81*  --   --   --   --    ANIONGAP 18*  --   --   --   --    LABGLOM 28*  --   --   --   --    GFRAA 34*  --   --   --   --    CALCIUM 10.0  --   --   --   --    TROPHS  --    < > 13 13 13   MYOGLOBIN  --    < > 55 45 40    < > = values in this interval not displayed.      Hepatic:   Recent Labs     07/14/19  1300   AST 21   ALT 15

## 2019-07-15 NOTE — PLAN OF CARE
Problem: Falls - Risk of:  Goal: Will remain free from falls  Description  Will remain free from falls  Outcome: Ongoing  Note:   Pt fall risk, fall band present, falling star, safety alarm activated and in use as needed. Hourly rounding performed. Pt encouraged to use call light. See Ubaldo Maharaj fall risk assessment. Goal: Absence of physical injury  Description  Absence of physical injury  Outcome: Ongoing  Note:   Non-skid socks in place, up with assistance, bed in lowest position, bed exit & alarm as needed, provide toileting every 2 hours an d as needed.

## 2019-07-16 LAB
ABSOLUTE EOS #: 0.14 K/UL (ref 0–0.44)
ABSOLUTE IMMATURE GRANULOCYTE: 0.01 K/UL (ref 0–0.3)
ABSOLUTE LYMPH #: 2.03 K/UL (ref 1.1–3.7)
ABSOLUTE MONO #: 0.29 K/UL (ref 0.1–1.2)
ANION GAP SERPL CALCULATED.3IONS-SCNC: 11 MMOL/L (ref 9–17)
BASOPHILS # BLD: 1 % (ref 0–2)
BASOPHILS ABSOLUTE: 0.04 K/UL (ref 0–0.2)
BUN BLDV-MCNC: 11 MG/DL (ref 8–23)
BUN/CREAT BLD: 12 (ref 9–20)
C DIFF AG + TOXIN: ABNORMAL
CALCIUM SERPL-MCNC: 8.3 MG/DL (ref 8.6–10.4)
CHLORIDE BLD-SCNC: 114 MMOL/L (ref 98–107)
CO2: 15 MMOL/L (ref 20–31)
CREAT SERPL-MCNC: 0.92 MG/DL (ref 0.5–0.9)
DIFFERENTIAL TYPE: ABNORMAL
EOSINOPHILS RELATIVE PERCENT: 2 % (ref 1–4)
GFR AFRICAN AMERICAN: >60 ML/MIN
GFR NON-AFRICAN AMERICAN: >60 ML/MIN
GFR SERPL CREATININE-BSD FRML MDRD: ABNORMAL ML/MIN/{1.73_M2}
GFR SERPL CREATININE-BSD FRML MDRD: ABNORMAL ML/MIN/{1.73_M2}
GLUCOSE BLD-MCNC: 81 MG/DL (ref 70–99)
HCT VFR BLD CALC: 33.7 % (ref 36.3–47.1)
HEMOGLOBIN: 10.6 G/DL (ref 11.9–15.1)
IMMATURE GRANULOCYTES: 0 %
LACTIC ACID: 1.8 MMOL/L (ref 0.5–2.2)
LYMPHOCYTES # BLD: 35 % (ref 24–43)
MCH RBC QN AUTO: 28.8 PG (ref 25.2–33.5)
MCHC RBC AUTO-ENTMCNC: 31.5 G/DL (ref 28.4–34.8)
MCV RBC AUTO: 91.6 FL (ref 82.6–102.9)
MONOCYTES # BLD: 5 % (ref 3–12)
NRBC AUTOMATED: 0 PER 100 WBC
PDW BLD-RTO: 15.1 % (ref 11.8–14.4)
PLATELET # BLD: 196 K/UL (ref 138–453)
PLATELET ESTIMATE: ABNORMAL
PMV BLD AUTO: 11.1 FL (ref 8.1–13.5)
POTASSIUM SERPL-SCNC: 4.5 MMOL/L (ref 3.7–5.3)
RBC # BLD: 3.68 M/UL (ref 3.95–5.11)
RBC # BLD: ABNORMAL 10*6/UL
SEG NEUTROPHILS: 57 % (ref 36–65)
SEGMENTED NEUTROPHILS ABSOLUTE COUNT: 3.23 K/UL (ref 1.5–8.1)
SODIUM BLD-SCNC: 140 MMOL/L (ref 135–144)
SPECIMEN DESCRIPTION: ABNORMAL
WBC # BLD: 5.7 K/UL (ref 3.5–11.3)
WBC # BLD: ABNORMAL 10*3/UL

## 2019-07-16 PROCEDURE — 6360000002 HC RX W HCPCS: Performed by: INTERNAL MEDICINE

## 2019-07-16 PROCEDURE — G0378 HOSPITAL OBSERVATION PER HR: HCPCS

## 2019-07-16 PROCEDURE — 80048 BASIC METABOLIC PNL TOTAL CA: CPT

## 2019-07-16 PROCEDURE — 83605 ASSAY OF LACTIC ACID: CPT

## 2019-07-16 PROCEDURE — 6370000000 HC RX 637 (ALT 250 FOR IP): Performed by: INTERNAL MEDICINE

## 2019-07-16 PROCEDURE — 2060000000 HC ICU INTERMEDIATE R&B

## 2019-07-16 PROCEDURE — 87086 URINE CULTURE/COLONY COUNT: CPT

## 2019-07-16 PROCEDURE — 36415 COLL VENOUS BLD VENIPUNCTURE: CPT

## 2019-07-16 PROCEDURE — 94640 AIRWAY INHALATION TREATMENT: CPT

## 2019-07-16 PROCEDURE — 2580000003 HC RX 258: Performed by: INTERNAL MEDICINE

## 2019-07-16 PROCEDURE — 96372 THER/PROPH/DIAG INJ SC/IM: CPT

## 2019-07-16 PROCEDURE — 85025 COMPLETE CBC W/AUTO DIFF WBC: CPT

## 2019-07-16 RX ORDER — ACETAMINOPHEN 325 MG/1
325 TABLET ORAL EVERY 6 HOURS PRN
Status: DISCONTINUED | OUTPATIENT
Start: 2019-07-16 | End: 2019-07-17 | Stop reason: HOSPADM

## 2019-07-16 RX ORDER — CIPROFLOXACIN 250 MG/1
250 TABLET, FILM COATED ORAL EVERY 12 HOURS SCHEDULED
Status: DISCONTINUED | OUTPATIENT
Start: 2019-07-16 | End: 2019-07-17 | Stop reason: HOSPADM

## 2019-07-16 RX ADMIN — CIPROFLOXACIN 250 MG: 250 TABLET, FILM COATED ORAL at 11:16

## 2019-07-16 RX ADMIN — ATORVASTATIN CALCIUM 80 MG: 80 TABLET, FILM COATED ORAL at 09:52

## 2019-07-16 RX ADMIN — SODIUM CHLORIDE: 9 INJECTION, SOLUTION INTRAVENOUS at 21:00

## 2019-07-16 RX ADMIN — ISOSORBIDE MONONITRATE 30 MG: 30 TABLET ORAL at 09:52

## 2019-07-16 RX ADMIN — IPRATROPIUM BROMIDE AND ALBUTEROL SULFATE 1 AMPULE: .5; 3 SOLUTION RESPIRATORY (INHALATION) at 06:10

## 2019-07-16 RX ADMIN — HEPARIN SODIUM 5000 UNITS: 5000 INJECTION INTRAVENOUS; SUBCUTANEOUS at 14:26

## 2019-07-16 RX ADMIN — SODIUM CHLORIDE: 9 INJECTION, SOLUTION INTRAVENOUS at 08:21

## 2019-07-16 RX ADMIN — LISINOPRIL 2.5 MG: 2.5 TABLET ORAL at 20:59

## 2019-07-16 RX ADMIN — ASPIRIN 81 MG: 81 TABLET, COATED ORAL at 09:52

## 2019-07-16 RX ADMIN — IPRATROPIUM BROMIDE AND ALBUTEROL SULFATE 1 AMPULE: .5; 3 SOLUTION RESPIRATORY (INHALATION) at 20:40

## 2019-07-16 RX ADMIN — TICAGRELOR 90 MG: 90 TABLET ORAL at 09:52

## 2019-07-16 RX ADMIN — PANTOPRAZOLE SODIUM 40 MG: 40 TABLET, DELAYED RELEASE ORAL at 09:52

## 2019-07-16 RX ADMIN — ACETAMINOPHEN 325 MG: 325 TABLET ORAL at 11:16

## 2019-07-16 RX ADMIN — METOPROLOL SUCCINATE 50 MG: 50 TABLET, EXTENDED RELEASE ORAL at 09:52

## 2019-07-16 RX ADMIN — HEPARIN SODIUM 5000 UNITS: 5000 INJECTION INTRAVENOUS; SUBCUTANEOUS at 05:58

## 2019-07-16 RX ADMIN — SERTRALINE HYDROCHLORIDE 100 MG: 100 TABLET ORAL at 09:52

## 2019-07-16 RX ADMIN — HEPARIN SODIUM 5000 UNITS: 5000 INJECTION INTRAVENOUS; SUBCUTANEOUS at 21:00

## 2019-07-16 RX ADMIN — TICAGRELOR 90 MG: 90 TABLET ORAL at 20:59

## 2019-07-16 RX ADMIN — CIPROFLOXACIN 250 MG: 250 TABLET, FILM COATED ORAL at 20:59

## 2019-07-16 ASSESSMENT — PAIN DESCRIPTION - LOCATION: LOCATION: HEAD

## 2019-07-16 ASSESSMENT — PAIN SCALES - GENERAL
PAINLEVEL_OUTOF10: 4
PAINLEVEL_OUTOF10: 5

## 2019-07-16 ASSESSMENT — PAIN DESCRIPTION - PAIN TYPE: TYPE: ACUTE PAIN

## 2019-07-16 NOTE — PROGRESS NOTES
General Surgery:  Daily Progress Note                 PATIENT NAME: Yolanda Buck     TODAY'S DATE: 7/16/2019, 5:06 AM    SUBJECTIVE:       Pt seen and examined at bedside. No acute overnight events. Pt reports diarrhea has slowed down significantly. Denies any N/V or fever/chills. Denies abdominal pain. Tolerating ice chips/sips yesterday. No other major issues at this time. OBJECTIVE:   VITALS:  BP (!) 113/53   Pulse 71   Temp 97.4 °F (36.3 °C) (Oral)   Resp 16   Ht 5' 4\" (1.626 m)   Wt 180 lb 12.8 oz (82 kg)   SpO2 98%   BMI 31.03 kg/m²      INTAKE/OUTPUT:      Intake/Output Summary (Last 24 hours) at 7/16/2019 0506  Last data filed at 7/15/2019 1341  Gross per 24 hour   Intake 1446.6 ml   Output 700 ml   Net 746.6 ml       PHYSICAL EXAM:  CONSTITUTIONAL:  awake, alert, not distressed  HEENT: Normocephalic/atraumatic  CARDIOVASCULAR: Regular rate and rhythm   LUNGS: no respiratory distress, no audible wheezing  ABDOMEN: soft, nondistended, nontender to palpation without guarding or rebound tenderness. MUSCULOSKELETAL: Muscle strength intact in all extremities bilaterally. SKIN: No cyanosis, rashes, or edema noted      Data:  CBC:   Recent Labs     07/14/19  1322 07/15/19  0600   WBC 9.7 8.0   HGB 14.7 11.4*    212     Chemistry:   Recent Labs     07/14/19  1300  07/14/19  1746 07/14/19  2112 07/15/19  0003 07/15/19  0600     --   --   --   --  139   K 5.3  --   --   --   --  5.1     --   --   --   --  110*   CO2 17*  --   --   --   --  17*   GLUCOSE 100*  --   --   --   --  88   BUN 20  --   --   --   --  17   CREATININE 1.81*  --   --   --   --  1.34*   ANIONGAP 18*  --   --   --   --  12   LABGLOM 28*  --   --   --   --  40*   GFRAA 34*  --   --   --   --  48*   CALCIUM 10.0  --   --   --   --  8.2*   TROPHS  --    < > 13 13 13  --    MYOGLOBIN  --    < > 55 45 40  --     < > = values in this interval not displayed.      Hepatic:   Recent Labs     07/14/19  1300 07/15/19  0600

## 2019-07-16 NOTE — FLOWSHEET NOTE
Patient was sitting up in bedside chair by the window. Patient states about her medical condition she has had since 2010. Patient shares her frustration with her condition. States good family support.  shared in presence, listening, prayers. Follow up as needed. 07/16/19 1803   Encounter Summary   Services provided to: Patient   Referral/Consult From: [address] [facility] Family members   Continue Visiting   (7-16-19)   Complexity of Encounter Moderate   Length of Encounter 15 minutes   Spiritual Assessment Completed Yes   Routine   Type Initial   Assessment Passive;Calm; Approachable   Intervention Active listening;Explored feelings, thoughts, concerns;Prayer;Discussed illness/injury and it's impact; Discussed belief system/Amish practices/balaji   Outcome Expressed gratitude;Receptive;Engaged in conversation;Expressed feelings/needs/concerns

## 2019-07-17 VITALS
HEIGHT: 64 IN | DIASTOLIC BLOOD PRESSURE: 67 MMHG | RESPIRATION RATE: 18 BRPM | TEMPERATURE: 98.2 F | OXYGEN SATURATION: 100 % | SYSTOLIC BLOOD PRESSURE: 112 MMHG | BODY MASS INDEX: 30.87 KG/M2 | HEART RATE: 68 BPM | WEIGHT: 180.8 LBS

## 2019-07-17 LAB
ABSOLUTE EOS #: 0.2 K/UL (ref 0–0.44)
ABSOLUTE IMMATURE GRANULOCYTE: 0.02 K/UL (ref 0–0.3)
ABSOLUTE LYMPH #: 2.44 K/UL (ref 1.1–3.7)
ABSOLUTE MONO #: 0.38 K/UL (ref 0.1–1.2)
ANION GAP SERPL CALCULATED.3IONS-SCNC: 12 MMOL/L (ref 9–17)
BASOPHILS # BLD: 1 % (ref 0–2)
BASOPHILS ABSOLUTE: 0.06 K/UL (ref 0–0.2)
BUN BLDV-MCNC: 7 MG/DL (ref 8–23)
BUN/CREAT BLD: 6 (ref 9–20)
C DIFFICILE TOXINS, PCR: NORMAL
CALCIUM SERPL-MCNC: 8.8 MG/DL (ref 8.6–10.4)
CHLORIDE BLD-SCNC: 113 MMOL/L (ref 98–107)
CO2: 14 MMOL/L (ref 20–31)
CREAT SERPL-MCNC: 1.13 MG/DL (ref 0.5–0.9)
CULTURE: NORMAL
DIFFERENTIAL TYPE: ABNORMAL
EOSINOPHILS RELATIVE PERCENT: 3 % (ref 1–4)
GFR AFRICAN AMERICAN: 59 ML/MIN
GFR NON-AFRICAN AMERICAN: 49 ML/MIN
GFR SERPL CREATININE-BSD FRML MDRD: ABNORMAL ML/MIN/{1.73_M2}
GFR SERPL CREATININE-BSD FRML MDRD: ABNORMAL ML/MIN/{1.73_M2}
GLUCOSE BLD-MCNC: 111 MG/DL (ref 70–99)
HCT VFR BLD CALC: 34.6 % (ref 36.3–47.1)
HEMOGLOBIN: 10.9 G/DL (ref 11.9–15.1)
IMMATURE GRANULOCYTES: 0 %
LYMPHOCYTES # BLD: 38 % (ref 24–43)
Lab: NORMAL
MCH RBC QN AUTO: 28.9 PG (ref 25.2–33.5)
MCHC RBC AUTO-ENTMCNC: 31.5 G/DL (ref 28.4–34.8)
MCV RBC AUTO: 91.8 FL (ref 82.6–102.9)
MONOCYTES # BLD: 6 % (ref 3–12)
NRBC AUTOMATED: 0 PER 100 WBC
PDW BLD-RTO: 15.2 % (ref 11.8–14.4)
PLATELET # BLD: 202 K/UL (ref 138–453)
PLATELET ESTIMATE: ABNORMAL
PMV BLD AUTO: 10.8 FL (ref 8.1–13.5)
POTASSIUM SERPL-SCNC: 3.4 MMOL/L (ref 3.7–5.3)
RBC # BLD: 3.77 M/UL (ref 3.95–5.11)
RBC # BLD: ABNORMAL 10*6/UL
SEG NEUTROPHILS: 52 % (ref 36–65)
SEGMENTED NEUTROPHILS ABSOLUTE COUNT: 3.31 K/UL (ref 1.5–8.1)
SODIUM BLD-SCNC: 139 MMOL/L (ref 135–144)
SPECIMEN DESCRIPTION: NORMAL
SPECIMEN DESCRIPTION: NORMAL
WBC # BLD: 6.4 K/UL (ref 3.5–11.3)
WBC # BLD: ABNORMAL 10*3/UL

## 2019-07-17 PROCEDURE — 36415 COLL VENOUS BLD VENIPUNCTURE: CPT

## 2019-07-17 PROCEDURE — G0378 HOSPITAL OBSERVATION PER HR: HCPCS

## 2019-07-17 PROCEDURE — 80048 BASIC METABOLIC PNL TOTAL CA: CPT

## 2019-07-17 PROCEDURE — 85025 COMPLETE CBC W/AUTO DIFF WBC: CPT

## 2019-07-17 PROCEDURE — 96372 THER/PROPH/DIAG INJ SC/IM: CPT

## 2019-07-17 PROCEDURE — 94640 AIRWAY INHALATION TREATMENT: CPT

## 2019-07-17 PROCEDURE — 6370000000 HC RX 637 (ALT 250 FOR IP): Performed by: INTERNAL MEDICINE

## 2019-07-17 PROCEDURE — 6360000002 HC RX W HCPCS: Performed by: INTERNAL MEDICINE

## 2019-07-17 PROCEDURE — 2580000003 HC RX 258: Performed by: INTERNAL MEDICINE

## 2019-07-17 RX ORDER — POTASSIUM CHLORIDE 750 MG/1
20 CAPSULE, EXTENDED RELEASE ORAL ONCE
Status: COMPLETED | OUTPATIENT
Start: 2019-07-17 | End: 2019-07-17

## 2019-07-17 RX ORDER — METRONIDAZOLE 500 MG/1
500 TABLET ORAL EVERY 8 HOURS SCHEDULED
Status: DISCONTINUED | OUTPATIENT
Start: 2019-07-17 | End: 2019-07-17 | Stop reason: HOSPADM

## 2019-07-17 RX ORDER — SODIUM BICARBONATE 650 MG/1
650 TABLET ORAL 2 TIMES DAILY
Status: DISCONTINUED | OUTPATIENT
Start: 2019-07-17 | End: 2019-07-17 | Stop reason: HOSPADM

## 2019-07-17 RX ORDER — METRONIDAZOLE 500 MG/1
500 TABLET ORAL 3 TIMES DAILY
Qty: 42 TABLET | Refills: 0 | Status: SHIPPED | OUTPATIENT
Start: 2019-07-17 | End: 2019-07-31

## 2019-07-17 RX ADMIN — HEPARIN SODIUM 5000 UNITS: 5000 INJECTION INTRAVENOUS; SUBCUTANEOUS at 05:46

## 2019-07-17 RX ADMIN — TICAGRELOR 90 MG: 90 TABLET ORAL at 09:02

## 2019-07-17 RX ADMIN — ATORVASTATIN CALCIUM 80 MG: 80 TABLET, FILM COATED ORAL at 09:02

## 2019-07-17 RX ADMIN — METOPROLOL SUCCINATE 50 MG: 50 TABLET, EXTENDED RELEASE ORAL at 09:02

## 2019-07-17 RX ADMIN — IPRATROPIUM BROMIDE AND ALBUTEROL SULFATE 1 AMPULE: .5; 3 SOLUTION RESPIRATORY (INHALATION) at 07:46

## 2019-07-17 RX ADMIN — PANTOPRAZOLE SODIUM 40 MG: 40 TABLET, DELAYED RELEASE ORAL at 09:02

## 2019-07-17 RX ADMIN — SERTRALINE HYDROCHLORIDE 100 MG: 100 TABLET ORAL at 09:02

## 2019-07-17 RX ADMIN — SODIUM CHLORIDE: 9 INJECTION, SOLUTION INTRAVENOUS at 17:17

## 2019-07-17 RX ADMIN — ISOSORBIDE MONONITRATE 30 MG: 30 TABLET ORAL at 09:02

## 2019-07-17 RX ADMIN — CIPROFLOXACIN 250 MG: 250 TABLET, FILM COATED ORAL at 09:02

## 2019-07-17 RX ADMIN — POTASSIUM CHLORIDE 20 MEQ: 750 CAPSULE, EXTENDED RELEASE ORAL at 18:38

## 2019-07-17 RX ADMIN — ASPIRIN 81 MG: 81 TABLET, COATED ORAL at 09:02

## 2019-07-17 NOTE — PROGRESS NOTES
General Surgery:  Daily Progress Note                 PATIENT NAME: Dedrick Claude     TODAY'S DATE: 7/17/2019, 5:59 AM    SUBJECTIVE:       Pt seen and examined at bedside. No acute overnight events. States diarrhea has slowed down. Tolerated clears yesterday. Continues to pass gas. Feeling a lot better than when she came in. Denies fevers, chills, SOB, chest pain, dysuria. OBJECTIVE:   VITALS:  /63   Pulse 78   Temp 98.1 °F (36.7 °C) (Oral)   Resp 16   Ht 5' 4\" (1.626 m)   Wt 180 lb 12.8 oz (82 kg)   SpO2 100%   BMI 31.03 kg/m²      INTAKE/OUTPUT:      Intake/Output Summary (Last 24 hours) at 7/17/2019 0559  Last data filed at 7/17/2019 0548  Gross per 24 hour   Intake 4075.3 ml   Output 1850 ml   Net 2225.3 ml       PHYSICAL EXAM:  CONSTITUTIONAL:  awake, alert, not distressed  HEENT: Normocephalic/atraumatic  CARDIOVASCULAR: Regular rate and rhythm   LUNGS: no respiratory distress, no audible wheezing  ABDOMEN: soft, nondistended, nontender to palpation without guarding or rebound tenderness. MUSCULOSKELETAL: Muscle strength intact in all extremities bilaterally.   SKIN: No cyanosis, rashes, or edema noted      Data:  CBC:   Recent Labs     07/14/19  1322 07/15/19  0600 07/16/19  0600   WBC 9.7 8.0 5.7   HGB 14.7 11.4* 10.6*    212 196     Chemistry:   Recent Labs     07/14/19  1300  07/14/19  1746 07/14/19  2112 07/15/19  0003 07/15/19  0600 07/16/19  0600     --   --   --   --  139 140   K 5.3  --   --   --   --  5.1 4.5     --   --   --   --  110* 114*   CO2 17*  --   --   --   --  17* 15*   GLUCOSE 100*  --   --   --   --  88 81   BUN 20  --   --   --   --  17 11   CREATININE 1.81*  --   --   --   --  1.34* 0.92*   ANIONGAP 18*  --   --   --   --  12 11   LABGLOM 28*  --   --   --   --  40* >60   GFRAA 34*  --   --   --   --  48* >60   CALCIUM 10.0  --   --   --   --  8.2* 8.3*   TROPHS  --    < > 13 13 13  --   --    MYOGLOBIN  --    < > 55 45 40  --   --     < >

## 2019-07-17 NOTE — PROGRESS NOTES
Patient discharged, IV removed without complications. Discharge instructions and discharge checklist reviewed and explained to patient, no further questions. Patient discharged with all belongings.

## 2019-07-17 NOTE — DISCHARGE INSTR - COC
Continuity of Care Form    Patient Name: Malou Nascimento   :  1955  MRN:  [id number]    Admit date:  2019  Discharge date:  ***    Code Status Order: Full Code   Advance Directives:   Advance Care Flowsheet Documentation     Date/Time Healthcare Directive Type of Healthcare Directive Copy in [address] Agent's Name Healthcare Agent's Phone Number    [phone]  Yes, patient has an advance directive for healthcare treatment  Living will  Yes, copy in chart  Adult Children  --  --          Admitting Physician:  Harley Ewing MD  PCP: Harley Ewing MD    Discharging Nurse: [facility] Unit/Room#: 1012/1012-02  Discharging Unit Phone Number: ***    Emergency Contact:   Extended Emergency Contact Information  Primary Emergency Contact: Miguel [address] Phone: [phone]  Relation: Child  Secondary Emergency Contact: johnnie [facility] Phone: [phone]  Relation: None    Past Surgical History:  Past Surgical History:   Procedure Laterality Date    ABDOMEN SURGERY      APPENDECTOMY      CARDIAC SURGERY      stents placed    CAROTID STENT PLACEMENT     [facility]      COLONOSCOPY  2003    Dr Blanco Pickkari diverticulosis and signs of diverticulitis    COLONOSCOPY  2010    stricture at about  35 cm from the anus.   Could not advance even gastroscope    COLONOSCOPY  10/17/2014    small bowel and rectum normal    SMALL INTESTINE SURGERY      entire large intestine removed, entire small intestine intact    TUBAL LIGATION      UPPER GASTROINTESTINAL ENDOSCOPY  10/17/2014    normal    UPPER GASTROINTESTINAL ENDOSCOPY  2019    UPPER GASTROINTESTINAL ENDOSCOPY  2019    UPPER GASTROINTESTINAL ENDOSCOPY N/A 2019    EGD ESOPHAGOGASTRODUODENOSCOPY performed by Iris Rich MD at [address] [place] [address] History:   Immunization History   Administered Date(s)

## 2019-07-21 NOTE — DISCHARGE SUMMARY
Physician Discharge Summary     Patient ID:  Ras Pryor  [id number]  61 y.o.  1955    Admit date: 7/14/2019    Discharge date and time: 7/17/2019    Admission Diagnoses:   Patient Active Problem List   Diagnosis    Diarrhea    Smoker    Acute kidney injury ([address]    CAD (coronary artery disease)    Abdominal pain    SBO (small bowel obstruction)    Metabolic acidosis    Chronic diarrhea    Urinary tract infection without hematuria    Elevated serum creatinine       Discharge Diagnoses:   Acute kidney injury  C. difficile colitis  Chronic smoker  Non-anion gap metabolic acidosis  Coronary artery disease  Partial bowel obstruction  Hypokalemia    Consults: general surgery    Procedures: none    Hospital Course: 49-year-old white gentleman with no chronic diarrhea his diarrhea has been much worse lately feeling tired and fatigued came into the emergency room her x-ray showed possible partial bowel obstruction patient was noted to have a total colectomy and she also was found to be in acute kidney injury IV fluids were started surgical consultation was obtained patient did fairly well with no major complication during her stay her C. difficile was positive and she was started on Flagyl she was discharged in a stable improved condition    Discharge Exam:  See progress note from today    Disposition: home  Stable improved  Patient Instructions:   Discharge Medication List as of 7/17/2019  6:50 PM      START taking these medications    Details   metroNIDAZOLE (FLAGYL) 500 MG tablet Take 1 tablet by mouth 3 times daily for 14 days, Disp-42 tablet, R-0Print         CONTINUE these medications which have NOT CHANGED    Details   sodium bicarbonate 650 MG tablet Take 650 mg by mouth 2 times dailyHistorical Med      ranitidine (ZANTAC 150 MAXIMUM STRENGTH) 150 MG tablet Take 1 tablet by mouth every evening Historical Med      albuterol-ipratropium (COMBIVENT RESPIMAT)  MCG/ACT AERS inhaler Inhale 1 puff into

## 2020-01-29 ENCOUNTER — HOSPITAL ENCOUNTER (INPATIENT)
Age: 65
LOS: 4 days | Discharge: HOME | DRG: 247 | End: 2020-02-02
Attending: EMERGENCY MEDICINE | Admitting: INTERNAL MEDICINE
Payer: COMMERCIAL

## 2020-01-29 ENCOUNTER — APPOINTMENT (OUTPATIENT)
Dept: CT IMAGING | Age: 65
DRG: 247 | End: 2020-01-29
Payer: COMMERCIAL

## 2020-01-29 LAB
ABSOLUTE EOS #: 0.13 K/UL (ref 0–0.44)
ABSOLUTE IMMATURE GRANULOCYTE: 0.07 K/UL (ref 0–0.3)
ABSOLUTE LYMPH #: 1.93 K/UL (ref 1.1–3.7)
ABSOLUTE MONO #: 0.47 K/UL (ref 0.1–1.2)
ALBUMIN SERPL-MCNC: 3.9 G/DL (ref 3.5–5.2)
ALBUMIN/GLOBULIN RATIO: ABNORMAL (ref 1–2.5)
ALP BLD-CCNC: 123 U/L (ref 35–104)
ALT SERPL-CCNC: 10 U/L (ref 5–33)
ANION GAP SERPL CALCULATED.3IONS-SCNC: 15 MMOL/L (ref 9–17)
AST SERPL-CCNC: 20 U/L
BASOPHILS # BLD: 1 % (ref 0–2)
BASOPHILS ABSOLUTE: 0.06 K/UL (ref 0–0.2)
BILIRUB SERPL-MCNC: 0.2 MG/DL (ref 0.3–1.2)
BUN BLDV-MCNC: 13 MG/DL (ref 8–23)
BUN/CREAT BLD: 10 (ref 9–20)
CALCIUM SERPL-MCNC: 9.4 MG/DL (ref 8.6–10.4)
CHLORIDE BLD-SCNC: 98 MMOL/L (ref 98–107)
CHP ED QC CHECK: YES
CO2: 20 MMOL/L (ref 20–31)
CREAT SERPL-MCNC: 1.31 MG/DL (ref 0.5–0.9)
DIFFERENTIAL TYPE: ABNORMAL
EOSINOPHILS RELATIVE PERCENT: 1 % (ref 1–4)
GFR AFRICAN AMERICAN: 50 ML/MIN
GFR NON-AFRICAN AMERICAN: 41 ML/MIN
GFR SERPL CREATININE-BSD FRML MDRD: ABNORMAL ML/MIN/{1.73_M2}
GFR SERPL CREATININE-BSD FRML MDRD: ABNORMAL ML/MIN/{1.73_M2}
GLUCOSE BLD-MCNC: 91 MG/DL (ref 70–99)
HCT VFR BLD CALC: 42.3 % (ref 36.3–47.1)
HEMOGLOBIN: 13.5 G/DL (ref 11.9–15.1)
IMMATURE GRANULOCYTES: 1 %
INR BLD: 0.9
LACTIC ACID: 0.8 MMOL/L (ref 0.5–2.2)
LIPASE: 38 U/L (ref 13–60)
LYMPHOCYTES # BLD: 16 % (ref 24–43)
MCH RBC QN AUTO: 27.8 PG (ref 25.2–33.5)
MCHC RBC AUTO-ENTMCNC: 31.9 G/DL (ref 28.4–34.8)
MCV RBC AUTO: 87.2 FL (ref 82.6–102.9)
MONOCYTES # BLD: 4 % (ref 3–12)
NRBC AUTOMATED: 0 PER 100 WBC
PARTIAL THROMBOPLASTIN TIME: 27.6 SEC (ref 23–31)
PDW BLD-RTO: 15.9 % (ref 11.8–14.4)
PLATELET # BLD: 347 K/UL (ref 138–453)
PLATELET ESTIMATE: ABNORMAL
PMV BLD AUTO: 10.7 FL (ref 8.1–13.5)
POTASSIUM SERPL-SCNC: 4.3 MMOL/L (ref 3.7–5.3)
PROTHROMBIN TIME: 9.5 SEC (ref 9.7–11.6)
RBC # BLD: 4.85 M/UL (ref 3.95–5.11)
RBC # BLD: ABNORMAL 10*6/UL
SEG NEUTROPHILS: 77 % (ref 36–65)
SEGMENTED NEUTROPHILS ABSOLUTE COUNT: 9.59 K/UL (ref 1.5–8.1)
SODIUM BLD-SCNC: 133 MMOL/L (ref 135–144)
TOTAL PROTEIN: 7.8 G/DL (ref 6.4–8.3)
WBC # BLD: 12.3 K/UL (ref 3.5–11.3)
WBC # BLD: ABNORMAL 10*3/UL

## 2020-01-29 PROCEDURE — 6360000002 HC RX W HCPCS: Performed by: EMERGENCY MEDICINE

## 2020-01-29 PROCEDURE — 96374 THER/PROPH/DIAG INJ IV PUSH: CPT

## 2020-01-29 PROCEDURE — 6360000002 HC RX W HCPCS: Performed by: INTERNAL MEDICINE

## 2020-01-29 PROCEDURE — G0378 HOSPITAL OBSERVATION PER HR: HCPCS

## 2020-01-29 PROCEDURE — 2580000003 HC RX 258: Performed by: INTERNAL MEDICINE

## 2020-01-29 PROCEDURE — 2580000003 HC RX 258: Performed by: EMERGENCY MEDICINE

## 2020-01-29 PROCEDURE — 85025 COMPLETE CBC W/AUTO DIFF WBC: CPT

## 2020-01-29 PROCEDURE — 85730 THROMBOPLASTIN TIME PARTIAL: CPT

## 2020-01-29 PROCEDURE — 96376 TX/PRO/DX INJ SAME DRUG ADON: CPT

## 2020-01-29 PROCEDURE — 2060000000 HC ICU INTERMEDIATE R&B

## 2020-01-29 PROCEDURE — 74176 CT ABD & PELVIS W/O CONTRAST: CPT

## 2020-01-29 PROCEDURE — 96375 TX/PRO/DX INJ NEW DRUG ADDON: CPT

## 2020-01-29 PROCEDURE — 83690 ASSAY OF LIPASE: CPT

## 2020-01-29 PROCEDURE — 85610 PROTHROMBIN TIME: CPT

## 2020-01-29 PROCEDURE — 99285 EMERGENCY DEPT VISIT HI MDM: CPT

## 2020-01-29 PROCEDURE — 80053 COMPREHEN METABOLIC PANEL: CPT

## 2020-01-29 PROCEDURE — 83605 ASSAY OF LACTIC ACID: CPT

## 2020-01-29 PROCEDURE — 96361 HYDRATE IV INFUSION ADD-ON: CPT

## 2020-01-29 PROCEDURE — 81001 URINALYSIS AUTO W/SCOPE: CPT

## 2020-01-29 RX ORDER — FENTANYL CITRATE 50 UG/ML
25 INJECTION, SOLUTION INTRAMUSCULAR; INTRAVENOUS
Status: DISCONTINUED | OUTPATIENT
Start: 2020-01-29 | End: 2020-02-02 | Stop reason: HOSPADM

## 2020-01-29 RX ORDER — SODIUM CHLORIDE 0.9 % (FLUSH) 0.9 %
10 SYRINGE (ML) INJECTION EVERY 12 HOURS SCHEDULED
Status: DISCONTINUED | OUTPATIENT
Start: 2020-01-29 | End: 2020-02-02 | Stop reason: HOSPADM

## 2020-01-29 RX ORDER — 0.9 % SODIUM CHLORIDE 0.9 %
1000 INTRAVENOUS SOLUTION INTRAVENOUS ONCE
Status: COMPLETED | OUTPATIENT
Start: 2020-01-29 | End: 2020-01-29

## 2020-01-29 RX ORDER — ONDANSETRON 2 MG/ML
4 INJECTION INTRAMUSCULAR; INTRAVENOUS ONCE
Status: COMPLETED | OUTPATIENT
Start: 2020-01-29 | End: 2020-01-29

## 2020-01-29 RX ORDER — SODIUM CHLORIDE 9 MG/ML
INJECTION, SOLUTION INTRAVENOUS CONTINUOUS
Status: DISCONTINUED | OUTPATIENT
Start: 2020-01-29 | End: 2020-02-02 | Stop reason: HOSPADM

## 2020-01-29 RX ORDER — ONDANSETRON 2 MG/ML
4 INJECTION INTRAMUSCULAR; INTRAVENOUS EVERY 8 HOURS PRN
Status: DISCONTINUED | OUTPATIENT
Start: 2020-01-29 | End: 2020-02-02 | Stop reason: HOSPADM

## 2020-01-29 RX ORDER — FENTANYL CITRATE 50 UG/ML
50 INJECTION, SOLUTION INTRAMUSCULAR; INTRAVENOUS ONCE
Status: COMPLETED | OUTPATIENT
Start: 2020-01-29 | End: 2020-01-29

## 2020-01-29 RX ORDER — FENTANYL CITRATE 50 UG/ML
50 INJECTION, SOLUTION INTRAMUSCULAR; INTRAVENOUS
Status: DISCONTINUED | OUTPATIENT
Start: 2020-01-29 | End: 2020-02-02 | Stop reason: HOSPADM

## 2020-01-29 RX ORDER — SODIUM CHLORIDE 0.9 % (FLUSH) 0.9 %
10 SYRINGE (ML) INJECTION PRN
Status: DISCONTINUED | OUTPATIENT
Start: 2020-01-29 | End: 2020-02-02 | Stop reason: HOSPADM

## 2020-01-29 RX ADMIN — ONDANSETRON 4 MG: 2 INJECTION INTRAMUSCULAR; INTRAVENOUS at 20:52

## 2020-01-29 RX ADMIN — FENTANYL CITRATE 50 MCG: 50 INJECTION, SOLUTION INTRAMUSCULAR; INTRAVENOUS at 20:52

## 2020-01-29 RX ADMIN — SODIUM CHLORIDE 1000 ML: 9 INJECTION, SOLUTION INTRAVENOUS at 20:52

## 2020-01-29 RX ADMIN — SODIUM CHLORIDE: 9 INJECTION, SOLUTION INTRAVENOUS at 22:50

## 2020-01-29 RX ADMIN — FENTANYL CITRATE 50 MCG: 50 INJECTION, SOLUTION INTRAMUSCULAR; INTRAVENOUS at 22:56

## 2020-01-29 RX ADMIN — Medication 10 ML: at 22:50

## 2020-01-29 ASSESSMENT — PAIN SCALES - GENERAL
PAINLEVEL_OUTOF10: 5
PAINLEVEL_OUTOF10: 9
PAINLEVEL_OUTOF10: 9
PAINLEVEL_OUTOF10: 8
PAINLEVEL_OUTOF10: 8

## 2020-01-29 ASSESSMENT — ENCOUNTER SYMPTOMS
ABDOMINAL PAIN: 1
VOMITING: 0
NAUSEA: 0
COUGH: 0
DIARRHEA: 1

## 2020-01-29 ASSESSMENT — PAIN DESCRIPTION - PAIN TYPE: TYPE: ACUTE PAIN

## 2020-01-29 ASSESSMENT — PAIN DESCRIPTION - LOCATION: LOCATION: ABDOMEN

## 2020-01-30 PROCEDURE — 87324 CLOSTRIDIUM AG IA: CPT

## 2020-01-30 PROCEDURE — 6360000002 HC RX W HCPCS: Performed by: INTERNAL MEDICINE

## 2020-01-30 PROCEDURE — G0378 HOSPITAL OBSERVATION PER HR: HCPCS

## 2020-01-30 PROCEDURE — 96376 TX/PRO/DX INJ SAME DRUG ADON: CPT

## 2020-01-30 PROCEDURE — 96372 THER/PROPH/DIAG INJ SC/IM: CPT

## 2020-01-30 PROCEDURE — 2580000003 HC RX 258: Performed by: INTERNAL MEDICINE

## 2020-01-30 PROCEDURE — 2060000000 HC ICU INTERMEDIATE R&B

## 2020-01-30 PROCEDURE — 87449 NOS EACH ORGANISM AG IA: CPT

## 2020-01-30 RX ORDER — LISINOPRIL 2.5 MG/1
2.5 TABLET ORAL NIGHTLY
Status: DISCONTINUED | OUTPATIENT
Start: 2020-01-30 | End: 2020-02-02 | Stop reason: HOSPADM

## 2020-01-30 RX ORDER — ATORVASTATIN CALCIUM 80 MG/1
80 TABLET, FILM COATED ORAL DAILY
Status: DISCONTINUED | OUTPATIENT
Start: 2020-01-30 | End: 2020-01-30 | Stop reason: SDUPTHER

## 2020-01-30 RX ORDER — PANTOPRAZOLE SODIUM 40 MG/1
40 TABLET, DELAYED RELEASE ORAL DAILY
Status: DISCONTINUED | OUTPATIENT
Start: 2020-01-30 | End: 2020-02-02 | Stop reason: HOSPADM

## 2020-01-30 RX ORDER — ASPIRIN 81 MG/1
81 TABLET ORAL DAILY
Status: DISCONTINUED | OUTPATIENT
Start: 2020-01-30 | End: 2020-02-02 | Stop reason: HOSPADM

## 2020-01-30 RX ORDER — ATORVASTATIN CALCIUM 80 MG/1
80 TABLET, FILM COATED ORAL DAILY
Status: DISCONTINUED | OUTPATIENT
Start: 2020-01-30 | End: 2020-02-02 | Stop reason: HOSPADM

## 2020-01-30 RX ORDER — SERTRALINE HYDROCHLORIDE 100 MG/1
100 TABLET, FILM COATED ORAL DAILY
Status: DISCONTINUED | OUTPATIENT
Start: 2020-01-30 | End: 2020-02-02 | Stop reason: HOSPADM

## 2020-01-30 RX ADMIN — SODIUM CHLORIDE: 9 INJECTION, SOLUTION INTRAVENOUS at 17:25

## 2020-01-30 RX ADMIN — FENTANYL CITRATE 50 MCG: 50 INJECTION, SOLUTION INTRAMUSCULAR; INTRAVENOUS at 15:49

## 2020-01-30 RX ADMIN — FENTANYL CITRATE 50 MCG: 50 INJECTION, SOLUTION INTRAMUSCULAR; INTRAVENOUS at 12:44

## 2020-01-30 RX ADMIN — FENTANYL CITRATE 50 MCG: 50 INJECTION, SOLUTION INTRAMUSCULAR; INTRAVENOUS at 08:28

## 2020-01-30 RX ADMIN — ENOXAPARIN SODIUM 40 MG: 40 INJECTION SUBCUTANEOUS at 08:39

## 2020-01-30 RX ADMIN — FENTANYL CITRATE: 50 INJECTION, SOLUTION INTRAMUSCULAR; INTRAVENOUS at 21:56

## 2020-01-30 RX ADMIN — SODIUM CHLORIDE: 9 INJECTION, SOLUTION INTRAVENOUS at 08:28

## 2020-01-30 RX ADMIN — FENTANYL CITRATE 50 MCG: 50 INJECTION, SOLUTION INTRAMUSCULAR; INTRAVENOUS at 05:20

## 2020-01-30 RX ADMIN — FENTANYL CITRATE 50 MCG: 50 INJECTION, SOLUTION INTRAMUSCULAR; INTRAVENOUS at 09:58

## 2020-01-30 RX ADMIN — FENTANYL CITRATE 50 MCG: 50 INJECTION, SOLUTION INTRAMUSCULAR; INTRAVENOUS at 18:40

## 2020-01-30 RX ADMIN — FENTANYL CITRATE 50 MCG: 50 INJECTION, SOLUTION INTRAMUSCULAR; INTRAVENOUS at 02:28

## 2020-01-30 ASSESSMENT — PAIN SCALES - GENERAL
PAINLEVEL_OUTOF10: 9
PAINLEVEL_OUTOF10: 5
PAINLEVEL_OUTOF10: 8
PAINLEVEL_OUTOF10: 9
PAINLEVEL_OUTOF10: 9
PAINLEVEL_OUTOF10: 4
PAINLEVEL_OUTOF10: 9
PAINLEVEL_OUTOF10: 8
PAINLEVEL_OUTOF10: 9

## 2020-01-30 NOTE — ED NOTES
RN attempts to call report x2, Floor states RN will call back.      Paula Laughlin RN  01/29/20 0712

## 2020-01-30 NOTE — ED NOTES
Pt to ED with c/o lower sharp abdominal pain that started around 10 AM this morning. Pt reports she has frequent problems with bowel obstructions in the past. Pt reports watery diarrhea which she states is not uncommon for her because she has a small amount of bowel left. Pt rates 8/10 on pain scale. Pt denies nausea, or vomiting. Pt is alert and oriented x4, RR even and unlabored, skin WPD, ambulatory with a steady gait. Will continue to monitor closely.       Rachelle Recinos RN  01/29/20 1958

## 2020-01-30 NOTE — CARE COORDINATION
Case Management Initial Discharge Plan  Lucy Gutierrez,         Readmission Risk              Risk of Unplanned Readmission:        10             Met with:patient to discuss discharge plans. Information verified: address, contacts, phone number, , insurance Yes  PCP: Bruce Mata MD  Date of last visit: 2-3 [address] Provider: [facility] community    Discharge Planning  Current Residence:  Private home  Living Arrangements:    alone       Home has 2 stories/1  stairs to climb to enter the home. Bed and bath are on main floor, rarely goes up to second floor. Support Systems:   family       Current Services PTA:  None   Agency: none       Patient able to perform ADL's:Independent  DME in home:  [facility],   DME used to aid ambulation prior to admission:   None   DME used during admission:  None     Potential Assistance Needed:   none     Pharmacy: [facility] on [address] with pill pax a mail in pharmacy Potential Assistance Purchasing Medications:   no  Does patient want to participate in local refill/ meds to beds program?   no     Patient agreeable to home care: No  [place] of choice provided:  n/a      Type of Home Care Services:   na  Patient expects to be discharged to:   home     Prior SNF/Rehab Placement and Facility: none   Agreeable to SNF/Rehab: No  [place] of choice provided: n/a   Evaluation: n/a    Expected Discharge date:     Follow Up Appointment: Best Day/ Time:  any     Transportation provider: per family  Transportation arrangements needed for discharge: No    Discharge Plan:   Patient lives alone and very independent in her ADL's and active . She has had home care in past but unsure of name of company. Her son lives next door and assists if needed. She has had admissions in past due to SBO due to multiple abdominal surgeries. Will follow but do not anticipate any dc needs.      Electronically signed by Stacia Jalloh RN on 20 at 1:58 PM

## 2020-01-30 NOTE — ED PROVIDER NOTES
EMERGENCY DEPARTMENT ENCOUNTER    Pt Name: Ad Brown  MRN: [id number]  [place] 1955  Date of evaluation: 1/29/20  CHIEF COMPLAINT       Chief Complaint   Patient presents with    Abdominal Pain     HISTORY OF PRESENT ILLNESS   Presents with pain across the lower abdomen since 10 am. Waxing & waning, and worsening. Feels like someone kicked her with a boot. H/o recurrent SBO with multiple surgeries. H/o near total colectomy, with about a foot of bowel left per patient. Her surgeon is Dr Cade Song. Associated watery stool x 15 today. Normally doesn't eat with recurrence of these symptoms until SBO ruled out. Suspects dehydration. Denies nausea or vomiting, but will require antiemetic with analgesic. Tolerates Fentanyl and Zofran. The history is provided by the patient and a relative. Abdominal Pain   Pain location:  LLQ, RLQ and suprapubic  Pain quality: aching and cramping    Pain radiates to:  Does not radiate  Pain severity:  Moderate  Onset quality:  Unable to specify  Duration:  10 hours  Timing:  Constant  Progression:  Worsening  Chronicity:  Recurrent  Relieved by:  Nothing  Worsened by:  Nothing  Ineffective treatments:  None tried  Associated symptoms: diarrhea    Associated symptoms: no chills, no cough, no dysuria, no fever, no nausea and no vomiting    Risk factors: multiple surgeries        REVIEW OF SYSTEMS     Review of Systems   Constitutional: Negative for chills and fever. HENT: Negative for congestion. Eyes: Negative for visual disturbance. Respiratory: Negative for cough. Gastrointestinal: Positive for abdominal pain and diarrhea. Negative for nausea and vomiting. Endocrine: Negative for cold intolerance and heat intolerance. Genitourinary: Negative for dysuria. Skin: Negative for pallor. Neurological: Negative for headaches. Psychiatric/Behavioral: Negative for decreased concentration.      PASTMEDICAL HISTORY     Past Medical History:   Diagnosis Date    Anxiety     Arthritis     CAD (coronary artery disease)     MI.   9 stents total    Chronic kidney disease     COPD (chronic obstructive pulmonary disease) (HCC)     Depression     Diverticulosis     GERD (gastroesophageal reflux disease)     Heart attack ([facility] [address] 05/2018    Heart palpitations     History of blood transfusion     Hyperlipidemia     Hypertension     PSVT (paroxysmal supraventricular tachycardia) ([facility] [address]      SURGICAL HISTORY       Past Surgical History:   Procedure Laterality Date    ABDOMEN SURGERY      APPENDECTOMY      CARDIAC SURGERY      stents placed    CAROTID STENT PLACEMENT      CHOLECYSTECTOMY      COLECTOMY  2003    COLONOSCOPY  7/30/2003    Dr Linnea Bruno diverticulosis and signs of diverticulitis    COLONOSCOPY  5/21/2010    stricture at about  35 cm from the anus.   Could not advance even gastroscope    COLONOSCOPY  10/17/2014    small bowel and rectum normal    SMALL INTESTINE SURGERY      entire large intestine removed, entire small intestine intact    TUBAL LIGATION      UPPER GASTROINTESTINAL ENDOSCOPY  10/17/2014    normal    UPPER GASTROINTESTINAL ENDOSCOPY  04/25/2019    UPPER GASTROINTESTINAL ENDOSCOPY  04/25/2019    UPPER GASTROINTESTINAL ENDOSCOPY N/A 4/25/2019    EGD ESOPHAGOGASTRODUODENOSCOPY performed by Lalitha Almanzar MD at [facility]       Previous Medications    ALBUTEROL-IPRATROPIUM (COMBIVENT RESPIMAT)  MCG/ACT AERS INHALER    Inhale 1 puff into the lungs 3 times daily     ASPIR-LOW 81 MG EC TABLET    Take 81 mg by mouth daily    ATORVASTATIN (LIPITOR) 80 MG TABLET    Take 80 mg by mouth daily    ISOSORBIDE MONONITRATE (IMDUR) 30 MG EXTENDED RELEASE TABLET    Take 30 mg by mouth daily    LISINOPRIL (PRINIVIL;ZESTRIL) 2.5 MG TABLET    Take 2.5 mg by mouth nightly    LOPERAMIDE (IMODIUM) 2 MG CAPSULE    Take 2 mg by mouth as needed for Diarrhea    METOPROLOL SUCCINATE (TOPROL XL) 50 MG EXTENDED RELEASE TABLET    Take 50 mg by area and left lower quadrant. There is no guarding. Skin:     Capillary Refill: Capillary refill takes less than 2 seconds. Coloration: Skin is not pale. Findings: No rash. Neurological:      Mental Status: She is alert. Motor: Motor function is intact. No abnormal muscle tone. Psychiatric:         Mood and Affect: Mood normal.         Behavior: Behavior normal.         MEDICAL DECISION MAKING:   Presents with her typical SBO pain. Plan labs and CT imaging. States she does not receive contrast due to CRF. IVF bolus ordered. Doses of Fentanyl and Zofran ordered    Labs reviewed    Sodium is 133. Creatinine is 1.31. Unremarkable CMP otherwise    Normal lipase    WBC is 12.3    No anemia    CT abd/pelvis findings c/w SBO with surrounding fat stranding, as read by Radiologist and reviewed by me. Radiologist also documents lung nodules with recommendation for repeat imaging in 3 months    Patient notified of results, recommendations, and potential concerns. Agrees to pursue repeat imaging in 3 months    Patient states this is her typical presentation. Pain has improved with single dose Fentanyl    States she typically only requires NG tube if vomiting, and she is not vomiting    I d/w Dr Noa Welch, covering for Dr Hollie Jiménez. States that she will be seen in the morning. I d/w Dr Jose Raul Orta. Accepts admission to [facility]. Okay to have sips of water with medications. Requests that we send a lactate    Orders placed    Lactate ordered.  Asked overnight EM physician to check the result, given CT interpretation                 CRITICAL CARE:       PROCEDURES:    Procedures    DIAGNOSTIC RESULTS   EKG:All EKG's are interpreted by the Emergency Department Physician who either signs or Co-signs this chart in the absence of a cardiologist.        RADIOLOGY:All plain film, CT, MRI, and formal ultrasound images (except ED bedside ultrasound) are read by the radiologist, see reports below, unless otherwisenoted in °C)   TempSrc: Oral   SpO2: 98%   Weight: 180 lb (81.6 kg)   Height: 5' 4\" (1.626 m)       The patient was given the following medications while in the emergency department:  Orders Placed This Encounter   Medications    0.9 % sodium chloride bolus    fentaNYL (SUBLIMAZE) injection 50 mcg    ondansetron (ZOFRAN) injection 4 mg     CONSULTS:  IP CONSULT TO GENERAL SURGERY  IP CONSULT TO INTERNAL MEDICINE    FINAL IMPRESSION      1.  Small bowel obstruction (HCC)    2. Pulmonary nodules          DISPOSITION/PLAN   DISPOSITION        PATIENT REFERRED TO:  Bruce Mata MD  [address] [place] [address] MD Jeb  [address] [place] [address]  [phone]          DISCHARGE MEDICATIONS:  New Prescriptions    No medications on file     Ángela Correa MD  Attending Emergency Physician                    Deborah Wood MD  01/29/20 5070

## 2020-01-30 NOTE — CONSULTS
General Surgery:    Consult Note        PATIENT NAME: Lucy Gutierrez   YOB: 1955    ADMISSION DATE: 1/29/2020  7:59 PM     Admitting Provider: Dr. Amelia Ho Physician: Dr. Gogo Marti: 1/30/2020    Chief Complaint:  Abd pain   Consult Regarding:  SBO    HISTORY OF PRESENT ILLNESS:  The patient is a 59 y.o. female  who presented to the ER last night with chief complaint of worsening abdominal pain. Patient is well-known to our general surgery service as she has had multiple abdominal surgeries in the past.  Patient is status post total abdominal colectomy with ileorectal anastomosis due to recurrent diverticulitis. She has also undergone appendectomy, cholecystectomy. Patient has been hospitalized multiple times for small bowel obstructions and enteritis. Patient has a history of anastomotic strictures and a longstanding history of diarrhea. Patient has a history of coronary artery disease with 9 stents, chronic kidney disease, hypertension, hyperlipidemia, and COPD, she continues to smoke. Patient reports that 10 AM yesterday she started experiencing lower abdominal pain. Patient reports that since then she has had constant abdominal pain that sometimes does get worse. Patient reports that her pain is very sharp in her lower abdomen and nothing seems to help. Patient reports she has had 14-15 loose bowel movements. Patient reports she has not changed anything in her diet, she has not been around anyone sick at home. Patient reports she did have C. difficile earlier last year. Patient reports that she takes Imodium quite frequently and this did not seem to help. Patient reports that she is not passed gas but she has been burping often. Patient denies any nausea or vomiting. Patient reports that over the weekend she felt that she had fever, chills, and body aches but this seems to have resolved.   Patient reports that since she was admitted she has not had any further by mouth 2 times daily  ranitidine (ZANTAC 150 MAXIMUM STRENGTH) 150 MG tablet, Take 1 tablet by mouth every evening   albuterol-ipratropium (COMBIVENT RESPIMAT)  MCG/ACT AERS inhaler, Inhale 1 puff into the lungs 3 times daily   loperamide (IMODIUM) 2 MG capsule, Take 2 mg by mouth as needed for Diarrhea  lisinopril (PRINIVIL;ZESTRIL) 2.5 MG tablet, Take 2.5 mg by mouth nightly  ticagrelor (BRILINTA) 90 MG TABS tablet, Take 90 mg by mouth 2 times daily   isosorbide mononitrate (IMDUR) 30 MG extended release tablet, Take 30 mg by mouth daily  atorvastatin (LIPITOR) 80 MG tablet, Take 80 mg by mouth daily  sertraline (ZOLOFT) 100 MG tablet, Take 100 mg by mouth daily   ASPIR-LOW 81 MG EC tablet, Take 81 mg by mouth daily  metoprolol succinate (TOPROL XL) 50 MG extended release tablet, Take 50 mg by mouth daily   pantoprazole (PROTONIX) 40 MG tablet, Take 40 mg by mouth daily    Allergies:  Keflex [cephalexin]; Atenolol; Codeine; Dilaudid [hydromorphone hcl]; Percocet [oxycodone-acetaminophen]; Phenergan [promethazine hcl];  Sodium hypochlorite; Erythromycin; and Prednisone    Social History:   Social History     Socioeconomic History    Marital status:      Spouse name: Not on file    Number of children: Not on file    Years of education: Not on file    Highest education level: Not on file   Occupational History    Not on file   Social Needs    Financial resource strain: Not on file    Food insecurity:     Worry: Not on file     Inability: Not on file    Transportation needs:     Medical: Not on file     Non-medical: Not on file   Tobacco Use    Smoking status: Current Every Day Smoker     Packs/day: 1.00     Years: 40.00     Pack years: 40.00     Types: Cigarettes    Smokeless tobacco: Never Used   Substance and Sexual Activity    Alcohol use: No    Drug use: No    Sexual activity: Not on file   Lifestyle    Physical activity:     Days per week: Not on file     Minutes per session: Not on file    Stress: Not on file   Relationships    Social connections:     Talks on phone: Not on file     Gets together: Not on file     Attends [employer] service: Not on file     Active member of club or organization: Not on file     Attends meetings of clubs or organizations: Not on file     Relationship status: Not on file    Intimate partner violence:     Fear of current or ex partner: Not on file     Emotionally abused: Not on file     Physically abused: Not on file     Forced sexual activity: Not on file   Other Topics Concern    Not on file   Social History Narrative    Not on file       Family History:       Problem Relation Age of Onset    Cancer Mother     Heart Disease Father        REVIEW OF SYSTEMS:    CONSTITUTIONAL:  negative for  fevers, chills and fatigue  No recent weight gain/loss. Energy level normal for pt. HEENT:  No nasal congestion or drainage. CARDIOVASCULAR:  No chest pain  GASTROINTESTINAL:  positive for diarrhea and abdominal pain, denies nausea, vomiting, constipation  GENITOURINARY:  No dysuria  HEMATOLOGIC/LYMPHATIC:  No easy bruising. No history of cancer  ENDOCRINE: denies DM   Review of systems negative unless listed above.     PHYSICAL EXAM:    VITALS:  BP (!) 94/43   Pulse 55   Temp 97.7 °F (36.5 °C) (Oral)   Resp 18   Ht 5' 4\" (1.626 m)   Wt 180 lb (81.6 kg)   SpO2 95%   BMI 30.90 kg/m²   INTAKE/OUTPUT:     Intake/Output Summary (Last 24 hours) at 1/30/2020 0545  Last data filed at 1/30/2020 1297  Gross per 24 hour   Intake 1000 ml   Output 625 ml   Net 375 ml       CONSTITUTIONAL:  awake, alert, not distressed and mildly obese  ENT:  normocephalic/atraumatic, without obvious abnormality  NECK:  supple, symmetrical, trachea midline   LUNGS:  CTA bilaterally  CARDIOVASCULAR:  regular rate and rhythm  ABDOMEN: Soft, nondistended, mild tenderness to palpation in bilateral lower quadrants, no peritoneal signs at this time, no rebound tenderness, previous abdominal surgical scars present  SKIN: No rashes or skin lesions noted  MUSCULOSKELETAL:  No joint swelling, deformity, or tenderness. , there is not obvious somatic dysfunction  NEUROLOGIC:  Mental Status Exam:  Level of Alertness:   alert  Orientation:   oriented to person, place, and time    CBC with Differential:    Lab Results   Component Value Date    WBC 12.3 01/29/2020    RBC 4.85 01/29/2020    RBC 4.09 04/14/2012    HGB 13.5 01/29/2020    HCT 42.3 01/29/2020     01/29/2020     04/14/2012    MCV 87.2 01/29/2020    MCH 27.8 01/29/2020    MCHC 31.9 01/29/2020    RDW 15.9 01/29/2020    LYMPHOPCT 16 01/29/2020    MONOPCT 4 01/29/2020    BASOPCT 1 01/29/2020    MONOSABS 0.47 01/29/2020    LYMPHSABS 1.93 01/29/2020    EOSABS 0.13 01/29/2020    BASOSABS 0.06 01/29/2020    DIFFTYPE NOT REPORTED 01/29/2020     BMP:    Lab Results   Component Value Date     01/29/2020    K 4.3 01/29/2020    CL 98 01/29/2020    CO2 20 01/29/2020    BUN 13 01/29/2020    LABALBU 3.9 01/29/2020    CREATININE 1.31 01/29/2020    CALCIUM 9.4 01/29/2020    GFRAA 50 01/29/2020    LABGLOM 41 01/29/2020    GLUCOSE 91 01/29/2020    GLUCOSE 123 04/14/2012       Pertinent Radiology:   Ct Abdomen Pelvis Wo Contrast Additional Contrast? None    Result Date: 1/29/2020  EXAMINATION: CT OF THE ABDOMEN AND PELVIS WITHOUT CONTRAST 1/29/2020 8:58 pm TECHNIQUE: CT of the abdomen and pelvis was performed without the administration of intravenous contrast. Multiplanar reformatted images are provided for review. Dose modulation, iterative reconstruction, and/or weight based adjustment of the mA/kV was utilized to reduce the radiation dose to as low as reasonably achievable.  COMPARISON: 07/14/2018 HISTORY: ORDERING SYSTEM PROVIDED HISTORY: b/l lower quadrant pain (r/o SBO) TECHNOLOGIST PROVIDED HISTORY: b/l lower quadrant pain (r/o SBO) FINDINGS: Lower Chest: There are several rounded ground-glass opacities in the visualized right middle lobe and surgeries presenting with multiple episodes of loose stools, concern for early small bowel obstruction versus enteritis. Plan:  1. Patient seen and examined at bedside  2. Labs and imaging reviewed CT scan with dilated small bowel and pelvis, concern for early small bowel obstruction, small amount of free fluid with mesenteric fat stranding. 3. Continue n.p.o., IV fluids  4. We will manage conservatively at this time, patient agreeable that if she does become nauseous, vomits, or her abdomen becomes more distended she will have an NG tube placed for decompression. 5. Encourage patient to be out of bed to ambulate in the hallway. 6. We will continue serial abdominal exams at this time. No acute surgical intervention. Electronically signed by Fara Chambers DO  on 1/30/2020 at 5:47 AM   Attending Physician Statement  I have discussed the case, including pertinent history and exam findings with the resident. I agree with the assessment, plan and orders as documented by the resident. Suspect dehydration from diarrhea.    Following with you

## 2020-01-31 ENCOUNTER — APPOINTMENT (OUTPATIENT)
Dept: GENERAL RADIOLOGY | Age: 65
DRG: 247 | End: 2020-01-31
Payer: COMMERCIAL

## 2020-01-31 LAB
ABSOLUTE EOS #: 0.19 K/UL (ref 0–0.44)
ABSOLUTE IMMATURE GRANULOCYTE: 0.02 K/UL (ref 0–0.3)
ABSOLUTE LYMPH #: 2.37 K/UL (ref 1.1–3.7)
ABSOLUTE MONO #: 0.39 K/UL (ref 0.1–1.2)
ANION GAP SERPL CALCULATED.3IONS-SCNC: 13 MMOL/L (ref 9–17)
BASOPHILS # BLD: 1 % (ref 0–2)
BASOPHILS ABSOLUTE: 0.05 K/UL (ref 0–0.2)
BUN BLDV-MCNC: 16 MG/DL (ref 8–23)
BUN/CREAT BLD: 13 (ref 9–20)
C DIFF AG + TOXIN: NEGATIVE
CALCIUM SERPL-MCNC: 8.8 MG/DL (ref 8.6–10.4)
CHLORIDE BLD-SCNC: 109 MMOL/L (ref 98–107)
CO2: 15 MMOL/L (ref 20–31)
CREAT SERPL-MCNC: 1.21 MG/DL (ref 0.5–0.9)
DIFFERENTIAL TYPE: ABNORMAL
EOSINOPHILS RELATIVE PERCENT: 3 % (ref 1–4)
GFR AFRICAN AMERICAN: 54 ML/MIN
GFR NON-AFRICAN AMERICAN: 45 ML/MIN
GFR SERPL CREATININE-BSD FRML MDRD: ABNORMAL ML/MIN/{1.73_M2}
GFR SERPL CREATININE-BSD FRML MDRD: ABNORMAL ML/MIN/{1.73_M2}
GLUCOSE BLD-MCNC: 70 MG/DL (ref 70–99)
HCT VFR BLD CALC: 39.5 % (ref 36.3–47.1)
HEMOGLOBIN: 11.9 G/DL (ref 11.9–15.1)
IMMATURE GRANULOCYTES: 0 %
LYMPHOCYTES # BLD: 34 % (ref 24–43)
MCH RBC QN AUTO: 27.7 PG (ref 25.2–33.5)
MCHC RBC AUTO-ENTMCNC: 30.1 G/DL (ref 28.4–34.8)
MCV RBC AUTO: 91.9 FL (ref 82.6–102.9)
MONOCYTES # BLD: 6 % (ref 3–12)
NRBC AUTOMATED: 0 PER 100 WBC
PDW BLD-RTO: 16.2 % (ref 11.8–14.4)
PLATELET # BLD: 276 K/UL (ref 138–453)
PLATELET ESTIMATE: ABNORMAL
PMV BLD AUTO: 10.5 FL (ref 8.1–13.5)
POTASSIUM SERPL-SCNC: 4.2 MMOL/L (ref 3.7–5.3)
RBC # BLD: 4.3 M/UL (ref 3.95–5.11)
RBC # BLD: ABNORMAL 10*6/UL
SEG NEUTROPHILS: 57 % (ref 36–65)
SEGMENTED NEUTROPHILS ABSOLUTE COUNT: 4.01 K/UL (ref 1.5–8.1)
SODIUM BLD-SCNC: 137 MMOL/L (ref 135–144)
SPECIMEN DESCRIPTION: NORMAL
WBC # BLD: 7 K/UL (ref 3.5–11.3)
WBC # BLD: ABNORMAL 10*3/UL

## 2020-01-31 PROCEDURE — 96376 TX/PRO/DX INJ SAME DRUG ADON: CPT

## 2020-01-31 PROCEDURE — 6360000002 HC RX W HCPCS: Performed by: INTERNAL MEDICINE

## 2020-01-31 PROCEDURE — G0378 HOSPITAL OBSERVATION PER HR: HCPCS

## 2020-01-31 PROCEDURE — 85025 COMPLETE CBC W/AUTO DIFF WBC: CPT

## 2020-01-31 PROCEDURE — 6370000000 HC RX 637 (ALT 250 FOR IP): Performed by: INTERNAL MEDICINE

## 2020-01-31 PROCEDURE — 36415 COLL VENOUS BLD VENIPUNCTURE: CPT

## 2020-01-31 PROCEDURE — 74018 RADEX ABDOMEN 1 VIEW: CPT

## 2020-01-31 PROCEDURE — 96372 THER/PROPH/DIAG INJ SC/IM: CPT

## 2020-01-31 PROCEDURE — 80048 BASIC METABOLIC PNL TOTAL CA: CPT

## 2020-01-31 PROCEDURE — 2580000003 HC RX 258: Performed by: INTERNAL MEDICINE

## 2020-01-31 PROCEDURE — 2060000000 HC ICU INTERMEDIATE R&B

## 2020-01-31 RX ORDER — SODIUM BICARBONATE 650 MG/1
650 TABLET ORAL 2 TIMES DAILY
Status: DISCONTINUED | OUTPATIENT
Start: 2020-01-31 | End: 2020-02-02 | Stop reason: HOSPADM

## 2020-01-31 RX ADMIN — LISINOPRIL 2.5 MG: 2.5 TABLET ORAL at 21:39

## 2020-01-31 RX ADMIN — ATORVASTATIN CALCIUM 80 MG: 80 TABLET, FILM COATED ORAL at 08:02

## 2020-01-31 RX ADMIN — SODIUM BICARBONATE 650 MG: 650 TABLET ORAL at 21:39

## 2020-01-31 RX ADMIN — ENOXAPARIN SODIUM 40 MG: 40 INJECTION SUBCUTANEOUS at 08:01

## 2020-01-31 RX ADMIN — FENTANYL CITRATE 50 MCG: 50 INJECTION, SOLUTION INTRAMUSCULAR; INTRAVENOUS at 00:16

## 2020-01-31 RX ADMIN — FENTANYL CITRATE 50 MCG: 50 INJECTION, SOLUTION INTRAMUSCULAR; INTRAVENOUS at 21:39

## 2020-01-31 RX ADMIN — SODIUM CHLORIDE: 9 INJECTION, SOLUTION INTRAVENOUS at 13:41

## 2020-01-31 RX ADMIN — SODIUM CHLORIDE: 9 INJECTION, SOLUTION INTRAVENOUS at 23:04

## 2020-01-31 RX ADMIN — FENTANYL CITRATE 25 MCG: 50 INJECTION, SOLUTION INTRAMUSCULAR; INTRAVENOUS at 06:39

## 2020-01-31 RX ADMIN — FENTANYL CITRATE 50 MCG: 50 INJECTION, SOLUTION INTRAMUSCULAR; INTRAVENOUS at 08:01

## 2020-01-31 RX ADMIN — PANTOPRAZOLE SODIUM 40 MG: 40 TABLET, DELAYED RELEASE ORAL at 08:02

## 2020-01-31 RX ADMIN — SERTRALINE HYDROCHLORIDE 100 MG: 100 TABLET ORAL at 08:02

## 2020-01-31 RX ADMIN — ASPIRIN 81 MG: 81 TABLET, COATED ORAL at 08:02

## 2020-01-31 ASSESSMENT — PAIN - FUNCTIONAL ASSESSMENT: PAIN_FUNCTIONAL_ASSESSMENT: PREVENTS OR INTERFERES SOME ACTIVE ACTIVITIES AND ADLS

## 2020-01-31 ASSESSMENT — PAIN DESCRIPTION - PAIN TYPE: TYPE: ACUTE PAIN

## 2020-01-31 ASSESSMENT — PAIN SCALES - GENERAL
PAINLEVEL_OUTOF10: 5
PAINLEVEL_OUTOF10: 9

## 2020-01-31 ASSESSMENT — PAIN DESCRIPTION - FREQUENCY: FREQUENCY: CONTINUOUS

## 2020-01-31 ASSESSMENT — PAIN DESCRIPTION - LOCATION: LOCATION: ABDOMEN

## 2020-01-31 ASSESSMENT — PAIN DESCRIPTION - PROGRESSION
CLINICAL_PROGRESSION: NOT CHANGED
CLINICAL_PROGRESSION: NOT CHANGED

## 2020-01-31 ASSESSMENT — PAIN DESCRIPTION - ONSET: ONSET: ON-GOING

## 2020-01-31 ASSESSMENT — PAIN DESCRIPTION - DESCRIPTORS: DESCRIPTORS: ACHING

## 2020-01-31 NOTE — H&P
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   9 stents total    Chronic kidney disease     [facility]) ([facility] [address]     Depression     Diverticulosis     GERD (gastroesophageal reflux disease)     Heart attack ([facility] [address] 05/2018    Heart palpitations     History of blood transfusion     Hyperlipidemia     Hypertension     PSVT (paroxysmal supraventricular tachycardia) ([facility] [address]          Past Surgical History:   Procedure Laterality Date    ABDOMEN SURGERY      APPENDECTOMY      CARDIAC SURGERY      stents placed    CAROTID STENT PLACEMENT      CHOLECYSTECTOMY      COLECTOMY  2003    COLONOSCOPY  7/30/2003    Dr Duncan Mcwilliams diverticulosis and signs of diverticulitis    COLONOSCOPY  5/21/2010    stricture at about  35 cm from the anus.   Could not advance even gastroscope    COLONOSCOPY  10/17/2014    small bowel and rectum normal    SMALL INTESTINE SURGERY      entire large intestine removed, entire small intestine intact    TUBAL LIGATION      UPPER GASTROINTESTINAL ENDOSCOPY  10/17/2014    normal    UPPER GASTROINTESTINAL ENDOSCOPY  04/25/2019    UPPER GASTROINTESTINAL ENDOSCOPY  04/25/2019    UPPER GASTROINTESTINAL ENDOSCOPY N/A 4/25/2019    EGD ESOPHAGOGASTRODUODENOSCOPY performed by Ana Rolon MD at [facility]       Medications Prior to Admission:    Medications Prior to Admission: sodium bicarbonate 650 MG tablet, Take 650 mg by mouth 2 times daily  ranitidine (ZANTAC 150 MAXIMUM STRENGTH) 150 MG tablet, Take 1 tablet by mouth every evening   lisinopril (PRINIVIL;ZESTRIL) 2.5 MG tablet, Take 2.5 mg by mouth nightly  atorvastatin (LIPITOR) 80 MG tablet, Take 80 mg by mouth daily  sertraline (ZOLOFT) 100 MG tablet, Take 100 mg by mouth daily   ASPIR-LOW 81 MG EC tablet, Take 81 mg by mouth daily  metoprolol succinate (TOPROL XL) 50 MG extended release tablet, Take 50 mg by mouth daily   pantoprazole (PROTONIX) 40 MG tablet, Take 40 mg by mouth daily  loperamide (IMODIUM) 2 MG capsule, Take 2 mg by mouth as needed for Diarrhea  [DISCONTINUED] albuterol-ipratropium (COMBIVENT RESPIMAT)  MCG/ACT AERS inhaler, Inhale 1 puff into the lungs 3 times daily   [DISCONTINUED] ticagrelor (BRILINTA) 90 MG TABS tablet, Take 90 mg by mouth 2 times daily   [DISCONTINUED] isosorbide mononitrate (IMDUR) 30 MG extended release tablet, Take 30 mg by mouth daily    Allergies:    Keflex [cephalexin]; Atenolol; Codeine; Dilaudid [hydromorphone hcl]; Percocet [oxycodone-acetaminophen]; Phenergan [promethazine hcl]; Sodium hypochlorite; Erythromycin; and Prednisone    Social History:    reports that she has been smoking cigarettes. She has a 40.00 pack-year smoking history. She has never used smokeless tobacco. She reports that she does not drink alcohol or use drugs. Family History:   family history includes Cancer in her mother; Heart Disease in her father.     REVIEW OF SYSTEMS:    Constitutional: negative, No fever no chills  Eyes: negative  Ears, nose, mouth, throat, and face: negative  Respiratory: negative, No cough no shortness of breath  Cardiovascular: negative, Chest pain no palpitation  Gastrointestinal: negative, See history of present illness  Genitourinary:negative, No dysuria hematuria or frequency  Integument/breast: negative  Hematologic/lymphatic: negative  Musculoskeletal:negative  Neurological: negative  Behavioral/Psych: negative  Endocrine: negative, Polyuria no polydipsia no hypoglycemia  Allergic/Immunologic: negative  PHYSICAL EXAM:  General Appearance: alert and oriented to person, place and time and in no acute distress  Skin: warm and dry, no rash or erythema  Head: normocephalic and atraumatic  Eyes: pupils equal, round, and reactive to light, conjunctivae normal and sclera anicteric  Neck: neck supple and non tender without mass   Pulmonary/Chest: clear to auscultation bilaterally- no wheezes, rales or rhonchi, normal air movement, no respiratory distress  Cardiovascular: normal rate, regular rhythm, normal S1 and S2, no gallops, intact distal pulses and no carotid bruits  Abdomen: soft, non-tender, non-distended, normal bowel sounds, no masses or organomegaly  Extremities: no edema and good pulses no Homans    Neurologic: Alert oriented x3 with no focal deficit    Vitals:  BP (!) 111/53   Pulse 61   Temp 98.2 °F (36.8 °C) (Oral)   Resp 18   Ht 5' 4\" (1.626 m)   Wt 180 lb (81.6 kg)   SpO2 94%   BMI 30.90 kg/m²     LABS:  CBC:   Lab Results   Component Value Date    WBC 12.3 01/29/2020    RBC 4.85 01/29/2020    RBC 4.09 04/14/2012    HGB 13.5 01/29/2020    HCT 42.3 01/29/2020    MCV 87.2 01/29/2020    MCH 27.8 01/29/2020    MCHC 31.9 01/29/2020    RDW 15.9 01/29/2020     01/29/2020     04/14/2012    MPV 10.7 01/29/2020     CMP:    Lab Results   Component Value Date     01/29/2020    K 4.3 01/29/2020    CL 98 01/29/2020    CO2 20 01/29/2020    BUN 13 01/29/2020    CREATININE 1.31 01/29/2020    GFRAA 50 01/29/2020    LABGLOM 41 01/29/2020    GLUCOSE 91 01/29/2020    GLUCOSE 123 04/14/2012    PROT 7.8 01/29/2020    LABALBU 3.9 01/29/2020    CALCIUM 9.4 01/29/2020    BILITOT 0.20 01/29/2020    ALKPHOS 123 01/29/2020    AST 20 01/29/2020    ALT 10 01/29/2020         ASSESSMENT:    Abdominal pain  Partial small bowel obstruction  Chronic smoker  Coronary artery disease  CKD 3

## 2020-01-31 NOTE — PROGRESS NOTES
General Surgery:  Daily Progress Note            PATIENT NAME: Juliana Lemons     TODAY'S DATE: 1/31/2020, 6:57 AM    SUBJECTIVE:     Pt seen and examined at bedside this morning. No acute events overnight. Afebrile. Patient reports she did have a bowel movement. Pain controlled this morning. No nausea or vomiting. OBJECTIVE:   VITALS:  BP (!) 111/53   Pulse 61   Temp 98.2 °F (36.8 °C) (Oral)   Resp 18   Ht 5' 4\" (1.626 m)   Wt 180 lb (81.6 kg)   SpO2 94%   BMI 30.90 kg/m²      INTAKE/OUTPUT:      Intake/Output Summary (Last 24 hours) at 1/31/2020 0657  Last data filed at 1/31/2020 0506  Gross per 24 hour   Intake 3000 ml   Output 500 ml   Net 2500 ml       PHYSICAL EXAM:  General Appearance:  awake, alert, oriented, in no acute distress  HEENT:  Normocephalic, atraumatic, mucus membranes moist   Skin:  Skin color, texture, turgor normal. No rashes or lesions. Lungs:  No chest wall tenderness. Heart:  Heart regular rate and rhythm  Abdomen: Soft, nontender, nondistended, no peritoneal signs, no rebound tenderness  Extremities: Extremities warm to touch, pink, with no edema.         Data:  CBC with Differential:    Lab Results   Component Value Date    WBC 12.3 01/29/2020    RBC 4.85 01/29/2020    RBC 4.09 04/14/2012    HGB 13.5 01/29/2020    HCT 42.3 01/29/2020     01/29/2020     04/14/2012    MCV 87.2 01/29/2020    MCH 27.8 01/29/2020    MCHC 31.9 01/29/2020    RDW 15.9 01/29/2020    LYMPHOPCT 16 01/29/2020    MONOPCT 4 01/29/2020    BASOPCT 1 01/29/2020    MONOSABS 0.47 01/29/2020    LYMPHSABS 1.93 01/29/2020    EOSABS 0.13 01/29/2020    BASOSABS 0.06 01/29/2020    DIFFTYPE NOT REPORTED 01/29/2020     BMP:    Lab Results   Component Value Date     01/29/2020    K 4.3 01/29/2020    CL 98 01/29/2020    CO2 20 01/29/2020    BUN 13 01/29/2020    LABALBU 3.9 01/29/2020    CREATININE 1.31 01/29/2020    CALCIUM 9.4 01/29/2020    GFRAA 50 01/29/2020    LABGLOM 41 01/29/2020    GLUCOSE 91 01/29/2020    GLUCOSE 123 04/14/2012         ASSESSMENT:      3. 59 y.o. female with concern for early small bowel obstruction versus enteritis    Plan:  1. Okay for clear liquid diet this morning. 2. Pain control  3. IV fluids  4. Encourage patient to be out of bed to ambulate more today. 5. We will continue conservative management this time, will continue serial exams. Electronically signed by Amalia Giordano DO  on 1/31/2020 at 6:57 AM   Attending Physician Statement  I have discussed the case, including pertinent history and exam findings with the resident. I agree with the assessment, plan and orders as documented by the resident. No acute surgical issues identified.

## 2020-02-01 LAB
ABSOLUTE EOS #: 0.19 K/UL (ref 0–0.44)
ABSOLUTE IMMATURE GRANULOCYTE: 0.02 K/UL (ref 0–0.3)
ABSOLUTE LYMPH #: 2.23 K/UL (ref 1.1–3.7)
ABSOLUTE MONO #: 0.37 K/UL (ref 0.1–1.2)
ANION GAP SERPL CALCULATED.3IONS-SCNC: 9 MMOL/L (ref 9–17)
BASOPHILS # BLD: 1 % (ref 0–2)
BASOPHILS ABSOLUTE: 0.04 K/UL (ref 0–0.2)
BUN BLDV-MCNC: 10 MG/DL (ref 8–23)
BUN/CREAT BLD: 10 (ref 9–20)
CALCIUM SERPL-MCNC: 8.4 MG/DL (ref 8.6–10.4)
CHLORIDE BLD-SCNC: 112 MMOL/L (ref 98–107)
CO2: 15 MMOL/L (ref 20–31)
CREAT SERPL-MCNC: 1.01 MG/DL (ref 0.5–0.9)
DIFFERENTIAL TYPE: ABNORMAL
EOSINOPHILS RELATIVE PERCENT: 4 % (ref 1–4)
GFR AFRICAN AMERICAN: >60 ML/MIN
GFR NON-AFRICAN AMERICAN: 55 ML/MIN
GFR SERPL CREATININE-BSD FRML MDRD: ABNORMAL ML/MIN/{1.73_M2}
GFR SERPL CREATININE-BSD FRML MDRD: ABNORMAL ML/MIN/{1.73_M2}
GLUCOSE BLD-MCNC: 81 MG/DL (ref 70–99)
HCT VFR BLD CALC: 33 % (ref 36.3–47.1)
HEMOGLOBIN: 10.1 G/DL (ref 11.9–15.1)
IMMATURE GRANULOCYTES: 0 %
LYMPHOCYTES # BLD: 43 % (ref 24–43)
MCH RBC QN AUTO: 27.7 PG (ref 25.2–33.5)
MCHC RBC AUTO-ENTMCNC: 30.6 G/DL (ref 28.4–34.8)
MCV RBC AUTO: 90.4 FL (ref 82.6–102.9)
MONOCYTES # BLD: 7 % (ref 3–12)
NRBC AUTOMATED: 0 PER 100 WBC
PDW BLD-RTO: 15.9 % (ref 11.8–14.4)
PLATELET # BLD: 238 K/UL (ref 138–453)
PLATELET ESTIMATE: ABNORMAL
PMV BLD AUTO: 10.6 FL (ref 8.1–13.5)
POTASSIUM SERPL-SCNC: 4 MMOL/L (ref 3.7–5.3)
RBC # BLD: 3.65 M/UL (ref 3.95–5.11)
RBC # BLD: ABNORMAL 10*6/UL
SEG NEUTROPHILS: 45 % (ref 36–65)
SEGMENTED NEUTROPHILS ABSOLUTE COUNT: 2.35 K/UL (ref 1.5–8.1)
SODIUM BLD-SCNC: 136 MMOL/L (ref 135–144)
WBC # BLD: 5.2 K/UL (ref 3.5–11.3)
WBC # BLD: ABNORMAL 10*3/UL

## 2020-02-01 PROCEDURE — G0378 HOSPITAL OBSERVATION PER HR: HCPCS

## 2020-02-01 PROCEDURE — 6370000000 HC RX 637 (ALT 250 FOR IP): Performed by: INTERNAL MEDICINE

## 2020-02-01 PROCEDURE — 6360000002 HC RX W HCPCS: Performed by: INTERNAL MEDICINE

## 2020-02-01 PROCEDURE — 96372 THER/PROPH/DIAG INJ SC/IM: CPT

## 2020-02-01 PROCEDURE — 36415 COLL VENOUS BLD VENIPUNCTURE: CPT

## 2020-02-01 PROCEDURE — 85025 COMPLETE CBC W/AUTO DIFF WBC: CPT

## 2020-02-01 PROCEDURE — 80048 BASIC METABOLIC PNL TOTAL CA: CPT

## 2020-02-01 PROCEDURE — 2580000003 HC RX 258: Performed by: INTERNAL MEDICINE

## 2020-02-01 PROCEDURE — 96376 TX/PRO/DX INJ SAME DRUG ADON: CPT

## 2020-02-01 PROCEDURE — 2060000000 HC ICU INTERMEDIATE R&B

## 2020-02-01 RX ADMIN — PANTOPRAZOLE SODIUM 40 MG: 40 TABLET, DELAYED RELEASE ORAL at 06:12

## 2020-02-01 RX ADMIN — LISINOPRIL 2.5 MG: 2.5 TABLET ORAL at 20:52

## 2020-02-01 RX ADMIN — ENOXAPARIN SODIUM 40 MG: 40 INJECTION SUBCUTANEOUS at 09:08

## 2020-02-01 RX ADMIN — FENTANYL CITRATE 50 MCG: 50 INJECTION, SOLUTION INTRAMUSCULAR; INTRAVENOUS at 00:52

## 2020-02-01 RX ADMIN — ASPIRIN 81 MG: 81 TABLET, COATED ORAL at 09:08

## 2020-02-01 RX ADMIN — FENTANYL CITRATE 25 MCG: 50 INJECTION, SOLUTION INTRAMUSCULAR; INTRAVENOUS at 06:21

## 2020-02-01 RX ADMIN — SODIUM CHLORIDE: 9 INJECTION, SOLUTION INTRAVENOUS at 18:02

## 2020-02-01 RX ADMIN — FENTANYL CITRATE 50 MCG: 50 INJECTION, SOLUTION INTRAMUSCULAR; INTRAVENOUS at 18:59

## 2020-02-01 RX ADMIN — SODIUM CHLORIDE: 9 INJECTION, SOLUTION INTRAVENOUS at 09:09

## 2020-02-01 RX ADMIN — FENTANYL CITRATE 25 MCG: 50 INJECTION, SOLUTION INTRAMUSCULAR; INTRAVENOUS at 18:09

## 2020-02-01 RX ADMIN — ATORVASTATIN CALCIUM 80 MG: 80 TABLET, FILM COATED ORAL at 09:08

## 2020-02-01 RX ADMIN — SODIUM BICARBONATE 650 MG: 650 TABLET ORAL at 20:52

## 2020-02-01 RX ADMIN — SODIUM BICARBONATE 650 MG: 650 TABLET ORAL at 09:08

## 2020-02-01 RX ADMIN — SERTRALINE HYDROCHLORIDE 100 MG: 100 TABLET ORAL at 09:08

## 2020-02-01 ASSESSMENT — PAIN SCALES - GENERAL
PAINLEVEL_OUTOF10: 6
PAINLEVEL_OUTOF10: 3
PAINLEVEL_OUTOF10: 9
PAINLEVEL_OUTOF10: 6
PAINLEVEL_OUTOF10: 7
PAINLEVEL_OUTOF10: 9
PAINLEVEL_OUTOF10: 9
PAINLEVEL_OUTOF10: 4
PAINLEVEL_OUTOF10: 6

## 2020-02-01 ASSESSMENT — PAIN DESCRIPTION - ONSET
ONSET: ON-GOING

## 2020-02-01 ASSESSMENT — PAIN DESCRIPTION - FREQUENCY
FREQUENCY: CONTINUOUS

## 2020-02-01 ASSESSMENT — PAIN DESCRIPTION - PAIN TYPE
TYPE: ACUTE PAIN
TYPE: CHRONIC PAIN
TYPE: ACUTE PAIN
TYPE: ACUTE PAIN;SURGICAL PAIN
TYPE: ACUTE PAIN

## 2020-02-01 ASSESSMENT — PAIN - FUNCTIONAL ASSESSMENT
PAIN_FUNCTIONAL_ASSESSMENT: ACTIVITIES ARE NOT PREVENTED
PAIN_FUNCTIONAL_ASSESSMENT: PREVENTS OR INTERFERES SOME ACTIVE ACTIVITIES AND ADLS
PAIN_FUNCTIONAL_ASSESSMENT: ACTIVITIES ARE NOT PREVENTED

## 2020-02-01 ASSESSMENT — PAIN DESCRIPTION - PROGRESSION
CLINICAL_PROGRESSION: NOT CHANGED
CLINICAL_PROGRESSION: RAPIDLY WORSENING
CLINICAL_PROGRESSION: RAPIDLY WORSENING
CLINICAL_PROGRESSION: GRADUALLY IMPROVING
CLINICAL_PROGRESSION: NOT CHANGED
CLINICAL_PROGRESSION: RAPIDLY WORSENING

## 2020-02-01 ASSESSMENT — PAIN DESCRIPTION - ORIENTATION: ORIENTATION: LOWER;MID

## 2020-02-01 ASSESSMENT — PAIN DESCRIPTION - LOCATION
LOCATION: ABDOMEN

## 2020-02-01 ASSESSMENT — PAIN DESCRIPTION - DESCRIPTORS
DESCRIPTORS: SHARP
DESCRIPTORS: ACHING
DESCRIPTORS: ACHING
DESCRIPTORS: DISCOMFORT;DULL
DESCRIPTORS: SHARP

## 2020-02-01 NOTE — FLOWSHEET NOTE
Patient shares about her many hospital visits. States her pain , frustration and tired of her medical situation. States good family support.  shares in presence, listening, prayers. Follow up as needed. 02/01/20 1320   Encounter Summary   Services provided to: Patient   Referral/Consult From: [facility];Family members   Continue Visiting   (2-1-20)   Complexity of Encounter Moderate   Length of Encounter 15 minutes   Spiritual Assessment Completed Yes   Routine   Type Initial   Assessment Passive   Intervention Explored feelings, thoughts, concerns; Active listening;Prayer;Discussed illness/injury and it's impact; Discussed belief system/Church practices/balaji   Outcome Expressed gratitude;Receptive;Engaged in conversation;Expressed feelings/needs/concerns

## 2020-02-01 NOTE — PROGRESS NOTES
([facility] [address]    CAD (coronary artery disease)    Abdominal pain    SBO (small bowel obstruction)    Metabolic acidosis    Chronic diarrhea    Urinary tract infection without hematuria    Elevated serum creatinine     Abdominal pain with nausea vomiting resolved  Partial bowel obstruction resolved  CKD 3  Coronary artery disease  Chronic smoker smoking cessation advised  Metabolic acidosis  Plan:    Meds labs reviewed continue with home medications advance diet ambulate see orders restart sodium bicarb      Stephany Ramey MD  1:44 PM

## 2020-02-01 NOTE — PROGRESS NOTES
01/29/2020    ALT 10 01/29/2020        Assessment:  Patient Active Problem List   Diagnosis    Diarrhea    Smoker    Acute [facility] ([facility] [address]    CAD (coronary artery disease)    Abdominal pain    SBO (small bowel obstruction)    Metabolic acidosis    Chronic diarrhea    Urinary tract infection without hematuria    Elevated serum creatinine     Abdominal pain resolved  Nausea vomiting resolved  CKD 3  Coronary artery disease stable  Chronic smoker smoking cessation advised  Plan:    Labs reviewed doing fairly well will advance diet to full liquid hopefully soft in the morning resume all home meds ambulate see orders      Cathleen Saunders MD  7:43 PM

## 2020-02-01 NOTE — PROGRESS NOTES
BASOSABS 0.05 01/31/2020    DIFFTYPE NOT REPORTED 01/31/2020     BMP:    Lab Results   Component Value Date     01/31/2020    K 4.2 01/31/2020     01/31/2020    CO2 15 01/31/2020    BUN 16 01/31/2020    LABALBU 3.9 01/29/2020    CREATININE 1.21 01/31/2020    CALCIUM 8.8 01/31/2020    GFRAA 54 01/31/2020    LABGLOM 45 01/31/2020    GLUCOSE 70 01/31/2020    GLUCOSE 123 04/14/2012         ASSESSMENT:    1. 59 y.o. female with resolving early SBO versus enteritis    Plan:  1. Okay to advance to soft diet  2. Encourage patient to be out of bed to ambulate  3. Recommend patient have small, multiple meals throughout the day  4. If tolerates soft diet okay for DC from surgical standpoint. 5. No acute surgical intervention needed at this time. Electronically signed by Reyes Hughs, DO  on 2/1/2020 at 5:33 AM     Attending Physician Statement  I have discussed the case, including pertinent history and exam findings with the resident. I agree with the assessment, plan and orders as documented by the resident. Patient seen and examined. Agree with above. Diet as tolerated to a soft diet. Had a lengthy discussion with the patient regarding her diet post discharge. Recommended small meals and avoiding excessive Imodium which likely exacerbated some of her abdominal pain, cramping, and appearance of bowel obstruction or ileus on imaging. If she does need to take any of these medications, would recommend very small doses at any given time.   The patient can follow-up with Dr. Shirin Bush post discharge

## 2020-02-02 VITALS
BODY MASS INDEX: 30.73 KG/M2 | SYSTOLIC BLOOD PRESSURE: 130 MMHG | OXYGEN SATURATION: 96 % | WEIGHT: 180 LBS | HEIGHT: 64 IN | DIASTOLIC BLOOD PRESSURE: 66 MMHG | HEART RATE: 71 BPM | TEMPERATURE: 97.9 F | RESPIRATION RATE: 20 BRPM

## 2020-02-02 LAB
ABSOLUTE EOS #: 0.19 K/UL (ref 0–0.44)
ABSOLUTE IMMATURE GRANULOCYTE: 0.01 K/UL (ref 0–0.3)
ABSOLUTE LYMPH #: 1.9 K/UL (ref 1.1–3.7)
ABSOLUTE MONO #: 0.42 K/UL (ref 0.1–1.2)
ANION GAP SERPL CALCULATED.3IONS-SCNC: 9 MMOL/L (ref 9–17)
BASOPHILS # BLD: 1 % (ref 0–2)
BASOPHILS ABSOLUTE: 0.05 K/UL (ref 0–0.2)
BUN BLDV-MCNC: 10 MG/DL (ref 8–23)
BUN/CREAT BLD: 9 (ref 9–20)
CALCIUM SERPL-MCNC: 8.4 MG/DL (ref 8.6–10.4)
CHLORIDE BLD-SCNC: 114 MMOL/L (ref 98–107)
CO2: 18 MMOL/L (ref 20–31)
CREAT SERPL-MCNC: 1.16 MG/DL (ref 0.5–0.9)
DIFFERENTIAL TYPE: ABNORMAL
EOSINOPHILS RELATIVE PERCENT: 4 % (ref 1–4)
GFR AFRICAN AMERICAN: 57 ML/MIN
GFR NON-AFRICAN AMERICAN: 47 ML/MIN
GFR SERPL CREATININE-BSD FRML MDRD: ABNORMAL ML/MIN/{1.73_M2}
GFR SERPL CREATININE-BSD FRML MDRD: ABNORMAL ML/MIN/{1.73_M2}
GLUCOSE BLD-MCNC: 91 MG/DL (ref 70–99)
HCT VFR BLD CALC: 31.7 % (ref 36.3–47.1)
HEMOGLOBIN: 9.7 G/DL (ref 11.9–15.1)
IMMATURE GRANULOCYTES: 0 %
LYMPHOCYTES # BLD: 37 % (ref 24–43)
MCH RBC QN AUTO: 27.4 PG (ref 25.2–33.5)
MCHC RBC AUTO-ENTMCNC: 30.6 G/DL (ref 28.4–34.8)
MCV RBC AUTO: 89.5 FL (ref 82.6–102.9)
MONOCYTES # BLD: 8 % (ref 3–12)
NRBC AUTOMATED: 0 PER 100 WBC
PDW BLD-RTO: 15.8 % (ref 11.8–14.4)
PLATELET # BLD: 224 K/UL (ref 138–453)
PLATELET ESTIMATE: ABNORMAL
PMV BLD AUTO: 10.6 FL (ref 8.1–13.5)
POTASSIUM SERPL-SCNC: 4.6 MMOL/L (ref 3.7–5.3)
RBC # BLD: 3.54 M/UL (ref 3.95–5.11)
RBC # BLD: ABNORMAL 10*6/UL
SEG NEUTROPHILS: 50 % (ref 36–65)
SEGMENTED NEUTROPHILS ABSOLUTE COUNT: 2.62 K/UL (ref 1.5–8.1)
SODIUM BLD-SCNC: 141 MMOL/L (ref 135–144)
WBC # BLD: 5.2 K/UL (ref 3.5–11.3)
WBC # BLD: ABNORMAL 10*3/UL

## 2020-02-02 PROCEDURE — G0378 HOSPITAL OBSERVATION PER HR: HCPCS

## 2020-02-02 PROCEDURE — 36415 COLL VENOUS BLD VENIPUNCTURE: CPT

## 2020-02-02 PROCEDURE — 80048 BASIC METABOLIC PNL TOTAL CA: CPT

## 2020-02-02 PROCEDURE — 6370000000 HC RX 637 (ALT 250 FOR IP): Performed by: INTERNAL MEDICINE

## 2020-02-02 PROCEDURE — 96372 THER/PROPH/DIAG INJ SC/IM: CPT

## 2020-02-02 PROCEDURE — 85025 COMPLETE CBC W/AUTO DIFF WBC: CPT

## 2020-02-02 PROCEDURE — 6360000002 HC RX W HCPCS: Performed by: INTERNAL MEDICINE

## 2020-02-02 PROCEDURE — 2580000003 HC RX 258: Performed by: INTERNAL MEDICINE

## 2020-02-02 RX ADMIN — PANTOPRAZOLE SODIUM 40 MG: 40 TABLET, DELAYED RELEASE ORAL at 05:52

## 2020-02-02 RX ADMIN — ENOXAPARIN SODIUM 40 MG: 40 INJECTION SUBCUTANEOUS at 09:11

## 2020-02-02 RX ADMIN — SERTRALINE HYDROCHLORIDE 100 MG: 100 TABLET ORAL at 09:12

## 2020-02-02 RX ADMIN — SODIUM BICARBONATE 650 MG: 650 TABLET ORAL at 09:11

## 2020-02-02 RX ADMIN — ASPIRIN 81 MG: 81 TABLET, COATED ORAL at 09:11

## 2020-02-02 RX ADMIN — SODIUM CHLORIDE: 9 INJECTION, SOLUTION INTRAVENOUS at 03:38

## 2020-02-02 RX ADMIN — ATORVASTATIN CALCIUM 80 MG: 80 TABLET, FILM COATED ORAL at 09:12

## 2020-02-02 ASSESSMENT — PAIN SCALES - GENERAL
PAINLEVEL_OUTOF10: 0
PAINLEVEL_OUTOF10: 0

## 2020-02-02 NOTE — PLAN OF CARE
Problem: Falls - Risk of:  Goal: Will remain free from falls  Description  Will remain free from falls  2/2/2020 0043 by Cata David RN  Outcome: Ongoing     Problem: Pain:  Goal: Pain level will decrease  Description  Pain level will decrease  2/2/2020 0043 by Cata David RN  Outcome: Ongoing     Problem: Pain:  Goal: Control of acute pain  Description  Control of acute pain  2/2/2020 0043 by Cata David RN  Outcome: Ongoing     Problem: Bowel/Gastric:  Goal: Control of bowel function will improve  Description  Control of bowel function will improve  Outcome: Ongoing     Problem:  Bowel/Gastric:  Goal: Ability to achieve a regular elimination pattern will improve  Description  Ability to achieve a regular elimination pattern will improve  Outcome: Ongoing     Problem: Skin Integrity:  Goal: Risk for impaired skin integrity will decrease  Description  Risk for impaired skin integrity will decrease  Outcome: Ongoing

## 2020-02-02 NOTE — PROGRESS NOTES
Subjective:    Abdominal pain  More abdominal pain no nausea no vomiting still with some diarrhea no hematuria emesis no blood in the stools and no melena  ROS  No fever no chills no  or cardiopulmonary complaints no TIA no bleeding no headache no sore throat no skin lesions, no polyuria polydipsia or hypoglycemia  physical exam  General Appearance: alert and oriented to person, place and time and in no acute distress  Skin: warm and dry, no rash or erythema  Head: normocephalic and atraumatic  Eyes: pupils equal, round, and reactive to light, conjunctivae normal and sclera anicteric  Neck: neck supple and non tender without mass   Pulmonary/Chest: clear to auscultation bilaterally- no wheezes, rales or rhonchi, normal air movement, no respiratory distress  Cardiovascular: normal rate, regular rhythm, normal S1 and S2, no gallops, intact distal pulses and no carotid bruits  Abdomen: soft, non-tender, non-distended, normal bowel sounds, no masses or organomegaly  Extremities: no edema and pulses no Homans sign  Neurologic: Oriented x3 with no focal death    /66   Pulse 71   Temp 97.9 °F (36.6 °C) (Oral)   Resp 20   Ht 5' 4\" (1.626 m)   Wt 180 lb (81.6 kg)   SpO2 96%   BMI 30.90 kg/m²     CBC:   Lab Results   Component Value Date    WBC 5.2 02/02/2020    RBC 3.54 02/02/2020    RBC 4.09 04/14/2012    HGB 9.7 02/02/2020    HCT 31.7 02/02/2020    MCV 89.5 02/02/2020    MCH 27.4 02/02/2020    MCHC 30.6 02/02/2020    RDW 15.8 02/02/2020     02/02/2020     04/14/2012    MPV 10.6 02/02/2020     CMP:    Lab Results   Component Value Date     02/02/2020    K 4.6 02/02/2020     02/02/2020    CO2 18 02/02/2020    BUN 10 02/02/2020    CREATININE 1.16 02/02/2020    GFRAA 57 02/02/2020    LABGLOM 47 02/02/2020    GLUCOSE 91 02/02/2020    GLUCOSE 123 04/14/2012    PROT 7.8 01/29/2020    LABALBU 3.9 01/29/2020    CALCIUM 8.4 02/02/2020    BILITOT 0.20 01/29/2020    ALKPHOS 123 01/29/2020 AST 20 01/29/2020    ALT 10 01/29/2020        Assessment:  Patient Active Problem List   Diagnosis    Diarrhea    Smoker    [facility] ([facility] [address]    CAD (coronary artery disease)    Abdominal pain    SBO (small bowel obstruction)    Metabolic acidosis    Chronic diarrhea    Urinary tract infection without hematuria    Elevated serum creatinine   Abdominal pain  Partial small bowel obstruction resolved  Non-anion gap metabolic acidosis  Coronary artery disease  Chronic smoker  Obesity BMI of 30-34.9  CKD 3  Plan:    Meds labs reviewed doing fairly well home today office in 1 week cessation advised and bicarb restarted      Jamil Galvan MD  2:23 PM

## 2020-02-02 NOTE — DISCHARGE SUMMARY
Physician Discharge Summary     Patient ID:  Nathanael Rothman  [id number]  59 y.o.  1955    Admit date: 1/29/2020    Discharge date and time: 2/2/2020    Admission Diagnoses:   Patient Active Problem List   Diagnosis    Diarrhea    Smoker    Acute kidney injury ([address]    CAD (coronary artery disease)    Abdominal pain    SBO (small bowel obstruction)    Metabolic acidosis    Chronic diarrhea    Urinary tract infection without hematuria    Elevated serum creatinine       Discharge Diagnoses:  Abdominal pain  Partial small bowel obstruction  Non-anion gap metabolic acidosis  Coronary artery disease stable  Chronic smoker  CKD 3  Obesity BMI of 30-34.9  Consults: general surgery    Procedures: none    Hospital Course: 77-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 pain control IV fluids did not need any NG, did fairly well no major complication during her stay was seen by general surgery was discharged home in a stable improved condition smoking cessation was advised    Discharge Exam:  See progress note from today    Disposition: home  stAble improved  Patient Instructions:   Current Discharge Medication List      CONTINUE these medications which have NOT CHANGED    Details   sodium bicarbonate 650 MG tablet Take 650 mg by mouth 2 times daily      lisinopril (PRINIVIL;ZESTRIL) 2.5 MG tablet Take 2.5 mg by mouth nightly      atorvastatin (LIPITOR) 80 MG tablet Take 80 mg by mouth daily      sertraline (ZOLOFT) 100 MG tablet Take 100 mg by mouth daily       ASPIR-LOW 81 MG EC tablet Take 81 mg by mouth daily      metoprolol succinate (TOPROL XL) 50 MG extended release tablet Take 50 mg by mouth daily       pantoprazole (PROTONIX) 40 MG tablet Take 40 mg by mouth daily      loperamide (IMODIUM) 2 MG capsule Take 2 mg by mouth as needed for Diarrhea         STOP taking these medications       ranitidine (ZANTAC 150 MAXIMUM STRENGTH) 150 MG tablet Comments:   Reason for Stopping:         albuterol-ipratropium (COMBIVENT RESPIMAT)  MCG/ACT AERS inhaler Comments:   Reason for Stopping:         ticagrelor (BRILINTA) 90 MG TABS tablet Comments:   Reason for Stopping:         isosorbide mononitrate (IMDUR) 30 MG extended release tablet Comments:   Reason for Stopping:             Activity: activity as tolerated  Diet: cardiac diet    Follow-up with pcp in 1 week. Signed:   Stephany Ramey MD  2/2/2020  2:27 PM

## 2020-06-19 ENCOUNTER — HOSPITAL ENCOUNTER (EMERGENCY)
Age: 65
Discharge: HOME | End: 2020-06-19
Attending: EMERGENCY MEDICINE
Payer: COMMERCIAL

## 2020-06-19 ENCOUNTER — APPOINTMENT (OUTPATIENT)
Dept: GENERAL RADIOLOGY | Age: 65
End: 2020-06-19
Payer: COMMERCIAL

## 2020-06-19 VITALS
RESPIRATION RATE: 18 BRPM | TEMPERATURE: 99 F | HEART RATE: 83 BPM | SYSTOLIC BLOOD PRESSURE: 126 MMHG | WEIGHT: 180 LBS | OXYGEN SATURATION: 97 % | BODY MASS INDEX: 30.73 KG/M2 | HEIGHT: 64 IN | DIASTOLIC BLOOD PRESSURE: 83 MMHG

## 2020-06-19 PROCEDURE — 99283 EMERGENCY DEPT VISIT LOW MDM: CPT

## 2020-06-19 PROCEDURE — 93971 EXTREMITY STUDY: CPT

## 2020-06-19 PROCEDURE — 73552 X-RAY EXAM OF FEMUR 2/>: CPT

## 2020-06-19 ASSESSMENT — PAIN SCALES - GENERAL: PAINLEVEL_OUTOF10: 10

## 2020-06-19 NOTE — ED NOTES
Pt ambulatory to room 11 with c/o ongoing LLE pain x 2-3 months, progressively worse for past 1-2 weeks. Pt states she has not been seen by PCP for said c/o \"Because of all the Covid stuff going on\" and \"It's just been getting to the point where I can't sleep at night because it hurts so bad\". Pt denies any known injuries. No obvious deformities, ecchymosis, redness, warmth, or edema noted to LLE. Full ROM intact. PPP. Respirations even and non labored. NAD noted.       Ke Veliz RN  06/19/20 3339

## 2020-08-12 ASSESSMENT — PAIN SCALES - GENERAL: PAINLEVEL_OUTOF10: 10

## 2020-08-13 ENCOUNTER — HOSPITAL ENCOUNTER (INPATIENT)
Age: 65
LOS: 1 days | Discharge: HOME | DRG: 389 | End: 2020-08-14
Attending: EMERGENCY MEDICINE | Admitting: INTERNAL MEDICINE
Payer: MEDICARE

## 2020-08-13 ENCOUNTER — APPOINTMENT (OUTPATIENT)
Dept: CT IMAGING | Age: 65
DRG: 389 | End: 2020-08-13
Payer: MEDICARE

## 2020-08-13 PROBLEM — K56.600 PARTIAL SMALL BOWEL OBSTRUCTION (HCC): Status: ACTIVE | Noted: 2020-08-13

## 2020-08-13 LAB
ABSOLUTE EOS #: 0.38 K/UL (ref 0–0.44)
ABSOLUTE IMMATURE GRANULOCYTE: 0.01 K/UL (ref 0–0.3)
ABSOLUTE LYMPH #: 2.62 K/UL (ref 1.1–3.7)
ABSOLUTE MONO #: 0.47 K/UL (ref 0.1–1.2)
ALBUMIN SERPL-MCNC: 4.3 G/DL (ref 3.5–5.2)
ALBUMIN/GLOBULIN RATIO: ABNORMAL (ref 1–2.5)
ALP BLD-CCNC: 93 U/L (ref 35–104)
ALT SERPL-CCNC: 13 U/L (ref 5–33)
ANION GAP SERPL CALCULATED.3IONS-SCNC: 18 MMOL/L (ref 9–17)
AST SERPL-CCNC: 19 U/L
BASOPHILS # BLD: 1 % (ref 0–2)
BASOPHILS ABSOLUTE: 0.06 K/UL (ref 0–0.2)
BILIRUB SERPL-MCNC: 0.22 MG/DL (ref 0.3–1.2)
BUN BLDV-MCNC: 22 MG/DL (ref 8–23)
BUN/CREAT BLD: 13 (ref 9–20)
CALCIUM SERPL-MCNC: 9.8 MG/DL (ref 8.6–10.4)
CHLORIDE BLD-SCNC: 101 MMOL/L (ref 98–107)
CO2: 18 MMOL/L (ref 20–31)
CREAT SERPL-MCNC: 1.72 MG/DL (ref 0.5–0.9)
DIFFERENTIAL TYPE: ABNORMAL
EOSINOPHILS RELATIVE PERCENT: 4 % (ref 1–4)
GFR AFRICAN AMERICAN: 36 ML/MIN
GFR NON-AFRICAN AMERICAN: 30 ML/MIN
GFR SERPL CREATININE-BSD FRML MDRD: ABNORMAL ML/MIN/{1.73_M2}
GFR SERPL CREATININE-BSD FRML MDRD: ABNORMAL ML/MIN/{1.73_M2}
GLUCOSE BLD-MCNC: 97 MG/DL (ref 70–99)
HCT VFR BLD CALC: 44.1 % (ref 36.3–47.1)
HEMOGLOBIN: 13.9 G/DL (ref 11.9–15.1)
IMMATURE GRANULOCYTES: 0 %
LACTIC ACID: 1.4 MMOL/L (ref 0.5–2.2)
LIPASE: 49 U/L (ref 13–60)
LYMPHOCYTES # BLD: 25 % (ref 24–43)
MCH RBC QN AUTO: 28.2 PG (ref 25.2–33.5)
MCHC RBC AUTO-ENTMCNC: 31.5 G/DL (ref 28.4–34.8)
MCV RBC AUTO: 89.5 FL (ref 82.6–102.9)
MONOCYTES # BLD: 5 % (ref 3–12)
NRBC AUTOMATED: ABNORMAL PER 100 WBC
PDW BLD-RTO: 14.7 % (ref 11.8–14.4)
PLATELET # BLD: 305 K/UL (ref 138–453)
PLATELET ESTIMATE: ABNORMAL
PMV BLD AUTO: 9.9 FL (ref 8.1–13.5)
POTASSIUM SERPL-SCNC: 4.3 MMOL/L (ref 3.7–5.3)
RBC # BLD: 4.93 M/UL (ref 3.95–5.11)
RBC # BLD: ABNORMAL 10*6/UL
SEG NEUTROPHILS: 66 % (ref 36–65)
SEGMENTED NEUTROPHILS ABSOLUTE COUNT: 6.9 K/UL (ref 1.5–8.1)
SODIUM BLD-SCNC: 137 MMOL/L (ref 135–144)
TOTAL PROTEIN: 7.7 G/DL (ref 6.4–8.3)
WBC # BLD: 10.4 K/UL (ref 3.5–11.3)
WBC # BLD: ABNORMAL 10*3/UL

## 2020-08-13 PROCEDURE — 6360000002 HC RX W HCPCS: Performed by: INTERNAL MEDICINE

## 2020-08-13 PROCEDURE — 2580000003 HC RX 258: Performed by: INTERNAL MEDICINE

## 2020-08-13 PROCEDURE — 96361 HYDRATE IV INFUSION ADD-ON: CPT

## 2020-08-13 PROCEDURE — 83605 ASSAY OF LACTIC ACID: CPT

## 2020-08-13 PROCEDURE — 96375 TX/PRO/DX INJ NEW DRUG ADDON: CPT

## 2020-08-13 PROCEDURE — 99285 EMERGENCY DEPT VISIT HI MDM: CPT

## 2020-08-13 PROCEDURE — 6360000002 HC RX W HCPCS: Performed by: STUDENT IN AN ORGANIZED HEALTH CARE EDUCATION/TRAINING PROGRAM

## 2020-08-13 PROCEDURE — 83690 ASSAY OF LIPASE: CPT

## 2020-08-13 PROCEDURE — 2500000003 HC RX 250 WO HCPCS: Performed by: INTERNAL MEDICINE

## 2020-08-13 PROCEDURE — 6360000004 HC RX CONTRAST MEDICATION: Performed by: EMERGENCY MEDICINE

## 2020-08-13 PROCEDURE — 6360000002 HC RX W HCPCS: Performed by: EMERGENCY MEDICINE

## 2020-08-13 PROCEDURE — 2060000000 HC ICU INTERMEDIATE R&B

## 2020-08-13 PROCEDURE — 80053 COMPREHEN METABOLIC PANEL: CPT

## 2020-08-13 PROCEDURE — 96376 TX/PRO/DX INJ SAME DRUG ADON: CPT

## 2020-08-13 PROCEDURE — 2580000003 HC RX 258: Performed by: EMERGENCY MEDICINE

## 2020-08-13 PROCEDURE — 96374 THER/PROPH/DIAG INJ IV PUSH: CPT

## 2020-08-13 PROCEDURE — 85025 COMPLETE CBC W/AUTO DIFF WBC: CPT

## 2020-08-13 PROCEDURE — 74177 CT ABD & PELVIS W/CONTRAST: CPT

## 2020-08-13 RX ORDER — HYDROMORPHONE HYDROCHLORIDE 1 MG/ML
1 INJECTION, SOLUTION INTRAMUSCULAR; INTRAVENOUS; SUBCUTANEOUS ONCE
Status: COMPLETED | OUTPATIENT
Start: 2020-08-13 | End: 2020-08-13

## 2020-08-13 RX ORDER — HEPARIN SODIUM 5000 [USP'U]/ML
5000 INJECTION, SOLUTION INTRAVENOUS; SUBCUTANEOUS EVERY 8 HOURS SCHEDULED
Status: DISCONTINUED | OUTPATIENT
Start: 2020-08-13 | End: 2020-08-15 | Stop reason: HOSPADM

## 2020-08-13 RX ORDER — 0.9 % SODIUM CHLORIDE 0.9 %
1000 INTRAVENOUS SOLUTION INTRAVENOUS ONCE
Status: COMPLETED | OUTPATIENT
Start: 2020-08-13 | End: 2020-08-13

## 2020-08-13 RX ORDER — POLYETHYLENE GLYCOL 3350 17 G/17G
17 POWDER, FOR SOLUTION ORAL DAILY PRN
Status: DISCONTINUED | OUTPATIENT
Start: 2020-08-13 | End: 2020-08-15 | Stop reason: HOSPADM

## 2020-08-13 RX ORDER — MORPHINE SULFATE 4 MG/ML
4 INJECTION, SOLUTION INTRAMUSCULAR; INTRAVENOUS ONCE
Status: COMPLETED | OUTPATIENT
Start: 2020-08-13 | End: 2020-08-13

## 2020-08-13 RX ORDER — SODIUM CHLORIDE 0.9 % (FLUSH) 0.9 %
10 SYRINGE (ML) INJECTION EVERY 12 HOURS SCHEDULED
Status: DISCONTINUED | OUTPATIENT
Start: 2020-08-13 | End: 2020-08-15 | Stop reason: HOSPADM

## 2020-08-13 RX ORDER — ACETAMINOPHEN 325 MG/1
650 TABLET ORAL EVERY 4 HOURS PRN
Status: DISCONTINUED | OUTPATIENT
Start: 2020-08-13 | End: 2020-08-15 | Stop reason: HOSPADM

## 2020-08-13 RX ORDER — SODIUM CHLORIDE 0.9 % (FLUSH) 0.9 %
10 SYRINGE (ML) INJECTION EVERY 12 HOURS SCHEDULED
Status: DISCONTINUED | OUTPATIENT
Start: 2020-08-13 | End: 2020-08-13 | Stop reason: SDUPTHER

## 2020-08-13 RX ORDER — SODIUM CHLORIDE 0.9 % (FLUSH) 0.9 %
10 SYRINGE (ML) INJECTION PRN
Status: DISCONTINUED | OUTPATIENT
Start: 2020-08-13 | End: 2020-08-15 | Stop reason: HOSPADM

## 2020-08-13 RX ORDER — SODIUM CHLORIDE 0.9 % (FLUSH) 0.9 %
10 SYRINGE (ML) INJECTION PRN
Status: DISCONTINUED | OUTPATIENT
Start: 2020-08-13 | End: 2020-08-13 | Stop reason: SDUPTHER

## 2020-08-13 RX ORDER — ONDANSETRON 2 MG/ML
4 INJECTION INTRAMUSCULAR; INTRAVENOUS ONCE
Status: COMPLETED | OUTPATIENT
Start: 2020-08-13 | End: 2020-08-13

## 2020-08-13 RX ORDER — MORPHINE SULFATE 10 MG/ML
6 INJECTION, SOLUTION INTRAMUSCULAR; INTRAVENOUS ONCE
Status: COMPLETED | OUTPATIENT
Start: 2020-08-13 | End: 2020-08-13

## 2020-08-13 RX ORDER — 0.9 % SODIUM CHLORIDE 0.9 %
80 INTRAVENOUS SOLUTION INTRAVENOUS ONCE
Status: COMPLETED | OUTPATIENT
Start: 2020-08-13 | End: 2020-08-13

## 2020-08-13 RX ORDER — SODIUM CHLORIDE 9 MG/ML
INJECTION, SOLUTION INTRAVENOUS CONTINUOUS
Status: DISCONTINUED | OUTPATIENT
Start: 2020-08-13 | End: 2020-08-15 | Stop reason: HOSPADM

## 2020-08-13 RX ADMIN — Medication 10 ML: at 01:51

## 2020-08-13 RX ADMIN — MORPHINE SULFATE 6 MG: 10 INJECTION INTRAVENOUS at 00:37

## 2020-08-13 RX ADMIN — SODIUM CHLORIDE 1000 ML: 9 INJECTION, SOLUTION INTRAVENOUS at 00:32

## 2020-08-13 RX ADMIN — HYDROMORPHONE HYDROCHLORIDE 0.5 MG: 1 INJECTION, SOLUTION INTRAMUSCULAR; INTRAVENOUS; SUBCUTANEOUS at 14:22

## 2020-08-13 RX ADMIN — MORPHINE SULFATE 4 MG: 4 INJECTION, SOLUTION INTRAMUSCULAR; INTRAVENOUS at 01:52

## 2020-08-13 RX ADMIN — HEPARIN SODIUM 5000 UNITS: 5000 INJECTION INTRAVENOUS; SUBCUTANEOUS at 14:22

## 2020-08-13 RX ADMIN — HYDROMORPHONE HYDROCHLORIDE 0.5 MG: 1 INJECTION, SOLUTION INTRAMUSCULAR; INTRAVENOUS; SUBCUTANEOUS at 07:32

## 2020-08-13 RX ADMIN — HYDROMORPHONE HYDROCHLORIDE 1 MG: 1 INJECTION, SOLUTION INTRAMUSCULAR; INTRAVENOUS; SUBCUTANEOUS at 03:17

## 2020-08-13 RX ADMIN — Medication 10 ML: at 07:37

## 2020-08-13 RX ADMIN — FAMOTIDINE 20 MG: 10 INJECTION INTRAVENOUS at 07:33

## 2020-08-13 RX ADMIN — SODIUM CHLORIDE: 9 INJECTION, SOLUTION INTRAVENOUS at 07:36

## 2020-08-13 RX ADMIN — HYDROMORPHONE HYDROCHLORIDE 0.5 MG: 1 INJECTION, SOLUTION INTRAMUSCULAR; INTRAVENOUS; SUBCUTANEOUS at 20:31

## 2020-08-13 RX ADMIN — ONDANSETRON 4 MG: 2 INJECTION INTRAMUSCULAR; INTRAVENOUS at 00:37

## 2020-08-13 RX ADMIN — ONDANSETRON 4 MG: 2 INJECTION INTRAMUSCULAR; INTRAVENOUS at 03:17

## 2020-08-13 RX ADMIN — HEPARIN SODIUM 5000 UNITS: 5000 INJECTION INTRAVENOUS; SUBCUTANEOUS at 21:58

## 2020-08-13 RX ADMIN — SODIUM CHLORIDE 80 ML: 9 INJECTION, SOLUTION INTRAVENOUS at 01:51

## 2020-08-13 RX ADMIN — IOPAMIDOL 38 ML: 755 INJECTION, SOLUTION INTRAVENOUS at 01:51

## 2020-08-13 RX ADMIN — HEPARIN SODIUM 5000 UNITS: 5000 INJECTION INTRAVENOUS; SUBCUTANEOUS at 07:32

## 2020-08-13 ASSESSMENT — PAIN DESCRIPTION - DESCRIPTORS
DESCRIPTORS: ACHING
DESCRIPTORS: SHARP
DESCRIPTORS: ACHING

## 2020-08-13 ASSESSMENT — PAIN DESCRIPTION - FREQUENCY
FREQUENCY: CONTINUOUS

## 2020-08-13 ASSESSMENT — PAIN - FUNCTIONAL ASSESSMENT
PAIN_FUNCTIONAL_ASSESSMENT: ACTIVITIES ARE NOT PREVENTED

## 2020-08-13 ASSESSMENT — PAIN SCALES - GENERAL
PAINLEVEL_OUTOF10: 9
PAINLEVEL_OUTOF10: 6
PAINLEVEL_OUTOF10: 8
PAINLEVEL_OUTOF10: 9
PAINLEVEL_OUTOF10: 5
PAINLEVEL_OUTOF10: 5
PAINLEVEL_OUTOF10: 2
PAINLEVEL_OUTOF10: 4
PAINLEVEL_OUTOF10: 5
PAINLEVEL_OUTOF10: 4
PAINLEVEL_OUTOF10: 10
PAINLEVEL_OUTOF10: 6
PAINLEVEL_OUTOF10: 4
PAINLEVEL_OUTOF10: 8
PAINLEVEL_OUTOF10: 2
PAINLEVEL_OUTOF10: 6
PAINLEVEL_OUTOF10: 5
PAINLEVEL_OUTOF10: 4
PAINLEVEL_OUTOF10: 8
PAINLEVEL_OUTOF10: 3

## 2020-08-13 ASSESSMENT — PAIN DESCRIPTION - ONSET
ONSET: ON-GOING

## 2020-08-13 ASSESSMENT — PAIN DESCRIPTION - LOCATION
LOCATION: ABDOMEN

## 2020-08-13 ASSESSMENT — PAIN DESCRIPTION - PAIN TYPE
TYPE: CHRONIC PAIN
TYPE: CHRONIC PAIN;ACUTE PAIN
TYPE: CHRONIC PAIN

## 2020-08-13 ASSESSMENT — PAIN DESCRIPTION - PROGRESSION

## 2020-08-13 NOTE — ED PROVIDER NOTES
EMERGENCY DEPARTMENT ENCOUNTER    Pt Name: Analia Tapia  MRN: [id number]  [place] 1955  Date of evaluation: 8/13/20  CHIEF COMPLAINT       Chief Complaint   Patient presents with    Abdominal Pain     HISTORY OF PRESENT ILLNESS   Patient is a 27-year-old female with PMH of CAD, CKD, COPD, GERD, status post cholecystectomy, and small bowel obstructions who presents to the ED complaining of abdominal pain. Pain started earlier today. Pain is low-grade at baseline with severe episodes of sharp pain. Centrally located. Pain does not radiate. Pain similar to location quality and severity as previous SBO pains. She typically has 4 SBO episodes per year. She typically has 10 episodes of loose stool throughout the day however she had one solid stool in the past 24 hours. She does not have fever, cough, shortness breath, chest pain, changes in urine. REVIEW OF SYSTEMS     Review of Systems   All other systems reviewed and are negative. PASTMEDICAL HISTORY     Past Medical History:   Diagnosis Date    Anxiety     Arthritis     CAD (coronary artery disease)     MI.   9 stents total    Chronic kidney disease     COPD (chronic obstructive pulmonary disease) ([address]     Depression     Diverticulosis     GERD (gastroesophageal reflux disease)     Heart attack ([address] 05/2018    Heart palpitations     History of blood transfusion     Hyperlipidemia     Hypertension     PSVT (paroxysmal supraventricular tachycardia) ([address]      SURGICAL HISTORY       Past Surgical History:   Procedure Laterality Date    ABDOMEN SURGERY      APPENDECTOMY      CARDIAC SURGERY      stents placed    CAROTID STENT PLACEMENT      CHOLECYSTECTOMY      COLECTOMY  2003    COLONOSCOPY  7/30/2003    Dr Leyva Plane diverticulosis and signs of diverticulitis    COLONOSCOPY  5/21/2010    stricture at about  35 cm from the anus.   Could not advance even gastroscope    COLONOSCOPY  10/17/2014    small bowel and rectum normal  SMALL INTESTINE SURGERY      entire large intestine removed, entire small intestine intact    TUBAL LIGATION      UPPER GASTROINTESTINAL ENDOSCOPY  10/17/2014    normal    UPPER GASTROINTESTINAL ENDOSCOPY  04/25/2019    UPPER GASTROINTESTINAL ENDOSCOPY  04/25/2019    UPPER GASTROINTESTINAL ENDOSCOPY N/A 4/25/2019    EGD ESOPHAGOGASTRODUODENOSCOPY performed by Mansi Cantor MD at [facility]       Previous Medications    ASPIR-LOW 81 MG EC TABLET    Take 81 mg by mouth daily    ATORVASTATIN (LIPITOR) 80 MG TABLET    Take 80 mg by mouth daily    LISINOPRIL (PRINIVIL;ZESTRIL) 2.5 MG TABLET    Take 2.5 mg by mouth nightly    LOPERAMIDE (IMODIUM) 2 MG CAPSULE    Take 2 mg by mouth as needed for Diarrhea    METOPROLOL SUCCINATE (TOPROL XL) 50 MG EXTENDED RELEASE TABLET    Take 50 mg by mouth daily     PANTOPRAZOLE (PROTONIX) 40 MG TABLET    Take 40 mg by mouth daily    SERTRALINE (ZOLOFT) 100 MG TABLET    Take 100 mg by mouth daily     SODIUM BICARBONATE 650 MG TABLET    Take 650 mg by mouth 2 times daily     ALLERGIES     is allergic to keflex [cephalexin]; atenolol; codeine; dilaudid [hydromorphone hcl]; percocet [oxycodone-acetaminophen]; phenergan [promethazine hcl]; sodium hypochlorite; erythromycin; and prednisone. FAMILY HISTORY     She indicated that the status of her mother is unknown. She indicated that the status of her father is unknown.      SOCIAL HISTORY       Social History     Tobacco Use    Smoking status: Current Every Day Smoker     Packs/day: 1.00     Years: 40.00     Pack years: 40.00     Types: Cigarettes    Smokeless tobacco: Never Used   Substance Use Topics    Alcohol use: No    Drug use: No     PHYSICAL EXAM     INITIAL VITALS: BP (!) 94/49   Pulse 64   Temp 97.9 °F (36.6 °C)   Resp 18   Ht 5' 4\" (1.626 m)   Wt 185 lb (83.9 kg)   SpO2 96%   BMI 31.76 kg/m²    Physical Exam  Constitutional:       Comments: Sitting up in exam bed, wincing in pain during pain episodes. HENT:      Head: Normocephalic. Right Ear: External ear normal.      Left Ear: External ear normal.      Nose: Nose normal.   Eyes:      Conjunctiva/sclera: Conjunctivae normal.   Cardiovascular:      Rate and Rhythm: Normal rate. Pulmonary:      Effort: Pulmonary effort is normal.   Abdominal:      General: Abdomen is flat. Tenderness: There is abdominal tenderness. There is no guarding or rebound. Skin:     General: Skin is dry. Neurological:      Mental Status: She is alert. Mental status is at baseline. Psychiatric:         Mood and Affect: Mood normal.         Behavior: Behavior normal.     Limited exam secondary to Covid 19 pandemic. MEDICAL DECISION MAKING:   The patient is hemodynamically stable, afebrile, nontoxic-appearing. Physical exam notable for generalized abdominal tenderness. Based on history and exam high clinical suspicion for recurrent SBO. ED plan for basic labs, analgesia, CT of pelvis, reassess. ED Course as of Aug 13 0317   Thu Aug 13, 2020   0316 I spoke with Dr. Lilia Patino, who accepts patient for admission to hospital.    [JEAN]      ED Course User Index  [JEAN] Erik Daugherty MD       DIAGNOSTIC RESULTS   EKG:All EKG's are interpreted by the Emergency Department Physician who either signs or Co-signs this chart in the absence of a cardiologist.        RADIOLOGY:All plain film, CT, MRI, and formal ultrasound images (except ED bedside ultrasound) are read by the radiologist, see reports below, unless otherwisenoted in MDM or here. CT ABDOMEN PELVIS W IV CONTRAST Additional Contrast? None   Final Result   Multiple loops of dilated small bowel with air-fluid levels, wall thickening   and mild surrounding haziness may relate to enteritis with ileus or partial   small bowel obstruction. No intraperitoneal free air or abscess. Postsurgical changes of prior colectomy.            LABS: All lab results were reviewed by myself, and all abnormals are listed below. Labs Reviewed   CBC WITH AUTO DIFFERENTIAL - Abnormal; Notable for the following components:       Result Value    RDW 14.7 (*)     Seg Neutrophils 66 (*)     All other components within normal limits   COMPREHENSIVE METABOLIC PANEL W/ REFLEX TO MG FOR LOW K - Abnormal; Notable for the following components:    CREATININE 1.72 (*)     CO2 18 (*)     Anion Gap 18 (*)     Total Bilirubin 0.22 (*)     GFR Non- 30 (*)     GFR  36 (*)     All other components within normal limits   LIPASE   LACTIC ACID       EMERGENCY DEPARTMENTCOURSE:         Vitals:    Vitals:    08/12/20 2322 08/12/20 2323 08/13/20 0137 08/13/20 0247   BP:  (!) 147/91 (!) 97/53 (!) 94/49   Pulse:  87 69 64   Resp:  20 18 18   Temp:  97.9 °F (36.6 °C)     SpO2:  100% 96% 96%   Weight: 185 lb (83.9 kg)      Height: 5' 4\" (1.626 m)          The patient was given the following medications while in the emergency department:  Orders Placed This Encounter   Medications    0.9 % sodium chloride bolus    morphine (PF) injection 6 mg    ondansetron (ZOFRAN) injection 4 mg    0.9 % sodium chloride bolus    iopamidol (ISOVUE-370) 76 % injection 75 mL    sodium chloride flush 0.9 % injection 10 mL    morphine sulfate (PF) injection 4 mg    ondansetron (ZOFRAN) injection 4 mg    HYDROmorphone HCl PF (DILAUDID) injection 1 mg     CONSULTS:  IP CONSULT TO INTERNAL MEDICINE  IP CONSULT TO GENERAL SURGERY    FINAL IMPRESSION      1.  Partial intestinal obstruction, unspecified cause [facility])          DISPOSITION/PLAN   DISPOSITION Admitted 08/13/2020 03:08:23 AM      PATIENT REFERRED TO:  Richard Albert MD  [address] [facility] [address] Russ MD  [address] [place] [address]  [phone]          DISCHARGE MEDICATIONS:  New Prescriptions    No medications on file     Ruchi Campo MD  Attending Emergency Physician                                Rosaura Moss

## 2020-08-13 NOTE — FLOWSHEET NOTE
Patient resting in bed; appears sleepy; engages in quiet conversation; requests prayer. Writer provides listening presence, supportive conversation, and prayer. Patient expresses gratitude for visit. Spiritual Care will follow as needed. 08/13/20 1046   Encounter Summary   Services provided to: Patient   Referral/Consult From: [address] [place] [address] Family members   Place of [place] None   Continue Visiting   (8/13/20)   Complexity of Encounter Moderate   Length of Encounter 15 minutes   Spiritual Assessment Completed Yes   Routine   Type Initial   Spiritual/Islam   Type Spiritual support   Assessment Calm; Approachable   Intervention Active listening;Explored feelings, thoughts, concerns;Prayer   Outcome Expressed gratitude;Expressed feelings/needs/concerns

## 2020-08-13 NOTE — CARE COORDINATION
Case Management Initial Discharge Plan  Lucy Gutierrez,         Readmission Risk              Risk of Unplanned Readmission:        10             Met with:patient to discuss discharge plans. Information verified: address, contacts, phone number, , insurance Yes  PCP: Torrey Burnett MD  Date of last visit: last month    Insurance Provider: Medicare and Medicaid    Discharge Planning  Current Residence:  *Private residence   Living Arrangements:    alone  Home has 2 stories/ 1(pt only use main floor) stairs to climb  Support Systems:   family   Current Services PTA:  None  Agency:    Patient able to perform ADL's:Independent  DME in home:   Nebulizer machine   DME used to aid ambulation prior to admission:      DME used during admission:       Potential Assistance Needed:       Pharmacy: [facility] and [address] also mail in pharmacy   Potential Assistance Purchasing Medications:     Does patient want to participate in local refill/ meds to beds program?       Patient agreeable to home care: Yes  Freedom of choice provided:  n/a      Type of Home Care Services:     Patient expects to be discharged to:       Prior SNF/Rehab Placement and Facility: none  Agreeable to SNF/Rehab: No  [place] of choice provided: n/a   Evaluation: yes    Expected Discharge date: Follow Up Appointment: Best Day/ Time:      Transportation provider: family  Transportation arrangements needed for discharge: No    Discharge Plan:     Pt lives alone reports being independent and active . Pt has had homecare in the pt not sure of the company. Pt reports her son lives next door and assist her as needed. Pt discussed concern with her Medicaid - message left for [employer] Citizen in HELP program to see pt regarding concerns. No needs identified.    Plan to return home       Electronically signed by GUILLERMO Espana, LSW on 20 at 12:37 PM EDT

## 2020-08-13 NOTE — H&P
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   9 stents total    Chronic kidney disease     COPD (chronic obstructive pulmonary disease) ([address]     Depression     Diverticulosis     GERD (gastroesophageal reflux disease)     Heart attack ([address] 05/2018    Heart palpitations     History of blood transfusion     Hyperlipidemia     Hypertension     PSVT (paroxysmal supraventricular tachycardia) [address] [address]          Past Surgical History:   Procedure Laterality Date    ABDOMEN SURGERY      APPENDECTOMY      CARDIAC SURGERY      stents placed    CAROTID STENT PLACEMENT      CHOLECYSTECTOMY      COLECTOMY  2003    COLONOSCOPY  7/30/2003    Dr Karime Cornejo diverticulosis and signs of diverticulitis    COLONOSCOPY  5/21/2010    stricture at about  35 cm from the anus.   Could not advance even gastroscope    COLONOSCOPY  10/17/2014    small bowel and rectum normal    SMALL INTESTINE SURGERY      entire large intestine removed, entire small intestine intact    TUBAL LIGATION      UPPER GASTROINTESTINAL ENDOSCOPY  10/17/2014    normal    UPPER GASTROINTESTINAL ENDOSCOPY  04/25/2019    UPPER GASTROINTESTINAL ENDOSCOPY  04/25/2019    UPPER GASTROINTESTINAL ENDOSCOPY N/A 4/25/2019    EGD ESOPHAGOGASTRODUODENOSCOPY performed by Terri Lo MD at [facility]       Medications Prior to Admission:    Medications Prior to Admission: sodium bicarbonate 650 MG tablet, Take 650 mg by mouth 2 times daily  loperamide (IMODIUM) 2 MG capsule, Take 2 mg by mouth as needed for Diarrhea  lisinopril (PRINIVIL;ZESTRIL) 2.5 MG tablet, Take 2.5 mg by mouth nightly  atorvastatin (LIPITOR) 80 MG tablet, Take 80 mg by mouth daily  sertraline (ZOLOFT) 100 MG tablet, Take 100 mg by mouth daily   ASPIR-LOW 81 MG EC tablet, Take 81 mg by mouth daily  metoprolol succinate (TOPROL XL) 50 MG extended release tablet, Take 50 mg by mouth daily   pantoprazole (PROTONIX) 40 MG tablet, Take 40 mg by mouth daily    Allergies:    Keflex [cephalexin]; Atenolol; Codeine; Percocet [oxycodone-acetaminophen]; Phenergan [promethazine hcl]; Sodium hypochlorite; Erythromycin; and Prednisone    Social History:    reports that she has been smoking cigarettes. She has a 40.00 pack-year smoking history. She has never used smokeless tobacco. She reports that she does not drink alcohol or use drugs. Family History:   family history includes Cancer in her mother; Heart Disease in her father.     REVIEW OF SYSTEMS:  Constitutional: negative, Fever no chills  Eyes: negative  Ears, nose, mouth, throat, and face: negative  Respiratory: negative  Cardiovascular: negative, No chest pain no palpitation no dizziness  Gastrointestinal: negative, See HPI  Genitourinary:negative, Dysuria no hematuria no  Integument/breast: negative  Hematologic/lymphatic: negative  Musculoskeletal:negative  Neurological: negative, Headache no TIA no seizures  Behavioral/Psych: negative  Endocrine: negative, No polyuria no polydipsia no hypoglycemia  Allergic/Immunologic: negative  PHYSICAL EXAM:  General Appearance: alert and oriented to person, place and time and in no acute distress  Skin: warm and dry, no rash or erythema  Head: normocephalic and atraumatic  Eyes: pupils equal, round, and reactive to light, conjunctivae normal and sclera anicteric    Neck: neck supple and non tender without mass   Pulmonary/Chest: clear to auscultation bilaterally- no wheezes, rales or rhonchi, normal air movement, no respiratory distress  Cardiovascular: normal rate, regular rhythm, normal S1 and S2, no gallops, intact distal pulses and no carotid bruits  Abdomen: soft, non-tender, non-distended, normal bowel sounds, no masses or organomegaly  Extremities: no edema and good pulses no Homans  Neurologic: Alert oriented x3 with no focal deficit    Vitals:  /67   Pulse 66   Temp 97.7 °F (36.5 °C) (Temporal)   Resp 16   Ht 5' 4\" (1.626 m)   Wt 186 lb 11.4 oz (84.7 kg)   SpO2 93%   BMI 32.05 kg/m²     LABS:  CBC:   Lab Results   Component Value Date    WBC 10.4 08/13/2020    RBC 4.93 08/13/2020    RBC 4.09 04/14/2012    HGB 13.9 08/13/2020    HCT 44.1 08/13/2020    MCV 89.5 08/13/2020    MCH 28.2 08/13/2020    MCHC 31.5 08/13/2020    RDW 14.7 08/13/2020     08/13/2020     04/14/2012    MPV 9.9 08/13/2020     CMP:    Lab Results   Component Value Date     08/13/2020    K 4.3 08/13/2020     08/13/2020    CO2 18 08/13/2020    BUN 22 08/13/2020    CREATININE 1.72 08/13/2020    GFRAA 36 08/13/2020    LABGLOM 30 08/13/2020    GLUCOSE 97 08/13/2020    GLUCOSE 123 04/14/2012    PROT 7.7 08/13/2020    LABALBU 4.3 08/13/2020    CALCIUM 9.8 08/13/2020    BILITOT 0.22 08/13/2020    ALKPHOS 93 08/13/2020    AST 19 08/13/2020    ALT 13 08/13/2020         ASSESSMENT:    Partial bowel obstruction  Coronary artery disease stable  Acute kidney injury secondary to dehydration    Patient Active Problem List   Diagnosis    Diarrhea    Smoker    Acute [facility] ([facility]    CAD (coronary artery disease)    Abdominal pain    SBO (small bowel obstruction)    Metabolic acidosis    Chronic diarrhea    Urinary tract infection without hematuria    Elevated serum creatinine    Partial small bowel obstruction (HCC)       PLAN:    Keep n.p.o. IV fluids pain control DVT prophylaxis ambulate avoid nephrotoxic drugs see orders neurosurgery consult            Naye Shelby MD  5:59 PM  8/13/2020

## 2020-08-13 NOTE — FLOWSHEET NOTE
08/13/20 1641   Provider Notification   Reason for Communication Evaluate; Review case   Provider Name Dr. Louie Mackenzie   Provider Notification Physician   Method of Communication Face to face   Response At bedside   Notification Time 1640   Dr. Louie Mackenzie arrived at the bedside for evaluation and update via RN. Plan of care discussed involving maintaining NPO status and current NS infusion. Will continue to monitor and update as necessary.

## 2020-08-13 NOTE — PROGRESS NOTES
General Surgery:  Consult Note        PATIENT NAME: Lucy Gutierrez   YOB: 1955    ADMISSION DATE: 8/13/2020 12:02 AM     Admitting Provider: Dr. Wesley Bamberger Physician: Dr. Gladys Gonsalez: 8/13/2020    Chief Complaint:  abd pain  Consult Regarding:  SBO    HISTORY OF PRESENT ILLNESS:  The patient is a 72 y.o. female  who was admitted on 8/12/20 for concerns of a pSBO. Patient has extensive past medical and surgical history. Of note patient is status post total abdominal colectomy with ileorectal anastomosis due to recurrent diverticulitis. Patient has previously also undergone appendectomy and cholecystectomy. Patient has frequent issues with partial small bowel obstructions which require admission to the hospital, IV fluid hydration and sometimes NG tube placement for decompression. Patient states she has about 4 episodes a year. At this time patient denies any nausea, emesis, fever, chills. Patient states that she does still have some abdominal pain and cramping but otherwise has improved. Patient admits to soft bowel movement just yesterday but this is atypical for her as she typically has at least 10 loose stools a day. Patient denies any melena or hematochezia. Patient does have a remote history of C. difficile infection. Patient has used Imodium in the past but is not currently taking any. Patient does not take any other type of bowel regimen. Patient takes 81 mg aspirin, denies any other use of blood thinners. Patient has past medical history of hypertension, hyperlipidemia, MI, GERD, COPD, chronic kidney disease stage III, coronary disease, anxiety.          Past Medical History:        Diagnosis Date    Anxiety     Arthritis     CAD (coronary artery disease)     MI.   9 stents total    Chronic kidney disease     COPD (chronic obstructive pulmonary disease) (HCC)     Depression     Diverticulosis     GERD (gastroesophageal reflux disease)     Heart attack ([facility] 05/2018    Heart palpitations     History of blood transfusion     Hyperlipidemia     Hypertension     PSVT (paroxysmal supraventricular tachycardia) ([facility] [address]        Past Surgical History:        Procedure Laterality Date    ABDOMEN SURGERY      APPENDECTOMY      CARDIAC SURGERY      stents placed    CAROTID STENT PLACEMENT      CHOLECYSTECTOMY      COLECTOMY  2003    COLONOSCOPY  7/30/2003    Dr Ball Purple diverticulosis and signs of diverticulitis    COLONOSCOPY  5/21/2010    stricture at about  35 cm from the anus. Could not advance even gastroscope    COLONOSCOPY  10/17/2014    small bowel and rectum normal    SMALL INTESTINE SURGERY      entire large intestine removed, entire small intestine intact    TUBAL LIGATION      UPPER GASTROINTESTINAL ENDOSCOPY  10/17/2014    normal    UPPER GASTROINTESTINAL ENDOSCOPY  04/25/2019    UPPER GASTROINTESTINAL ENDOSCOPY  04/25/2019    UPPER GASTROINTESTINAL ENDOSCOPY N/A 4/25/2019    EGD ESOPHAGOGASTRODUODENOSCOPY performed by Wes Riggs MD at [address] [place] [address]       Medications Prior to Admission:   Not in a hospital admission. Allergies:  Keflex [cephalexin]; Atenolol; Codeine; Dilaudid [hydromorphone hcl]; Percocet [oxycodone-acetaminophen]; Phenergan [promethazine hcl];  Sodium hypochlorite; Erythromycin; and Prednisone    Social History:   Social History     Socioeconomic History    Marital status:      Spouse name: Not on file    Number of children: Not on file    Years of education: Not on file    Highest education level: Not on file   Occupational History    Not on file   Social Needs    Financial resource strain: Not on file    Food insecurity     Worry: Not on file     Inability: Not on file   [employer] needs     Medical: Not on file     Non-medical: Not on file   Tobacco Use    Smoking status: Current Every Day Smoker     Packs/day: 1.00     Years: 40.00     Pack years: 40.00     Types: Cigarettes    Smokeless tobacco: Never Used   Substance and Sexual Activity    Alcohol use: No    Drug use: No    Sexual activity: Not on file   Lifestyle    Physical activity     Days per week: Not on file     Minutes per session: Not on file    Stress: Not on file   Relationships    Social connections     Talks on phone: Not on file     Gets together: Not on file     Attends Sabianist service: Not on file     Active member of club or organization: Not on file     Attends meetings of clubs or organizations: Not on file     Relationship status: Not on file    Intimate partner violence     Fear of current or ex partner: Not on file     Emotionally abused: Not on file     Physically abused: Not on file     Forced sexual activity: Not on file   Other Topics Concern    Not on file   Social History Narrative    Not on file       Family History:       Problem Relation Age of Onset    Cancer Mother     Heart Disease Father        REVIEW OF SYSTEMS:    CONSTITUTIONAL: Denies recent weight loss, fatigue, fevers, chills. HEENT: Denies rhinorrhea, dysphagia, odynphagia. CARDIOVASCULAR: Denies chest pain. RESPIRATORY: Denies recent history of shortness of breath or history of PE. GASTROINTESTINAL: per hpi  GENITOURINARY: Denies increased frequency or dysuria. HEMATOLOGIC/LYMPHATIC: Denies history of anemia or DVTs. ENDOCRINE: Denies history of thyroid problems or diabetes. NEURO: Denies history of CVA, TIA. Review of systems negative unless listed above. PHYSICAL EXAM:    VITALS:  BP (!) 94/49   Pulse 64   Temp 97.9 °F (36.6 °C)   Resp 18   Ht 5' 4\" (1.626 m)   Wt 185 lb (83.9 kg)   SpO2 96%   BMI 31.76 kg/m²   INTAKE/OUTPUT:   No intake or output data in the 24 hours ending 08/13/20 0420    CONSTITUTIONAL:  awake, alert, not distressed and   HEENT: Normocephalic/atraumatic, without obvious abnormality. NECK:  Supple, symmetrical, trachea midline   CARDIOVASCULAR: Regular rate and rhythm without murmurs.   LUNGS: Clear TECHNIQUE: CT of the abdomen and pelvis was performed with the administration of intravenous contrast. Multiplanar reformatted images are provided for review. Dose modulation, iterative reconstruction, and/or weight based adjustment of the mA/kV was utilized to reduce the radiation dose to as low as reasonably achievable. COMPARISON: None. HISTORY: ORDERING SYSTEM PROVIDED HISTORY: abd pain, hx of sbo TECHNOLOGIST PROVIDED HISTORY: abd pain, hx of sbo Reason for Exam: lower abd pain Acuity: Acute Type of Exam: Initial Additional signs and symptoms: loose stool Relevant Medical/Surgical History: bowel resections, tubal, cholecystectomy, appendectomy FINDINGS: Lower Chest: Coronary artery calcifications. No cardiomegaly, pericardial or pleural effusion. Visualized lung bases are clear. Liver: Normal. Gallbladder and Bile Ducts: Prior cholecystectomy. Intrahepatic and extrahepatic biliary ductal prominence may relate to physiologic nonobstructive dilatation from prior cholecystectomy. Spleen: Normal. Adrenal Glands: Normal. Pancreas: Normal. Genitourinary: Normal. Bowel: Postsurgical changes of prior colectomy. Multiple loops of dilated small bowel measure up to 3.8 cm in diameter and demonstrate air-fluid levels, wall thickening and mild surrounding haziness. Vasculature: Mild atherosclerosis. No abdominal aortic aneurysm. Bones and Soft Tissues: Degenerative osseous changes in the visualized spine and pelvis. Diffusely heterogeneous marrow may relate to osteopenia. Retroperitoneum/Mesentery: No intraperitoneal free air, ascites or fluid collection. No lymphadenopathy in the abdomen or pelvis. Multiple loops of dilated small bowel with air-fluid levels, wall thickening and mild surrounding haziness may relate to enteritis with ileus or partial small bowel obstruction. No intraperitoneal free air or abscess. Postsurgical changes of prior colectomy.      ASSESSMENT:  Active Hospital Problems    Diagnosis Date Noted    Partial small bowel obstruction [facility]) [K56.600] 08/13/2020       77-year-old female with extensive past medical and surgical history, presents with concerns for partial SBO    Plan:  1. Continue medical mgmt and supportive care per primary  2. Repeat KUB tomorrow a.m, limit narcotic use as able  3. Continue n.p.o., serial abdominal exams,  replacement of electrolytes for goal potassium 4.0 or greater, monitor for bowel function  4. Continue IV fluid hydration and monitoring of urine output and electrolytes  5. Encourage ambulation activity, okay for DVT prophylaxis    Patient seen and examined with Dr. Anny Harley    Thank you,    Electronically signed by Maile Busch DO  on 8/13/2020 at 4:20 AM   Attending Physician Statement  I have discussed the case, including pertinent history and exam findings with the resident. I agree with the assessment, plan and orders as documented by the resident. Hold off on NGT insertion for now. Ambulate.   Check abdominal films in am

## 2020-08-14 ENCOUNTER — APPOINTMENT (OUTPATIENT)
Dept: GENERAL RADIOLOGY | Age: 65
DRG: 389 | End: 2020-08-14
Payer: MEDICARE

## 2020-08-14 VITALS
RESPIRATION RATE: 16 BRPM | TEMPERATURE: 97.7 F | OXYGEN SATURATION: 97 % | WEIGHT: 187 LBS | HEART RATE: 61 BPM | SYSTOLIC BLOOD PRESSURE: 112 MMHG | DIASTOLIC BLOOD PRESSURE: 59 MMHG | HEIGHT: 64 IN | BODY MASS INDEX: 31.92 KG/M2

## 2020-08-14 LAB
-: ABNORMAL
ABSOLUTE EOS #: 0.19 K/UL (ref 0–0.44)
ABSOLUTE IMMATURE GRANULOCYTE: 0.03 K/UL (ref 0–0.3)
ABSOLUTE LYMPH #: 1.96 K/UL (ref 1.1–3.7)
ABSOLUTE MONO #: 0.46 K/UL (ref 0.1–1.2)
ALBUMIN SERPL-MCNC: 3.1 G/DL (ref 3.5–5.2)
ALBUMIN/GLOBULIN RATIO: ABNORMAL (ref 1–2.5)
ALP BLD-CCNC: 87 U/L (ref 35–104)
ALT SERPL-CCNC: 20 U/L (ref 5–33)
AMORPHOUS: ABNORMAL
ANION GAP SERPL CALCULATED.3IONS-SCNC: 12 MMOL/L (ref 9–17)
AST SERPL-CCNC: 24 U/L
BACTERIA: ABNORMAL
BASOPHILS # BLD: 1 % (ref 0–2)
BASOPHILS ABSOLUTE: 0.04 K/UL (ref 0–0.2)
BILIRUB SERPL-MCNC: 0.34 MG/DL (ref 0.3–1.2)
BILIRUBIN URINE: NEGATIVE
BUN BLDV-MCNC: 19 MG/DL (ref 8–23)
BUN/CREAT BLD: 15 (ref 9–20)
CALCIUM SERPL-MCNC: 8.1 MG/DL (ref 8.6–10.4)
CASTS UA: ABNORMAL /LPF
CASTS UA: ABNORMAL /LPF
CHLORIDE BLD-SCNC: 110 MMOL/L (ref 98–107)
CO2: 17 MMOL/L (ref 20–31)
COLOR: YELLOW
COMMENT UA: ABNORMAL
CREAT SERPL-MCNC: 1.23 MG/DL (ref 0.5–0.9)
CRYSTALS, UA: ABNORMAL /HPF
DIFFERENTIAL TYPE: ABNORMAL
EOSINOPHILS RELATIVE PERCENT: 3 % (ref 1–4)
EPITHELIAL CELLS UA: ABNORMAL /HPF (ref 0–5)
GFR AFRICAN AMERICAN: 53 ML/MIN
GFR NON-AFRICAN AMERICAN: 44 ML/MIN
GFR SERPL CREATININE-BSD FRML MDRD: ABNORMAL ML/MIN/{1.73_M2}
GFR SERPL CREATININE-BSD FRML MDRD: ABNORMAL ML/MIN/{1.73_M2}
GLUCOSE BLD-MCNC: 82 MG/DL (ref 70–99)
GLUCOSE URINE: NEGATIVE
HCT VFR BLD CALC: 33.3 % (ref 36.3–47.1)
HEMOGLOBIN: 10.4 G/DL (ref 11.9–15.1)
IMMATURE GRANULOCYTES: 0 %
KETONES, URINE: NEGATIVE
LEUKOCYTE ESTERASE, URINE: NEGATIVE
LYMPHOCYTES # BLD: 28 % (ref 24–43)
MCH RBC QN AUTO: 28.9 PG (ref 25.2–33.5)
MCHC RBC AUTO-ENTMCNC: 31.2 G/DL (ref 28.4–34.8)
MCV RBC AUTO: 92.5 FL (ref 82.6–102.9)
MONOCYTES # BLD: 7 % (ref 3–12)
MUCUS: ABNORMAL
NITRITE, URINE: NEGATIVE
NRBC AUTOMATED: 0 PER 100 WBC
OTHER OBSERVATIONS UA: ABNORMAL
PDW BLD-RTO: 15 % (ref 11.8–14.4)
PH UA: 5.5 (ref 5–8)
PLATELET # BLD: 193 K/UL (ref 138–453)
PLATELET ESTIMATE: ABNORMAL
PMV BLD AUTO: 10.3 FL (ref 8.1–13.5)
POTASSIUM SERPL-SCNC: 4.4 MMOL/L (ref 3.7–5.3)
PROTEIN UA: NEGATIVE
RBC # BLD: 3.6 M/UL (ref 3.95–5.11)
RBC # BLD: ABNORMAL 10*6/UL
RBC UA: ABNORMAL /HPF (ref 0–2)
RENAL EPITHELIAL, UA: ABNORMAL /HPF
SEG NEUTROPHILS: 62 % (ref 36–65)
SEGMENTED NEUTROPHILS ABSOLUTE COUNT: 4.38 K/UL (ref 1.5–8.1)
SODIUM BLD-SCNC: 139 MMOL/L (ref 135–144)
SPECIFIC GRAVITY UA: 1.02 (ref 1–1.03)
TOTAL PROTEIN: 5.7 G/DL (ref 6.4–8.3)
TRICHOMONAS: ABNORMAL
TURBIDITY: CLEAR
URINE HGB: ABNORMAL
UROBILINOGEN, URINE: NORMAL
WBC # BLD: 7.1 K/UL (ref 3.5–11.3)
WBC # BLD: ABNORMAL 10*3/UL
WBC UA: ABNORMAL /HPF (ref 0–5)
YEAST: ABNORMAL

## 2020-08-14 PROCEDURE — 2500000003 HC RX 250 WO HCPCS: Performed by: INTERNAL MEDICINE

## 2020-08-14 PROCEDURE — 6360000002 HC RX W HCPCS: Performed by: INTERNAL MEDICINE

## 2020-08-14 PROCEDURE — 85025 COMPLETE CBC W/AUTO DIFF WBC: CPT

## 2020-08-14 PROCEDURE — 74018 RADEX ABDOMEN 1 VIEW: CPT

## 2020-08-14 PROCEDURE — 81001 URINALYSIS AUTO W/SCOPE: CPT

## 2020-08-14 PROCEDURE — 2580000003 HC RX 258: Performed by: INTERNAL MEDICINE

## 2020-08-14 PROCEDURE — 80053 COMPREHEN METABOLIC PANEL: CPT

## 2020-08-14 PROCEDURE — 36415 COLL VENOUS BLD VENIPUNCTURE: CPT

## 2020-08-14 RX ADMIN — SODIUM CHLORIDE: 9 INJECTION, SOLUTION INTRAVENOUS at 10:19

## 2020-08-14 RX ADMIN — FAMOTIDINE 20 MG: 10 INJECTION INTRAVENOUS at 08:12

## 2020-08-14 RX ADMIN — HEPARIN SODIUM 5000 UNITS: 5000 INJECTION INTRAVENOUS; SUBCUTANEOUS at 06:11

## 2020-08-14 ASSESSMENT — PAIN SCALES - GENERAL
PAINLEVEL_OUTOF10: 0
PAINLEVEL_OUTOF10: 0

## 2020-08-14 ASSESSMENT — PAIN DESCRIPTION - PROGRESSION: CLINICAL_PROGRESSION: NOT CHANGED

## 2020-08-14 NOTE — FLOWSHEET NOTE
08/14/20 0615   Provider Notification   Reason for Communication Review case;New orders   Provider Name Dr. Jn Marsh   Provider Notification Physician   Method of Communication Face to face   Response At bedside   Notification Time 0615     Dr maloney at this time, move to clear liquid diet, advance as tolerated. Possible discharge today. RN will continue to monitor patient closely.

## 2020-08-14 NOTE — PROGRESS NOTES
General surgery progress note    PATIENT NAME: Lucy Gutierrez   YOB: 1955     TODAY'S DATE: 8/14/2020     Subjective:  No issues overnight. Denies any f/c, n/v, sob or chest pain. +flatus and BM this AM.  States pain is much reduced and doing well today. PHYSICAL EXAM:    VITALS:  BP (!) 108/55   Pulse 70   Temp 97.3 °F (36.3 °C) (Temporal)   Resp 18   Ht 5' 4\" (1.626 m)   Wt 187 lb (84.8 kg)   SpO2 94%   BMI 32.10 kg/m²   INTAKE/OUTPUT:     Intake/Output Summary (Last 24 hours) at 8/14/2020 0654  Last data filed at 8/14/2020 0400  Gross per 24 hour   Intake --   Output 800 ml   Net -800 ml       CONSTITUTIONAL:  awake, alert, not distressed and   HEENT: Normocephalic/atraumatic, without obvious abnormality. NECK:  Supple, symmetrical, trachea midline   CARDIOVASCULAR: Regular rate and rhythm without murmurs. LUNGS: Clear to auscultation bilaterally without evidence of wheezing or tachypnea. ABDOMEN: Soft, minimally distended, minimal tenderness in lower quadrants, no rebound, non-peritoneal, surgical scars consistent with history  MUSCULOSKELETAL: Muscle strength intact in all extremities bilaterally. NEUROLOGIC: Gross motor intact without focal weakness. SKIN: No cyanosis, rashes, or edema noted. Orientation:   oriented to person, place, and time    ASSESSMENT:  Active Hospital Problems    Diagnosis Date Noted    Partial small bowel obstruction [facility]) [K56.600] 08/13/2020       18-year-old female with extensive past medical and surgical history, presents with concerns for partial SBO    Plan:  1. Continue medical mgmt and supportive care per primary  2. Ok for CLD and coffee this am, advance diet as tolerated from there, replacement of electrolytes for goal potassium 4.0 or greater, monitor for bowel function  3. Continue IV fluid hydration and monitoring of urine output and electrolytes  4. Encourage ambulation activity, okay for DVT prophylaxis  5.  Okay for advancement of diet as tolerated today, if patient advances to a regular diet and is tolerating okay for discharge from surgery stance    Patient seen and examined with Dr. Maxine Marroquin    Thank you,    Electronically signed by Shelly Farrell DO  on 8/14/2020 at 6:54 AM   Attending Physician Statement  I have discussed the case, including pertinent history and exam findings with the resident. I agree with the assessment, plan and orders as documented by the resident. Patient feeling better.   OK to start diet

## 2020-08-14 NOTE — PROGRESS NOTES
Subjective:   Follow-up partial small bowel obstruction  No abdominal pain no nausea no vomiting having bowel movements no blood in the stools no melena no hematemesis tolerating diet      ROS  Fever no chills no GI/ complaints no cardiopulmonary complaints no TIA no bleeding no headache no sore throat no skin lesions no polyuria no polydipsia no hypoglycemia  physical exam  General Appearance: alert and oriented to person, place and time and in no acute distress  Skin: warm and dry, no rash or erythema  Head: normocephalic and atraumatic  Eyes: pupils equal, round, and reactive to light, conjunctivae normal and sclera icteric  Skin mild rash on the arms  Neck: neck supple and non tender without mass   Pulmonary/Chest: clear to auscultation bilaterally- no wheezes, rales or rhonchi, normal air movement, no respiratory distress  Cardiovascular: normal rate, regular rhythm, normal S1 and S2, no gallops, intact distal pulses and no carotid bruits  Abdomen: soft, non-tender, non-distended, normal bowel sounds, no masses or organomegaly  Extremities: no edema and good pulses no Homans sign  Neurologic: Alert oriented x3 with no focal deficit    BP (!) 112/59   Pulse 61   Temp 97.7 °F (36.5 °C) (Temporal)   Resp 16   Ht 5' 4\" (1.626 m)   Wt 187 lb (84.8 kg)   SpO2 97%   BMI 32.10 kg/m²     CBC:   Lab Results   Component Value Date    WBC 7.1 08/14/2020    RBC 3.60 08/14/2020    RBC 4.09 04/14/2012    HGB 10.4 08/14/2020    HCT 33.3 08/14/2020    MCV 92.5 08/14/2020    MCH 28.9 08/14/2020    MCHC 31.2 08/14/2020    RDW 15.0 08/14/2020     08/14/2020     04/14/2012    MPV 10.3 08/14/2020     BMP:    Lab Results   Component Value Date     08/14/2020    K 4.4 08/14/2020     08/14/2020    CO2 17 08/14/2020    BUN 19 08/14/2020    LABALBU 3.1 08/14/2020    CREATININE 1.23 08/14/2020    CALCIUM 8.1 08/14/2020    GFRAA 53 08/14/2020    LABGLOM 44 08/14/2020    GLUCOSE 82 08/14/2020    GLUCOSE 123 04/14/2012        Assessment:  Patient Active Problem List   Diagnosis    Diarrhea    Smoker    [facility]    CAD (coronary artery disease)    Abdominal pain    SBO (small bowel obstruction)    Metabolic acidosis    Chronic diarrhea    Urinary tract infection without hematuria    Elevated serum creatinine    Partial small bowel obstruction (HCC)     Showed small bowel obstruction resolved  Acute kidney injury much better  Mild anemia  Coronary artery disease  Smoker    We will give Medrol Dosepak for resolving poison ivy  Plan:    Labs reviewed home today we will see in the office on Tuesday we will check BMP done avoid nephrotoxic drugs increase water intake smoking cessation advised see orders work-up of anemia as an outpatient      Oliva Staples MD  5:31 PM

## 2020-08-14 NOTE — PROGRESS NOTES
Dr. Jaclyn Briones rounded, OK to discharge if patient tolerates dinner with nausea/vomiting. MD states he will prescribe a medrol dose karen for her poison ivy. See orders.

## 2020-08-14 NOTE — PROGRESS NOTES
Transitions of Care Pharmacy Service   Medication Review    The patient's list of current home medications has been reviewed and updated. Source(s) of information: Patient, Kia (Joanne --- Pill Packs)    Please feel free to call with any questions about this encounter. Thank you. Arie Oliveros [facility]  Transitions of Care Pharmacy Service  Phone:  [phone]  Fax: [phone]      Prior to Admission medications    Medication Sig Start Date End Date Taking?  Authorizing Provider   sodium bicarbonate 650 MG tablet Take 650 mg by mouth 2 times daily   Yes Historical Provider, MD   loperamide (IMODIUM) 2 MG capsule Take 2 mg by mouth as needed for Diarrhea   Yes Historical Provider, MD   lisinopril (PRINIVIL;ZESTRIL) 2.5 MG tablet Take 2.5 mg by mouth nightly   Yes Historical Provider, MD   atorvastatin (LIPITOR) 80 MG tablet Take 80 mg by mouth daily   Yes Historical Provider, MD   sertraline (ZOLOFT) 100 MG tablet Take 100 mg by mouth daily  2/8/17  Yes Historical Provider, MD   ASPIR-LOW 81 MG EC tablet Take 81 mg by mouth daily 11/22/16  Yes Historical Provider, MD   metoprolol succinate (TOPROL XL) 50 MG extended release tablet Take 50 mg by mouth daily  11/22/16  Yes Historical Provider, MD   pantoprazole (PROTONIX) 40 MG tablet Take 40 mg by mouth daily 11/22/16  Yes Historical Provider, MD

## 2020-08-16 NOTE — DISCHARGE SUMMARY
Historical Med      pantoprazole (PROTONIX) 40 MG tablet Take 40 mg by mouth daily           Activity: activity as tolerated  Diet: cardiac diet    Follow-up with PCP in 1 week. Signed:   Ruchi Larry MD  8/16/2020  12:52 PM

## 2020-08-17 ENCOUNTER — HOSPITAL ENCOUNTER (OUTPATIENT)
Dept: ULTRASOUND IMAGING | Age: 65
Discharge: HOME | End: 2020-08-19
Payer: MEDICARE

## 2020-08-17 ENCOUNTER — HOSPITAL ENCOUNTER (OUTPATIENT)
Dept: VASCULAR LAB | Age: 65
Discharge: HOME | End: 2020-08-17
Payer: MEDICARE

## 2020-08-17 PROCEDURE — 93924 LWR XTR VASC STDY BILAT: CPT

## 2020-08-17 PROCEDURE — 76775 US EXAM ABDO BACK WALL LIM: CPT

## 2020-08-17 PROCEDURE — 93923 UPR/LXTR ART STDY 3+ LVLS: CPT

## 2021-01-03 NOTE — PLAN OF CARE
Problem: Falls - Risk of:  Goal: Will remain free from falls  Description  Will remain free from falls  Outcome: Met This Shift  Goal: Absence of physical injury  Description  Absence of physical injury  Outcome: Met This Shift     Problem: Nausea/Vomiting:  Goal: Absence of nausea/vomiting  Description  Absence of nausea/vomiting  Outcome: Met This Shift  Goal: Able to drink  Description  Able to drink  Outcome: Met This Shift  Goal: Able to eat  Description  Able to eat  Outcome: Met This Shift  Goal: Ability to achieve adequate nutritional intake will improve  Description  Ability to achieve adequate nutritional intake will improve  Outcome: Met This Shift room air

## 2022-06-24 ENCOUNTER — APPOINTMENT (OUTPATIENT)
Dept: CT IMAGING | Age: 67
DRG: 389 | End: 2022-06-24
Payer: MEDICARE

## 2022-06-24 ENCOUNTER — HOSPITAL ENCOUNTER (INPATIENT)
Age: 67
LOS: 3 days | Discharge: HOME | DRG: 389 | End: 2022-06-27
Attending: EMERGENCY MEDICINE | Admitting: INTERNAL MEDICINE
Payer: MEDICARE

## 2022-06-24 DIAGNOSIS — R10.30 LOWER ABDOMINAL PAIN: Primary | ICD-10-CM

## 2022-06-24 PROBLEM — R10.9 INTRACTABLE ABDOMINAL PAIN: Status: ACTIVE | Noted: 2022-06-24

## 2022-06-24 LAB
-: ABNORMAL
ABSOLUTE EOS #: 0.23 K/UL (ref 0–0.44)
ABSOLUTE IMMATURE GRANULOCYTE: 0.07 K/UL (ref 0–0.3)
ABSOLUTE LYMPH #: 2.66 K/UL (ref 1.1–3.7)
ABSOLUTE MONO #: 0.54 K/UL (ref 0.1–1.2)
ALBUMIN SERPL-MCNC: 4.4 G/DL (ref 3.5–5.2)
ALP BLD-CCNC: 109 U/L (ref 35–104)
ALT SERPL-CCNC: 11 U/L (ref 5–33)
ANION GAP SERPL CALCULATED.3IONS-SCNC: 16 MMOL/L (ref 9–17)
AST SERPL-CCNC: 29 U/L
BACTERIA: ABNORMAL
BASOPHILS # BLD: 1 % (ref 0–2)
BASOPHILS ABSOLUTE: 0.08 K/UL (ref 0–0.2)
BILIRUB SERPL-MCNC: 0.25 MG/DL (ref 0.3–1.2)
BILIRUBIN DIRECT: 0.09 MG/DL
BILIRUBIN URINE: NEGATIVE
BILIRUBIN, INDIRECT: 0.16 MG/DL (ref 0–1)
BUN BLDV-MCNC: 21 MG/DL (ref 8–23)
BUN/CREAT BLD: 15 (ref 9–20)
CALCIUM SERPL-MCNC: 9.8 MG/DL (ref 8.6–10.4)
CHLORIDE BLD-SCNC: 99 MMOL/L (ref 98–107)
CO2: 18 MMOL/L (ref 20–31)
COLOR: YELLOW
CREAT SERPL-MCNC: 1.42 MG/DL (ref 0.5–0.9)
EOSINOPHILS RELATIVE PERCENT: 2 % (ref 1–4)
EPITHELIAL CELLS UA: ABNORMAL /HPF (ref 0–5)
GFR AFRICAN AMERICAN: 45 ML/MIN
GFR NON-AFRICAN AMERICAN: 37 ML/MIN
GFR SERPL CREATININE-BSD FRML MDRD: ABNORMAL ML/MIN/{1.73_M2}
GLUCOSE BLD-MCNC: 129 MG/DL (ref 70–99)
GLUCOSE URINE: NEGATIVE
HCT VFR BLD CALC: 51.7 % (ref 36.3–47.1)
HEMOGLOBIN: 16.3 G/DL (ref 11.9–15.1)
IMMATURE GRANULOCYTES: 1 %
KETONES, URINE: NEGATIVE
LACTIC ACID, SEPSIS: 1.1 MMOL/L (ref 0.5–1.9)
LACTIC ACID, SEPSIS: 2.4 MMOL/L (ref 0.5–1.9)
LEUKOCYTE ESTERASE, URINE: NEGATIVE
LIPASE: 47 U/L (ref 13–60)
LYMPHOCYTES # BLD: 20 % (ref 24–43)
MCH RBC QN AUTO: 28.2 PG (ref 25.2–33.5)
MCHC RBC AUTO-ENTMCNC: 31.5 G/DL (ref 28.4–34.8)
MCV RBC AUTO: 89.4 FL (ref 82.6–102.9)
MONOCYTES # BLD: 4 % (ref 3–12)
NITRITE, URINE: NEGATIVE
NRBC AUTOMATED: 0 PER 100 WBC
PDW BLD-RTO: 14 % (ref 11.8–14.4)
PH UA: 6 (ref 5–8)
PLATELET # BLD: 341 K/UL (ref 138–453)
PMV BLD AUTO: 9.9 FL (ref 8.1–13.5)
POTASSIUM SERPL-SCNC: 5.2 MMOL/L (ref 3.7–5.3)
PROTEIN UA: ABNORMAL
RBC # BLD: 5.78 M/UL (ref 3.95–5.11)
RBC UA: ABNORMAL /HPF (ref 0–2)
SEG NEUTROPHILS: 72 % (ref 36–65)
SEGMENTED NEUTROPHILS ABSOLUTE COUNT: 10.06 K/UL (ref 1.5–8.1)
SODIUM BLD-SCNC: 133 MMOL/L (ref 135–144)
SPECIFIC GRAVITY UA: 1.03 (ref 1–1.03)
TOTAL PROTEIN: 8 G/DL (ref 6.4–8.3)
TURBIDITY: ABNORMAL
URINE HGB: NEGATIVE
UROBILINOGEN, URINE: NORMAL
WBC # BLD: 13.6 K/UL (ref 3.5–11.3)
WBC UA: ABNORMAL /HPF (ref 0–5)

## 2022-06-24 PROCEDURE — 85025 COMPLETE CBC W/AUTO DIFF WBC: CPT

## 2022-06-24 PROCEDURE — 2580000003 HC RX 258: Performed by: NURSE PRACTITIONER

## 2022-06-24 PROCEDURE — 80076 HEPATIC FUNCTION PANEL: CPT

## 2022-06-24 PROCEDURE — 83605 ASSAY OF LACTIC ACID: CPT

## 2022-06-24 PROCEDURE — 6360000002 HC RX W HCPCS: Performed by: INTERNAL MEDICINE

## 2022-06-24 PROCEDURE — 96374 THER/PROPH/DIAG INJ IV PUSH: CPT

## 2022-06-24 PROCEDURE — 99285 EMERGENCY DEPT VISIT HI MDM: CPT

## 2022-06-24 PROCEDURE — 6360000002 HC RX W HCPCS: Performed by: NURSE PRACTITIONER

## 2022-06-24 PROCEDURE — 6370000000 HC RX 637 (ALT 250 FOR IP): Performed by: NURSE PRACTITIONER

## 2022-06-24 PROCEDURE — 80048 BASIC METABOLIC PNL TOTAL CA: CPT

## 2022-06-24 PROCEDURE — 74176 CT ABD & PELVIS W/O CONTRAST: CPT

## 2022-06-24 PROCEDURE — 96376 TX/PRO/DX INJ SAME DRUG ADON: CPT

## 2022-06-24 PROCEDURE — 1200000000 HC SEMI PRIVATE

## 2022-06-24 PROCEDURE — 96375 TX/PRO/DX INJ NEW DRUG ADDON: CPT

## 2022-06-24 PROCEDURE — 81001 URINALYSIS AUTO W/SCOPE: CPT

## 2022-06-24 PROCEDURE — 83690 ASSAY OF LIPASE: CPT

## 2022-06-24 PROCEDURE — 6370000000 HC RX 637 (ALT 250 FOR IP): Performed by: INTERNAL MEDICINE

## 2022-06-24 RX ORDER — SODIUM BICARBONATE 650 MG/1
650 TABLET ORAL 2 TIMES DAILY
Status: DISCONTINUED | OUTPATIENT
Start: 2022-06-24 | End: 2022-06-27 | Stop reason: HOSPADM

## 2022-06-24 RX ORDER — CELECOXIB 100 MG/1
100 CAPSULE ORAL DAILY
COMMUNITY

## 2022-06-24 RX ORDER — ONDANSETRON 2 MG/ML
4 INJECTION INTRAMUSCULAR; INTRAVENOUS ONCE
Status: COMPLETED | OUTPATIENT
Start: 2022-06-24 | End: 2022-06-24

## 2022-06-24 RX ORDER — ALBUTEROL SULFATE 90 UG/1
2 AEROSOL, METERED RESPIRATORY (INHALATION) 2 TIMES DAILY
Status: DISCONTINUED | OUTPATIENT
Start: 2022-06-24 | End: 2022-06-27 | Stop reason: HOSPADM

## 2022-06-24 RX ORDER — ACETAMINOPHEN 325 MG/1
650 TABLET ORAL EVERY 4 HOURS PRN
Status: DISCONTINUED | OUTPATIENT
Start: 2022-06-24 | End: 2022-06-27 | Stop reason: HOSPADM

## 2022-06-24 RX ORDER — SERTRALINE HYDROCHLORIDE 100 MG/1
100 TABLET, FILM COATED ORAL DAILY
Status: DISCONTINUED | OUTPATIENT
Start: 2022-06-25 | End: 2022-06-27 | Stop reason: HOSPADM

## 2022-06-24 RX ORDER — IPRATROPIUM/ALBUTEROL SULFATE 20-100 MCG
1 MIST INHALER (GRAM) INHALATION 2 TIMES DAILY
COMMUNITY
Start: 2022-06-21

## 2022-06-24 RX ORDER — LISINOPRIL 5 MG/1
5 TABLET ORAL DAILY
Status: DISCONTINUED | OUTPATIENT
Start: 2022-06-25 | End: 2022-06-27 | Stop reason: HOSPADM

## 2022-06-24 RX ORDER — METOPROLOL SUCCINATE 50 MG/1
50 TABLET, EXTENDED RELEASE ORAL DAILY
Status: DISCONTINUED | OUTPATIENT
Start: 2022-06-25 | End: 2022-06-24 | Stop reason: SDUPTHER

## 2022-06-24 RX ORDER — ATORVASTATIN CALCIUM 80 MG/1
80 TABLET, FILM COATED ORAL DAILY
Status: DISCONTINUED | OUTPATIENT
Start: 2022-06-25 | End: 2022-06-27 | Stop reason: HOSPADM

## 2022-06-24 RX ORDER — ASPIRIN 81 MG/1
81 TABLET ORAL DAILY
Status: DISCONTINUED | OUTPATIENT
Start: 2022-06-25 | End: 2022-06-27 | Stop reason: HOSPADM

## 2022-06-24 RX ORDER — 0.9 % SODIUM CHLORIDE 0.9 %
500 INTRAVENOUS SOLUTION INTRAVENOUS ONCE
Status: COMPLETED | OUTPATIENT
Start: 2022-06-24 | End: 2022-06-24

## 2022-06-24 RX ORDER — GABAPENTIN 100 MG/1
100 CAPSULE ORAL 2 TIMES DAILY
COMMUNITY

## 2022-06-24 RX ORDER — METOPROLOL SUCCINATE 25 MG/1
25 TABLET, EXTENDED RELEASE ORAL 2 TIMES DAILY
COMMUNITY

## 2022-06-24 RX ORDER — SODIUM CHLORIDE 9 MG/ML
INJECTION, SOLUTION INTRAVENOUS CONTINUOUS
Status: DISCONTINUED | OUTPATIENT
Start: 2022-06-24 | End: 2022-06-27

## 2022-06-24 RX ORDER — ALBUTEROL SULFATE 90 UG/1
2 AEROSOL, METERED RESPIRATORY (INHALATION) EVERY 4 HOURS PRN
Status: DISCONTINUED | OUTPATIENT
Start: 2022-06-24 | End: 2022-06-27 | Stop reason: HOSPADM

## 2022-06-24 RX ORDER — GABAPENTIN 100 MG/1
100 CAPSULE ORAL 2 TIMES DAILY
Status: DISCONTINUED | OUTPATIENT
Start: 2022-06-24 | End: 2022-06-27 | Stop reason: HOSPADM

## 2022-06-24 RX ORDER — MORPHINE SULFATE 4 MG/ML
4 INJECTION, SOLUTION INTRAMUSCULAR; INTRAVENOUS ONCE
Status: COMPLETED | OUTPATIENT
Start: 2022-06-24 | End: 2022-06-24

## 2022-06-24 RX ORDER — SODIUM CHLORIDE 0.9 % (FLUSH) 0.9 %
5-40 SYRINGE (ML) INJECTION PRN
Status: DISCONTINUED | OUTPATIENT
Start: 2022-06-24 | End: 2022-06-27 | Stop reason: HOSPADM

## 2022-06-24 RX ORDER — SODIUM CHLORIDE 0.9 % (FLUSH) 0.9 %
5-40 SYRINGE (ML) INJECTION EVERY 12 HOURS SCHEDULED
Status: DISCONTINUED | OUTPATIENT
Start: 2022-06-24 | End: 2022-06-27 | Stop reason: HOSPADM

## 2022-06-24 RX ORDER — ICOSAPENT ETHYL 1000 MG/1
2 CAPSULE ORAL 2 TIMES DAILY
COMMUNITY
Start: 2022-06-18

## 2022-06-24 RX ORDER — ONDANSETRON 4 MG/1
4 TABLET, ORALLY DISINTEGRATING ORAL EVERY 8 HOURS PRN
Status: DISCONTINUED | OUTPATIENT
Start: 2022-06-24 | End: 2022-06-27 | Stop reason: HOSPADM

## 2022-06-24 RX ORDER — METOPROLOL SUCCINATE 25 MG/1
25 TABLET, EXTENDED RELEASE ORAL 2 TIMES DAILY
Status: DISCONTINUED | OUTPATIENT
Start: 2022-06-24 | End: 2022-06-27 | Stop reason: HOSPADM

## 2022-06-24 RX ORDER — LISINOPRIL 5 MG/1
5 TABLET ORAL DAILY
COMMUNITY
Start: 2022-05-31

## 2022-06-24 RX ORDER — PANTOPRAZOLE SODIUM 40 MG/1
40 TABLET, DELAYED RELEASE ORAL DAILY
Status: DISCONTINUED | OUTPATIENT
Start: 2022-06-25 | End: 2022-06-27 | Stop reason: HOSPADM

## 2022-06-24 RX ORDER — LISINOPRIL 2.5 MG/1
2.5 TABLET ORAL NIGHTLY
Status: DISCONTINUED | OUTPATIENT
Start: 2022-06-24 | End: 2022-06-24 | Stop reason: SDUPTHER

## 2022-06-24 RX ORDER — SODIUM CHLORIDE 9 MG/ML
INJECTION, SOLUTION INTRAVENOUS PRN
Status: DISCONTINUED | OUTPATIENT
Start: 2022-06-24 | End: 2022-06-27 | Stop reason: HOSPADM

## 2022-06-24 RX ORDER — ONDANSETRON 2 MG/ML
4 INJECTION INTRAMUSCULAR; INTRAVENOUS EVERY 6 HOURS PRN
Status: DISCONTINUED | OUTPATIENT
Start: 2022-06-24 | End: 2022-06-27 | Stop reason: HOSPADM

## 2022-06-24 RX ORDER — IPRATROPIUM BROMIDE AND ALBUTEROL SULFATE 2.5; .5 MG/3ML; MG/3ML
1 SOLUTION RESPIRATORY (INHALATION) 4 TIMES DAILY
Status: DISCONTINUED | OUTPATIENT
Start: 2022-06-24 | End: 2022-06-24

## 2022-06-24 RX ADMIN — SODIUM CHLORIDE: 9 INJECTION, SOLUTION INTRAVENOUS at 22:35

## 2022-06-24 RX ADMIN — MORPHINE SULFATE 4 MG: 4 INJECTION, SOLUTION INTRAMUSCULAR; INTRAVENOUS at 16:28

## 2022-06-24 RX ADMIN — METOPROLOL SUCCINATE 25 MG: 25 TABLET, EXTENDED RELEASE ORAL at 23:01

## 2022-06-24 RX ADMIN — GABAPENTIN 100 MG: 100 CAPSULE ORAL at 23:01

## 2022-06-24 RX ADMIN — ONDANSETRON 4 MG: 2 INJECTION INTRAMUSCULAR; INTRAVENOUS at 23:02

## 2022-06-24 RX ADMIN — MORPHINE SULFATE 4 MG: 4 INJECTION, SOLUTION INTRAMUSCULAR; INTRAVENOUS at 18:10

## 2022-06-24 RX ADMIN — HYDROMORPHONE HYDROCHLORIDE 0.5 MG: 1 INJECTION, SOLUTION INTRAMUSCULAR; INTRAVENOUS; SUBCUTANEOUS at 23:01

## 2022-06-24 RX ADMIN — ONDANSETRON 4 MG: 2 INJECTION INTRAMUSCULAR; INTRAVENOUS at 16:28

## 2022-06-24 RX ADMIN — SODIUM CHLORIDE: 9 INJECTION, SOLUTION INTRAVENOUS at 16:27

## 2022-06-24 RX ADMIN — SODIUM CHLORIDE 500 ML: 9 INJECTION, SOLUTION INTRAVENOUS at 18:10

## 2022-06-24 RX ADMIN — SODIUM BICARBONATE 650 MG: 650 TABLET ORAL at 23:01

## 2022-06-24 ASSESSMENT — ENCOUNTER SYMPTOMS
SHORTNESS OF BREATH: 0
ABDOMINAL PAIN: 1
COUGH: 0
BACK PAIN: 0
VOMITING: 0
SORE THROAT: 0
NAUSEA: 0
CONSTIPATION: 0
COLOR CHANGE: 0

## 2022-06-24 ASSESSMENT — PAIN - FUNCTIONAL ASSESSMENT
PAIN_FUNCTIONAL_ASSESSMENT: 0-10
PAIN_FUNCTIONAL_ASSESSMENT: 0-10

## 2022-06-24 ASSESSMENT — PAIN DESCRIPTION - LOCATION: LOCATION: ABDOMEN

## 2022-06-24 ASSESSMENT — PAIN SCALES - GENERAL
PAINLEVEL_OUTOF10: 6
PAINLEVEL_OUTOF10: 10
PAINLEVEL_OUTOF10: 5
PAINLEVEL_OUTOF10: 10
PAINLEVEL_OUTOF10: 8

## 2022-06-24 ASSESSMENT — LIFESTYLE VARIABLES: HOW OFTEN DO YOU HAVE A DRINK CONTAINING ALCOHOL: NEVER

## 2022-06-24 NOTE — ED PROVIDER NOTES
[facility] ED  eMERGENCY dEPARTMENT eNCOUnter      Pt Name: Shirley Day  MRN: [id number]  [place] 1955  Date of evaluation: 6/24/2022  Provider: LÓPEZ Ames CNP    CHIEF COMPLAINT       Chief Complaint   Patient presents with    Abdominal Pain         HISTORY OF PRESENT ILLNESS  (Location/Symptom, Timing/Onset, Context/Setting, Quality, Duration, Modifying Factors, Severity.)   Shirley Day is a 77 y.o. female who presents to the emergency department via private auto for LLQ pain. Onset was today. Denies fever, chills, N/V, urinary sx. She has a hx of bowel obstructions, adhesions. Reports chronic diarrhea. Rates her pain 6/10 at this time. Nursing Notes were reviewed.     ALLERGIES     Keflex [cephalexin], Atenolol, Codeine, Percocet [oxycodone-acetaminophen], Phenergan [promethazine hcl], Sodium hypochlorite, Erythromycin, and Prednisone    CURRENT MEDICATIONS       Previous Medications    ASPIR-LOW 81 MG EC TABLET    Take 81 mg by mouth daily    ATORVASTATIN (LIPITOR) 80 MG TABLET    Take 80 mg by mouth daily    LISINOPRIL (PRINIVIL;ZESTRIL) 2.5 MG TABLET    Take 2.5 mg by mouth nightly    LOPERAMIDE (IMODIUM) 2 MG CAPSULE    Take 2 mg by mouth as needed for Diarrhea    METOPROLOL SUCCINATE (TOPROL XL) 50 MG EXTENDED RELEASE TABLET    Take 50 mg by mouth daily     PANTOPRAZOLE (PROTONIX) 40 MG TABLET    Take 40 mg by mouth daily    SERTRALINE (ZOLOFT) 100 MG TABLET    Take 100 mg by mouth daily     SODIUM BICARBONATE 650 MG TABLET    Take 650 mg by mouth 2 times daily       PAST MEDICAL HISTORY         Diagnosis Date    Anxiety     Arthritis     CAD (coronary artery disease)     MI.   9 stents total    Chronic kidney disease     COPD (chronic obstructive pulmonary disease) (HCC)     Depression     Diverticulosis     GERD (gastroesophageal reflux disease)     Heart attack ([facility] [address] 05/2018    Heart palpitations     History of blood transfusion     Hyperlipidemia  Hypertension     PSVT (paroxysmal supraventricular tachycardia) ([facility] [address]        SURGICAL HISTORY           Procedure Laterality Date    ABDOMEN SURGERY      APPENDECTOMY      CARDIAC SURGERY      stents placed    CAROTID STENT PLACEMENT      CHOLECYSTECTOMY      COLECTOMY  2003    COLONOSCOPY  7/30/2003    Dr Jm Yap diverticulosis and signs of diverticulitis    COLONOSCOPY  5/21/2010    stricture at about  35 cm from the anus. Could not advance even gastroscope    COLONOSCOPY  10/17/2014    small bowel and rectum normal    SMALL INTESTINE SURGERY      entire large intestine removed, entire small intestine intact    TUBAL LIGATION      UPPER GASTROINTESTINAL ENDOSCOPY  10/17/2014    normal    UPPER GASTROINTESTINAL ENDOSCOPY  04/25/2019    UPPER GASTROINTESTINAL ENDOSCOPY  04/25/2019    UPPER GASTROINTESTINAL ENDOSCOPY N/A 4/25/2019    EGD ESOPHAGOGASTRODUODENOSCOPY performed by Janet Anne MD at [place] [address] HISTORY           Problem Relation Age of Onset    Cancer Mother     Heart Disease Father      Family Status   Relation Name Status    Mother  (Not Specified)    Father  (Not Specified)        SOCIAL HISTORY      reports that she has been smoking cigarettes. She has a 40.00 pack-year smoking history. She has never used smokeless tobacco. She reports that she does not drink alcohol and does not use drugs. REVIEW OF SYSTEMS    (2-9 systems for level 4, 10 or more for level 5)     Review of Systems   Constitutional: Negative for chills, diaphoresis, fatigue and fever. HENT: Negative for congestion and sore throat. Respiratory: Negative for cough and shortness of breath. Cardiovascular: Negative for chest pain. Gastrointestinal: Positive for abdominal pain. Negative for constipation, nausea and vomiting. Genitourinary: Negative for dysuria, flank pain, frequency, hematuria and urgency.    Musculoskeletal: Negative for arthralgias, back pain, myalgias and neck pain. Skin: Negative for color change, rash and wound. Neurological: Negative for dizziness, weakness, light-headedness and headaches. Except as noted above the remainder of the review of systems was reviewed and negative. PHYSICAL EXAM    (up to 7 for level 4, 8 or more for level 5)     ED Triage Vitals [06/24/22 1553]   BP Temp Temp Source Heart Rate Resp SpO2 Height Weight   -- 98 °F (36.7 °C) Oral 96 18 100 % 5' 4\" (1.626 m) 185 lb (83.9 kg)     Physical Exam  Vitals reviewed. Constitutional:       General: She is not in acute distress. Appearance: She is well-developed. She is not diaphoretic. Eyes:      General: No scleral icterus. Conjunctiva/sclera: Conjunctivae normal.   Cardiovascular:      Rate and Rhythm: Normal rate. Pulmonary:      Effort: Pulmonary effort is normal. No respiratory distress. Breath sounds: No stridor. Abdominal:      General: Bowel sounds are normal. There is no distension. Palpations: Abdomen is soft. Tenderness: There is abdominal tenderness in the left lower quadrant. There is no guarding. Musculoskeletal:      Cervical back: Neck supple. Comments: Moves extremities. Skin:     General: Skin is warm and dry. Findings: No rash. Neurological:      Mental Status: She is alert and oriented to person, place, and time.    Psychiatric:         Behavior: Behavior normal.           DIAGNOSTIC RESULTS     RADIOLOGY:   Non-plain film images such as CT, Ultrasound and MRI are read by the radiologist. Plain radiographic images are visualized and preliminarily interpreted by the emergency physician with the below findings:    Interpretation per the Radiologist below, if available at the time of this note:    CT ABDOMEN PELVIS WO CONTRAST Additional Contrast? None    Result Date: 6/24/2022  EXAMINATION: CT OF THE ABDOMEN AND PELVIS WITHOUT CONTRAST 6/24/2022 5:32 pm TECHNIQUE: CT of the abdomen and pelvis was performed without the administration of intravenous contrast. Multiplanar reformatted images are provided for review. Automated exposure control, iterative reconstruction, and/or weight based adjustment of the mA/kV was utilized to reduce the radiation dose to as low as reasonably achievable. COMPARISON: CT abdomen/pelvis dated 13 August 2020 HISTORY: ORDERING SYSTEM PROVIDED HISTORY: low abd pain TECHNOLOGIST PROVIDED HISTORY: low abd pain Decision Support Exception - unselect if not a suspected or confirmed emergency medical condition->Emergency Medical Condition (MA) Reason for Exam: low abd pain FINDINGS: Lower Chest: Lung bases are clear. Organs: Limited evaluation of the abdominal organs due to lack of intravenous contrast.  The liver is normal.  Gallbladder is surgically absent. Spleen and pancreas are normal.  Adrenal glands are normal.  No nephrolithiasis. No hydronephrosis. GI/Bowel: Stomach appears normal.  Postsurgical changes of prior colectomy. There are multiple loops of small bowel that are slightly dilated measuring up to 3 cm in diameter filled with fluid. In the left lower quadrant extending down into the left pelvis there is mesenteric inflammatory change which appears to surround loops of small bowel. Pelvis: The urinary bladder is normal.  Pelvic organs appear normal. Peritoneum/Retroperitoneum: The aorta is normal caliber. No lymphadenopathy. Bones/Soft Tissues: No suspicious osseous lesions     1. Multiple loops of mildly dilated fluid-filled small bowel with surrounding mesenteric inflammatory change. This may be related to enteritis with ileus or partial small bowel obstruction.        LABS:  Labs Reviewed   LACTATE, SEPSIS - Abnormal; Notable for the following components:       Result Value    Lactic Acid, Sepsis 2.4 (*)     All other components within normal limits   BASIC METABOLIC PANEL - Abnormal; Notable for the following components:    Glucose 129 (*)     CREATININE 1.42 (*)     Sodium 133 (*) CO2 18 (*)     GFR Non- 37 (*)     GFR  45 (*)     All other components within normal limits   CBC WITH AUTO DIFFERENTIAL - Abnormal; Notable for the following components:    WBC 13.6 (*)     RBC 5.78 (*)     Hemoglobin 16.3 (*)     Hematocrit 51.7 (*)     Seg Neutrophils 72 (*)     Lymphocytes 20 (*)     Immature Granulocytes 1 (*)     Segs Absolute 10.06 (*)     All other components within normal limits   HEPATIC FUNCTION PANEL - Abnormal; Notable for the following components:    Alkaline Phosphatase 109 (*)     Total Bilirubin 0.25 (*)     All other components within normal limits   LACTATE, SEPSIS   LIPASE   URINALYSIS WITH MICROSCOPIC       All other labs were within normal range or not returned as of this dictation. EMERGENCY DEPARTMENT COURSE and DIFFERENTIAL DIAGNOSIS/MDM:   Vitals:    Vitals:    06/24/22 1553 06/24/22 1905   BP:  101/70   Pulse: 96 95   Resp: 18 16   Temp: 98 °F (36.7 °C)    TempSrc: Oral    SpO2: 100% 96%   Weight: 185 lb (83.9 kg)    Height: 5' 4\" (1.626 m)          MEDICATIONS GIVEN IN THE ED:  Medications   0.9 % sodium chloride infusion ( IntraVENous New Bag 6/24/22 1627)   ondansetron (ZOFRAN) injection 4 mg (4 mg IntraVENous Given 6/24/22 1628)   morphine sulfate (PF) injection 4 mg (4 mg IntraVENous Given 6/24/22 1628)   morphine sulfate (PF) injection 4 mg (4 mg IntraVENous Given 6/24/22 1810)   0.9 % sodium chloride bolus (0 mLs IntraVENous Stopped 6/24/22 1854)       CLINICAL DECISION MAKING:  The patient presented alert with a nontoxic appearance. Imaging was abnormal. She will be admitted for further evaluation and treatment. I spoke with Dr. Jorge Alvarado. Care was provided during an unprecedented national emergency due to the novel coronavirus, Covid-19. CONSULTS:  IP CONSULT TO INTERNAL MEDICINE  IP CONSULT TO GENERAL SURGERY      FINAL IMPRESSION      1.  Lower abdominal pain            Problem List  Patient Active Problem List Diagnosis Code    Diarrhea R19.7    Smoker F17.200    Acute kidney injury ([facility] [address] N17.9    CAD (coronary artery disease) I25.10    Abdominal pain R10.9    SBO (small bowel obstruction) F81.901    Metabolic acidosis K19.7    Chronic diarrhea K52.9    Urinary tract infection without hematuria N39.0    Elevated serum creatinine R79.89    Partial small bowel obstruction ([facility] [address] K56.600         DISPOSITION/PLAN   DISPOSITION Decision To Admit 06/24/2022 06:54:03 PM      PATIENT REFERRED TO:   No follow-up provider specified.     DISCHARGE MEDICATIONS:     New Prescriptions    No medications on file           (Please note that portions of this note were completed with a voice recognition program.  Efforts were made to edit the dictations but occasionally words are mis-transcribed.)    LÓPEZ Fung CNP, APRN - CNP  06/24/22 1771

## 2022-06-24 NOTE — ED PROVIDER NOTES
History:   Diagnosis Date    Anxiety     Arthritis     CAD (coronary artery disease)     MI.   9 stents total    Chronic kidney disease     COPD (chronic obstructive pulmonary disease) (HCC)     Depression     Diverticulosis     GERD (gastroesophageal reflux disease)     Heart attack ([facility] [address] 05/2018    Heart palpitations     History of blood transfusion     Hyperlipidemia     Hypertension     PSVT (paroxysmal supraventricular tachycardia) ([facility] [address]      SURGICAL HISTORY       Past Surgical History:   Procedure Laterality Date    ABDOMEN SURGERY      APPENDECTOMY      CARDIAC SURGERY      stents placed    CAROTID STENT PLACEMENT      CHOLECYSTECTOMY      COLECTOMY  2003    COLONOSCOPY  7/30/2003    Dr [facility] diverticulosis and signs of diverticulitis    COLONOSCOPY  5/21/2010    stricture at about  35 cm from the anus.   Could not advance even gastroscope    COLONOSCOPY  10/17/2014    small bowel and rectum normal    SMALL INTESTINE SURGERY      entire large intestine removed, entire small intestine intact    TUBAL LIGATION      UPPER GASTROINTESTINAL ENDOSCOPY  10/17/2014    normal    UPPER GASTROINTESTINAL ENDOSCOPY  04/25/2019    UPPER GASTROINTESTINAL ENDOSCOPY  04/25/2019    UPPER GASTROINTESTINAL ENDOSCOPY N/A 4/25/2019    EGD ESOPHAGOGASTRODUODENOSCOPY performed by Edilia Mai MD at [facility]       Previous Medications    ASPIR-LOW 81 MG EC TABLET    Take 81 mg by mouth daily    ATORVASTATIN (LIPITOR) 80 MG TABLET    Take 80 mg by mouth daily    LISINOPRIL (PRINIVIL;ZESTRIL) 2.5 MG TABLET    Take 2.5 mg by mouth nightly    LOPERAMIDE (IMODIUM) 2 MG CAPSULE    Take 2 mg by mouth as needed for Diarrhea    METOPROLOL SUCCINATE (TOPROL XL) 50 MG EXTENDED RELEASE TABLET    Take 50 mg by mouth daily     PANTOPRAZOLE (PROTONIX) 40 MG TABLET    Take 40 mg by mouth daily    SERTRALINE (ZOLOFT) 100 MG TABLET    Take 100 mg by mouth daily     SODIUM BICARBONATE 650 MG TABLET    Take 650 mg by mouth 2 times daily     ALLERGIES     is allergic to keflex [cephalexin], atenolol, codeine, percocet [oxycodone-acetaminophen], phenergan [promethazine hcl], sodium hypochlorite, erythromycin, and prednisone. FAMILY HISTORY     She indicated that the status of her mother is unknown. She indicated that the status of her father is unknown. SOCIAL HISTORY       Social History     Tobacco Use    Smoking status: Current Every Day Smoker     Packs/day: 1.00     Years: 40.00     Pack years: 40.00     Types: Cigarettes    Smokeless tobacco: Never Used   Substance Use Topics    Alcohol use: No    Drug use: No          Nasim Calvert MD  The care is provided during an unprecedented national emergency due to the novel coronavirus, COVID 19.   Attending Emergency Physician          Nasim Calvert MD  68/80/15 [id number]

## 2022-06-25 LAB
ABSOLUTE EOS #: 0.27 K/UL (ref 0–0.44)
ABSOLUTE IMMATURE GRANULOCYTE: 0.02 K/UL (ref 0–0.3)
ABSOLUTE LYMPH #: 2.9 K/UL (ref 1.1–3.7)
ABSOLUTE MONO #: 0.47 K/UL (ref 0.1–1.2)
ANION GAP SERPL CALCULATED.3IONS-SCNC: 10 MMOL/L (ref 9–17)
BASOPHILS # BLD: 1 % (ref 0–2)
BASOPHILS ABSOLUTE: 0.05 K/UL (ref 0–0.2)
BUN BLDV-MCNC: 22 MG/DL (ref 8–23)
BUN/CREAT BLD: 17 (ref 9–20)
CALCIUM SERPL-MCNC: 8.3 MG/DL (ref 8.6–10.4)
CHLORIDE BLD-SCNC: 108 MMOL/L (ref 98–107)
CO2: 17 MMOL/L (ref 20–31)
CREAT SERPL-MCNC: 1.32 MG/DL (ref 0.5–0.9)
EOSINOPHILS RELATIVE PERCENT: 3 % (ref 1–4)
GFR AFRICAN AMERICAN: 49 ML/MIN
GFR NON-AFRICAN AMERICAN: 40 ML/MIN
GFR SERPL CREATININE-BSD FRML MDRD: ABNORMAL ML/MIN/{1.73_M2}
GLUCOSE BLD-MCNC: 88 MG/DL (ref 70–99)
HCT VFR BLD CALC: 42.5 % (ref 36.3–47.1)
HEMOGLOBIN: 12.6 G/DL (ref 11.9–15.1)
IMMATURE GRANULOCYTES: 0 %
LYMPHOCYTES # BLD: 35 % (ref 24–43)
MCH RBC QN AUTO: 28.7 PG (ref 25.2–33.5)
MCHC RBC AUTO-ENTMCNC: 29.6 G/DL (ref 28.4–34.8)
MCV RBC AUTO: 96.8 FL (ref 82.6–102.9)
MONOCYTES # BLD: 6 % (ref 3–12)
NRBC AUTOMATED: 0 PER 100 WBC
PDW BLD-RTO: 14.3 % (ref 11.8–14.4)
PLATELET # BLD: 206 K/UL (ref 138–453)
PMV BLD AUTO: 10.2 FL (ref 8.1–13.5)
POTASSIUM SERPL-SCNC: 5.1 MMOL/L (ref 3.7–5.3)
RBC # BLD: 4.39 M/UL (ref 3.95–5.11)
SEG NEUTROPHILS: 55 % (ref 36–65)
SEGMENTED NEUTROPHILS ABSOLUTE COUNT: 4.51 K/UL (ref 1.5–8.1)
SODIUM BLD-SCNC: 135 MMOL/L (ref 135–144)
WBC # BLD: 8.2 K/UL (ref 3.5–11.3)

## 2022-06-25 PROCEDURE — 94640 AIRWAY INHALATION TREATMENT: CPT

## 2022-06-25 PROCEDURE — 6360000002 HC RX W HCPCS: Performed by: INTERNAL MEDICINE

## 2022-06-25 PROCEDURE — 6370000000 HC RX 637 (ALT 250 FOR IP): Performed by: INTERNAL MEDICINE

## 2022-06-25 PROCEDURE — 1200000000 HC SEMI PRIVATE

## 2022-06-25 PROCEDURE — 87324 CLOSTRIDIUM AG IA: CPT

## 2022-06-25 PROCEDURE — 87328 CRYPTOSPORIDIUM AG IA: CPT

## 2022-06-25 PROCEDURE — 6370000000 HC RX 637 (ALT 250 FOR IP): Performed by: NURSE PRACTITIONER

## 2022-06-25 PROCEDURE — 6360000002 HC RX W HCPCS: Performed by: NURSE PRACTITIONER

## 2022-06-25 PROCEDURE — 2580000003 HC RX 258: Performed by: NURSE PRACTITIONER

## 2022-06-25 PROCEDURE — 87449 NOS EACH ORGANISM AG IA: CPT

## 2022-06-25 PROCEDURE — 87329 GIARDIA AG IA: CPT

## 2022-06-25 PROCEDURE — 94761 N-INVAS EAR/PLS OXIMETRY MLT: CPT

## 2022-06-25 PROCEDURE — 80048 BASIC METABOLIC PNL TOTAL CA: CPT

## 2022-06-25 PROCEDURE — 36415 COLL VENOUS BLD VENIPUNCTURE: CPT

## 2022-06-25 PROCEDURE — 85025 COMPLETE CBC W/AUTO DIFF WBC: CPT

## 2022-06-25 RX ORDER — ALBUTEROL SULFATE 90 UG/1
2 AEROSOL, METERED RESPIRATORY (INHALATION) EVERY 6 HOURS PRN
COMMUNITY

## 2022-06-25 RX ADMIN — ASPIRIN 81 MG: 81 TABLET, COATED ORAL at 09:55

## 2022-06-25 RX ADMIN — SODIUM BICARBONATE 650 MG: 650 TABLET ORAL at 09:55

## 2022-06-25 RX ADMIN — HYDROMORPHONE HYDROCHLORIDE 0.5 MG: 1 INJECTION, SOLUTION INTRAMUSCULAR; INTRAVENOUS; SUBCUTANEOUS at 05:01

## 2022-06-25 RX ADMIN — LISINOPRIL 5 MG: 5 TABLET ORAL at 09:55

## 2022-06-25 RX ADMIN — SODIUM BICARBONATE 650 MG: 650 TABLET ORAL at 20:11

## 2022-06-25 RX ADMIN — ATORVASTATIN CALCIUM 80 MG: 80 TABLET, FILM COATED ORAL at 20:11

## 2022-06-25 RX ADMIN — SODIUM CHLORIDE: 9 INJECTION, SOLUTION INTRAVENOUS at 18:11

## 2022-06-25 RX ADMIN — GABAPENTIN 100 MG: 100 CAPSULE ORAL at 20:11

## 2022-06-25 RX ADMIN — METOPROLOL SUCCINATE 25 MG: 25 TABLET, EXTENDED RELEASE ORAL at 09:55

## 2022-06-25 RX ADMIN — METOPROLOL SUCCINATE 25 MG: 25 TABLET, EXTENDED RELEASE ORAL at 20:11

## 2022-06-25 RX ADMIN — SODIUM CHLORIDE: 9 INJECTION, SOLUTION INTRAVENOUS at 09:56

## 2022-06-25 RX ADMIN — ONDANSETRON 4 MG: 2 INJECTION INTRAMUSCULAR; INTRAVENOUS at 05:01

## 2022-06-25 RX ADMIN — SERTRALINE HYDROCHLORIDE 100 MG: 100 TABLET ORAL at 09:54

## 2022-06-25 RX ADMIN — GABAPENTIN 100 MG: 100 CAPSULE ORAL at 09:55

## 2022-06-25 RX ADMIN — Medication 2 PUFF: at 08:39

## 2022-06-25 RX ADMIN — HYDROMORPHONE HYDROCHLORIDE 0.5 MG: 1 INJECTION, SOLUTION INTRAMUSCULAR; INTRAVENOUS; SUBCUTANEOUS at 13:16

## 2022-06-25 RX ADMIN — HYDROMORPHONE HYDROCHLORIDE 0.5 MG: 1 INJECTION, SOLUTION INTRAMUSCULAR; INTRAVENOUS; SUBCUTANEOUS at 20:11

## 2022-06-25 RX ADMIN — Medication 2 PUFF: at 21:11

## 2022-06-25 RX ADMIN — PANTOPRAZOLE SODIUM 40 MG: 40 TABLET, DELAYED RELEASE ORAL at 05:40

## 2022-06-25 ASSESSMENT — PAIN DESCRIPTION - PAIN TYPE: TYPE: ACUTE PAIN

## 2022-06-25 ASSESSMENT — PAIN DESCRIPTION - ONSET: ONSET: ON-GOING

## 2022-06-25 ASSESSMENT — PAIN SCALES - GENERAL
PAINLEVEL_OUTOF10: 9
PAINLEVEL_OUTOF10: 3

## 2022-06-25 ASSESSMENT — PAIN DESCRIPTION - FREQUENCY: FREQUENCY: CONTINUOUS

## 2022-06-25 ASSESSMENT — PAIN DESCRIPTION - LOCATION
LOCATION: ABDOMEN

## 2022-06-25 ASSESSMENT — PAIN - FUNCTIONAL ASSESSMENT
PAIN_FUNCTIONAL_ASSESSMENT: ACTIVITIES ARE NOT PREVENTED
PAIN_FUNCTIONAL_ASSESSMENT: ACTIVITIES ARE NOT PREVENTED

## 2022-06-25 ASSESSMENT — PAIN DESCRIPTION - DESCRIPTORS
DESCRIPTORS: CRAMPING;SORE;ACHING
DESCRIPTORS: ACHING;SORE;SHARP
DESCRIPTORS: STABBING

## 2022-06-25 ASSESSMENT — PAIN DESCRIPTION - ORIENTATION
ORIENTATION: LOWER
ORIENTATION: LOWER
ORIENTATION: LEFT;LOWER

## 2022-06-25 NOTE — H&P
History and Physical/ admit note    CHIEF COMPLAINT: Abdominal pain    History of Present Illness: 63-year-old gentleman with known history of total colectomy comes in with abdominal pain mainly periumbilical but diffuse continuous sharp rated 8/10 no radiation no associated symptoms of nausea vomiting no diaphoresis has been having increased diarrhea watery stools no melena no blood in the stools no foul odor      Past Medical History:   Diagnosis Date    Anxiety     Arthritis     CAD (coronary artery disease)     MI.   9 stents total    Chronic kidney disease     COPD (chronic obstructive pulmonary disease) ([facility] [address]     Depression     Diverticulosis     GERD (gastroesophageal reflux disease)     Heart attack ([facility] [address] 05/2018    Heart palpitations     History of blood transfusion     Hyperlipidemia     Hypertension     PSVT (paroxysmal supraventricular tachycardia) ([facility] [address]          Past Surgical History:   Procedure Laterality Date    ABDOMEN SURGERY      APPENDECTOMY      CARDIAC SURGERY      stents placed    CAROTID STENT PLACEMENT      CHOLECYSTECTOMY      COLECTOMY  2003    COLONOSCOPY  7/30/2003    Dr Gamaliel Carney diverticulosis and signs of diverticulitis    COLONOSCOPY  5/21/2010    stricture at about  35 cm from the anus.   Could not advance even gastroscope    COLONOSCOPY  10/17/2014    small bowel and rectum normal    SMALL INTESTINE SURGERY      entire large intestine removed, entire small intestine intact    TUBAL LIGATION      UPPER GASTROINTESTINAL ENDOSCOPY  10/17/2014    normal    UPPER GASTROINTESTINAL ENDOSCOPY  04/25/2019    UPPER GASTROINTESTINAL ENDOSCOPY  04/25/2019    UPPER GASTROINTESTINAL ENDOSCOPY N/A 4/25/2019    EGD ESOPHAGOGASTRODUODENOSCOPY performed by Jc Douglas MD at [address] [place] [address]       Medications Prior to Admission:    Medications Prior to Admission: albuterol sulfate HFA (VENTOLIN HFA) 108 (90 Base) MCG/ACT inhaler, Inhale 2 puffs into the lungs every 6 hours as needed for Wheezing  metoprolol succinate (TOPROL XL) 25 MG extended release tablet, Take 25 mg by mouth in the morning and at bedtime  gabapentin (NEURONTIN) 100 MG capsule, Take 100 mg by mouth 2 times daily. celecoxib (CELEBREX) 100 MG capsule, Take 100 mg by mouth daily  lisinopril (PRINIVIL;ZESTRIL) 5 MG tablet, Take 5 mg by mouth daily   VASCEPA 1 g CAPS capsule, Take 2 capsules by mouth 2 times daily   COMBIVENT RESPIMAT  MCG/ACT AERS inhaler, Inhale 1 puff into the lungs 2 times daily   sodium bicarbonate 650 MG tablet, Take 650 mg by mouth 2 times daily  [DISCONTINUED] loperamide (IMODIUM) 2 MG capsule, Take 2 mg by mouth as needed for Diarrhea  atorvastatin (LIPITOR) 80 MG tablet, Take 80 mg by mouth daily  sertraline (ZOLOFT) 100 MG tablet, Take 100 mg by mouth daily   ASPIR-LOW 81 MG EC tablet, Take 81 mg by mouth nightly   pantoprazole (PROTONIX) 40 MG tablet, Take 40 mg by mouth daily    Allergies:    Keflex [cephalexin], Atenolol, Codeine, Percocet [oxycodone-acetaminophen], Phenergan [promethazine hcl], Sodium hypochlorite, Erythromycin, and Prednisone    Social History:    reports that she has been smoking cigarettes. She has a 40.00 pack-year smoking history. She has never used smokeless tobacco. She reports that she does not drink alcohol and does not use drugs. Family History:   family history includes Cancer in her mother; Heart Disease in her father.     REVIEW OF SYSTEMS:    Constitutional: negative, No fever no chills  Eyes: negative  Ears, nose, mouth, throat, and face: negative  Respiratory: negative, No cough no shortness of breath no wheeze  Cardiovascular: negative, No chest pain no palpitation no dizziness no syncope  Gastrointestinal: negative, Abdominal pain diarrhea no blood in the stools no melena no nausea vomiting no heartburn no dysphagia no change in weight  Genitourinary:negative, No dysuria hematuria or frequency  Integument/breast: negative  Hematologic/lymphatic: negative  Musculoskeletal:negative  Neurological: negative, No headache no TIA no seizures  Behavioral/Psych: negative  Endocrine: negative, No polyuria no polydipsia no hypoglycemia  Allergic/Immunologic: negative  PHYSICAL EXAM:  General Appearance: in no acute distress and alert  Skin: warm and dry, no rash or erythema  Head: normocephalic and atraumatic  Eyes: extraocular eye movements intact, conjunctivae normal and sclera anicteric    Neck: neck supple and non tender without mass   Pulmonary/Chest: clear to auscultation bilaterally- no wheezes, rales or rhonchi, normal air movement, no respiratory distress  Cardiovascular: normal rate, regular rhythm, normal S1 and S2, no gallops, intact distal pulses and no carotid bruits  Abdomen: soft, non-tender, non-distended, normal bowel sounds, no masses or organomegaly  Extremities: no edema good pulses no Homans' sign  Neurologic: Alert oriented x3 with no focal deficit  Vitals:  BP (!) 105/53   Pulse 62   Temp 97.5 °F (36.4 °C) (Oral)   Resp 17   Ht 5' 4\" (1.626 m)   Wt 197 lb 14.4 oz (89.8 kg)   SpO2 97%   BMI 33.97 kg/m²     LABS:  CBC:   Lab Results   Component Value Date    WBC 8.2 06/25/2022    RBC 4.39 06/25/2022    RBC 4.09 04/14/2012    HGB 12.6 06/25/2022    HCT 42.5 06/25/2022    MCV 96.8 06/25/2022    MCH 28.7 06/25/2022    MCHC 29.6 06/25/2022    RDW 14.3 06/25/2022     06/25/2022     04/14/2012    MPV 10.2 06/25/2022     BMP:    Lab Results   Component Value Date     06/25/2022    K 5.1 06/25/2022     06/25/2022    CO2 17 06/25/2022    BUN 22 06/25/2022    LABALBU 4.4 06/24/2022    CREATININE 1.32 06/25/2022    CALCIUM 8.3 06/25/2022    GFRAA 49 06/25/2022    LABGLOM 40 06/25/2022    GLUCOSE 88 06/25/2022    GLUCOSE 123 04/14/2012         ASSESSMENT:    Abdominal pain  Chronic diarrhea  Partial bowel obstruction versus ileus  Chronic smoker smoking cessation advised COPD  Coronary artery disease with multiple stents  Obesity BMI 33.97  CKD 3B avoid nephrotoxic drugs  None anion gap metabolic acidosis  Patient Active Problem List   Diagnosis    Diarrhea    Smoker    [facility] injury ([facility] [address]    CAD (coronary artery disease)    Abdominal pain    SBO (small bowel obstruction)    Metabolic acidosis    Chronic diarrhea    Urinary tract infection without hematuria    Elevated serum creatinine    Partial small bowel obstruction (HCC)    Intractable abdominal pain       PLAN:    Meds labs reviewed surgical consultation obtained clear liquid diet EPC cuffs DVT prophylaxis IV fluids    See Orders    Marlon Garsia MD MD  1:06 PM  6/25/2022

## 2022-06-25 NOTE — PROGRESS NOTES
Transitions of Care Pharmacy Service   Medication Review    The patient's list of current home medications has been reviewed. Source(s) of information: patient, Eddi refill report    Other Notes Most of her meds are in packs from [address] [place] [address]           Please feel free to call me with any questions about this encounter. Thank you. Dixie Vidal [facility]   Transitions of Care Pharmacy Service  Phone:  [phone]  Fax: [phone]      Electronically signed by Dixie Vidal [facility] on 6/25/2022 at 3:52 PM           Medications Prior to Admission:   albuterol sulfate HFA (VENTOLIN HFA) 108 (90 Base) MCG/ACT inhaler, Inhale 2 puffs into the lungs every 6 hours as needed for Wheezing  metoprolol succinate (TOPROL XL) 25 MG extended release tablet, Take 25 mg by mouth in the morning and at bedtime  gabapentin (NEURONTIN) 100 MG capsule, Take 100 mg by mouth 2 times daily.   celecoxib (CELEBREX) 100 MG capsule, Take 100 mg by mouth daily  lisinopril (PRINIVIL;ZESTRIL) 5 MG tablet, Take 5 mg by mouth daily   VASCEPA 1 g CAPS capsule, Take 2 capsules by mouth 2 times daily   COMBIVENT RESPIMAT  MCG/ACT AERS inhaler, Inhale 1 puff into the lungs 2 times daily   sodium bicarbonate 650 MG tablet, Take 650 mg by mouth 2 times daily  atorvastatin (LIPITOR) 80 MG tablet, Take 80 mg by mouth daily  sertraline (ZOLOFT) 100 MG tablet, Take 100 mg by mouth daily   ASPIR-LOW 81 MG EC tablet, Take 81 mg by mouth nightly   pantoprazole (PROTONIX) 40 MG tablet, Take 40 mg by mouth daily

## 2022-06-25 NOTE — CONSULTS
General Surgery:  Consult Note        PATIENT NAME: Lucy Gutierrez   YOB: 1955    ADMISSION DATE: 6/24/2022  3:56 PM     Admitting Provider: Luis Eduardo Mccarthy Physician: Odalys Diaz DATE: 6/25/2022    Chief Complaint:  Abdominal pain  Consult Regarding:  \"consult\"    HISTORY OF PRESENT ILLNESS:  The patient is a 77 y.o. female  who was admitted on 2/24/2022 complaining of of abdominal pain. Patient is well-known to our surgical service. Patient has history of subtotal colectomy with ileorectal anastomosis after various abdominal surgeries, most recently was 2010, subsequent small bowel obstructions that have resolved with medical management. Patient has chronic, loose stools as a result. However, patient reports she began having abdominal pain yesterday and reports that her bowel movements have been more loose and frequent in the last day. Denies CP, SOB, N/V, C/F. Patient denies eating anything that would cause her to have the symptoms, denies taking any new medications or antibiotics. CT abdomen/pelvis without contrast shows mildly dilated fluid-filled small bowel loops with adjacent fat stranding suggestive of enteritis. This morning, patient reports she feels moderately better since admission, had 3 bowel movements overnight.       Past Medical History:        Diagnosis Date    Anxiety     Arthritis     CAD (coronary artery disease)     MI.   9 stents total    Chronic kidney disease     COPD (chronic obstructive pulmonary disease) (HCC)     Depression     Diverticulosis     GERD (gastroesophageal reflux disease)     Heart attack ([facility] [address] 05/2018    Heart palpitations     History of blood transfusion     Hyperlipidemia     Hypertension     PSVT (paroxysmal supraventricular tachycardia) ([facility] [address]        Past Surgical History:        Procedure Laterality Date    ABDOMEN SURGERY      APPENDECTOMY      CARDIAC SURGERY      stents placed    CAROTID STENT PLACEMENT      CHOLECYSTECTOMY      COLECTOMY  2003    COLONOSCOPY  7/30/2003    Dr Steven Hutchison diverticulosis and signs of diverticulitis    COLONOSCOPY  5/21/2010    stricture at about  35 cm from the anus. Could not advance even gastroscope    COLONOSCOPY  10/17/2014    small bowel and rectum normal    SMALL INTESTINE SURGERY      entire large intestine removed, entire small intestine intact    TUBAL LIGATION      UPPER GASTROINTESTINAL ENDOSCOPY  10/17/2014    normal    UPPER GASTROINTESTINAL ENDOSCOPY  04/25/2019    UPPER GASTROINTESTINAL ENDOSCOPY  04/25/2019    UPPER GASTROINTESTINAL ENDOSCOPY N/A 4/25/2019    EGD ESOPHAGOGASTRODUODENOSCOPY performed by Aroldo Staples MD at [address] [place] [address]       Medications Prior to Admission:   Medications Prior to Admission: metoprolol succinate (TOPROL XL) 25 MG extended release tablet, Take 25 mg by mouth in the morning and at bedtime  gabapentin (NEURONTIN) 100 MG capsule, Take 100 mg by mouth 2 times daily.   celecoxib (CELEBREX) 100 MG capsule, Take 100 mg by mouth daily  lisinopril (PRINIVIL;ZESTRIL) 5 MG tablet,   VASCEPA 1 g CAPS capsule,   COMBIVENT RESPIMAT  MCG/ACT AERS inhaler,   sodium bicarbonate 650 MG tablet, Take 650 mg by mouth 2 times daily  loperamide (IMODIUM) 2 MG capsule, Take 2 mg by mouth as needed for Diarrhea  atorvastatin (LIPITOR) 80 MG tablet, Take 80 mg by mouth daily  sertraline (ZOLOFT) 100 MG tablet, Take 100 mg by mouth daily   ASPIR-LOW 81 MG EC tablet, Take 81 mg by mouth nightly   pantoprazole (PROTONIX) 40 MG tablet, Take 40 mg by mouth daily    Allergies:  Keflex [cephalexin], Atenolol, Codeine, Percocet [oxycodone-acetaminophen], Phenergan [promethazine hcl], Sodium hypochlorite, Erythromycin, and Prednisone    Social History:   Social History     Socioeconomic History    Marital status:      Spouse name: Not on file    Number of children: Not on file    Years of education: Not on file    Highest education level: Not on file   Occupational History    Not on file   Tobacco Use    Smoking status: Current Every Day Smoker     Packs/day: 1.00     Years: 40.00     Pack years: 40.00     Types: Cigarettes    Smokeless tobacco: Never Used   Vaping Use    Vaping Use: Never used   Substance and Sexual Activity    Alcohol use: No    Drug use: No    Sexual activity: Not on file   Other Topics Concern    Not on file   Social History Narrative    Not on file     Social Determinants of Health     Financial Resource Strain:     Difficulty of Paying Living Expenses: Not on file   Food Insecurity:     Worried About Running Out of Food in the Last Year: Not on file    Gino of Food in the Last Year: Not on file   Transportation Needs:     Lack of Transportation (Medical): Not on file    Lack of Transportation (Non-Medical): Not on file   Physical Activity:     Days of Exercise per Week: Not on file    Minutes of Exercise per Session: Not on file   Stress:     Feeling of Stress : Not on file   Social Connections:     Frequency of Communication with Friends and Family: Not on file    Frequency of Social Gatherings with Friends and Family: Not on file    Attends Yazdanism Services: Not on file    Active Member of [address] [place] [address] or Organizations: Not on file    Attends Club or Organization Meetings: Not on file    Marital Status: Not on file   Intimate Partner Violence:     Fear of Current or Ex-Partner: Not on file    Emotionally Abused: Not on file    Physically Abused: Not on file    Sexually Abused: Not on file   Housing Stability:     Unable to Pay for Housing in the Last Year: Not on file    Number of [place] in the Last Year: Not on file    Unstable Housing in the Last Year: Not on file       Family History:       Problem Relation Age of Onset    Cancer Mother     Heart Disease Father        REVIEW OF SYSTEMS:    CONSTITUTIONAL: Denies recent weight loss, fatigue, fevers, chills.   HEENT: Denies rhinorrhea, dysphagia, EOSABS 0.23 06/24/2022    BASOSABS 0.08 06/24/2022    DIFFTYPE NOT REPORTED 08/14/2020     CMP:    Lab Results   Component Value Date     06/24/2022    K 5.2 06/24/2022    CL 99 06/24/2022    CO2 18 06/24/2022    BUN 21 06/24/2022    CREATININE 1.42 06/24/2022    GFRAA 45 06/24/2022    LABGLOM 37 06/24/2022    GLUCOSE 129 06/24/2022    GLUCOSE 123 04/14/2012    PROT 8.0 06/24/2022    LABALBU 4.4 06/24/2022    CALCIUM 9.8 06/24/2022    BILITOT 0.25 06/24/2022    ALKPHOS 109 06/24/2022    AST 29 06/24/2022    ALT 11 06/24/2022     BMP:    Lab Results   Component Value Date     06/24/2022    K 5.2 06/24/2022    CL 99 06/24/2022    CO2 18 06/24/2022    BUN 21 06/24/2022    LABALBU 4.4 06/24/2022    CREATININE 1.42 06/24/2022    CALCIUM 9.8 06/24/2022    GFRAA 45 06/24/2022    LABGLOM 37 06/24/2022    GLUCOSE 129 06/24/2022    GLUCOSE 123 04/14/2012       Pertinent Radiology:   CT ABDOMEN PELVIS WO CONTRAST Additional Contrast? None    Result Date: 6/24/2022  EXAMINATION: CT OF THE ABDOMEN AND PELVIS WITHOUT CONTRAST 6/24/2022 5:32 pm TECHNIQUE: CT of the abdomen and pelvis was performed without the administration of intravenous contrast. Multiplanar reformatted images are provided for review. Automated exposure control, iterative reconstruction, and/or weight based adjustment of the mA/kV was utilized to reduce the radiation dose to as low as reasonably achievable. COMPARISON: CT abdomen/pelvis dated 13 August 2020 HISTORY: ORDERING SYSTEM PROVIDED HISTORY: low abd pain TECHNOLOGIST PROVIDED HISTORY: low abd pain Decision Support Exception - unselect if not a suspected or confirmed emergency medical condition->Emergency Medical Condition (MA) Reason for Exam: low abd pain FINDINGS: Lower Chest: Lung bases are clear. Organs: Limited evaluation of the abdominal organs due to lack of intravenous contrast.  The liver is normal.  Gallbladder is surgically absent.   Spleen and pancreas are normal.  Adrenal glands are normal.  No nephrolithiasis. No hydronephrosis. GI/Bowel: Stomach appears normal.  Postsurgical changes of prior colectomy. There are multiple loops of small bowel that are slightly dilated measuring up to 3 cm in diameter filled with fluid. In the left lower quadrant extending down into the left pelvis there is mesenteric inflammatory change which appears to surround loops of small bowel. Pelvis: The urinary bladder is normal.  Pelvic organs appear normal. Peritoneum/Retroperitoneum: The aorta is normal caliber. No lymphadenopathy. Bones/Soft Tissues: No suspicious osseous lesions     1. Multiple loops of mildly dilated fluid-filled small bowel with surrounding mesenteric inflammatory change. This may be related to enteritis with ileus or partial small bowel obstruction. ASSESSMENT:  Active Hospital Problems    Diagnosis Date Noted    Intractable abdominal pain [R10.9] 06/24/2022     Priority: Medium       1. 66F with history of multiple SBO's s/p couple abdominal surgeries, with chronic diarrhea, who presents with abdominal pain and looser stools. Plan:  1. Reviewed the imaging in person, and examined the patient. Discussed the patient, history, physical, labs, imaging with the on-call attending. 2. Clinical picture suggestive of an enteritis like picture. Patient does not appear to be distracted given that she has no nausea or emesis, and is having multiple bowel movements. 3. Recommend sending stool sample for ova and parasites, C. Difficile  4. Diet: Okay for diet from general surgery perspective, recommend CLD  5. Okay for anticoagulation  6. Remain on maintenanceIV fluids  7. Does not need NG tube at this time. If patient begins to have nausea or emesis, will reconsider. 8. Monitor I&Os  9. With a.m. labs. Yesterday, labs suggestive of dehydration and concentrated status. 10. No surgical intervention indicated. We will continue to follow  11.  Continue medical management per primary        Electronically signed by Cricket Aguilar DO  on 6/25/2022 at 5:44 AM   Attending Physician Statement  I have discussed the case, including pertinent history and exam findings with the resident. I agree with the assessment, plan and orders as documented by the resident.     IV hydration, bowel rest. Check stool for C Diff and infection

## 2022-06-25 NOTE — FLOWSHEET NOTE
[facility]  PROGRESS NOTE    Room # 2007/2007-02   Name: Seun Dixon              Reason for visit: Routine    I visited the patient. Admit Date & Time: 6/24/2022  3:56 PM    Assessment:  Seun Dixon is a 77 y.o. female. Upon entering the room patient states about their medical condition, states struggles with their medical situation. States worries, fears frustrations. Patient states well , treated well. Patient states good family support, shares about spiritual life, Holiness background, shares Holiness beliefs. Patient shares about outside interests. Intervention:   provided a ministry presence, listening and prayer. Outcome:  Patient open to visit. Plan:  Chaplains will remain available to offer spiritual and emotional support as needed. Electronically signed by Katia Infnirmal. Chaplain Jorge, on 6/25/2022 at 1:50 PM.  Gonzalez       06/25/22 1348   Encounter Summary   Service Provided For: Patient   Referral/Consult From: [facility]   Support System Unknown   Last Encounter  06/25/22   Complexity of Encounter Moderate   Begin Time 1101   End Time  1105   Total Time Calculated 4 min   Encounter    Type Initial Screen/Assessment   Assessment/Intervention/Outcome   Assessment Passive   Intervention Discussed belief system/Holiness practices/balaji;Discussed illness injury and its impact;Prayer (assurance of)/[place];Sustaining Presence/Ministry of presence   Outcome Receptive; Expressed Gratitude

## 2022-06-25 NOTE — PROGRESS NOTES
Pt admitted to room 2007 from [address] to room and call light/tv controls. Bed in lowest position, wheels locked, 2/4 side rails up  Call light in reach, room free of clutter, adequate lighting provided.

## 2022-06-25 NOTE — PLAN OF CARE
Problem: Discharge Planning  Goal: Discharge to home or other facility with appropriate resources  Outcome: Progressing  Flowsheets (Taken 6/25/2022 0955)  Discharge to home or other facility with appropriate resources:   Identify barriers to discharge with patient and caregiver   Arrange for needed discharge resources and transportation as appropriate   Identify discharge learning needs (meds, wound care, etc)   Refer to discharge planning if patient needs post-hospital services based on physician order or complex needs related to functional status, cognitive ability or social support system     Problem: Pain  Goal: Verbalizes/displays adequate comfort level or baseline comfort level  Outcome: Progressing  Flowsheets (Taken 6/25/2022 1316)  Verbalizes/displays adequate comfort level or baseline comfort level:   Encourage patient to monitor pain and request assistance   Assess pain using appropriate pain scale   Administer analgesics based on type and severity of pain and evaluate response   Implement non-pharmacological measures as appropriate and evaluate response   Notify Licensed Independent Practitioner if interventions unsuccessful or patient reports new pain     Problem: Safety - Adult  Goal: Free from fall injury  Outcome: Progressing  Flowsheets (Taken 6/24/2022 7588 by Priyanka Henriquez RN)  Free From Fall Injury: Instruct family/caregiver on patient safety     Problem: Infection - Adult  Goal: Absence of infection at discharge  Outcome: Progressing  Flowsheets (Taken 6/25/2022 0955)  Absence of infection at discharge:   Assess and monitor for signs and symptoms of infection   Monitor lab/diagnostic results   Administer medications as ordered     Problem: Infection - Adult  Goal: Absence of infection during hospitalization  Outcome: Progressing  Flowsheets (Taken 6/25/2022 0955)  Absence of infection during hospitalization:   Assess and monitor for signs and symptoms of infection   Monitor lab/diagnostic results   Administer medications as ordered     Problem: Infection - Adult  Goal: Absence of fever/infection during anticipated neutropenic period  Outcome: Progressing  Flowsheets (Taken 6/25/2022 0955)  Absence of fever/infection during anticipated neutropenic period:   Monitor white blood cell count   Administer growth factors as ordered     Problem: Musculoskeletal - Adult  Goal: Return mobility to safest level of function  Outcome: Progressing  Flowsheets (Taken 6/25/2022 0955)  Return Mobility to Safest Level of Function: Assess patient stability and activity tolerance for standing, transferring and ambulating with or without assistive devices     Problem: Gastrointestinal - Adult  Goal: Minimal or absence of nausea and vomiting  Outcome: Progressing  Flowsheets (Taken 6/25/2022 0955)  Minimal or absence of nausea and vomiting:   Administer IV fluids as ordered to ensure adequate hydration   Maintain NPO status until nausea and vomiting are resolved   Administer ordered antiemetic medications as needed   Provide nonpharmacologic comfort measures as appropriate   Advance diet as tolerated, if ordered     Problem: Gastrointestinal - Adult  Goal: Maintains or returns to baseline bowel function  Outcome: Progressing  Flowsheets (Taken 6/25/2022 0955)  Maintains or returns to baseline bowel function:   Assess bowel function   Encourage oral fluids to ensure adequate hydration   Administer IV fluids as ordered to ensure adequate hydration   Administer ordered medications as needed   Encourage mobilization and activity     Problem: Gastrointestinal - Adult  Goal: Maintains adequate nutritional intake  Outcome: Progressing  Flowsheets (Taken 6/25/2022 0955)  Maintains adequate nutritional intake: Monitor intake and output, weight and lab values     Problem: Gastrointestinal - Adult  Goal: Establish and maintain optimal ostomy function  Outcome: Progressing  Flowsheets (Taken 6/25/2022 0955)  Establish and maintain optimal ostomy function: Administer IV fluids and TPN as ordered

## 2022-06-25 NOTE — RT PROTOCOL NOTE
RT Inhaler-Nebulizer Bronchodilator Protocol Note    There is a bronchodilator order in the chart from a provider indicating to follow the RT Bronchodilator Protocol and there is an Initiate RT Inhaler-Nebulizer Bronchodilator Protocol order as well (see protocol at bottom of note). CXR Findings:  No results found. The findings from the last RT Protocol Assessment were as follows:   History Pulmonary Disease: Chronic pulmonary disease  Respiratory Pattern: Regular pattern and RR 12-20 bpm  Breath Sounds: Slightly diminished and/or crackles  Cough: Strong, spontaneous, non-productive  Indication for Bronchodilator Therapy: On home bronchodilators  Bronchodilator Assessment Score: 4    Aerosolized bronchodilator medication orders have been revised according to the RT Inhaler-Nebulizer Bronchodilator Protocol below. Respiratory Therapist to perform RT Therapy Protocol Assessment initially then follow the protocol. Repeat RT Therapy Protocol Assessment PRN for score 0-3 or on second treatment, BID, and PRN for scores above 3. No Indications  adjust the frequency to every 6 hours PRN wheezing or bronchospasm, if no treatments needed after 48 hours then discontinue using Per Protocol order mode. If indication present, adjust the RT bronchodilator orders based on the Bronchodilator Assessment Score as indicated below. Use Inhaler orders unless patient has one or more of the following: on home nebulizer, not able to hold breath for 10 seconds, is not alert and oriented, cannot activate and use MDI correctly, or respiratory rate 25 breaths per minute or more, then use the equivalent nebulizer order(s) with same Frequency and PRN reasons based on the score. If a patient is on this medication at home then do not decrease Frequency below that used at home.     0-3  enter or revise RT bronchodilator order(s) to equivalent RT Bronchodilator order with Frequency of every 4 hours PRN for wheezing or increased work of breathing using Per Protocol order mode. 4-6  enter or revise RT Bronchodilator order(s) to two equivalent RT bronchodilator orders with one order with BID Frequency and one order with Frequency of every 4 hours PRN wheezing or increased work of breathing using Per Protocol order mode. 7-10  enter or revise RT Bronchodilator order(s) to two equivalent RT bronchodilator orders with one order with TID Frequency and one order with Frequency of every 4 hours PRN wheezing or increased work of breathing using Per Protocol order mode. 11-13  enter or revise RT Bronchodilator order(s) to one equivalent RT bronchodilator order with QID Frequency and an Albuterol order with Frequency of every 4 hours PRN wheezing or increased work of breathing using Per Protocol order mode. Greater than 13  enter or revise RT Bronchodilator order(s) to one equivalent RT bronchodilator order with every 4 hours Frequency and an Albuterol order with Frequency of every 2 hours PRN wheezing or increased work of breathing using Per Protocol order mode. RT to enter RT Home Evaluation for COPD & MDI Assessment order using Per Protocol order mode.     Electronically signed by Aj Reeves RCP on 6/24/2022 at 11:10 PM

## 2022-06-25 NOTE — RT PROTOCOL NOTE
RT Inhaler-Nebulizer Bronchodilator Protocol Note    There is a bronchodilator order in the chart from a provider indicating to follow the RT Bronchodilator Protocol and there is an Initiate RT Inhaler-Nebulizer Bronchodilator Protocol order as well (see protocol at bottom of note). CXR Findings:  No results found. The findings from the last RT Protocol Assessment were as follows:   History Pulmonary Disease: Chronic pulmonary disease  Respiratory Pattern: Regular pattern and RR 12-20 bpm  Breath Sounds: Slightly diminished and/or crackles  Cough: Strong, spontaneous, non-productive  Indication for Bronchodilator Therapy: On home bronchodilators  Bronchodilator Assessment Score: 4    Aerosolized bronchodilator medication orders have been revised according to the RT Inhaler-Nebulizer Bronchodilator Protocol below. Respiratory Therapist to perform RT Therapy Protocol Assessment initially then follow the protocol. Repeat RT Therapy Protocol Assessment PRN for score 0-3 or on second treatment, BID, and PRN for scores above 3. No Indications  adjust the frequency to every 6 hours PRN wheezing or bronchospasm, if no treatments needed after 48 hours then discontinue using Per Protocol order mode. If indication present, adjust the RT bronchodilator orders based on the Bronchodilator Assessment Score as indicated below. Use Inhaler orders unless patient has one or more of the following: on home nebulizer, not able to hold breath for 10 seconds, is not alert and oriented, cannot activate and use MDI correctly, or respiratory rate 25 breaths per minute or more, then use the equivalent nebulizer order(s) with same Frequency and PRN reasons based on the score. If a patient is on this medication at home then do not decrease Frequency below that used at home.     0-3  enter or revise RT bronchodilator order(s) to equivalent RT Bronchodilator order with Frequency of every 4 hours PRN for wheezing or increased work of breathing using Per Protocol order mode. 4-6  enter or revise RT Bronchodilator order(s) to two equivalent RT bronchodilator orders with one order with BID Frequency and one order with Frequency of every 4 hours PRN wheezing or increased work of breathing using Per Protocol order mode. 7-10  enter or revise RT Bronchodilator order(s) to two equivalent RT bronchodilator orders with one order with TID Frequency and one order with Frequency of every 4 hours PRN wheezing or increased work of breathing using Per Protocol order mode. 11-13  enter or revise RT Bronchodilator order(s) to one equivalent RT bronchodilator order with QID Frequency and an Albuterol order with Frequency of every 4 hours PRN wheezing or increased work of breathing using Per Protocol order mode. Greater than 13  enter or revise RT Bronchodilator order(s) to one equivalent RT bronchodilator order with every 4 hours Frequency and an Albuterol order with Frequency of every 2 hours PRN wheezing or increased work of breathing using Per Protocol order mode. RT to enter RT Home Evaluation for COPD & MDI Assessment order using Per Protocol order mode.     Electronically signed by Erin Sanches RCP on 6/25/2022 at 8:45 AM

## 2022-06-25 NOTE — PLAN OF CARE
Pain level assessment complete. Patient educated on pain scale and control interventions  PRN pain medication given per patient request  Patient instructed to call out with new onset of pain or unrelieved pain    Siderails up x 2  Hourly rounding  Call light in reach  Instructed to call for assist before attempting out of bed.   Remains free from falls and accidental injury at this time   Floor free from obstacles  Bed is locked and in lowest position  Adequate lighting provided

## 2022-06-25 NOTE — ED NOTES
ED to inpatient nurses report     Chief Complaint   Patient presents with    Abdominal Pain      Present to ED from home for LLQ pain. Pt states she has had bowel blockages in the past and feels she has another one. LOC: alert and orientated to name, place, date  Vital signs   Vitals:    06/24/22 1553 06/24/22 1905   BP:  101/70   Pulse: 96 95   Resp: 18 16   Temp: 98 °F (36.7 °C)    TempSrc: Oral    SpO2: 100% 96%   Weight: 185 lb (83.9 kg)    Height: 5' 4\" (1.626 m)       Oxygen Baseline RA    Current needs required RA   LDAs:   Peripheral IV 06/24/22 Right Forearm (Active)   Site Assessment Clean, dry & intact 06/24/22 1617   Line Status Blood return noted; Flushed 06/24/22 1617   Dressing Status New dressing applied 06/24/22 1617     Mobility: Independent  Fall Risk:    Pending ED orders: None  Present condition: Stable  Code Status: Full  Consults: IP CONSULT TO INTERNAL MEDICINE  IP CONSULT TO GENERAL SURGERY  [x]  Hospitalist  Completed  [x] yes [] no Who:   []  Medicine  Completed  [] yes [] No Who:   []  Cardiology  Completed  [] yes [] No Who:   []  GI   Completed  [] yes [] No Who:   []  Neurology  Completed  [] yes [] No Who:   []  Nephrology Completed  [] yes [] No Who:    []  Vascular  Completed  [] yes [] No Who:   []  Ortho  Completed  [] yes [] No Who:     []  Surgery  Completed  [] yes [] No Who:    []  Urology  Completed  [] yes [] No Who:    []  CT Surgery Completed  [] yes [] No Who:   []  Podiatry  Completed  [] yes [] No Who:    []  Other    Completed  [] yes [] No Who:  Interventions: IV, Labs, CT  Important Events: CT shows possible enteritis or small bowel obstruction. Pt is being admitted for abdominal exams.         Electronically signed by Tamika Ayala RN on 6/24/2022 at 9:27 PM     Tamika Ayala RN  06/24/22 5252

## 2022-06-26 LAB
ABSOLUTE EOS #: 0.21 K/UL (ref 0–0.44)
ABSOLUTE IMMATURE GRANULOCYTE: 0.01 K/UL (ref 0–0.3)
ABSOLUTE LYMPH #: 2.3 K/UL (ref 1.1–3.7)
ABSOLUTE MONO #: 0.37 K/UL (ref 0.1–1.2)
ANION GAP SERPL CALCULATED.3IONS-SCNC: 9 MMOL/L (ref 9–17)
BASOPHILS # BLD: 1 % (ref 0–2)
BASOPHILS ABSOLUTE: 0.03 K/UL (ref 0–0.2)
BUN BLDV-MCNC: 16 MG/DL (ref 8–23)
BUN/CREAT BLD: 13 (ref 9–20)
C DIFF AG + TOXIN: NEGATIVE
CALCIUM SERPL-MCNC: 8.3 MG/DL (ref 8.6–10.4)
CHLORIDE BLD-SCNC: 108 MMOL/L (ref 98–107)
CO2: 20 MMOL/L (ref 20–31)
CREAT SERPL-MCNC: 1.21 MG/DL (ref 0.5–0.9)
EOSINOPHILS RELATIVE PERCENT: 4 % (ref 1–4)
GFR AFRICAN AMERICAN: 54 ML/MIN
GFR NON-AFRICAN AMERICAN: 45 ML/MIN
GFR SERPL CREATININE-BSD FRML MDRD: ABNORMAL ML/MIN/{1.73_M2}
GLUCOSE BLD-MCNC: 83 MG/DL (ref 70–99)
HCT VFR BLD CALC: 37.2 % (ref 36.3–47.1)
HEMOGLOBIN: 11.2 G/DL (ref 11.9–15.1)
IMMATURE GRANULOCYTES: 0 %
LYMPHOCYTES # BLD: 38 % (ref 24–43)
MCH RBC QN AUTO: 28.4 PG (ref 25.2–33.5)
MCHC RBC AUTO-ENTMCNC: 30.1 G/DL (ref 28.4–34.8)
MCV RBC AUTO: 94.4 FL (ref 82.6–102.9)
MONOCYTES # BLD: 6 % (ref 3–12)
NRBC AUTOMATED: 0 PER 100 WBC
PDW BLD-RTO: 14 % (ref 11.8–14.4)
PLATELET # BLD: 184 K/UL (ref 138–453)
PMV BLD AUTO: 10.5 FL (ref 8.1–13.5)
POTASSIUM SERPL-SCNC: 4.7 MMOL/L (ref 3.7–5.3)
RBC # BLD: 3.94 M/UL (ref 3.95–5.11)
SEG NEUTROPHILS: 51 % (ref 36–65)
SEGMENTED NEUTROPHILS ABSOLUTE COUNT: 3.14 K/UL (ref 1.5–8.1)
SODIUM BLD-SCNC: 137 MMOL/L (ref 135–144)
SPECIMEN DESCRIPTION: NORMAL
WBC # BLD: 6.1 K/UL (ref 3.5–11.3)

## 2022-06-26 PROCEDURE — 2580000003 HC RX 258: Performed by: NURSE PRACTITIONER

## 2022-06-26 PROCEDURE — 1200000000 HC SEMI PRIVATE

## 2022-06-26 PROCEDURE — 6360000002 HC RX W HCPCS: Performed by: INTERNAL MEDICINE

## 2022-06-26 PROCEDURE — 80048 BASIC METABOLIC PNL TOTAL CA: CPT

## 2022-06-26 PROCEDURE — 94761 N-INVAS EAR/PLS OXIMETRY MLT: CPT

## 2022-06-26 PROCEDURE — 94640 AIRWAY INHALATION TREATMENT: CPT

## 2022-06-26 PROCEDURE — 36415 COLL VENOUS BLD VENIPUNCTURE: CPT

## 2022-06-26 PROCEDURE — 6370000000 HC RX 637 (ALT 250 FOR IP): Performed by: INTERNAL MEDICINE

## 2022-06-26 PROCEDURE — 85025 COMPLETE CBC W/AUTO DIFF WBC: CPT

## 2022-06-26 PROCEDURE — 6370000000 HC RX 637 (ALT 250 FOR IP): Performed by: NURSE PRACTITIONER

## 2022-06-26 RX ORDER — HEPARIN SODIUM 5000 [USP'U]/ML
5000 INJECTION, SOLUTION INTRAVENOUS; SUBCUTANEOUS 2 TIMES DAILY
Status: DISCONTINUED | OUTPATIENT
Start: 2022-06-26 | End: 2022-06-27 | Stop reason: HOSPADM

## 2022-06-26 RX ADMIN — GABAPENTIN 100 MG: 100 CAPSULE ORAL at 22:12

## 2022-06-26 RX ADMIN — LISINOPRIL 5 MG: 5 TABLET ORAL at 09:32

## 2022-06-26 RX ADMIN — SODIUM CHLORIDE: 9 INJECTION, SOLUTION INTRAVENOUS at 15:58

## 2022-06-26 RX ADMIN — Medication 2 PUFF: at 08:15

## 2022-06-26 RX ADMIN — HEPARIN SODIUM 5000 UNITS: 5000 INJECTION INTRAVENOUS; SUBCUTANEOUS at 22:12

## 2022-06-26 RX ADMIN — PANTOPRAZOLE SODIUM 40 MG: 40 TABLET, DELAYED RELEASE ORAL at 06:04

## 2022-06-26 RX ADMIN — METOPROLOL SUCCINATE 25 MG: 25 TABLET, EXTENDED RELEASE ORAL at 22:12

## 2022-06-26 RX ADMIN — METOPROLOL SUCCINATE 25 MG: 25 TABLET, EXTENDED RELEASE ORAL at 09:32

## 2022-06-26 RX ADMIN — Medication 2 PUFF: at 21:02

## 2022-06-26 RX ADMIN — HYDROMORPHONE HYDROCHLORIDE 0.5 MG: 1 INJECTION, SOLUTION INTRAMUSCULAR; INTRAVENOUS; SUBCUTANEOUS at 22:09

## 2022-06-26 RX ADMIN — HYDROMORPHONE HYDROCHLORIDE 0.5 MG: 1 INJECTION, SOLUTION INTRAMUSCULAR; INTRAVENOUS; SUBCUTANEOUS at 09:43

## 2022-06-26 RX ADMIN — ASPIRIN 81 MG: 81 TABLET, COATED ORAL at 09:32

## 2022-06-26 RX ADMIN — HYDROMORPHONE HYDROCHLORIDE 0.5 MG: 1 INJECTION, SOLUTION INTRAMUSCULAR; INTRAVENOUS; SUBCUTANEOUS at 02:03

## 2022-06-26 RX ADMIN — SODIUM BICARBONATE 650 MG: 650 TABLET ORAL at 22:12

## 2022-06-26 RX ADMIN — SERTRALINE HYDROCHLORIDE 100 MG: 100 TABLET ORAL at 09:32

## 2022-06-26 RX ADMIN — SODIUM CHLORIDE: 9 INJECTION, SOLUTION INTRAVENOUS at 04:49

## 2022-06-26 RX ADMIN — ATORVASTATIN CALCIUM 80 MG: 80 TABLET, FILM COATED ORAL at 22:12

## 2022-06-26 RX ADMIN — SODIUM BICARBONATE 650 MG: 650 TABLET ORAL at 09:32

## 2022-06-26 RX ADMIN — GABAPENTIN 100 MG: 100 CAPSULE ORAL at 09:33

## 2022-06-26 ASSESSMENT — PAIN SCALES - GENERAL
PAINLEVEL_OUTOF10: 8
PAINLEVEL_OUTOF10: 8
PAINLEVEL_OUTOF10: 9
PAINLEVEL_OUTOF10: 3

## 2022-06-26 ASSESSMENT — PAIN DESCRIPTION - ORIENTATION
ORIENTATION: LEFT
ORIENTATION: LOWER
ORIENTATION: LOWER

## 2022-06-26 ASSESSMENT — PAIN DESCRIPTION - PAIN TYPE
TYPE: ACUTE PAIN
TYPE: ACUTE PAIN

## 2022-06-26 ASSESSMENT — PAIN DESCRIPTION - LOCATION
LOCATION: ABDOMEN

## 2022-06-26 ASSESSMENT — PAIN DESCRIPTION - DESCRIPTORS
DESCRIPTORS: SORE;CRAMPING
DESCRIPTORS: ACHING;STABBING;SORE
DESCRIPTORS: SHARP

## 2022-06-26 ASSESSMENT — PAIN DESCRIPTION - FREQUENCY
FREQUENCY: CONTINUOUS
FREQUENCY: CONTINUOUS

## 2022-06-26 ASSESSMENT — PAIN - FUNCTIONAL ASSESSMENT
PAIN_FUNCTIONAL_ASSESSMENT: ACTIVITIES ARE NOT PREVENTED
PAIN_FUNCTIONAL_ASSESSMENT: PREVENTS OR INTERFERES SOME ACTIVE ACTIVITIES AND ADLS

## 2022-06-26 ASSESSMENT — PAIN DESCRIPTION - ONSET: ONSET: ON-GOING

## 2022-06-26 NOTE — PROGRESS NOTES
Subjective:  Abdominal pain    No more abdominal pain no nausea no vomiting less diarrhea no blood in the stools no melena tolerating clears  ROS  No fever no chills no  or cardiopulmonary complaints at present, no TIA no bleeding no headache no sore throat no skin lesions no polyuria no polydipsia no hypoglycemia physical exam  General Appearance: in no acute distress and alert  Skin: warm and dry, no rash or erythema  Head: normocephalic and atraumatic  Eyes: conjunctivae normal and sclera anicteric    Neck: neck supple and non tender without mass   Pulmonary/Chest: clear to auscultation bilaterally- no wheezes, rales or rhonchi, normal air movement, no respiratory distress  Cardiovascular: normal rate, normal S1 and S2, no gallops, intact distal pulses and no carotid bruits  Abdomen: soft, non-tender, non-distended, normal bowel sounds, no masses or organomegaly  Extremities: no edema and good pulses no Homans' sign  Neurologic: Alert oriented x3 with no focal deficit    BP (!) 105/55   Pulse 62   Temp 98.1 °F (36.7 °C) (Oral)   Resp 16   Ht 5' 4\" (1.626 m)   Wt 197 lb 14.4 oz (89.8 kg)   SpO2 97%   BMI 33.97 kg/m²     CBC:   Lab Results   Component Value Date    WBC 6.1 06/26/2022    RBC 3.94 06/26/2022    RBC 4.09 04/14/2012    HGB 11.2 06/26/2022    HCT 37.2 06/26/2022    MCV 94.4 06/26/2022    MCH 28.4 06/26/2022    MCHC 30.1 06/26/2022    RDW 14.0 06/26/2022     06/26/2022     04/14/2012    MPV 10.5 06/26/2022     BMP:    Lab Results   Component Value Date     06/26/2022    K 4.7 06/26/2022     06/26/2022    CO2 20 06/26/2022    BUN 16 06/26/2022    LABALBU 4.4 06/24/2022    CREATININE 1.21 06/26/2022    CALCIUM 8.3 06/26/2022    GFRAA 54 06/26/2022    LABGLOM 45 06/26/2022    GLUCOSE 83 06/26/2022    GLUCOSE 123 04/14/2012        Assessment:  Patient Active Problem List   Diagnosis    Diarrhea    Smoker    Acute kidney injury ([facility] [address]    CAD (coronary artery disease)    Abdominal pain    SBO (small bowel obstruction)    Metabolic acidosis    Chronic diarrhea    Urinary tract infection without hematuria    Elevated serum creatinine    Partial small bowel obstruction (HCC)    Intractable abdominal pain     Abdominal pain resolved  Chronic diarrhea better  Partial small bowel obstruction/ileus  Chronic smoker  Coronary artery disease with multiple stents  CKD 3B  None anion gap metabolic acidosis  Obesity BMI of 33.97  Plan:    Meds labs reviewed continue with DVT prophylaxis continue with IV fluids advance diet if tolerating hopefully discharge in 24 hours smoking cessation advised ambulate see orders, avoid nephrotoxic drugs    Marlon Garsia MD MD  1:35 PM

## 2022-06-26 NOTE — PROGRESS NOTES
General Surgery:  Progress Note        PATIENT NAME: Lucy Gutierrez   YOB: 1955    ADMISSION DATE: 6/24/2022  3:56 PM      TODAY'S DATE: 6/26/2022    Chief Complaint:  Abdominal pain      Subjective: The patient was seen and examined at bedside, no overnight events. Patient reports her nausea is resolved. Her abdominal pain is significantly improved, rates it 3/10. Also, her watery bowel movements have become less frequent. Ambulating without difficulty. Voiding. Tolerating CLD.  VSS, afebrile. REVIEW OF SYSTEMS:    CONSTITUTIONAL: Denies recent weight loss, fatigue, fevers, chills. HEENT: Denies rhinorrhea, dysphagia, odynphagia. CARDIOVASCULAR: Denies history of MI, recent chest pain. RESPIRATORY: Denies recent history of shortness of breath or history of PE. GASTROINTESTINAL: Admits to diarrhea and abdominal pain. Denies nausea or emesis  GENITOURINARY: Denies increased frequency or dysuria. HEMATOLOGIC/LYMPHATIC: Denies history of anemia or DVTs. ENDOCRINE: Denies history of thyroid problems or diabetes. NEURO: Denies history of CVA, TIA. Review of systems negative unless listed above. PHYSICAL EXAM:    VITALS:  /60   Pulse 63   Temp 98.1 °F (36.7 °C)   Resp 18   Ht 5' 4\" (1.626 m)   Wt 197 lb 14.4 oz (89.8 kg)   SpO2 96%   BMI 33.97 kg/m²   INTAKE/OUTPUT:     Intake/Output Summary (Last 24 hours) at 6/26/2022 0550  Last data filed at 6/26/2022 0439  Gross per 24 hour   Intake 5023.53 ml   Output 2350 ml   Net 2673.53 ml       CONSTITUTIONAL:  awake, alert, not distressed and morbidly obese  HEENT: Normocephalic/atraumatic, without obvious abnormality. NECK:  Supple, symmetrical, trachea midline   CARDIOVASCULAR: Regular rate and rhythm  LUNGS: Unlabored breathing on RA  ABDOMEN: Soft, mild distention, minimal TTP throughout. No rebound/rigidity/peritoneal signs. MUSCULOSKELETAL: Muscle strength intact in all extremities bilaterally.   NEUROLOGIC: CN II- XII intact. Gross motor intact without focal weakness. SKIN: No cyanosis, rashes, or edema noted. Orientation:   oriented to person, place, and time      CBC with Differential:    Lab Results   Component Value Date    WBC 8.2 06/25/2022    RBC 4.39 06/25/2022    RBC 4.09 04/14/2012    HGB 12.6 06/25/2022    HCT 42.5 06/25/2022     06/25/2022     04/14/2012    MCV 96.8 06/25/2022    MCH 28.7 06/25/2022    MCHC 29.6 06/25/2022    RDW 14.3 06/25/2022    LYMPHOPCT 35 06/25/2022    MONOPCT 6 06/25/2022    BASOPCT 1 06/25/2022    MONOSABS 0.47 06/25/2022    LYMPHSABS 2.90 06/25/2022    EOSABS 0.27 06/25/2022    BASOSABS 0.05 06/25/2022    DIFFTYPE NOT REPORTED 08/14/2020     CMP:    Lab Results   Component Value Date     06/25/2022    K 5.1 06/25/2022     06/25/2022    CO2 17 06/25/2022    BUN 22 06/25/2022    CREATININE 1.32 06/25/2022    GFRAA 49 06/25/2022    LABGLOM 40 06/25/2022    GLUCOSE 88 06/25/2022    GLUCOSE 123 04/14/2012    PROT 8.0 06/24/2022    LABALBU 4.4 06/24/2022    CALCIUM 8.3 06/25/2022    BILITOT 0.25 06/24/2022    ALKPHOS 109 06/24/2022    AST 29 06/24/2022    ALT 11 06/24/2022     BMP:    Lab Results   Component Value Date     06/25/2022    K 5.1 06/25/2022     06/25/2022    CO2 17 06/25/2022    BUN 22 06/25/2022    LABALBU 4.4 06/24/2022    CREATININE 1.32 06/25/2022    CALCIUM 8.3 06/25/2022    GFRAA 49 06/25/2022    LABGLOM 40 06/25/2022    GLUCOSE 88 06/25/2022    GLUCOSE 123 04/14/2012       Pertinent Radiology:   CT ABDOMEN PELVIS WO CONTRAST Additional Contrast? None    Result Date: 6/24/2022  EXAMINATION: CT OF THE ABDOMEN AND PELVIS WITHOUT CONTRAST 6/24/2022 5:32 pm TECHNIQUE: CT of the abdomen and pelvis was performed without the administration of intravenous contrast. Multiplanar reformatted images are provided for review.  Automated exposure control, iterative reconstruction, and/or weight based adjustment of the mA/kV was utilized to reduce the radiation dose to as low as reasonably achievable. COMPARISON: CT abdomen/pelvis dated 13 August 2020 HISTORY: ORDERING SYSTEM PROVIDED HISTORY: low abd pain TECHNOLOGIST PROVIDED HISTORY: low abd pain Decision Support Exception - unselect if not a suspected or confirmed emergency medical condition->Emergency Medical Condition (MA) Reason for Exam: low abd pain FINDINGS: Lower Chest: Lung bases are clear. Organs: Limited evaluation of the abdominal organs due to lack of intravenous contrast.  The liver is normal.  Gallbladder is surgically absent. Spleen and pancreas are normal.  Adrenal glands are normal.  No nephrolithiasis. No hydronephrosis. GI/Bowel: Stomach appears normal.  Postsurgical changes of prior colectomy. There are multiple loops of small bowel that are slightly dilated measuring up to 3 cm in diameter filled with fluid. In the left lower quadrant extending down into the left pelvis there is mesenteric inflammatory change which appears to surround loops of small bowel. Pelvis: The urinary bladder is normal.  Pelvic organs appear normal. Peritoneum/Retroperitoneum: The aorta is normal caliber. No lymphadenopathy. Bones/Soft Tissues: No suspicious osseous lesions     1. Multiple loops of mildly dilated fluid-filled small bowel with surrounding mesenteric inflammatory change. This may be related to enteritis with ileus or partial small bowel obstruction. ASSESSMENT:  Active Hospital Problems    Diagnosis Date Noted    Intractable abdominal pain [R10.9] 06/24/2022     Priority: Medium       1. 66F with history of multiple SBO's s/p couple abdominal surgeries, with chronic diarrhea, who presents with abdominal pain and looser stools. Plan:  1. Patient seen and examined this morning, clinically improved  2. Loose/watery bowel movements have improved  3. Pending stool studies  4. Diet: FL D. Advance diet as tolerated  5. Okay for anticoagulation  6. Maintenance IV fluids per primary.   RADHA improving  7. Monitor I&Os  8. No surgical intervention indicated. General surgery to sign off at this time. Call with any questions. 9. Continue medical management per primary      Electronically signed by Risa Andre DO  on 6/26/2022 at 5:50 AM   This note is created with the assistance of a speech recognition program.  While intending to generate a document that actually reflects the content of the visit, the document can still have some errors including those of syntax and sound a like substitutions which may escape proof reading. In such instances, actual meaning can be extrapolated by contextual diversion.

## 2022-06-26 NOTE — CARE COORDINATION
CASE MANAGEMENT NOTE:    Admission Date:  6/24/2022 Judd Gilbert is a 77 y.o.  female    Admitted for : Lower abdominal pain [R10.30]  Intractable abdominal pain [R10.9]    Met with:  Patient    PCP:  Ella Carey MD                                Insurance:  [employer]      Is patient alert and oriented at time of discussion:  Yes    Current Residence/ Living Arrangements:  independently at home             Current Services PTA:  No    Does patient go to outpatient dialysis: No  If yes, location and chair time: n/a    Is patient agreeable to VNS: No    Freedom of choice provided:  Yes    List of [address] provided: No    VNS chosen:  No    DME:  none    Home Oxygen: No    Nebulizer: No    CPAP/BIPAP: No    Supplier: N/A    Potential Assistance Needed: No    SNF needed: No    Freedom of choice and list provided: Yes    Pharmacy:  [facility]       Is patient currently receiving oral anticoagulation therapy? NA    Is the Patient an [facility] with Readmission Risk Score greater than 14%? No  If yes, pt needs a follow up appointment made within 7 days. Family Members/Caregivers that pt would like involved in their care:    Yes    If yes, list name here:  2 kids oln face sheet    Transportation Provider:  Family             Discharge Plan:  The Plan for Transition of Care is related to the following treatment goals: return home with no services is the current plan per pt. Has 2 kids in the area who will help if pt has needs per pt. Has 2 story home but only uses first floor. The Patient and/or patient representative patient was provided with a choice of provider and agrees   with the discharge plan. [x] Yes [] No    Freedom of choice list was provided with basic dialogue that supports the patient's individualized plan of care/goals, treatment preferences and shares the quality data associated with the providers.  [x] Yes [] No                 Electronically signed by: Estephania Abrams GUILLERMO Snyder, JORGEW on 6/26/2022 at 3:59 PM

## 2022-06-26 NOTE — PLAN OF CARE
Problem: Discharge Planning  Goal: Discharge to home or other facility with appropriate resources  Outcome: Progressing  Flowsheets (Taken 6/26/2022 0934)  Discharge to home or other facility with appropriate resources:   Identify barriers to discharge with patient and caregiver   Arrange for needed discharge resources and transportation as appropriate   Identify discharge learning needs (meds, wound care, etc)   Refer to discharge planning if patient needs post-hospital services based on physician order or complex needs related to functional status, cognitive ability or social support system     Problem: Pain  Goal: Verbalizes/displays adequate comfort level or baseline comfort level  Outcome: Progressing  Flowsheets (Taken 6/25/2022 1316)  Verbalizes/displays adequate comfort level or baseline comfort level:   Encourage patient to monitor pain and request assistance   Assess pain using appropriate pain scale   Administer analgesics based on type and severity of pain and evaluate response   Implement non-pharmacological measures as appropriate and evaluate response   Notify Licensed Independent Practitioner if interventions unsuccessful or patient reports new pain     Problem: Safety - Adult  Goal: Free from fall injury  Outcome: Progressing  Flowsheets (Taken 6/24/2022 2348 by Geoffrey Sanchez RN)  Free From Fall Injury: Instruct family/caregiver on patient safety     Problem: Infection - Adult  Goal: Absence of infection at discharge  Outcome: Progressing  Flowsheets (Taken 6/26/2022 0934)  Absence of infection at discharge:   Assess and monitor for signs and symptoms of infection   Monitor lab/diagnostic results   Administer medications as ordered   Identify and instruct in appropriate isolation precautions for identified infection/condition     Problem: Infection - Adult  Goal: Absence of infection during hospitalization  Outcome: Progressing  Flowsheets (Taken 6/26/2022 0934)  Absence of infection during hospitalization:   Assess and monitor for signs and symptoms of infection   Monitor lab/diagnostic results   Administer medications as ordered   Instruct and encourage patient and family to use good hand hygiene technique   Identify and instruct in appropriate isolation precautions for identified infection/condition     Problem: Musculoskeletal - Adult  Goal: Return mobility to safest level of function  Outcome: Progressing  Flowsheets (Taken 6/26/2022 0934)  Return Mobility to Safest Level of Function: Assess patient stability and activity tolerance for standing, transferring and ambulating with or without assistive devices     Problem: Gastrointestinal - Adult  Goal: Minimal or absence of nausea and vomiting  Outcome: Progressing  Flowsheets (Taken 6/26/2022 0934)  Minimal or absence of nausea and vomiting:   Administer IV fluids as ordered to ensure adequate hydration   Advance diet as tolerated, if ordered     Problem: Gastrointestinal - Adult  Goal: Maintains or returns to baseline bowel function  Outcome: Progressing  Flowsheets (Taken 6/26/2022 0934)  Maintains or returns to baseline bowel function:   Assess bowel function   Encourage oral fluids to ensure adequate hydration   Administer IV fluids as ordered to ensure adequate hydration   Administer ordered medications as needed   Encourage mobilization and activity     Problem: Gastrointestinal - Adult  Goal: Maintains adequate nutritional intake  Outcome: Progressing  Flowsheets (Taken 6/26/2022 0934)  Maintains adequate nutritional intake:   Monitor percentage of each meal consumed   Identify factors contributing to decreased intake, treat as appropriate   Monitor intake and output, weight and lab values     Problem: ABCDS Injury Assessment  Goal: Absence of physical injury  Outcome: Progressing

## 2022-06-26 NOTE — RT PROTOCOL NOTE
RT Inhaler-Nebulizer Bronchodilator Protocol Note    There is a bronchodilator order in the chart from a provider indicating to follow the RT Bronchodilator Protocol and there is an Initiate RT Inhaler-Nebulizer Bronchodilator Protocol order as well (see protocol at bottom of note). CXR Findings:  No results found. The findings from the last RT Protocol Assessment were as follows:   History Pulmonary Disease: Chronic pulmonary disease  Respiratory Pattern: Dyspnea on exertion or RR 21-25 bpm  Breath Sounds: Clear breath sounds  Cough: Strong, spontaneous, non-productive  Indication for Bronchodilator Therapy: On home bronchodilators  Bronchodilator Assessment Score: 4    Aerosolized bronchodilator medication orders have been revised according to the RT Inhaler-Nebulizer Bronchodilator Protocol below. Respiratory Therapist to perform RT Therapy Protocol Assessment initially then follow the protocol. Repeat RT Therapy Protocol Assessment PRN for score 0-3 or on second treatment, BID, and PRN for scores above 3. No Indications  adjust the frequency to every 6 hours PRN wheezing or bronchospasm, if no treatments needed after 48 hours then discontinue using Per Protocol order mode. If indication present, adjust the RT bronchodilator orders based on the Bronchodilator Assessment Score as indicated below. Use Inhaler orders unless patient has one or more of the following: on home nebulizer, not able to hold breath for 10 seconds, is not alert and oriented, cannot activate and use MDI correctly, or respiratory rate 25 breaths per minute or more, then use the equivalent nebulizer order(s) with same Frequency and PRN reasons based on the score. If a patient is on this medication at home then do not decrease Frequency below that used at home.     0-3  enter or revise RT bronchodilator order(s) to equivalent RT Bronchodilator order with Frequency of every 4 hours PRN for wheezing or increased work of breathing using Per Protocol order mode. 4-6  enter or revise RT Bronchodilator order(s) to two equivalent RT bronchodilator orders with one order with BID Frequency and one order with Frequency of every 4 hours PRN wheezing or increased work of breathing using Per Protocol order mode. 7-10  enter or revise RT Bronchodilator order(s) to two equivalent RT bronchodilator orders with one order with TID Frequency and one order with Frequency of every 4 hours PRN wheezing or increased work of breathing using Per Protocol order mode. 11-13  enter or revise RT Bronchodilator order(s) to one equivalent RT bronchodilator order with QID Frequency and an Albuterol order with Frequency of every 4 hours PRN wheezing or increased work of breathing using Per Protocol order mode. Greater than 13  enter or revise RT Bronchodilator order(s) to one equivalent RT bronchodilator order with every 4 hours Frequency and an Albuterol order with Frequency of every 2 hours PRN wheezing or increased work of breathing using Per Protocol order mode. RT to enter RT Home Evaluation for COPD & MDI Assessment order using Per Protocol order mode.     Electronically signed by Zaki Mcfadden RCP on 6/25/2022 at 9:21 PM

## 2022-06-26 NOTE — RT PROTOCOL NOTE
breathing using Per Protocol order mode. 4-6  enter or revise RT Bronchodilator order(s) to two equivalent RT bronchodilator orders with one order with BID Frequency and one order with Frequency of every 4 hours PRN wheezing or increased work of breathing using Per Protocol order mode. 7-10  enter or revise RT Bronchodilator order(s) to two equivalent RT bronchodilator orders with one order with TID Frequency and one order with Frequency of every 4 hours PRN wheezing or increased work of breathing using Per Protocol order mode. 11-13  enter or revise RT Bronchodilator order(s) to one equivalent RT bronchodilator order with QID Frequency and an Albuterol order with Frequency of every 4 hours PRN wheezing or increased work of breathing using Per Protocol order mode. Greater than 13  enter or revise RT Bronchodilator order(s) to one equivalent RT bronchodilator order with every 4 hours Frequency and an Albuterol order with Frequency of every 2 hours PRN wheezing or increased work of breathing using Per Protocol order mode. RT to enter RT Home Evaluation for COPD & MDI Assessment order using Per Protocol order mode.     Electronically signed by Birdie Gonzalez RCP on 6/26/2022 at 8:50 AM

## 2022-06-27 VITALS
HEIGHT: 64 IN | TEMPERATURE: 97.9 F | OXYGEN SATURATION: 100 % | WEIGHT: 197.9 LBS | BODY MASS INDEX: 33.79 KG/M2 | HEART RATE: 58 BPM | DIASTOLIC BLOOD PRESSURE: 66 MMHG | SYSTOLIC BLOOD PRESSURE: 148 MMHG | RESPIRATION RATE: 18 BRPM

## 2022-06-27 LAB
ABSOLUTE EOS #: 0.24 K/UL (ref 0–0.44)
ABSOLUTE IMMATURE GRANULOCYTE: 0.01 K/UL (ref 0–0.3)
ABSOLUTE LYMPH #: 2.25 K/UL (ref 1.1–3.7)
ABSOLUTE MONO #: 0.28 K/UL (ref 0.1–1.2)
ANION GAP SERPL CALCULATED.3IONS-SCNC: 11 MMOL/L (ref 9–17)
BASOPHILS # BLD: 1 % (ref 0–2)
BASOPHILS ABSOLUTE: 0.05 K/UL (ref 0–0.2)
BUN BLDV-MCNC: 9 MG/DL (ref 8–23)
BUN/CREAT BLD: 9 (ref 9–20)
CALCIUM SERPL-MCNC: 8.3 MG/DL (ref 8.6–10.4)
CHLORIDE BLD-SCNC: 113 MMOL/L (ref 98–107)
CO2: 18 MMOL/L (ref 20–31)
CREAT SERPL-MCNC: 1.03 MG/DL (ref 0.5–0.9)
CRYPTOSPORIDIUM ANTIGEN STOOL: NEGATIVE
EOSINOPHILS RELATIVE PERCENT: 5 % (ref 1–4)
GFR AFRICAN AMERICAN: >60 ML/MIN
GFR NON-AFRICAN AMERICAN: 54 ML/MIN
GFR SERPL CREATININE-BSD FRML MDRD: ABNORMAL ML/MIN/{1.73_M2}
GIARDIA ANTIGEN STOOL: NEGATIVE
GLUCOSE BLD-MCNC: 81 MG/DL (ref 70–99)
HCT VFR BLD CALC: 35.2 % (ref 36.3–47.1)
HEMOGLOBIN: 10.7 G/DL (ref 11.9–15.1)
IMMATURE GRANULOCYTES: 0 %
LYMPHOCYTES # BLD: 45 % (ref 24–43)
MCH RBC QN AUTO: 28.3 PG (ref 25.2–33.5)
MCHC RBC AUTO-ENTMCNC: 30.4 G/DL (ref 28.4–34.8)
MCV RBC AUTO: 93.1 FL (ref 82.6–102.9)
MONOCYTES # BLD: 6 % (ref 3–12)
NRBC AUTOMATED: 0 PER 100 WBC
PDW BLD-RTO: 14 % (ref 11.8–14.4)
PLATELET # BLD: 181 K/UL (ref 138–453)
PMV BLD AUTO: 10.9 FL (ref 8.1–13.5)
POTASSIUM SERPL-SCNC: 4.8 MMOL/L (ref 3.7–5.3)
RBC # BLD: 3.78 M/UL (ref 3.95–5.11)
SEG NEUTROPHILS: 43 % (ref 36–65)
SEGMENTED NEUTROPHILS ABSOLUTE COUNT: 2.17 K/UL (ref 1.5–8.1)
SODIUM BLD-SCNC: 142 MMOL/L (ref 135–144)
SOURCE: NORMAL
WBC # BLD: 5 K/UL (ref 3.5–11.3)

## 2022-06-27 PROCEDURE — 6370000000 HC RX 637 (ALT 250 FOR IP): Performed by: INTERNAL MEDICINE

## 2022-06-27 PROCEDURE — 94640 AIRWAY INHALATION TREATMENT: CPT

## 2022-06-27 PROCEDURE — 36415 COLL VENOUS BLD VENIPUNCTURE: CPT

## 2022-06-27 PROCEDURE — 94761 N-INVAS EAR/PLS OXIMETRY MLT: CPT

## 2022-06-27 PROCEDURE — 80048 BASIC METABOLIC PNL TOTAL CA: CPT

## 2022-06-27 PROCEDURE — 2580000003 HC RX 258: Performed by: NURSE PRACTITIONER

## 2022-06-27 PROCEDURE — 85025 COMPLETE CBC W/AUTO DIFF WBC: CPT

## 2022-06-27 PROCEDURE — 6370000000 HC RX 637 (ALT 250 FOR IP): Performed by: NURSE PRACTITIONER

## 2022-06-27 PROCEDURE — 6360000002 HC RX W HCPCS: Performed by: INTERNAL MEDICINE

## 2022-06-27 RX ORDER — TRAMADOL HYDROCHLORIDE 50 MG/1
50 TABLET ORAL EVERY 6 HOURS PRN
Status: DISCONTINUED | OUTPATIENT
Start: 2022-06-27 | End: 2022-06-27 | Stop reason: HOSPADM

## 2022-06-27 RX ADMIN — HEPARIN SODIUM 5000 UNITS: 5000 INJECTION INTRAVENOUS; SUBCUTANEOUS at 08:16

## 2022-06-27 RX ADMIN — SODIUM BICARBONATE 650 MG: 650 TABLET ORAL at 08:15

## 2022-06-27 RX ADMIN — SODIUM CHLORIDE: 9 INJECTION, SOLUTION INTRAVENOUS at 02:04

## 2022-06-27 RX ADMIN — SERTRALINE HYDROCHLORIDE 100 MG: 100 TABLET ORAL at 08:16

## 2022-06-27 RX ADMIN — Medication 2 PUFF: at 09:49

## 2022-06-27 RX ADMIN — METOPROLOL SUCCINATE 25 MG: 25 TABLET, EXTENDED RELEASE ORAL at 08:16

## 2022-06-27 RX ADMIN — PANTOPRAZOLE SODIUM 40 MG: 40 TABLET, DELAYED RELEASE ORAL at 06:36

## 2022-06-27 RX ADMIN — GABAPENTIN 100 MG: 100 CAPSULE ORAL at 08:16

## 2022-06-27 RX ADMIN — ASPIRIN 81 MG: 81 TABLET, COATED ORAL at 08:16

## 2022-06-27 RX ADMIN — LISINOPRIL 5 MG: 5 TABLET ORAL at 08:15

## 2022-06-27 NOTE — PLAN OF CARE
Problem: Discharge Planning  Goal: Discharge to home or other facility with appropriate resources  6/27/2022 1404 by Ghazal Robison RN  Outcome: Progressing  Flowsheets (Taken 6/27/2022 1404)  Discharge to home or other facility with appropriate resources:   Identify barriers to discharge with patient and caregiver   Arrange for needed discharge resources and transportation as appropriate  Note: Progressing     Problem: Gastrointestinal - Adult  Goal: Maintains or returns to baseline bowel function  6/27/2022 1404 by Ghazal Robison RN  Outcome: Progressing  Flowsheets (Taken 6/27/2022 1404)  Maintains or returns to baseline bowel function:   Assess bowel function   Administer IV fluids as ordered to ensure adequate hydration   Encourage mobilization and activity   Encourage oral fluids to ensure adequate hydration   Administer ordered medications as needed  Note: Progressing. Patient tolerating regular diet.

## 2022-06-27 NOTE — PLAN OF CARE
Problem: Discharge Planning  Goal: Discharge to home or other facility with appropriate resources  6/27/2022 0524 by Isaiah Morelos RN  Outcome: Progressing     Problem: Pain  Goal: Verbalizes/displays adequate comfort level or baseline comfort level  6/27/2022 0524 by Isaiah Morelos RN  Outcome: Progressing     Problem: Safety - Adult  Goal: Free from fall injury  6/27/2022 0524 by Isaiah Morelos RN  Outcome: Progressing  Flowsheets (Taken 6/26/2022 2200)  Free From Fall Injury: Sonali Padilla family/caregiver on patient safety     Problem: Musculoskeletal - Adult  Goal: Return mobility to safest level of function  6/27/2022 0524 by Isaiah Morelos RN  Outcome: Progressing     Problem: Gastrointestinal - Adult  Goal: Maintains or returns to baseline bowel function  6/27/2022 0524 by Isaiah Morelos RN  Outcome: Progressing

## 2022-06-27 NOTE — RT PROTOCOL NOTE
RT Inhaler-Nebulizer Bronchodilator Protocol Note    There is a bronchodilator order in the chart from a provider indicating to follow the RT Bronchodilator Protocol and there is an Initiate RT Inhaler-Nebulizer Bronchodilator Protocol order as well (see protocol at bottom of note). CXR Findings:  No results found. The findings from the last RT Protocol Assessment were as follows:   History Pulmonary Disease: Chronic pulmonary disease  Respiratory Pattern: Dyspnea on exertion or RR 21-25 bpm  Breath Sounds: Clear breath sounds  Cough: Strong, spontaneous, non-productive  Indication for Bronchodilator Therapy: On home bronchodilators  Bronchodilator Assessment Score: 4    Aerosolized bronchodilator medication orders have been revised according to the RT Inhaler-Nebulizer Bronchodilator Protocol below. Respiratory Therapist to perform RT Therapy Protocol Assessment initially then follow the protocol. Repeat RT Therapy Protocol Assessment PRN for score 0-3 or on second treatment, BID, and PRN for scores above 3. No Indications  adjust the frequency to every 6 hours PRN wheezing or bronchospasm, if no treatments needed after 48 hours then discontinue using Per Protocol order mode. If indication present, adjust the RT bronchodilator orders based on the Bronchodilator Assessment Score as indicated below. Use Inhaler orders unless patient has one or more of the following: on home nebulizer, not able to hold breath for 10 seconds, is not alert and oriented, cannot activate and use MDI correctly, or respiratory rate 25 breaths per minute or more, then use the equivalent nebulizer order(s) with same Frequency and PRN reasons based on the score. If a patient is on this medication at home then do not decrease Frequency below that used at home.     0-3  enter or revise RT bronchodilator order(s) to equivalent RT Bronchodilator order with Frequency of every 4 hours PRN for wheezing or increased work of breathing using Per Protocol order mode. 4-6  enter or revise RT Bronchodilator order(s) to two equivalent RT bronchodilator orders with one order with BID Frequency and one order with Frequency of every 4 hours PRN wheezing or increased work of breathing using Per Protocol order mode. 7-10  enter or revise RT Bronchodilator order(s) to two equivalent RT bronchodilator orders with one order with TID Frequency and one order with Frequency of every 4 hours PRN wheezing or increased work of breathing using Per Protocol order mode. 11-13  enter or revise RT Bronchodilator order(s) to one equivalent RT bronchodilator order with QID Frequency and an Albuterol order with Frequency of every 4 hours PRN wheezing or increased work of breathing using Per Protocol order mode. Greater than 13  enter or revise RT Bronchodilator order(s) to one equivalent RT bronchodilator order with every 4 hours Frequency and an Albuterol order with Frequency of every 2 hours PRN wheezing or increased work of breathing using Per Protocol order mode. RT to enter RT Home Evaluation for COPD & MDI Assessment order using Per Protocol order mode.     Electronically signed by Zaki Mcfadden RCP on 6/26/2022 at 9:08 PM

## 2022-06-27 NOTE — RT PROTOCOL NOTE
RT Inhaler-Nebulizer Bronchodilator Protocol Note    There is a bronchodilator order in the chart from a provider indicating to follow the RT Bronchodilator Protocol and there is an Initiate RT Inhaler-Nebulizer Bronchodilator Protocol order as well (see protocol at bottom of note). CXR Findings:  No results found. The findings from the last RT Protocol Assessment were as follows:   History Pulmonary Disease: Chronic pulmonary disease  Respiratory Pattern: Regular pattern and RR 12-20 bpm  Breath Sounds: Clear breath sounds  Cough: Strong, spontaneous, non-productive  Indication for Bronchodilator Therapy: On home bronchodilators  Bronchodilator Assessment Score: 2    Aerosolized bronchodilator medication orders have been revised according to the RT Inhaler-Nebulizer Bronchodilator Protocol below. Respiratory Therapist to perform RT Therapy Protocol Assessment initially then follow the protocol. Repeat RT Therapy Protocol Assessment PRN for score 0-3 or on second treatment, BID, and PRN for scores above 3. No Indications  adjust the frequency to every 6 hours PRN wheezing or bronchospasm, if no treatments needed after 48 hours then discontinue using Per Protocol order mode. If indication present, adjust the RT bronchodilator orders based on the Bronchodilator Assessment Score as indicated below. Use Inhaler orders unless patient has one or more of the following: on home nebulizer, not able to hold breath for 10 seconds, is not alert and oriented, cannot activate and use MDI correctly, or respiratory rate 25 breaths per minute or more, then use the equivalent nebulizer order(s) with same Frequency and PRN reasons based on the score. If a patient is on this medication at home then do not decrease Frequency below that used at home.     0-3  enter or revise RT bronchodilator order(s) to equivalent RT Bronchodilator order with Frequency of every 4 hours PRN for wheezing or increased work of breathing using Per Protocol order mode. 4-6  enter or revise RT Bronchodilator order(s) to two equivalent RT bronchodilator orders with one order with BID Frequency and one order with Frequency of every 4 hours PRN wheezing or increased work of breathing using Per Protocol order mode. 7-10  enter or revise RT Bronchodilator order(s) to two equivalent RT bronchodilator orders with one order with TID Frequency and one order with Frequency of every 4 hours PRN wheezing or increased work of breathing using Per Protocol order mode. 11-13  enter or revise RT Bronchodilator order(s) to one equivalent RT bronchodilator order with QID Frequency and an Albuterol order with Frequency of every 4 hours PRN wheezing or increased work of breathing using Per Protocol order mode. Greater than 13  enter or revise RT Bronchodilator order(s) to one equivalent RT bronchodilator order with every 4 hours Frequency and an Albuterol order with Frequency of every 2 hours PRN wheezing or increased work of breathing using Per Protocol order mode. RT to enter RT Home Evaluation for COPD & MDI Assessment order using Per Protocol order mode.     Electronically signed by shimon obrien RCP on 6/27/2022 at 9:52 AM

## 2022-06-27 NOTE — PROGRESS NOTES
Subjective:    Abdominal pain  Minimal abdominal pain no nausea no vomiting tolerating diet still with diarrhea but much less no melena no blood in the stools    ROS  No fever no chills no  or cardiopulmonary complaints no TIA no bleeding no headache no sore throat no skin lesions no polyuria no polydipsia no hypoglycemia  physical exam  General Appearance: in no acute distress and alert  Skin: warm and dry, no rash or erythema  Head: normocephalic and atraumatic  Eyes: conjunctivae normal and sclera anicteric  Neck: neck supple and non tender without mass   Pulmonary/Chest: clear to auscultation bilaterally- no wheezes, rales or rhonchi, normal air movement, no respiratory distress  Cardiovascular: normal rate, regular rhythm, normal S1 and S2, no gallops, intact distal pulses and no carotid bruits  Abdomen: soft, non-tender, non-distended, normal bowel sounds, no masses or organomegaly  Extremities: no edema and good pulses no Homans' sign    Neurologic: Alert oriented x3 with no focal deficit    BP (!) 148/66   Pulse 58   Temp 97.9 °F (36.6 °C) (Oral)   Resp 18   Ht 5' 4\" (1.626 m)   Wt 197 lb 14.4 oz (89.8 kg)   SpO2 100%   BMI 33.97 kg/m²     CBC:   Lab Results   Component Value Date    WBC 5.0 06/27/2022    RBC 3.78 06/27/2022    RBC 4.09 04/14/2012    HGB 10.7 06/27/2022    HCT 35.2 06/27/2022    MCV 93.1 06/27/2022    MCH 28.3 06/27/2022    MCHC 30.4 06/27/2022    RDW 14.0 06/27/2022     06/27/2022     04/14/2012    MPV 10.9 06/27/2022     BMP:    Lab Results   Component Value Date     06/27/2022    K 4.8 06/27/2022     06/27/2022    CO2 18 06/27/2022    BUN 9 06/27/2022    LABALBU 4.4 06/24/2022    CREATININE 1.03 06/27/2022    CALCIUM 8.3 06/27/2022    GFRAA >60 06/27/2022    LABGLOM 54 06/27/2022    GLUCOSE 81 06/27/2022    GLUCOSE 123 04/14/2012        Assessment:  Patient Active Problem List   Diagnosis    Diarrhea    Smoker    Acute kidney injury ([facility] [address]    CAD (coronary artery disease)    Abdominal pain    SBO (small bowel obstruction)    Metabolic acidosis    Chronic diarrhea    Urinary tract infection without hematuria    Elevated serum creatinine    Partial small bowel obstruction (HCC)    Intractable abdominal pain     Nominal pain resolved  Chronic diarrhea better  None anion metabolic acidosis we will restart sodium bicarb  Partial small bowel obstruction/ileus resolved  Chronic smoker smoking cessation advised  Coronary artery disease with multiple stents stable  CKD 3B avoid nephrotoxic drugs  Obesity BMI of 33.97  Plan:    's labs reviewed patient doing fairly well tolerating diet ambulating home today office in 5 days    Syed Couch MD MD  5:24 PM

## 2022-07-03 NOTE — DISCHARGE SUMMARY
Physician Discharge Summary     Patient ID:  Seun Dixon  [id number]  77 y.o.  1955    Admit date: 6/24/2022    Discharge date and time: 6/27/2022    Admission Diagnoses:   Patient Active Problem List   Diagnosis    Diarrhea    Smoker    Acute kidney injury ([address]    CAD (coronary artery disease)    Abdominal pain    SBO (small bowel obstruction)    Metabolic acidosis    Chronic diarrhea    Urinary tract infection without hematuria    Elevated serum creatinine    Partial small bowel obstruction (HCC)    Intractable abdominal pain       Discharge Diagnoses:  Abdominal pain  Chronic diarrhea  None anion metabolic acidosis  Partial small bowel obstruction/ileus  Chronic smoker  Coronary artery disease with multiple stents  CKD 3B  Obesity BMI of 33.97  History of total colectomy  Consults: general surgery    Procedures: None    Hospital Course: 80-year-old gentlewoman came in with abdominal pain and diarrhea, CT abdomen showed partial bowel obstruction versus ileus kept n.p.o. IV fluids and surgery consultation patient did fairly well with no major complication during her stay she did not need any surgical intervention discharge Exam:  See progress note from today    Disposition: home  Stable improved  Patient Instructions:   Discharge Medication List as of 6/27/2022  5:33 PM      CONTINUE these medications which have NOT CHANGED    Details   albuterol sulfate HFA (VENTOLIN HFA) 108 (90 Base) MCG/ACT inhaler Inhale 2 puffs into the lungs every 6 hours as needed for WheezingHistorical Med      lisinopril (PRINIVIL;ZESTRIL) 5 MG tablet Take 5 mg by mouth daily Historical Med      metoprolol succinate (TOPROL XL) 25 MG extended release tablet Take 25 mg by mouth in the morning and at bedtimeHistorical Med      gabapentin (NEURONTIN) 100 MG capsule Take 100 mg by mouth 2 times daily. Historical Med      celecoxib (CELEBREX) 100 MG capsule Take 100 mg by mouth dailyHistorical Med      VASCEPA 1 g CAPS capsule Take 2 capsules by mouth 2 times daily , DAWHistorical Med      COMBIVENT RESPIMAT  MCG/ACT AERS inhaler Inhale 1 puff into the lungs 2 times daily , DAWHistorical Med      sodium bicarbonate 650 MG tablet Take 650 mg by mouth 2 times dailyHistorical Med      atorvastatin (LIPITOR) 80 MG tablet Take 80 mg by mouth dailyHistorical Med      sertraline (ZOLOFT) 100 MG tablet Take 100 mg by mouth daily Historical Med      ASPIR-LOW 81 MG EC tablet Take 81 mg by mouth nightly , DAWHistorical Med      pantoprazole (PROTONIX) 40 MG tablet Take 40 mg by mouth daily         STOP taking these medications       loperamide (IMODIUM) 2 MG capsule Comments:   Reason for Stopping:             Activity: activity as tolerated  Diet: cardiac diet    Follow-up with pcp in 5 days. Advised smoking cessation  Signed:   Halina Restrepo MD MD  7/3/2022  12:24 PM

## 2023-04-27 ENCOUNTER — HOSPITAL ENCOUNTER (OUTPATIENT)
Dept: PREADMISSION TESTING | Age: 68
Discharge: HOME | End: 2023-04-27
Payer: MEDICARE

## 2023-04-27 VITALS
DIASTOLIC BLOOD PRESSURE: 68 MMHG | WEIGHT: 195 LBS | OXYGEN SATURATION: 97 % | BODY MASS INDEX: 33.29 KG/M2 | HEIGHT: 64 IN | TEMPERATURE: 97 F | RESPIRATION RATE: 16 BRPM | HEART RATE: 92 BPM | SYSTOLIC BLOOD PRESSURE: 138 MMHG

## 2023-04-27 LAB
ABO/RH: NORMAL
ANION GAP SERPL CALCULATED.3IONS-SCNC: 11 MMOL/L (ref 9–17)
ANTIBODY SCREEN: NEGATIVE
ARM BAND NUMBER: NORMAL
BUN SERPL-MCNC: 25 MG/DL (ref 8–23)
CHLORIDE SERPL-SCNC: 105 MMOL/L (ref 98–107)
CO2 SERPL-SCNC: 24 MMOL/L (ref 20–31)
CREAT SERPL-MCNC: 1.31 MG/DL (ref 0.5–0.9)
EKG ATRIAL RATE: 87 BPM
EKG P AXIS: 50 DEGREES
EKG P-R INTERVAL: 154 MS
EKG Q-T INTERVAL: 364 MS
EKG QRS DURATION: 80 MS
EKG QTC CALCULATION (BAZETT): 438 MS
EKG R AXIS: -10 DEGREES
EKG T AXIS: 59 DEGREES
EKG VENTRICULAR RATE: 87 BPM
EXPIRATION DATE: NORMAL
GFR SERPL CREATININE-BSD FRML MDRD: 45 ML/MIN/1.73M2
HCT VFR BLD AUTO: 40.5 % (ref 36.3–47.1)
HGB BLD-MCNC: 13 G/DL (ref 11.9–15.1)
MCH RBC QN AUTO: 28.8 PG (ref 25.2–33.5)
MCHC RBC AUTO-ENTMCNC: 32.1 G/DL (ref 28.4–34.8)
MCV RBC AUTO: 89.6 FL (ref 82.6–102.9)
NRBC AUTOMATED: 0 PER 100 WBC
PDW BLD-RTO: 14.6 % (ref 11.8–14.4)
PLATELET # BLD AUTO: 248 K/UL (ref 138–453)
PMV BLD AUTO: 10.7 FL (ref 8.1–13.5)
POTASSIUM SERPL-SCNC: 4.2 MMOL/L (ref 3.7–5.3)
RBC # BLD: 4.52 M/UL (ref 3.95–5.11)
SODIUM SERPL-SCNC: 140 MMOL/L (ref 135–144)
WBC # BLD AUTO: 6 K/UL (ref 3.5–11.3)

## 2023-04-27 PROCEDURE — 36415 COLL VENOUS BLD VENIPUNCTURE: CPT

## 2023-04-27 PROCEDURE — 83036 HEMOGLOBIN GLYCOSYLATED A1C: CPT

## 2023-04-27 PROCEDURE — 87641 MR-STAPH DNA AMP PROBE: CPT

## 2023-04-27 PROCEDURE — 86900 BLOOD TYPING SEROLOGIC ABO: CPT

## 2023-04-27 PROCEDURE — 84520 ASSAY OF UREA NITROGEN: CPT

## 2023-04-27 PROCEDURE — 82565 ASSAY OF CREATININE: CPT

## 2023-04-27 PROCEDURE — 86901 BLOOD TYPING SEROLOGIC RH(D): CPT

## 2023-04-27 PROCEDURE — 86850 RBC ANTIBODY SCREEN: CPT

## 2023-04-27 PROCEDURE — 80051 ELECTROLYTE PANEL: CPT

## 2023-04-27 PROCEDURE — 93010 ELECTROCARDIOGRAM REPORT: CPT | Performed by: INTERNAL MEDICINE

## 2023-04-27 PROCEDURE — 93005 ELECTROCARDIOGRAM TRACING: CPT | Performed by: ANESTHESIOLOGY

## 2023-04-27 PROCEDURE — 85027 COMPLETE CBC AUTOMATED: CPT

## 2023-04-27 RX ORDER — ACETAMINOPHEN 500 MG
1000 TABLET ORAL ONCE
OUTPATIENT
Start: 2023-05-16

## 2023-04-27 RX ORDER — CELECOXIB 200 MG/1
200 CAPSULE ORAL ONCE
OUTPATIENT
Start: 2023-05-16

## 2023-04-27 RX ORDER — BUDESONIDE, GLYCOPYRROLATE, AND FORMOTEROL FUMARATE 160; 9; 4.8 UG/1; UG/1; UG/1
2 AEROSOL, METERED RESPIRATORY (INHALATION) 2 TIMES DAILY
COMMUNITY
Start: 2023-04-04

## 2023-04-27 RX ORDER — GABAPENTIN 300 MG/1
300 CAPSULE ORAL ONCE
OUTPATIENT
Start: 2023-05-16

## 2023-04-27 ASSESSMENT — HOOS JR
BENDING TO THE FLOOR TO PICK UP OBJECT: 4
HOOS JR TOTAL INTERVAL SCORE: 25.103
HOOS JR RAW SCORE: 20
LYING IN BED (TURNING OVER, MAINTAINING HIP POSITION): 4
RISING FROM SITTING: 3
SITTING: 3
WALKING ON UNEVEN SURFACE: 3
GOING UP OR DOWN STAIRS: 3
HOOS JR RAW SCORE: 20

## 2023-04-27 ASSESSMENT — PROMIS GLOBAL HEALTH SCALE
IN THE PAST 7 DAYS, HOW WOULD YOU RATE YOUR FATIGUE ON AVERAGE [ON A SCALE FROM 1 (NONE) TO 5 (VERY SEVERE)]?: 4
SUM OF RESPONSES TO QUESTIONS 3, 6, 7, & 8: 18
IN THE PAST 7 DAYS, HOW OFTEN HAVE YOU BEEN BOTHERED BY EMOTIONAL PROBLEMS, SUCH AS FEELING ANXIOUS, DEPRESSED, OR IRRITABLE [ON A SCALE FROM 1 (NEVER) TO 5 (ALWAYS)]?: 2
IN GENERAL, HOW WOULD YOU RATE YOUR PHYSICAL HEALTH [ON A SCALE OF 1 (POOR) TO 5 (EXCELLENT)]?: 3
IN GENERAL, WOULD YOU SAY YOUR QUALITY OF LIFE IS...[ON A SCALE OF 1 (POOR) TO 5 (EXCELLENT)]: 2
IN THE PAST 7 DAYS, HOW WOULD YOU RATE YOUR PAIN ON AVERAGE [ON A SCALE FROM 0 (NO PAIN) TO 10 (WORST IMAGINABLE PAIN)]?: 10
WHO IS THE PERSON COMPLETING THE PROMIS V1.1 SURVEY?: 0
TO WHAT EXTENT ARE YOU ABLE TO CARRY OUT YOUR EVERYDAY PHYSICAL ACTIVITIES SUCH AS WALKING, CLIMBING STAIRS, CARRYING GROCERIES, OR MOVING A CHAIR [ON A SCALE OF 1 (NOT AT ALL) TO 5 (COMPLETELY)]?: 1
SUM OF RESPONSES TO QUESTIONS 2, 4, 5, & 10: 10
HOW IS THE PROMIS V1.1 BEING ADMINISTERED?: 0
IN GENERAL, HOW WOULD YOU RATE YOUR MENTAL HEALTH, INCLUDING YOUR MOOD AND YOUR ABILITY TO THINK [ON A SCALE OF 1 (POOR) TO 5 (EXCELLENT)]?: 5
IN GENERAL, HOW WOULD YOU RATE YOUR SATISFACTION WITH YOUR SOCIAL ACTIVITIES AND RELATIONSHIPS [ON A SCALE OF 1 (POOR) TO 5 (EXCELLENT)]?: 1
IN GENERAL, PLEASE RATE HOW WELL YOU CARRY OUT YOUR USUAL SOCIAL ACTIVITIES (INCLUDES ACTIVITIES AT HOME, AT WORK, AND IN YOUR COMMUNITY, AND RESPONSIBILITIES AS A PARENT, CHILD, SPOUSE, EMPLOYEE, FRIEND, ETC) [ON A SCALE OF 1 (POOR) TO 5 (EXCELLENT)]?: 1
IN GENERAL, WOULD YOU SAY YOUR HEALTH IS...[ON A SCALE OF 1 (POOR) TO 5 (EXCELLENT)]: 2

## 2023-04-27 ASSESSMENT — PAIN DESCRIPTION - PAIN TYPE: TYPE: CHRONIC PAIN

## 2023-04-27 ASSESSMENT — PAIN DESCRIPTION - ORIENTATION: ORIENTATION: LEFT

## 2023-04-27 ASSESSMENT — PAIN DESCRIPTION - LOCATION: LOCATION: HIP

## 2023-04-27 ASSESSMENT — PAIN SCALES - GENERAL: PAINLEVEL_OUTOF10: 10

## 2023-04-27 ASSESSMENT — PAIN DESCRIPTION - FREQUENCY: FREQUENCY: CONTINUOUS

## 2023-04-27 ASSESSMENT — PAIN DESCRIPTION - DESCRIPTORS: DESCRIPTORS: ACHING;SORE;SHOOTING;STABBING

## 2023-04-27 NOTE — PRE-PROCEDURE INSTRUCTIONS
[facility]  [facility] Tuesday, May 16  at 0545 AM    Once you enter the hospital lobby, take the elevators to the second floor. Check-In is at the surgery registration desk. Continue to take your home medications as you normally do up to and including the night before surgery with the exception of any blood thinning medications. Please stop any blood thinning medications as directed by your surgeon or prescribing physician. Failure to stop certain medications may interfere with your scheduled surgery. These may include:  Aspirin, Warfarin (Coumadin), Clopidogrel (Plavix), Ibuprofen (Motrin, Advil), Naproxen (Aleve), Meloxicam (Mobic), Celecoxib (Celebrex), Eliquis, Pradaxa, Xarelto, Effient, Fish Oil, Herbal supplements. Stop aspirin as directed by physician. Stop celebrex 7 days prior to surgery      Please take the following medication(s) the day of surgery with a small sip of water:  Pantoprazole, metoprolol, gabapentin, breztri    Please use your inhaler(s) if needed and bring your inhaler(s) from home the day of surgery. PREPARING FOR YOUR SURGERY:     Before surgery, you can play an important role in your own health. Because skin is not sterile, we need to be sure that your skin is as free of germs as possible before surgery by carefully washing before surgery. Preparing or prepping skin before surgery can reduce the risk of a surgical site infection.   Do not shave the area of your body where your surgery will be performed unless you received specific permission from your physician. You will need to shower at home the night before surgery and the morning of surgery with a special soap called chlorhexidine gluconate (CHG*). *Not to be used by people allergic to Chlorhexidine Gluconate (CHG). Following these instructions will help you be sure that your skin is clean before surgery. Instructions on cleaning your skin before surgery:      The night before your surgery:

## 2023-04-28 LAB
EST. AVERAGE GLUCOSE BLD GHB EST-MCNC: 120 MG/DL
HBA1C MFR BLD: 5.8 % (ref 4–6)
MRSA, DNA, NASAL: ABNORMAL
SPECIMEN DESCRIPTION: ABNORMAL

## 2023-05-02 NOTE — PROGRESS NOTES
Lucy Gutierrez was evaluated today and a DME order was entered for a wheeled walker because she requires this to successfully complete daily living tasks of ambulating. A wheeled walker is necessary due to the patient's unsteady gait, upper body weakness, and inability to  an ambulation device; and she can ambulate only by pushing a walker instead of a lesser assistive device such as a cane, crutch, or standard walker. The need for this equipment was discussed with the patient and she understands and is in agreement.
PAT Progress Note    Pt Name: Caity Quesada  MRN: [id number]  YOB: 1955  Date of evaluation: 4/27/2023      [x] Called to [facility]. I spoke to the patient, Caity Quesada, a 79 y.o. female, who is scheduled for an upcoming [address] by Caterina Mathew MD for Osteoarthritis of left hip on 5/16/2023 at 0730. [x] I reviewed the hard copy orthopedic surgery progress note by Dr. Jason Michelle dated 4/17/2023 for an Interval History and Physical Note the day of surgery. History of coronary artery disease with 10 stents (RCA/mid LAD/distal cx), hypertension, hyperlipidemia, cardiomyopathy, chronic kidney disease, STEMI (2018), COPD, congestive heart failure. Patient follows with cardiologist Dr. Leia Phillips and evaluated in March 2023. Per note in paper chart, \"pt can be cleared for upcoming hip surgery when needed. \" Patient has shortness of breath with exertion and occasional dizziness with exertion. She denies chest pain, palpitations. Transthoracic Echocardiography 9/21/2022 (full report in paper chart):  Left ventricle: global systolic function: visually the overall left ventricular systolic function appears normal with an estimated ejection fraction of 55-60%. Left ventricle: regional systolic function: wall motion: no obvious wall motion abnormalities. Left ventricle: the diastolic filling pattern is indeterminate. Right ventricle: normal right ventricular size and systolic function. Mitral valve: mild mitral regurgitation present. Aortic valve: there is mild-moderate aortic insufficiency by color and spectral doppler. Tricuspid valve: there is trivial-mild tricuspid regurgitation present. Tricuspid valve: the peak velocity of tricuspid regurgitant is 2.55 m/s with peak gradient of 26.09 mmHg. There is no evidence of pulmonary hypertension with estimated right ventricular / pulmonary artery systolic pressure of 92.84 mmHg.    Pericardium: pericardial
Positive MRSA result from PAT sent to 's office at this time
AWA. PT NEEDS A FWW AND WRITER WILL ORDER PRE-OP.     Electronically signed by: Aurelia Rivera RN on 5/2/2023 at 12:39 PM

## 2023-05-16 ENCOUNTER — APPOINTMENT (OUTPATIENT)
Dept: GENERAL RADIOLOGY | Age: 68
End: 2023-05-16
Attending: ORTHOPAEDIC SURGERY
Payer: MEDICARE

## 2023-05-16 ENCOUNTER — ANESTHESIA (OUTPATIENT)
Dept: OPERATING ROOM | Age: 68
End: 2023-05-16
Payer: MEDICARE

## 2023-05-16 ENCOUNTER — ANESTHESIA EVENT (OUTPATIENT)
Dept: OPERATING ROOM | Age: 68
End: 2023-05-16
Payer: MEDICARE

## 2023-05-16 ENCOUNTER — HOSPITAL ENCOUNTER (OUTPATIENT)
Age: 68
Discharge: HOME | End: 2023-05-16
Attending: ORTHOPAEDIC SURGERY | Admitting: ORTHOPAEDIC SURGERY
Payer: MEDICARE

## 2023-05-16 VITALS
WEIGHT: 195 LBS | OXYGEN SATURATION: 94 % | DIASTOLIC BLOOD PRESSURE: 62 MMHG | TEMPERATURE: 97.3 F | RESPIRATION RATE: 16 BRPM | SYSTOLIC BLOOD PRESSURE: 132 MMHG | HEART RATE: 77 BPM | HEIGHT: 64 IN | BODY MASS INDEX: 33.29 KG/M2

## 2023-05-16 DIAGNOSIS — G89.18 ACUTE POSTOPERATIVE PAIN: Primary | ICD-10-CM

## 2023-05-16 PROBLEM — M16.12 PRIMARY LOCALIZED OSTEOARTHRITIS OF LEFT HIP: Status: ACTIVE | Noted: 2023-05-16

## 2023-05-16 PROCEDURE — 72170 X-RAY EXAM OF PELVIS: CPT

## 2023-05-16 PROCEDURE — 97116 GAIT TRAINING THERAPY: CPT

## 2023-05-16 PROCEDURE — 97535 SELF CARE MNGMENT TRAINING: CPT

## 2023-05-16 PROCEDURE — 6360000002 HC RX W HCPCS: Performed by: ANESTHESIOLOGY

## 2023-05-16 PROCEDURE — 3700000000 HC ANESTHESIA ATTENDED CARE: Performed by: ORTHOPAEDIC SURGERY

## 2023-05-16 PROCEDURE — 97162 PT EVAL MOD COMPLEX 30 MIN: CPT

## 2023-05-16 PROCEDURE — A4217 STERILE WATER/SALINE, 500 ML: HCPCS | Performed by: ORTHOPAEDIC SURGERY

## 2023-05-16 PROCEDURE — 97166 OT EVAL MOD COMPLEX 45 MIN: CPT

## 2023-05-16 PROCEDURE — 6360000002 HC RX W HCPCS: Performed by: NURSE ANESTHETIST, CERTIFIED REGISTERED

## 2023-05-16 PROCEDURE — 97530 THERAPEUTIC ACTIVITIES: CPT

## 2023-05-16 PROCEDURE — 3700000001 HC ADD 15 MINUTES (ANESTHESIA): Performed by: ORTHOPAEDIC SURGERY

## 2023-05-16 PROCEDURE — 2580000003 HC RX 258: Performed by: ORTHOPAEDIC SURGERY

## 2023-05-16 PROCEDURE — C1776 JOINT DEVICE (IMPLANTABLE): HCPCS | Performed by: ORTHOPAEDIC SURGERY

## 2023-05-16 PROCEDURE — 3600000015 HC SURGERY LEVEL 5 ADDTL 15MIN: Performed by: ORTHOPAEDIC SURGERY

## 2023-05-16 PROCEDURE — 97110 THERAPEUTIC EXERCISES: CPT

## 2023-05-16 PROCEDURE — 2580000003 HC RX 258: Performed by: STUDENT IN AN ORGANIZED HEALTH CARE EDUCATION/TRAINING PROGRAM

## 2023-05-16 PROCEDURE — 2709999900 HC NON-CHARGEABLE SUPPLY: Performed by: ORTHOPAEDIC SURGERY

## 2023-05-16 PROCEDURE — 6360000002 HC RX W HCPCS: Performed by: ORTHOPAEDIC SURGERY

## 2023-05-16 PROCEDURE — 7100000000 HC PACU RECOVERY - FIRST 15 MIN: Performed by: ORTHOPAEDIC SURGERY

## 2023-05-16 PROCEDURE — 73501 X-RAY EXAM HIP UNI 1 VIEW: CPT

## 2023-05-16 PROCEDURE — 2500000003 HC RX 250 WO HCPCS: Performed by: NURSE ANESTHETIST, CERTIFIED REGISTERED

## 2023-05-16 PROCEDURE — 6370000000 HC RX 637 (ALT 250 FOR IP): Performed by: ORTHOPAEDIC SURGERY

## 2023-05-16 PROCEDURE — 7100000001 HC PACU RECOVERY - ADDTL 15 MIN: Performed by: ORTHOPAEDIC SURGERY

## 2023-05-16 PROCEDURE — 3600000005 HC SURGERY LEVEL 5 BASE: Performed by: ORTHOPAEDIC SURGERY

## 2023-05-16 PROCEDURE — 3209999900 FLUORO FOR SURGICAL PROCEDURES

## 2023-05-16 DEVICE — G7 FINNED 3 HOLE SHELL 48C: Type: IMPLANTABLE DEVICE | Site: HIP | Status: FUNCTIONAL

## 2023-05-16 DEVICE — IMPLANTABLE DEVICE
Type: IMPLANTABLE DEVICE | Site: HIP | Status: FUNCTIONAL
Brand: BIOLOX OPTION HIP SYSTEM

## 2023-05-16 DEVICE — HEAD FEM DIA28MM HIP BIOLOX DELT OPT FOR G7 ACET SYS: Type: IMPLANTABLE DEVICE | Site: HIP | Status: FUNCTIONAL

## 2023-05-16 DEVICE — STEM FEM SZ 11 L107.5MM 133DEG HIP PPS HI OFFSET TYP 1 TAPR: Type: IMPLANTABLE DEVICE | Site: HIP | Status: FUNCTIONAL

## 2023-05-16 DEVICE — G7 DUAL MOBILITY LINER 38MM C: Type: IMPLANTABLE DEVICE | Site: HIP | Status: FUNCTIONAL

## 2023-05-16 DEVICE — BONE SCREW 6.5X30 SELF-TAP: Type: IMPLANTABLE DEVICE | Site: HIP | Status: FUNCTIONAL

## 2023-05-16 DEVICE — BEARING TIB 28X38 MM HIP VIVACIT-E: Type: IMPLANTABLE DEVICE | Site: HIP | Status: FUNCTIONAL

## 2023-05-16 RX ORDER — GABAPENTIN 300 MG/1
300 CAPSULE ORAL ONCE
Status: DISCONTINUED | OUTPATIENT
Start: 2023-05-16 | End: 2023-05-16

## 2023-05-16 RX ORDER — LIDOCAINE HYDROCHLORIDE 20 MG/ML
INJECTION, SOLUTION EPIDURAL; INFILTRATION; INTRACAUDAL; PERINEURAL PRN
Status: DISCONTINUED | OUTPATIENT
Start: 2023-05-16 | End: 2023-05-16 | Stop reason: SDUPTHER

## 2023-05-16 RX ORDER — ACETAMINOPHEN 500 MG
1000 TABLET ORAL ONCE
Status: DISCONTINUED | OUTPATIENT
Start: 2023-05-16 | End: 2023-05-16

## 2023-05-16 RX ORDER — ISOSORBIDE MONONITRATE 30 MG/1
30 TABLET, EXTENDED RELEASE ORAL NIGHTLY
Status: DISCONTINUED | OUTPATIENT
Start: 2023-05-16 | End: 2023-05-16 | Stop reason: HOSPADM

## 2023-05-16 RX ORDER — RIVAROXABAN 2.5 MG/1
1 TABLET, FILM COATED ORAL 2 TIMES DAILY
COMMUNITY
Start: 2023-03-10

## 2023-05-16 RX ORDER — FENTANYL CITRATE 50 UG/ML
25 INJECTION, SOLUTION INTRAMUSCULAR; INTRAVENOUS EVERY 5 MIN PRN
Status: DISCONTINUED | OUTPATIENT
Start: 2023-05-16 | End: 2023-05-16 | Stop reason: HOSPADM

## 2023-05-16 RX ORDER — ROCURONIUM BROMIDE 10 MG/ML
INJECTION, SOLUTION INTRAVENOUS PRN
Status: DISCONTINUED | OUTPATIENT
Start: 2023-05-16 | End: 2023-05-16 | Stop reason: SDUPTHER

## 2023-05-16 RX ORDER — TRANEXAMIC ACID 100 MG/ML
INJECTION, SOLUTION INTRAVENOUS PRN
Status: DISCONTINUED | OUTPATIENT
Start: 2023-05-16 | End: 2023-05-16 | Stop reason: SDUPTHER

## 2023-05-16 RX ORDER — FENTANYL CITRATE 50 UG/ML
50 INJECTION, SOLUTION INTRAMUSCULAR; INTRAVENOUS EVERY 5 MIN PRN
Status: DISCONTINUED | OUTPATIENT
Start: 2023-05-16 | End: 2023-05-16 | Stop reason: HOSPADM

## 2023-05-16 RX ORDER — ONDANSETRON 4 MG/1
4 TABLET, ORALLY DISINTEGRATING ORAL EVERY 8 HOURS PRN
Status: DISCONTINUED | OUTPATIENT
Start: 2023-05-16 | End: 2023-05-16 | Stop reason: HOSPADM

## 2023-05-16 RX ORDER — METOPROLOL SUCCINATE 25 MG/1
25 TABLET, EXTENDED RELEASE ORAL 2 TIMES DAILY
Status: DISCONTINUED | OUTPATIENT
Start: 2023-05-16 | End: 2023-05-16 | Stop reason: HOSPADM

## 2023-05-16 RX ORDER — SODIUM CHLORIDE 0.9 % (FLUSH) 0.9 %
5-40 SYRINGE (ML) INJECTION PRN
Status: DISCONTINUED | OUTPATIENT
Start: 2023-05-16 | End: 2023-05-16 | Stop reason: HOSPADM

## 2023-05-16 RX ORDER — BISACODYL 5 MG/1
5 TABLET, DELAYED RELEASE ORAL DAILY
Status: DISCONTINUED | OUTPATIENT
Start: 2023-05-16 | End: 2023-05-16 | Stop reason: HOSPADM

## 2023-05-16 RX ORDER — HYDROCODONE BITARTRATE AND ACETAMINOPHEN 5; 325 MG/1; MG/1
2 TABLET ORAL EVERY 4 HOURS PRN
Status: DISCONTINUED | OUTPATIENT
Start: 2023-05-16 | End: 2023-05-16 | Stop reason: HOSPADM

## 2023-05-16 RX ORDER — ALBUTEROL SULFATE 90 UG/1
2 AEROSOL, METERED RESPIRATORY (INHALATION) EVERY 6 HOURS PRN
Status: DISCONTINUED | OUTPATIENT
Start: 2023-05-16 | End: 2023-05-16 | Stop reason: HOSPADM

## 2023-05-16 RX ORDER — PROPOFOL 10 MG/ML
INJECTION, EMULSION INTRAVENOUS PRN
Status: DISCONTINUED | OUTPATIENT
Start: 2023-05-16 | End: 2023-05-16 | Stop reason: SDUPTHER

## 2023-05-16 RX ORDER — SODIUM CHLORIDE 9 MG/ML
INJECTION, SOLUTION INTRAVENOUS CONTINUOUS
Status: DISCONTINUED | OUTPATIENT
Start: 2023-05-16 | End: 2023-05-16

## 2023-05-16 RX ORDER — ONDANSETRON 2 MG/ML
INJECTION INTRAMUSCULAR; INTRAVENOUS PRN
Status: DISCONTINUED | OUTPATIENT
Start: 2023-05-16 | End: 2023-05-16 | Stop reason: SDUPTHER

## 2023-05-16 RX ORDER — SODIUM CHLORIDE 0.9 % (FLUSH) 0.9 %
5-40 SYRINGE (ML) INJECTION EVERY 12 HOURS SCHEDULED
Status: DISCONTINUED | OUTPATIENT
Start: 2023-05-16 | End: 2023-05-16 | Stop reason: HOSPADM

## 2023-05-16 RX ORDER — HYDROCODONE BITARTRATE AND ACETAMINOPHEN 5; 325 MG/1; MG/1
1-2 TABLET ORAL EVERY 4 HOURS PRN
Qty: 50 TABLET | Refills: 0 | Status: SHIPPED | OUTPATIENT
Start: 2023-05-16 | End: 2023-05-23

## 2023-05-16 RX ORDER — OMEGA-3-ACID ETHYL ESTERS 1 G/1
1 CAPSULE, LIQUID FILLED ORAL 2 TIMES DAILY
Status: DISCONTINUED | OUTPATIENT
Start: 2023-05-16 | End: 2023-05-16 | Stop reason: HOSPADM

## 2023-05-16 RX ORDER — PHENYLEPHRINE HCL IN 0.9% NACL 1 MG/10 ML
SYRINGE (ML) INTRAVENOUS PRN
Status: DISCONTINUED | OUTPATIENT
Start: 2023-05-16 | End: 2023-05-16 | Stop reason: SDUPTHER

## 2023-05-16 RX ORDER — DIPHENHYDRAMINE HYDROCHLORIDE 50 MG/ML
12.5 INJECTION INTRAMUSCULAR; INTRAVENOUS
Status: DISCONTINUED | OUTPATIENT
Start: 2023-05-16 | End: 2023-05-16 | Stop reason: HOSPADM

## 2023-05-16 RX ORDER — LIDOCAINE HYDROCHLORIDE 10 MG/ML
1 INJECTION, SOLUTION EPIDURAL; INFILTRATION; INTRACAUDAL; PERINEURAL
Status: DISCONTINUED | OUTPATIENT
Start: 2023-05-16 | End: 2023-05-16 | Stop reason: HOSPADM

## 2023-05-16 RX ORDER — LISINOPRIL 5 MG/1
5 TABLET ORAL DAILY
Status: DISCONTINUED | OUTPATIENT
Start: 2023-05-16 | End: 2023-05-16 | Stop reason: HOSPADM

## 2023-05-16 RX ORDER — SODIUM CHLORIDE, SODIUM LACTATE, POTASSIUM CHLORIDE, CALCIUM CHLORIDE 600; 310; 30; 20 MG/100ML; MG/100ML; MG/100ML; MG/100ML
INJECTION, SOLUTION INTRAVENOUS CONTINUOUS
Status: DISCONTINUED | OUTPATIENT
Start: 2023-05-16 | End: 2023-05-16

## 2023-05-16 RX ORDER — KETAMINE HCL IN NACL, ISO-OSM 100MG/10ML
SYRINGE (ML) INJECTION PRN
Status: DISCONTINUED | OUTPATIENT
Start: 2023-05-16 | End: 2023-05-16 | Stop reason: SDUPTHER

## 2023-05-16 RX ORDER — PANTOPRAZOLE SODIUM 40 MG/1
40 TABLET, DELAYED RELEASE ORAL NIGHTLY
Status: DISCONTINUED | OUTPATIENT
Start: 2023-05-16 | End: 2023-05-16 | Stop reason: HOSPADM

## 2023-05-16 RX ORDER — ONDANSETRON 2 MG/ML
4 INJECTION INTRAMUSCULAR; INTRAVENOUS
Status: DISCONTINUED | OUTPATIENT
Start: 2023-05-16 | End: 2023-05-16 | Stop reason: HOSPADM

## 2023-05-16 RX ORDER — ATORVASTATIN CALCIUM 80 MG/1
80 TABLET, FILM COATED ORAL NIGHTLY
Status: DISCONTINUED | OUTPATIENT
Start: 2023-05-16 | End: 2023-05-16 | Stop reason: HOSPADM

## 2023-05-16 RX ORDER — HYDROXYZINE HYDROCHLORIDE 10 MG/1
10 TABLET, FILM COATED ORAL EVERY 8 HOURS PRN
Status: DISCONTINUED | OUTPATIENT
Start: 2023-05-16 | End: 2023-05-16 | Stop reason: HOSPADM

## 2023-05-16 RX ORDER — SODIUM CHLORIDE 9 MG/ML
INJECTION, SOLUTION INTRAVENOUS CONTINUOUS
Status: DISCONTINUED | OUTPATIENT
Start: 2023-05-16 | End: 2023-05-16 | Stop reason: HOSPADM

## 2023-05-16 RX ORDER — ONDANSETRON 4 MG/1
4 TABLET, FILM COATED ORAL EVERY 6 HOURS PRN
Qty: 30 TABLET | Refills: 1
Start: 2023-05-16

## 2023-05-16 RX ORDER — SERTRALINE HYDROCHLORIDE 100 MG/1
100 TABLET, FILM COATED ORAL DAILY
Status: DISCONTINUED | OUTPATIENT
Start: 2023-05-16 | End: 2023-05-16 | Stop reason: HOSPADM

## 2023-05-16 RX ORDER — HYDROCODONE BITARTRATE AND ACETAMINOPHEN 5; 325 MG/1; MG/1
1 TABLET ORAL EVERY 4 HOURS PRN
Status: DISCONTINUED | OUTPATIENT
Start: 2023-05-16 | End: 2023-05-16 | Stop reason: HOSPADM

## 2023-05-16 RX ORDER — ACETAMINOPHEN 325 MG/1
650 TABLET ORAL EVERY 6 HOURS SCHEDULED
Status: DISCONTINUED | OUTPATIENT
Start: 2023-05-16 | End: 2023-05-16 | Stop reason: HOSPADM

## 2023-05-16 RX ORDER — FENTANYL CITRATE 50 UG/ML
INJECTION, SOLUTION INTRAMUSCULAR; INTRAVENOUS PRN
Status: DISCONTINUED | OUTPATIENT
Start: 2023-05-16 | End: 2023-05-16 | Stop reason: SDUPTHER

## 2023-05-16 RX ORDER — ONDANSETRON 2 MG/ML
4 INJECTION INTRAMUSCULAR; INTRAVENOUS EVERY 6 HOURS PRN
Status: DISCONTINUED | OUTPATIENT
Start: 2023-05-16 | End: 2023-05-16 | Stop reason: HOSPADM

## 2023-05-16 RX ORDER — ASPIRIN 81 MG/1
81 TABLET ORAL NIGHTLY
Status: DISCONTINUED | OUTPATIENT
Start: 2023-05-16 | End: 2023-05-16 | Stop reason: HOSPADM

## 2023-05-16 RX ORDER — EPHEDRINE SULFATE/0.9% NACL/PF 50 MG/5 ML
SYRINGE (ML) INTRAVENOUS PRN
Status: DISCONTINUED | OUTPATIENT
Start: 2023-05-16 | End: 2023-05-16 | Stop reason: SDUPTHER

## 2023-05-16 RX ORDER — BUDESONIDE AND FORMOTEROL FUMARATE DIHYDRATE 160; 4.5 UG/1; UG/1
2 AEROSOL RESPIRATORY (INHALATION) 2 TIMES DAILY
Status: DISCONTINUED | OUTPATIENT
Start: 2023-05-16 | End: 2023-05-16 | Stop reason: HOSPADM

## 2023-05-16 RX ORDER — ISOSORBIDE MONONITRATE 30 MG/1
1 TABLET, EXTENDED RELEASE ORAL NIGHTLY
COMMUNITY
Start: 2023-04-25

## 2023-05-16 RX ORDER — SODIUM CHLORIDE 9 MG/ML
INJECTION, SOLUTION INTRAVENOUS PRN
Status: DISCONTINUED | OUTPATIENT
Start: 2023-05-16 | End: 2023-05-16 | Stop reason: HOSPADM

## 2023-05-16 RX ORDER — HYDROMORPHONE HCL 110MG/55ML
PATIENT CONTROLLED ANALGESIA SYRINGE INTRAVENOUS PRN
Status: DISCONTINUED | OUTPATIENT
Start: 2023-05-16 | End: 2023-05-16 | Stop reason: SDUPTHER

## 2023-05-16 RX ORDER — DOCUSATE SODIUM 100 MG/1
100 CAPSULE, LIQUID FILLED ORAL 2 TIMES DAILY
Qty: 30 CAPSULE | Refills: 0
Start: 2023-05-16

## 2023-05-16 RX ORDER — DEXAMETHASONE SODIUM PHOSPHATE 10 MG/ML
INJECTION, SOLUTION INTRAMUSCULAR; INTRAVENOUS PRN
Status: DISCONTINUED | OUTPATIENT
Start: 2023-05-16 | End: 2023-05-16 | Stop reason: SDUPTHER

## 2023-05-16 RX ORDER — KETOROLAC TROMETHAMINE 15 MG/ML
15 INJECTION, SOLUTION INTRAMUSCULAR; INTRAVENOUS EVERY 6 HOURS
Status: DISCONTINUED | OUTPATIENT
Start: 2023-05-16 | End: 2023-05-16 | Stop reason: HOSPADM

## 2023-05-16 RX ORDER — MIDAZOLAM HYDROCHLORIDE 1 MG/ML
INJECTION INTRAMUSCULAR; INTRAVENOUS PRN
Status: DISCONTINUED | OUTPATIENT
Start: 2023-05-16 | End: 2023-05-16 | Stop reason: SDUPTHER

## 2023-05-16 RX ORDER — GABAPENTIN 100 MG/1
100 CAPSULE ORAL 2 TIMES DAILY
Status: DISCONTINUED | OUTPATIENT
Start: 2023-05-16 | End: 2023-05-16 | Stop reason: HOSPADM

## 2023-05-16 RX ORDER — CELECOXIB 200 MG/1
200 CAPSULE ORAL ONCE
Status: DISCONTINUED | OUTPATIENT
Start: 2023-05-16 | End: 2023-05-16

## 2023-05-16 RX ADMIN — ONDANSETRON 4 MG: 2 INJECTION INTRAMUSCULAR; INTRAVENOUS at 09:50

## 2023-05-16 RX ADMIN — DEXAMETHASONE SODIUM PHOSPHATE 10 MG: 10 INJECTION, SOLUTION INTRAMUSCULAR; INTRAVENOUS at 07:36

## 2023-05-16 RX ADMIN — FENTANYL CITRATE 50 MCG: 50 INJECTION, SOLUTION INTRAMUSCULAR; INTRAVENOUS at 11:35

## 2023-05-16 RX ADMIN — SUGAMMADEX 200 MG: 100 INJECTION, SOLUTION INTRAVENOUS at 09:55

## 2023-05-16 RX ADMIN — Medication 100 MCG: at 07:35

## 2023-05-16 RX ADMIN — PROPOFOL 25 MCG/KG/MIN: 10 INJECTION, EMULSION INTRAVENOUS at 07:35

## 2023-05-16 RX ADMIN — HYDROMORPHONE HYDROCHLORIDE 0.5 MG: 2 INJECTION, SOLUTION INTRAMUSCULAR; INTRAVENOUS; SUBCUTANEOUS at 08:18

## 2023-05-16 RX ADMIN — Medication 10 MG: at 09:14

## 2023-05-16 RX ADMIN — SODIUM CHLORIDE: 9 INJECTION, SOLUTION INTRAVENOUS at 15:07

## 2023-05-16 RX ADMIN — FENTANYL CITRATE 100 MCG: 50 INJECTION INTRAMUSCULAR; INTRAVENOUS at 07:28

## 2023-05-16 RX ADMIN — Medication 100 MCG: at 07:33

## 2023-05-16 RX ADMIN — HYDROMORPHONE HYDROCHLORIDE 0.5 MG: 2 INJECTION, SOLUTION INTRAMUSCULAR; INTRAVENOUS; SUBCUTANEOUS at 09:02

## 2023-05-16 RX ADMIN — Medication 150 MCG: at 09:32

## 2023-05-16 RX ADMIN — LIDOCAINE HYDROCHLORIDE 80 MG: 20 INJECTION, SOLUTION EPIDURAL; INFILTRATION; INTRACAUDAL; PERINEURAL at 07:28

## 2023-05-16 RX ADMIN — TRANEXAMIC ACID 1000 MG: 100 INJECTION, SOLUTION INTRAVENOUS at 07:50

## 2023-05-16 RX ADMIN — Medication 100 MCG: at 07:40

## 2023-05-16 RX ADMIN — OMEGA-3-ACID ETHYL ESTERS 1 G: 1 CAPSULE, LIQUID FILLED ORAL at 14:52

## 2023-05-16 RX ADMIN — PROPOFOL 140 MG: 10 INJECTION, EMULSION INTRAVENOUS at 07:28

## 2023-05-16 RX ADMIN — Medication 15 MG: at 09:30

## 2023-05-16 RX ADMIN — Medication 100 MCG: at 07:37

## 2023-05-16 RX ADMIN — KETOROLAC TROMETHAMINE 15 MG: 15 INJECTION, SOLUTION INTRAMUSCULAR; INTRAVENOUS at 10:40

## 2023-05-16 RX ADMIN — TRANEXAMIC ACID 1000 MG: 100 INJECTION, SOLUTION INTRAVENOUS at 09:26

## 2023-05-16 RX ADMIN — MIDAZOLAM 2 MG: 1 INJECTION INTRAMUSCULAR; INTRAVENOUS at 07:19

## 2023-05-16 RX ADMIN — Medication 100 MCG: at 07:51

## 2023-05-16 RX ADMIN — VANCOMYCIN HYDROCHLORIDE 1250 MG: 5 INJECTION, POWDER, LYOPHILIZED, FOR SOLUTION INTRAVENOUS at 07:40

## 2023-05-16 RX ADMIN — ROCURONIUM BROMIDE 50 MG: 10 INJECTION, SOLUTION INTRAVENOUS at 07:28

## 2023-05-16 RX ADMIN — Medication 100 MCG: at 07:44

## 2023-05-16 RX ADMIN — Medication 15 MG: at 09:51

## 2023-05-16 RX ADMIN — HYDROCODONE BITARTRATE AND ACETAMINOPHEN 2 TABLET: 5; 325 TABLET ORAL at 14:51

## 2023-05-16 RX ADMIN — Medication 150 MCG: at 09:29

## 2023-05-16 RX ADMIN — SODIUM CHLORIDE, POTASSIUM CHLORIDE, SODIUM LACTATE AND CALCIUM CHLORIDE: 600; 310; 30; 20 INJECTION, SOLUTION INTRAVENOUS at 06:36

## 2023-05-16 RX ADMIN — Medication 10 MG: at 07:57

## 2023-05-16 RX ADMIN — SODIUM CHLORIDE, POTASSIUM CHLORIDE, SODIUM LACTATE AND CALCIUM CHLORIDE: 600; 310; 30; 20 INJECTION, SOLUTION INTRAVENOUS at 09:52

## 2023-05-16 RX ADMIN — FENTANYL CITRATE 50 MCG: 50 INJECTION, SOLUTION INTRAMUSCULAR; INTRAVENOUS at 10:54

## 2023-05-16 RX ADMIN — Medication 25 MG: at 07:35

## 2023-05-16 RX ADMIN — Medication 100 MCG: at 07:47

## 2023-05-16 ASSESSMENT — PAIN DESCRIPTION - DESCRIPTORS
DESCRIPTORS: SHOOTING;STABBING
DESCRIPTORS: ACHING;DULL;DISCOMFORT
DESCRIPTORS: SHARP;STABBING
DESCRIPTORS: PATIENT UNABLE TO DESCRIBE
DESCRIPTORS: ACHING;THROBBING

## 2023-05-16 ASSESSMENT — PAIN DESCRIPTION - ORIENTATION
ORIENTATION: LEFT

## 2023-05-16 ASSESSMENT — PAIN DESCRIPTION - PAIN TYPE
TYPE: SURGICAL PAIN

## 2023-05-16 ASSESSMENT — PAIN SCALES - GENERAL
PAINLEVEL_OUTOF10: 9
PAINLEVEL_OUTOF10: 6
PAINLEVEL_OUTOF10: 10
PAINLEVEL_OUTOF10: 10

## 2023-05-16 ASSESSMENT — PAIN - FUNCTIONAL ASSESSMENT
PAIN_FUNCTIONAL_ASSESSMENT: PREVENTS OR INTERFERES SOME ACTIVE ACTIVITIES AND ADLS
PAIN_FUNCTIONAL_ASSESSMENT: 0-10

## 2023-05-16 ASSESSMENT — PAIN DESCRIPTION - LOCATION
LOCATION: HIP

## 2023-05-16 ASSESSMENT — ENCOUNTER SYMPTOMS: SHORTNESS OF BREATH: 0

## 2023-05-16 ASSESSMENT — PAIN DESCRIPTION - ONSET: ONSET: ON-GOING

## 2023-05-16 ASSESSMENT — PAIN DESCRIPTION - FREQUENCY
FREQUENCY: CONTINUOUS

## 2023-05-16 NOTE — OP NOTE
Operative Note      Patient: Jourdan Pryor  YOB: 1955  MRN: [id number]    Date of Procedure: 5/16/2023    Pre-Op Diagnosis Codes:     * Primary osteoarthritis of left hip [M16.12]    Post-Op Diagnosis: Same       Procedure(s):  LEFT HIP TOTAL ARTHROPLASTY ANTERIOR APPROACH   Cruz Bradshaw    Surgeon(s):  Apolinar Freeman MD    Assistant:   Surgical Assistant: Shelton Nj  Resident: Beth Yuan DO    Anesthesia: General    Estimated Blood Loss (mL): 432     Complications: None    Specimens:   * No specimens in log *    Implants:  Implant Name Type Inv. Item Serial No.  Lot No. LRB No. Used Action   G7 FINNED 3 HOLE SHELL 48C - [id number]  G7 FINNED 3 HOLE SHELL 48C  [employer] [facility] [place] [id number] Left 1 Implanted   BONE SCREW 6.5X30 SELF-TAP - [id number]  BONE SCREW 6.5X30 SELF-TAP  [employer]Children[facility] [place] [id number] Left 1 Implanted   G7 DUAL MOBILITY LINER 38MM C - [id number]  G7 DUAL MOBILITY LINER 38MM C  EVY [employer] [facility] [place] [id number] Left 1 Implanted   STEM FEM SZ 11 L107. 5MM 133DEG HIP PPS HI OFFSET TYP 1 TAPR - [id number]  STEM FEM SZ 11 L107. 5MM 133DEG HIP PPS HI OFFSET TYP 1 TAPR  EVY [employer] [facility] [place] [id number] Left 1 Implanted   BEARING TIB 28X38 MM HIP VIVACIT-E - [id number]  BEARING TIB 28X38 MM HIP VIVACIT-E  EVY [employer] [facility] [place] [id number] Left 1 Implanted   HEAD FEM [id number] HIP BIOLOX DELT OPT FOR G7 ACET SYS - [id number]  HEAD FEM [id number] HIP BIOLOX DELT OPT FOR G7 ACET SYS  [employer]- [id number] Left 1 Implanted   IMPL HIP HEAD FEM BIOLOX -6 NECK TAPR - [id number] Hip IMPL HIP HEAD FEM BIOLOX -6 NECK TAPR  [employer] [id number] Left 1 Implanted         Drains: * No LDAs found *    Findings: Severe degenerative joint disease      Detailed Description of Procedure: The is a 51-year-old female with a longstanding history of left hip pain that is failed attempted conservative management.   The patient is elected to undergo an

## 2023-05-16 NOTE — PROGRESS NOTES
Occupational Therapy  Facility/Department: [facility] MED SURG  Occupational Therapy Initial Assessment    Name: Bakari Mai  : 1955  MRN: [id number]  Date of Service: 2023     L ANTONIO 2023      OT Equipment Recommendations  Equipment Needed: Yes  Mobility Devices: ADL Assistive Devices  ADL Assistive Devices: Long-handled Sponge    RN reports patient is medically stable for therapy treatment this date. Chart reviewed prior to treatment and patient is agreeable for therapy. All lines intact and patient positioned comfortably at end of treatment. All patient needs addressed prior to ending therapy session. Patient Diagnosis(es): The encounter diagnosis was Acute postoperative pain. Past Medical History:  has a past medical history of Anxiety, Arthritis, CAD (coronary artery disease), Chronic kidney disease, COPD (chronic obstructive pulmonary disease) ([facility] [address] Depression, Diverticulosis, GERD (gastroesophageal reflux disease), Heart attack ([facility] [address] Heart palpitations, History of blood transfusion, Hyperlipidemia, Hypertension, and PSVT (paroxysmal supraventricular tachycardia) ([facility] [address] Past Surgical History:  has a past surgical history that includes Appendectomy; Abdomen surgery; Small intestine surgery; Cholecystectomy; colectomy (); Upper gastrointestinal endoscopy (10/17/2014); Colonoscopy (2003); Colonoscopy (2010); Colonoscopy (10/17/2014); Tubal ligation; Carotid stent placement; Upper gastrointestinal endoscopy (N/A, 2019); and Cardiac surgery. Assessment   Performance deficits / Impairments: Decreased functional mobility ; Decreased ADL status; Decreased high-level IADLs;Decreased sensation  Assessment: Pt able to safely demo functional mob household distances this session in order to be safely d/c'd home same day. Pt able to verb and demo good understanding of education provided on RW safety,ANTONIO anterior hip precautions, and ADL techs.   Prognosis:
Ortho Face-to-Face Discussion of Medical Necessity for Use of Assistive Device after Joint Replacement Surgery    This patient was evaluated today and a DME order was entered for a wheeled walker because the patient requires this to successfully complete daily living tasks of ambulating. A wheeled walker is necessary due to the patient's unsteady gait, upper body weakness, and inability to  an ambulation device; and they can ambulate only by pushing a walker instead of a lesser assistive device such as a cane, crutch, or standard walker. The need for this equipment was discussed with the patient and they understand and are in agreement.
Orthopedic Coordinator Note    Patient s/p left total Hip replacement on 05/16/2023 WITH DR. Junito Torres.     The following appointments are currently scheduled:    Post-op with surgeon UNSURE    Physical Therapy COMPLETE CARE OPPT      Wheeled walker order is  entered  Face to face documentation is SIGNED, WRITER HAS FWW  AND WILL DELIVER TO PT POSTOP    DVT Prophylaxis: PT WILL RESUME 2.5MG XARELTO BID HOME MED      Any questions please contact Maryann Oneill RN, MSN  [phone]      Electronically signed by: Maryann Oneill RN on 5/16/2023 at 8:44 AM
Pt admitted to room 2103 per bed in stable condition from PACU  Oriented to room and surroundings  Bed in lowest position, wheels locked, 2/4 side rails up  Call light in reach, room free of clutter, adequate lighting provided  Denies any further questions at this time  Instructed to call out with any questions/concerns/new onset of pain and/or n/v   White board updated  Continue to monitor with hourly rounding  STAY WITH ME protocol initiated   Bed alarm on/Fall Risk signs in place/Fall risk sticker to wrist band  Non-skid socks on/at bedside
Pt discharged to home in stable condition with belongings  Discharge instructions given  Prescriptions at bedside picked up from the pharmacy by her daughter   Pt denies having any further questions at this time  Personal items given to patient at discharge  Patient/family state they have everything they were admitted with.   Hibiclens sent home with patient
(05/16/23 1500)          Tinneti Score       Goals  Short Term Goals  Time Frame for Short Term Goals: 2 visits  Short Term Goal 1: Inc bed-mobility & transfers to independent to enable pt to safely get in/OOB & return to PLOF & decrease risk for falls; Short Term Goal 2: Inc gait to amb 50ft or > indep with R/walker & WBAT LLE  Short Term Goal 3: Pt able to go up/down one platform step with WBAT LLE & close supervision  Short Term Goal 4: Pt Ed on ANTONIO ex's, precautions, safe functional mobility, transfers/gait with R/walker  circulation ex's, car transfers, prevention of sedentary complications, & issue written Pt ED       Education  Patient Education  Education Given To: Patient; Family  Education Provided: Role of Therapy; Fall Prevention Strategies;Transfer Training;Energy Conservation;Precautions  Education Provided Comments: Ed ANTONIO ex's Supine: quad sets, glut sets, hip Abd, heelslides, SAQ, ankle pumps,; Seated: LAQ, & safe functional mobility including ascending/descending steps, car transfers, sit to stand & stand to sit(with R/walker), & ambulation with R/walker(all with surgical precautions). Pt issued [facility] Pt ED printed handout with access code [email or web address]  Education Method: Demonstration;Verbal;Printed Information/Hand-outs  Education Outcome: Verbalized understanding;Demonstrated understanding      Therapy Time   Individual Concurrent Group Co-treatment   Time In 1347         Time Out 1500         Minutes 73+10=83             Additional 10 minutes for chart review    Treatment time: 76 minutes    Co-treatment with OT warranted first time up day of surgery. Cotx due to potential risk of decreased sensation, muscle control and proprioception from spinal epidural and/or regional block. Decreased safety and independence requiring 2 skilled therapy professionals to address individual discipline's goals.              [address], PT
01-Apr-2021 18:53

## 2023-05-16 NOTE — ANESTHESIA POSTPROCEDURE EVALUATION
Department of Anesthesiology  Postprocedure Note    Patient: Wong Gilmore  MRN: [id number]  YOB: 1955  Date of evaluation: 5/16/2023      Procedure Summary     Date: 05/16/23 Room / Location: [address] - INPATIENT    Anesthesia Start: 0719 Anesthesia Stop: 1012    Procedure: LEFT HIP TOTAL ARTHROPLASTY ANTERIOR APPROACH   Dafrannie Villatoro (Left: Hip) Diagnosis:       Primary osteoarthritis of left hip      (Primary osteoarthritis of left hip [M16.12])    Surgeons: Blayne Calderon MD Responsible Provider: Jaylon Martin MD    Anesthesia Type: general ASA Status: 4          Anesthesia Type: No value filed.     Bernard Phase I: Bernard Score: 8    Bernard Phase II:        Anesthesia Post Evaluation    Complications: no

## 2023-05-16 NOTE — DISCHARGE INSTRUCTIONS
ANY ORTHOPEDIC QUESTIONS OR ANY OTHER CONCERNS YOU MAY CALL THE ORTHOPEDIC COORDINATOR:  Demarcus Loco RN, MSN  [phone]  [email or web address]    DISCHARGE INSTRUCTIONS  Caring for yourself after joint replacement surgery (Total Hip and Total Knee Replacement)    Activity and Therapy  Receive physical therapy three times per week. (Pain medication one hour prior to therapy)   Perform PT exercises on own when not receiving home or outpatient PT. Ideally exercises should be at least two times a day. Increase level of activity and ambulation each day. Perform deep breathing exercises daily. Patient provides self-care when possible. Work on Range of motion for Total knee patients. No pillow under the knee for Total knee patients. Elevate the surgical leg when seated. No driving until cleared by surgeon      Diet:  Increase oral intake of fruits, fiber and water to prevent constipation. Drink fluids frequently and take stool softeners to aid in bowel motility. Increase protein intake/reduce high-sugar intake to help promote healing and prevent infection. Incision Care:  Keep Aquacel or other dressing intact until seen and removed by surgeon, unless saturated, in which case, call surgeon and request instructions. If dressing falls off, call surgeon. [facility] on in the am and off in the pm to reduce swelling. Ice affected area four times a day, for twenty minutes. Pain Medications and Anticoagulant  You have been place on an anticoagulant to prevent blood clots. Take this medication exactly as prescribed. Be alert for signs of bleeding. Take care not to injure yourself. You have been provided pain medicine to control your pain. Do not take more narcotics than prescribed. You may begin weaning from narcotics as your pain level improves by decreasing the amount or frequency of the narcotics. You may also take plain acetaminophen as an alternate to the narcotics.    Never exceed the

## 2023-05-16 NOTE — DISCHARGE INSTR - COC
kg)     Mental Status:  {IP PT MENTAL STATUS:}    IV Access:  { BEV IV ACCESS:174799268}    Nursing Mobility/ADLs:  Walking   {CHP DME UKUB:852738133}  Transfer  {CHP DME TMIB:836582821}  Bathing  {CHP DME ATLI:912476546}  Dressing  {CHP DME GGLA:715837311}  Toileting  {CHP DME HVIQ:904368199}  Feeding  {P DME LOLF:779596867}  Med Admin  {CHP DME KIEE:976778250}  Med Delivery   { BEV MED Delivery:703680653}    Wound Care Documentation and Therapy:        Elimination:  Continence: Bowel: {YES / KU:72210}  Bladder: {YES / OF:30591}  Urinary Catheter: {Urinary Catheter:388983650}   Colostomy/Ileostomy/Ileal Conduit: {YES / EO:54096}       Date of Last BM: ***  No intake or output data in the 24 hours ending 23 0711  No intake/output data recorded.     Safety Concerns:     [address] Safety Concerns:095346904}    Impairments/Disabilities:      [address] Impairments/Disabilities:842977774}    Nutrition Therapy:  Current Nutrition Therapy:   [address] Diet List:897991034}    Routes of Feeding: {[facility] DME Other Feedings:472191726}  Liquids: {Slp liquid thickness:13196}  Daily Fluid Restriction: {CHP DME Yes amt example:238792752}  Last Modified Barium Swallow with Video (Video Swallowing Test): {Done Not Done AFNN:399424785}    Treatments at the Time of Hospital Discharge:   Respiratory Treatments: ***  Oxygen Therapy:  {Therapy; copd oxygen:83318}  Ventilator:    {[facility] Vent LBOH:159875621}    Rehab Therapies: {THERAPEUTIC INTERVENTION:6822050485}  Weight Bearing Status/Restrictions: [address] [employer] Weight Bearin}  Other Medical Equipment (for information only, NOT a DME order):  {EQUIPMENT:792123154}  Other Treatments: ***    Patient's personal belongings (please select all that are sent with patient):  {[facility] DME Belongings:181265570}    RN SIGNATURE:  {Esignature:823305405}    CASE MANAGEMENT/SOCIAL WORK SECTION    Inpatient Status Date: ***    Readmission Risk Assessment Score:  Readmission Risk

## 2023-05-16 NOTE — H&P
Interval H&P Note    Pt Name: Ana Dale  MRN: [id number]  YOB: 1955  Date of evaluation: 5/16/2023      [x] I have reviewed the hard copy orthopedic progress note in  the paper chart by dr Danita Ashby dated 4/17/23 for an Interval History and Physical note. [x] I have examined  Lucy Gutierrez  There are no changes to the patient who is scheduled for LEFT HIP TOTAL ARTHROPLASTY ANTERIOR APPROACH   Stephanie Morales by Leonard Song MD for Primary osteoarthritis of left hip. The patient denies new health changes, fever, chills, wheezing, cough, increased SOB, chest pain, open sores or wounds. Vital signs: BP (!) 148/83   Pulse 78   Resp 18   SpO2 98%     Allergies:  Keflex [cephalexin], Atenolol, Codeine, Percocet [oxycodone-acetaminophen], Phenergan [promethazine hcl], Sodium hypochlorite, Erythromycin, and Prednisone    Cardiology clearance obtained by dr Timur Naidu  The pt does not have DM  The pt is not taking anticoagulation medications  The last dose of aspirin was on 5/9/23 the last dose of celebrex was on 5/9/23    Medications:    Prior to Admission medications    Medication Sig Start Date End Date Taking? Authorizing Provider   BREZTRI AEROSPHERE 160-9-4.8 MCG/ACT AERO Inhale 2 puffs into the lungs in the morning and at bedtime 4/4/23   Historical Provider, MD   albuterol sulfate HFA (PROVENTIL;VENTOLIN;PROAIR) 108 (90 Base) MCG/ACT inhaler Inhale 2 puffs into the lungs every 6 hours as needed for Wheezing    Historical Provider, MD   lisinopril (PRINIVIL;ZESTRIL) 5 MG tablet Take 1 tablet by mouth daily 5/31/22   Historical Provider, MD   metoprolol succinate (TOPROL XL) 25 MG extended release tablet Take 1 tablet by mouth in the morning and at bedtime    Historical Provider, MD   gabapentin (NEURONTIN) 100 MG capsule Take 1 capsule by mouth 2 times daily.     Historical Provider, MD   celecoxib (CELEBREX) 100 MG capsule Take 1 capsule by mouth daily    Historical Provider, MD   VASCEPA 1 g CAPS

## 2023-05-16 NOTE — PLAN OF CARE
Problem: Discharge Planning  Goal: Discharge to home or other facility with appropriate resources  5/16/2023 1809 by Tara Baez RN  Outcome: Adequate for Discharge  5/16/2023 1442 by Tara Baez RN  Outcome: Progressing  Flowsheets (Taken 5/16/2023 1330)  Discharge to home or other facility with appropriate resources: Identify barriers to discharge with patient and caregiver     Problem: Pain  Goal: Verbalizes/displays adequate comfort level or baseline comfort level  5/16/2023 1809 by Tara Baez RN  Outcome: Adequate for Discharge  5/16/2023 1442 by Tara Baez RN  Outcome: Progressing     Problem: Safety - Adult  Goal: Free from fall injury  5/16/2023 1809 by Tara Baez RN  Outcome: Adequate for Discharge  5/16/2023 1442 by Tara Baez RN  Outcome: Progressing     Problem: Infection - Adult  Goal: Absence of infection at discharge  5/16/2023 1809 by Tara Baez RN  Outcome: Adequate for Discharge  5/16/2023 1442 by Tara Baez RN  Outcome: Progressing  Flowsheets (Taken 5/16/2023 1330)  Absence of infection at discharge: Assess and monitor for signs and symptoms of infection     Problem: ABCDS Injury Assessment  Goal: Absence of physical injury  Outcome: Adequate for Discharge Problem: Patient Care Overview  Goal: Plan of Care Review  Outcome: Ongoing (interventions implemented as appropriate)  Patient in no apparent distress. Sat's  95 % on room air. PRN treatments not needed at this time . Will continue to monitor.

## 2023-05-16 NOTE — PLAN OF CARE
Problem: Discharge Planning  Goal: Discharge to home or other facility with appropriate resources  Outcome: Progressing  Flowsheets (Taken 5/16/2023 1330)  Discharge to home or other facility with appropriate resources: Identify barriers to discharge with patient and caregiver     Problem: Pain  Goal: Verbalizes/displays adequate comfort level or baseline comfort level  Outcome: Progressing     Problem: Safety - Adult  Goal: Free from fall injury  Outcome: Progressing     Problem: Infection - Adult  Goal: Absence of infection at discharge  Outcome: Progressing  Flowsheets (Taken 5/16/2023 1330)  Absence of infection at discharge: Assess and monitor for signs and symptoms of infection

## 2023-05-16 NOTE — ANESTHESIA PRE PROCEDURE
Department of Anesthesiology  Preprocedure Note       Name:  Tate Buckner   Age:  79 y.o.  :  1955                                          MRN:  [id number]         Date:  2023      Surgeon: Emerald Jacobson):  Pb Shahid MD    Procedure: Procedure(s):  LEFT HIP TOTAL ARTHROPLASTY ANTERIOR APPROACH   Roseanne Aid BIOMET    Medications prior to admission:   Prior to Admission medications    Medication Sig Start Date End Date Taking? Authorizing Provider   XARELTO 2.5 MG TABS tablet Take 1 tablet by mouth 2 times daily 3/10/23   Historical Provider, MD   mupirocin (BACTROBAN) 2 % ointment Apply 2 application topically in the morning and at bedtime Nasal for + MRSA. 23   Historical Provider, MD   isosorbide mononitrate (IMDUR) 30 MG extended release tablet Take 1 tablet by mouth at bedtime 23   Historical Provider, MD Shruthi White 160-9-4.8 MCG/ACT AERO Inhale 2 puffs into the lungs in the morning and at bedtime 23   Historical Provider, MD   albuterol sulfate HFA (PROVENTIL;VENTOLIN;PROAIR) 108 (90 Base) MCG/ACT inhaler Inhale 2 puffs into the lungs every 6 hours as needed for Wheezing    Historical Provider, MD   lisinopril (PRINIVIL;ZESTRIL) 5 MG tablet Take 1 tablet by mouth daily 22   Historical Provider, MD   metoprolol succinate (TOPROL XL) 25 MG extended release tablet Take 1 tablet by mouth in the morning and at bedtime    Historical Provider, MD   gabapentin (NEURONTIN) 100 MG capsule Take 1 capsule by mouth 2 times daily.     Historical Provider, MD   celecoxib (CELEBREX) 100 MG capsule Take 1 capsule by mouth daily    Historical Provider, MD   VASCEPA 1 g CAPS capsule Take 2 capsules by mouth 2 times daily  22   Historical Provider, MD   sodium bicarbonate 650 MG tablet Take 1 tablet by mouth 2 times daily    Historical Provider, MD   atorvastatin (LIPITOR) 80 MG tablet Take 1 tablet by mouth at bedtime    Historical Provider, MD   sertraline (ZOLOFT) 100 MG tablet Take 1

## 2023-05-17 NOTE — ED PROVIDER NOTES
[address] [place] [address] ED  eMERGENCY dEPARTMENT eNCOUnter      Pt Name: Henry Galindo  MRN: [id number]  [place] 1955  Date of evaluation: 3/22/2019  Provider: Asher Tong MD    [address] [place] [address]       Chief Complaint   Patient presents with    Abdominal Pain    Nausea    Fatigue         HISTORY OF PRESENT ILLNESS  (Location/Symptom, Timing/Onset, Context/Setting, Quality, Duration, Modifying Factors, Severity.)   Henry Galindo is a 61 y.o. female who presents to the emergency department for a chief complaint of abdominal pain. It started yesterday and it's continuous and mostly across the lower abdomen. She rates it as a 9. She is worried she might have a small bowel obstruction. She's been having diarrhea, watery and very frequent. No blood in it and no vomiting. Nursing Notes were reviewed. ALLERGIES     Keflex [cephalexin]; Atenolol; Codeine; Dilaudid [hydromorphone hcl]; Other; Percocet [oxycodone-acetaminophen]; Phenergan [promethazine hcl]; Sodium hypochlorite; Erythromycin; and Prednisone    CURRENT MEDICATIONS       Previous Medications    ALBUTEROL IN    Inhale 1 vial into the lungs daily as needed (exacerbation) Nebulized solution in nebulizer.     ALBUTEROL-IPRATROPIUM (COMBIVENT RESPIMAT)  MCG/ACT AERS INHALER    Inhale 1 puff into the lungs 3 times daily (patient states to be only using as needed due to inconvenience 9/27/18)    ASPIR-LOW 81 MG EC TABLET    Take 81 mg by mouth daily    ATORVASTATIN (LIPITOR) 80 MG TABLET    Take 80 mg by mouth daily    ISOSORBIDE MONONITRATE (IMDUR) 30 MG EXTENDED RELEASE TABLET    Take 30 mg by mouth daily    LISINOPRIL (PRINIVIL;ZESTRIL) 2.5 MG TABLET    Take 2.5 mg by mouth nightly    METOPROLOL SUCCINATE (TOPROL XL) 25 MG EXTENDED RELEASE TABLET    Take 25 mg by mouth daily    PANTOPRAZOLE (PROTONIX) 40 MG TABLET    Take 40 mg by mouth daily    RANITIDINE HCL (ZANTAC 150 MAXIMUM STRENGTH PO)    Take by mouth    SERTRALINE (ZOLOFT) 50 MG TABLET    Take 1.5 tablets by mouth daily Takes 1.5 tabs (=75mg) daily    SODIUM BICARBONATE 650 MG TABLET    Take 1 tablet by mouth 2 times daily    TICAGRELOR (BRILINTA) 90 MG TABS TABLET    Take 90 mg by mouth 2 times daily        PAST MEDICAL HISTORY         Diagnosis Date    Anxiety     Arthritis     CAD (coronary artery disease)     MI.   9 stents total    Chronic kidney disease     COPD (chronic obstructive pulmonary disease) (HCC)     Depression     Diverticulosis     GERD (gastroesophageal reflux disease)     Heart attack ([facility] [address] 05/2018    Heart palpitations     History of blood transfusion     Hyperlipidemia     Hypertension     PSVT (paroxysmal supraventricular tachycardia) ([facility] [address]        SURGICAL HISTORY           Procedure Laterality Date    ABDOMEN SURGERY      APPENDECTOMY      CAROTID STENT PLACEMENT      CHOLECYSTECTOMY      COLECTOMY  2003    COLONOSCOPY  7/30/2003    Dr Birdie Adorno diverticulosis and signs of diverticulitis    COLONOSCOPY  5/21/2010    stricture at about  35 cm from the anus. Could not advance even gastroscope    COLONOSCOPY  10/17/2014    small bowel and rectum normal    SMALL INTESTINE SURGERY      entire large intestine removed, entire small intestine intact    TUBAL LIGATION      UPPER GASTROINTESTINAL ENDOSCOPY  10/17/2014    normal         FAMILY HISTORY           Problem Relation Age of Onset    Cancer Mother     Heart Disease Father      Family Status   Relation Name Status    Mother  (Not Specified)    Father  (Not Specified)        SOCIAL HISTORY      reports that she has been smoking cigarettes. She has a 40.00 pack-year smoking history. She has never used smokeless tobacco. She reports that she does not drink alcohol or use drugs. REVIEW OF SYSTEMS    (2-9 systems for level 4, 10 or more for level 5)     Review of Systems   Constitutional: Negative for chills, fatigue and fever.    HENT: Negative for congestion, ear discharge and facial swelling. Eyes: Negative for discharge and redness. Respiratory: Negative for cough and shortness of breath. Cardiovascular: Negative for chest pain. Gastrointestinal: Positive for abdominal pain and diarrhea. Negative for constipation and vomiting. Genitourinary: Negative for dysuria and hematuria. Musculoskeletal: Negative for arthralgias. Skin: Negative for color change and rash. Neurological: Negative for syncope, numbness and headaches. Hematological: Negative for adenopathy. Psychiatric/Behavioral: Negative for confusion. The patient is not nervous/anxious. Except as noted above the remainder of the review of systems was reviewed and negative. PHYSICAL EXAM    (up to 7 for level 4, 8 or more for level 5)     Vitals:    03/22/19 0901   BP: 105/63   Pulse: 88   Resp: 20   Temp: 97.7 °F (36.5 °C)   TempSrc: Oral   SpO2: 100%   Weight: 180 lb (81.6 kg)   Height: 5' 3\" (1.6 m)       Physical Exam   Constitutional: She is oriented to person, place, and time. She appears well-developed and well-nourished. No distress. HENT:   Head: Normocephalic and atraumatic. Eyes: Right eye exhibits no discharge. Left eye exhibits no discharge. No scleral icterus. Neck: Neck supple. Cardiovascular: Normal rate and regular rhythm. Pulmonary/Chest: Effort normal and breath sounds normal. No stridor. No respiratory distress. She has no wheezes. She has no rales. Abdominal: Soft. She exhibits no distension. There is tenderness. Bowel sounds are hypoactive. She has diffuse tenderness primarily across the lower abdomen. Musculoskeletal: Normal range of motion. Lymphadenopathy:     She has no cervical adenopathy. Neurological: She is alert and oriented to person, place, and time. Skin: Skin is warm and dry. No rash noted. She is not diaphoretic. No erythema. Psychiatric: She has a normal mood and affect.  Her behavior is normal.           DIAGNOSTIC RESULTS     EKG: All EKG's are No evidence of bowel obstruction on x-rays. Treatment diagnosis and disposition were discussed with the patient. CONSULTS:  IP CONSULT TO HOSPITALIST    PROCEDURES:  None    FINAL IMPRESSION      1. Elevated serum creatinine    2. Diarrhea, unspecified type          DISPOSITION/PLAN   DISPOSITION Decision To Admit 03/22/2019 10:24:12 AM      PATIENT REFERRED TO:   No follow-up provider specified.     DISCHARGE MEDICATIONS:     New Prescriptions    No medications on file         (Please note that portions of this note were completed with a voice recognition program.  Efforts were made to edit the dictations but occasionally words are mis-transcribed.)    Asher Tong MD  Attending Emergency Physician           Asher Tong MD  03/22/19 5684 Clear bilaterally, pupils equal, round and reactive to light.

## 2023-05-20 ENCOUNTER — APPOINTMENT (OUTPATIENT)
Dept: GENERAL RADIOLOGY | Age: 68
End: 2023-05-20
Payer: MEDICARE

## 2023-05-20 ENCOUNTER — HOSPITAL ENCOUNTER (EMERGENCY)
Age: 68
Discharge: HOME | End: 2023-05-20
Attending: EMERGENCY MEDICINE
Payer: MEDICARE

## 2023-05-20 VITALS
HEART RATE: 80 BPM | BODY MASS INDEX: 33.29 KG/M2 | WEIGHT: 195 LBS | RESPIRATION RATE: 16 BRPM | TEMPERATURE: 97.6 F | HEIGHT: 64 IN | DIASTOLIC BLOOD PRESSURE: 61 MMHG | SYSTOLIC BLOOD PRESSURE: 109 MMHG | OXYGEN SATURATION: 98 %

## 2023-05-20 DIAGNOSIS — M25.552 LEFT HIP PAIN: Primary | ICD-10-CM

## 2023-05-20 LAB
ANION GAP SERPL CALCULATED.3IONS-SCNC: 10 MMOL/L (ref 9–17)
BASOPHILS # BLD: 0.04 K/UL (ref 0–0.2)
BASOPHILS NFR BLD: 1 % (ref 0–2)
BUN SERPL-MCNC: 21 MG/DL (ref 8–23)
BUN/CREAT SERPL: 16 (ref 9–20)
CALCIUM SERPL-MCNC: 8.7 MG/DL (ref 8.6–10.4)
CHLORIDE SERPL-SCNC: 105 MMOL/L (ref 98–107)
CO2 SERPL-SCNC: 22 MMOL/L (ref 20–31)
CREAT SERPL-MCNC: 1.33 MG/DL (ref 0.5–0.9)
EOSINOPHIL # BLD: 0.4 K/UL (ref 0–0.44)
EOSINOPHILS RELATIVE PERCENT: 5 % (ref 1–4)
ERYTHROCYTE [DISTWIDTH] IN BLOOD BY AUTOMATED COUNT: 14.8 % (ref 11.8–14.4)
GFR SERPL CREATININE-BSD FRML MDRD: 44 ML/MIN/1.73M2
GLUCOSE SERPL-MCNC: 101 MG/DL (ref 70–99)
HCT VFR BLD AUTO: 29.5 % (ref 36.3–47.1)
HGB BLD-MCNC: 9.4 G/DL (ref 11.9–15.1)
IMM GRANULOCYTES # BLD AUTO: 0.05 K/UL (ref 0–0.3)
IMM GRANULOCYTES NFR BLD: 1 %
LYMPHOCYTES # BLD: 17 % (ref 24–43)
LYMPHOCYTES NFR BLD: 1.45 K/UL (ref 1.1–3.7)
MCH RBC QN AUTO: 28.5 PG (ref 25.2–33.5)
MCHC RBC AUTO-ENTMCNC: 31.9 G/DL (ref 28.4–34.8)
MCV RBC AUTO: 89.4 FL (ref 82.6–102.9)
MONOCYTES NFR BLD: 0.48 K/UL (ref 0.1–1.2)
MONOCYTES NFR BLD: 6 % (ref 3–12)
NEUTROPHILS NFR BLD: 70 % (ref 36–65)
NEUTS SEG NFR BLD: 6.05 K/UL (ref 1.5–8.1)
NRBC AUTOMATED: 0 PER 100 WBC
PLATELET # BLD AUTO: 223 K/UL (ref 138–453)
PMV BLD AUTO: 10.4 FL (ref 8.1–13.5)
POTASSIUM SERPL-SCNC: 4.2 MMOL/L (ref 3.7–5.3)
RBC # BLD AUTO: 3.3 M/UL (ref 3.95–5.11)
RBC # BLD: ABNORMAL 10*6/UL
SODIUM SERPL-SCNC: 137 MMOL/L (ref 135–144)
WBC OTHER # BLD: 8.5 K/UL (ref 3.5–11.3)

## 2023-05-20 PROCEDURE — 96374 THER/PROPH/DIAG INJ IV PUSH: CPT

## 2023-05-20 PROCEDURE — 99284 EMERGENCY DEPT VISIT MOD MDM: CPT

## 2023-05-20 PROCEDURE — 96375 TX/PRO/DX INJ NEW DRUG ADDON: CPT

## 2023-05-20 PROCEDURE — 80048 BASIC METABOLIC PNL TOTAL CA: CPT

## 2023-05-20 PROCEDURE — 85025 COMPLETE CBC W/AUTO DIFF WBC: CPT

## 2023-05-20 PROCEDURE — 96376 TX/PRO/DX INJ SAME DRUG ADON: CPT

## 2023-05-20 PROCEDURE — 6360000002 HC RX W HCPCS: Performed by: EMERGENCY MEDICINE

## 2023-05-20 PROCEDURE — 93971 EXTREMITY STUDY: CPT

## 2023-05-20 PROCEDURE — 73502 X-RAY EXAM HIP UNI 2-3 VIEWS: CPT

## 2023-05-20 RX ORDER — KETOROLAC TROMETHAMINE 15 MG/ML
15 INJECTION, SOLUTION INTRAMUSCULAR; INTRAVENOUS ONCE
Status: COMPLETED | OUTPATIENT
Start: 2023-05-20 | End: 2023-05-20

## 2023-05-20 RX ORDER — ONDANSETRON 2 MG/ML
4 INJECTION INTRAMUSCULAR; INTRAVENOUS ONCE
Status: COMPLETED | OUTPATIENT
Start: 2023-05-20 | End: 2023-05-20

## 2023-05-20 RX ORDER — ONDANSETRON 4 MG/1
4 TABLET, ORALLY DISINTEGRATING ORAL 3 TIMES DAILY PRN
Qty: 21 TABLET | Refills: 0 | Status: SHIPPED | OUTPATIENT
Start: 2023-05-20

## 2023-05-20 RX ORDER — OXYCODONE HYDROCHLORIDE AND ACETAMINOPHEN 5; 325 MG/1; MG/1
2 TABLET ORAL EVERY 6 HOURS PRN
Qty: 30 TABLET | Refills: 0 | Status: ON HOLD
Start: 2023-05-20 | End: 2023-05-27 | Stop reason: HOSPADM

## 2023-05-20 RX ADMIN — ONDANSETRON 4 MG: 2 INJECTION INTRAMUSCULAR; INTRAVENOUS at 09:56

## 2023-05-20 RX ADMIN — HYDROMORPHONE HYDROCHLORIDE 0.5 MG: 1 INJECTION, SOLUTION INTRAMUSCULAR; INTRAVENOUS; SUBCUTANEOUS at 09:49

## 2023-05-20 RX ADMIN — HYDROMORPHONE HYDROCHLORIDE 0.5 MG: 1 INJECTION, SOLUTION INTRAMUSCULAR; INTRAVENOUS; SUBCUTANEOUS at 11:06

## 2023-05-20 RX ADMIN — KETOROLAC TROMETHAMINE 15 MG: 15 INJECTION, SOLUTION INTRAMUSCULAR; INTRAVENOUS at 11:21

## 2023-05-20 ASSESSMENT — ENCOUNTER SYMPTOMS
COUGH: 0
EYE REDNESS: 0
COLOR CHANGE: 0
NAUSEA: 0
VOMITING: 0
DIARRHEA: 0
SORE THROAT: 0
SHORTNESS OF BREATH: 0
RHINORRHEA: 0
EYE DISCHARGE: 0

## 2023-05-20 ASSESSMENT — PAIN SCALES - GENERAL
PAINLEVEL_OUTOF10: 5
PAINLEVEL_OUTOF10: 10

## 2023-05-20 NOTE — ED PROVIDER NOTES
at bedtime    BURKE AEROSPHERE 160-9-4.8 MCG/ACT AERO    Inhale 2 puffs into the lungs in the morning and at bedtime    CELECOXIB (CELEBREX) 100 MG CAPSULE    Take 1 capsule by mouth daily    DOCUSATE SODIUM (COLACE) 100 MG CAPSULE    Take 1 capsule by mouth 2 times daily    GABAPENTIN (NEURONTIN) 100 MG CAPSULE    Take 1 capsule by mouth 2 times daily. HYDROCODONE-ACETAMINOPHEN (NORCO) 5-325 MG PER TABLET    Take 1-2 tablets by mouth every 4 hours as needed for Pain for up to 7 days. Max Daily Amount: 12 tablets    ISOSORBIDE MONONITRATE (IMDUR) 30 MG EXTENDED RELEASE TABLET    Take 1 tablet by mouth at bedtime    LISINOPRIL (PRINIVIL;ZESTRIL) 5 MG TABLET    Take 1 tablet by mouth daily    METOPROLOL SUCCINATE (TOPROL XL) 25 MG EXTENDED RELEASE TABLET    Take 1 tablet by mouth in the morning and at bedtime    MUPIROCIN (BACTROBAN) 2 % OINTMENT    Apply 2 application topically in the morning and at bedtime Nasal for + MRSA. ONDANSETRON (ZOFRAN) 4 MG TABLET    Take 1 tablet by mouth every 6 hours as needed for Nausea    PANTOPRAZOLE (PROTONIX) 40 MG TABLET    Take 1 tablet by mouth at bedtime    SERTRALINE (ZOLOFT) 100 MG TABLET    Take 1 tablet by mouth daily    SODIUM BICARBONATE 650 MG TABLET    Take 1 tablet by mouth 2 times daily    VASCEPA 1 G CAPS CAPSULE    Take 2 capsules by mouth 2 times daily     XARELTO 2.5 MG TABS TABLET    Take 1 tablet by mouth 2 times daily     ALLERGIES     is allergic to keflex [cephalexin], atenolol, codeine, percocet [oxycodone-acetaminophen], phenergan [promethazine hcl], sodium hypochlorite, erythromycin, and prednisone. FAMILY HISTORY     She indicated that the status of her mother is unknown. She indicated that the status of her father is unknown.      SOCIAL HISTORY       Social History     Tobacco Use    Smoking status: Every Day     Packs/day: 0.25     Years: 40.00     Pack years: 10.00     Types: Cigarettes    Smokeless tobacco: Never   Vaping Use    Vaping Use:

## 2023-05-22 ENCOUNTER — HOSPITAL ENCOUNTER (INPATIENT)
Age: 68
LOS: 2 days | Discharge: HOME | DRG: 478 | End: 2023-05-27
Attending: EMERGENCY MEDICINE | Admitting: INTERNAL MEDICINE
Payer: MEDICARE

## 2023-05-22 ENCOUNTER — APPOINTMENT (OUTPATIENT)
Dept: GENERAL RADIOLOGY | Age: 68
DRG: 478 | End: 2023-05-22
Payer: MEDICARE

## 2023-05-22 ENCOUNTER — APPOINTMENT (OUTPATIENT)
Dept: CT IMAGING | Age: 68
DRG: 478 | End: 2023-05-22
Payer: MEDICARE

## 2023-05-22 DIAGNOSIS — N13.30 HYDRONEPHROSIS, UNSPECIFIED HYDRONEPHROSIS TYPE: ICD-10-CM

## 2023-05-22 DIAGNOSIS — M25.552 LEFT HIP PAIN: Primary | ICD-10-CM

## 2023-05-22 DIAGNOSIS — M62.89 MASS OF PSOAS MUSCLE: ICD-10-CM

## 2023-05-22 DIAGNOSIS — M79.81 NONTRAUMATIC PSOAS HEMATOMA: ICD-10-CM

## 2023-05-22 DIAGNOSIS — M16.12 PRIMARY LOCALIZED OSTEOARTHRITIS OF LEFT HIP: ICD-10-CM

## 2023-05-22 DIAGNOSIS — R26.2 UNABLE TO AMBULATE: ICD-10-CM

## 2023-05-22 PROBLEM — D62 ACUTE BLOOD LOSS ANEMIA: Status: ACTIVE | Noted: 2023-05-22

## 2023-05-22 PROBLEM — M54.9 INTRACTABLE BACK PAIN: Status: ACTIVE | Noted: 2023-05-22

## 2023-05-22 LAB
ANION GAP SERPL CALCULATED.3IONS-SCNC: 10 MMOL/L (ref 9–17)
BASOPHILS # BLD: 0.03 K/UL (ref 0–0.2)
BASOPHILS NFR BLD: 1 % (ref 0–2)
BUN SERPL-MCNC: 21 MG/DL (ref 8–23)
BUN/CREAT SERPL: 19 (ref 9–20)
CALCIUM SERPL-MCNC: 6.9 MG/DL (ref 8.6–10.4)
CHLORIDE SERPL-SCNC: 114 MMOL/L (ref 98–107)
CO2 SERPL-SCNC: 17 MMOL/L (ref 20–31)
CREAT SERPL-MCNC: 1.13 MG/DL (ref 0.5–0.9)
EOSINOPHIL # BLD: 0.24 K/UL (ref 0–0.44)
EOSINOPHILS RELATIVE PERCENT: 5 % (ref 1–4)
ERYTHROCYTE [DISTWIDTH] IN BLOOD BY AUTOMATED COUNT: 14.9 % (ref 11.8–14.4)
GFR SERPL CREATININE-BSD FRML MDRD: 53 ML/MIN/1.73M2
GLUCOSE SERPL-MCNC: 74 MG/DL (ref 70–99)
HCT VFR BLD AUTO: 22.6 % (ref 36.3–47.1)
HCT VFR BLD AUTO: 28.1 % (ref 36.3–47.1)
HCT VFR BLD AUTO: 32.3 % (ref 36.3–47.1)
HGB BLD-MCNC: 10.3 G/DL (ref 11.9–15.1)
HGB BLD-MCNC: 6.7 G/DL (ref 11.9–15.1)
HGB BLD-MCNC: 9 G/DL (ref 11.9–15.1)
IMM GRANULOCYTES # BLD AUTO: 0.04 K/UL (ref 0–0.3)
IMM GRANULOCYTES NFR BLD: 1 %
LYMPHOCYTES # BLD: 24 % (ref 24–43)
LYMPHOCYTES NFR BLD: 1.22 K/UL (ref 1.1–3.7)
MCH RBC QN AUTO: 28.8 PG (ref 25.2–33.5)
MCHC RBC AUTO-ENTMCNC: 29.6 G/DL (ref 28.4–34.8)
MCV RBC AUTO: 97 FL (ref 82.6–102.9)
MONOCYTES NFR BLD: 0.41 K/UL (ref 0.1–1.2)
MONOCYTES NFR BLD: 8 % (ref 3–12)
NEUTROPHILS NFR BLD: 61 % (ref 36–65)
NEUTS SEG NFR BLD: 3.05 K/UL (ref 1.5–8.1)
NRBC AUTOMATED: 0 PER 100 WBC
PLATELET # BLD AUTO: 194 K/UL (ref 138–453)
PMV BLD AUTO: 10.6 FL (ref 8.1–13.5)
POTASSIUM SERPL-SCNC: 3.7 MMOL/L (ref 3.7–5.3)
RBC # BLD AUTO: 2.33 M/UL (ref 3.95–5.11)
RBC # BLD: ABNORMAL 10*6/UL
SODIUM SERPL-SCNC: 141 MMOL/L (ref 135–144)
WBC OTHER # BLD: 5 K/UL (ref 3.5–11.3)

## 2023-05-22 PROCEDURE — 85018 HEMOGLOBIN: CPT

## 2023-05-22 PROCEDURE — 6360000002 HC RX W HCPCS: Performed by: NURSE PRACTITIONER

## 2023-05-22 PROCEDURE — 36415 COLL VENOUS BLD VENIPUNCTURE: CPT

## 2023-05-22 PROCEDURE — 86900 BLOOD TYPING SEROLOGIC ABO: CPT

## 2023-05-22 PROCEDURE — 99223 1ST HOSP IP/OBS HIGH 75: CPT | Performed by: NURSE PRACTITIONER

## 2023-05-22 PROCEDURE — 72170 X-RAY EXAM OF PELVIS: CPT

## 2023-05-22 PROCEDURE — 73552 X-RAY EXAM OF FEMUR 2/>: CPT

## 2023-05-22 PROCEDURE — 6370000000 HC RX 637 (ALT 250 FOR IP): Performed by: NURSE PRACTITIONER

## 2023-05-22 PROCEDURE — 36430 TRANSFUSION BLD/BLD COMPNT: CPT

## 2023-05-22 PROCEDURE — 73700 CT LOWER EXTREMITY W/O DYE: CPT

## 2023-05-22 PROCEDURE — G0378 HOSPITAL OBSERVATION PER HR: HCPCS

## 2023-05-22 PROCEDURE — 51798 US URINE CAPACITY MEASURE: CPT

## 2023-05-22 PROCEDURE — 73501 X-RAY EXAM HIP UNI 1 VIEW: CPT

## 2023-05-22 PROCEDURE — 80048 BASIC METABOLIC PNL TOTAL CA: CPT

## 2023-05-22 PROCEDURE — 86850 RBC ANTIBODY SCREEN: CPT

## 2023-05-22 PROCEDURE — P9016 RBC LEUKOCYTES REDUCED: HCPCS

## 2023-05-22 PROCEDURE — 96376 TX/PRO/DX INJ SAME DRUG ADON: CPT

## 2023-05-22 PROCEDURE — 99285 EMERGENCY DEPT VISIT HI MDM: CPT

## 2023-05-22 PROCEDURE — 85025 COMPLETE CBC W/AUTO DIFF WBC: CPT

## 2023-05-22 PROCEDURE — 85014 HEMATOCRIT: CPT

## 2023-05-22 PROCEDURE — 86901 BLOOD TYPING SEROLOGIC RH(D): CPT

## 2023-05-22 PROCEDURE — 96374 THER/PROPH/DIAG INJ IV PUSH: CPT

## 2023-05-22 PROCEDURE — 86920 COMPATIBILITY TEST SPIN: CPT

## 2023-05-22 PROCEDURE — 6360000002 HC RX W HCPCS: Performed by: EMERGENCY MEDICINE

## 2023-05-22 RX ORDER — OXYCODONE HYDROCHLORIDE AND ACETAMINOPHEN 5; 325 MG/1; MG/1
2 TABLET ORAL EVERY 6 HOURS PRN
Status: DISCONTINUED | OUTPATIENT
Start: 2023-05-22 | End: 2023-05-23

## 2023-05-22 RX ORDER — CELECOXIB 100 MG/1
100 CAPSULE ORAL DAILY
Status: DISCONTINUED | OUTPATIENT
Start: 2023-05-23 | End: 2023-05-27 | Stop reason: HOSPADM

## 2023-05-22 RX ORDER — METOPROLOL SUCCINATE 25 MG/1
25 TABLET, EXTENDED RELEASE ORAL 2 TIMES DAILY
Status: DISCONTINUED | OUTPATIENT
Start: 2023-05-22 | End: 2023-05-27 | Stop reason: HOSPADM

## 2023-05-22 RX ORDER — ACETAMINOPHEN 325 MG/1
650 TABLET ORAL EVERY 6 HOURS PRN
Status: DISCONTINUED | OUTPATIENT
Start: 2023-05-22 | End: 2023-05-27 | Stop reason: HOSPADM

## 2023-05-22 RX ORDER — ACETAMINOPHEN 650 MG/1
650 SUPPOSITORY RECTAL EVERY 6 HOURS PRN
Status: DISCONTINUED | OUTPATIENT
Start: 2023-05-22 | End: 2023-05-27 | Stop reason: HOSPADM

## 2023-05-22 RX ORDER — SODIUM BICARBONATE 650 MG/1
650 TABLET ORAL 2 TIMES DAILY
Status: DISCONTINUED | OUTPATIENT
Start: 2023-05-22 | End: 2023-05-27 | Stop reason: HOSPADM

## 2023-05-22 RX ORDER — SERTRALINE HYDROCHLORIDE 100 MG/1
100 TABLET, FILM COATED ORAL DAILY
Status: DISCONTINUED | OUTPATIENT
Start: 2023-05-22 | End: 2023-05-27 | Stop reason: HOSPADM

## 2023-05-22 RX ORDER — ALBUTEROL SULFATE 90 UG/1
2 AEROSOL, METERED RESPIRATORY (INHALATION) EVERY 6 HOURS PRN
Status: DISCONTINUED | OUTPATIENT
Start: 2023-05-22 | End: 2023-05-27 | Stop reason: HOSPADM

## 2023-05-22 RX ORDER — MAGNESIUM SULFATE 1 G/100ML
1000 INJECTION INTRAVENOUS PRN
Status: DISCONTINUED | OUTPATIENT
Start: 2023-05-22 | End: 2023-05-27 | Stop reason: HOSPADM

## 2023-05-22 RX ORDER — ONDANSETRON 2 MG/ML
4 INJECTION INTRAMUSCULAR; INTRAVENOUS EVERY 6 HOURS PRN
Status: DISCONTINUED | OUTPATIENT
Start: 2023-05-22 | End: 2023-05-27 | Stop reason: HOSPADM

## 2023-05-22 RX ORDER — SODIUM CHLORIDE 0.9 % (FLUSH) 0.9 %
5-40 SYRINGE (ML) INJECTION EVERY 12 HOURS SCHEDULED
Status: DISCONTINUED | OUTPATIENT
Start: 2023-05-22 | End: 2023-05-27 | Stop reason: HOSPADM

## 2023-05-22 RX ORDER — SODIUM CHLORIDE 9 MG/ML
INJECTION, SOLUTION INTRAVENOUS PRN
Status: DISCONTINUED | OUTPATIENT
Start: 2023-05-22 | End: 2023-05-27 | Stop reason: HOSPADM

## 2023-05-22 RX ORDER — LISINOPRIL 5 MG/1
5 TABLET ORAL DAILY
Status: DISCONTINUED | OUTPATIENT
Start: 2023-05-22 | End: 2023-05-27 | Stop reason: HOSPADM

## 2023-05-22 RX ORDER — SODIUM CHLORIDE 0.9 % (FLUSH) 0.9 %
10 SYRINGE (ML) INJECTION PRN
Status: DISCONTINUED | OUTPATIENT
Start: 2023-05-22 | End: 2023-05-27 | Stop reason: HOSPADM

## 2023-05-22 RX ORDER — POTASSIUM CHLORIDE 20 MEQ/1
40 TABLET, EXTENDED RELEASE ORAL PRN
Status: DISCONTINUED | OUTPATIENT
Start: 2023-05-22 | End: 2023-05-27 | Stop reason: HOSPADM

## 2023-05-22 RX ORDER — GABAPENTIN 300 MG/1
300 CAPSULE ORAL 3 TIMES DAILY
Status: DISCONTINUED | OUTPATIENT
Start: 2023-05-22 | End: 2023-05-27 | Stop reason: HOSPADM

## 2023-05-22 RX ORDER — PANTOPRAZOLE SODIUM 40 MG/1
40 TABLET, DELAYED RELEASE ORAL
Status: DISCONTINUED | OUTPATIENT
Start: 2023-05-23 | End: 2023-05-27 | Stop reason: HOSPADM

## 2023-05-22 RX ORDER — POLYETHYLENE GLYCOL 3350 17 G/17G
17 POWDER, FOR SOLUTION ORAL DAILY PRN
Status: DISCONTINUED | OUTPATIENT
Start: 2023-05-22 | End: 2023-05-27 | Stop reason: HOSPADM

## 2023-05-22 RX ORDER — ONDANSETRON 4 MG/1
4 TABLET, ORALLY DISINTEGRATING ORAL EVERY 8 HOURS PRN
Status: DISCONTINUED | OUTPATIENT
Start: 2023-05-22 | End: 2023-05-27 | Stop reason: HOSPADM

## 2023-05-22 RX ORDER — MORPHINE SULFATE 4 MG/ML
4 INJECTION, SOLUTION INTRAMUSCULAR; INTRAVENOUS ONCE
Status: COMPLETED | OUTPATIENT
Start: 2023-05-22 | End: 2023-05-22

## 2023-05-22 RX ORDER — ISOSORBIDE MONONITRATE 30 MG/1
30 TABLET, EXTENDED RELEASE ORAL NIGHTLY
Status: DISCONTINUED | OUTPATIENT
Start: 2023-05-22 | End: 2023-05-23

## 2023-05-22 RX ORDER — ASPIRIN 81 MG/1
81 TABLET ORAL NIGHTLY
Status: DISCONTINUED | OUTPATIENT
Start: 2023-05-22 | End: 2023-05-27 | Stop reason: HOSPADM

## 2023-05-22 RX ORDER — LORAZEPAM 2 MG/ML
2 INJECTION INTRAMUSCULAR EVERY 6 HOURS PRN
Status: DISCONTINUED | OUTPATIENT
Start: 2023-05-22 | End: 2023-05-23

## 2023-05-22 RX ORDER — POTASSIUM CHLORIDE 7.45 MG/ML
10 INJECTION INTRAVENOUS PRN
Status: DISCONTINUED | OUTPATIENT
Start: 2023-05-22 | End: 2023-05-27 | Stop reason: HOSPADM

## 2023-05-22 RX ORDER — ATORVASTATIN CALCIUM 80 MG/1
80 TABLET, FILM COATED ORAL NIGHTLY
Status: DISCONTINUED | OUTPATIENT
Start: 2023-05-22 | End: 2023-05-27 | Stop reason: HOSPADM

## 2023-05-22 RX ORDER — HYDROMORPHONE HYDROCHLORIDE 1 MG/ML
1 INJECTION, SOLUTION INTRAMUSCULAR; INTRAVENOUS; SUBCUTANEOUS ONCE
Status: COMPLETED | OUTPATIENT
Start: 2023-05-23 | End: 2023-05-22

## 2023-05-22 RX ORDER — HYDROMORPHONE HYDROCHLORIDE 1 MG/ML
1 INJECTION, SOLUTION INTRAMUSCULAR; INTRAVENOUS; SUBCUTANEOUS EVERY 4 HOURS PRN
Status: DISCONTINUED | OUTPATIENT
Start: 2023-05-22 | End: 2023-05-23

## 2023-05-22 RX ADMIN — HYDROMORPHONE HYDROCHLORIDE 1 MG: 1 INJECTION, SOLUTION INTRAMUSCULAR; INTRAVENOUS; SUBCUTANEOUS at 23:55

## 2023-05-22 RX ADMIN — GABAPENTIN 300 MG: 300 CAPSULE ORAL at 21:14

## 2023-05-22 RX ADMIN — GABAPENTIN 300 MG: 300 CAPSULE ORAL at 14:05

## 2023-05-22 RX ADMIN — ATORVASTATIN CALCIUM 80 MG: 80 TABLET, FILM COATED ORAL at 23:56

## 2023-05-22 RX ADMIN — HYDROMORPHONE HYDROCHLORIDE 1 MG: 1 INJECTION, SOLUTION INTRAMUSCULAR; INTRAVENOUS; SUBCUTANEOUS at 21:14

## 2023-05-22 RX ADMIN — ASPIRIN 81 MG: 81 TABLET, COATED ORAL at 23:55

## 2023-05-22 RX ADMIN — METOPROLOL SUCCINATE 25 MG: 25 TABLET, EXTENDED RELEASE ORAL at 23:57

## 2023-05-22 RX ADMIN — MORPHINE SULFATE 4 MG: 4 INJECTION, SOLUTION INTRAMUSCULAR; INTRAVENOUS at 10:56

## 2023-05-22 RX ADMIN — MORPHINE SULFATE 4 MG: 4 INJECTION, SOLUTION INTRAMUSCULAR; INTRAVENOUS at 13:12

## 2023-05-22 RX ADMIN — HYDROMORPHONE HYDROCHLORIDE 1 MG: 1 INJECTION, SOLUTION INTRAMUSCULAR; INTRAVENOUS; SUBCUTANEOUS at 14:04

## 2023-05-22 ASSESSMENT — ENCOUNTER SYMPTOMS
PHOTOPHOBIA: 0
COLOR CHANGE: 0
SINUS PAIN: 0
COUGH: 0
NAUSEA: 0
VOMITING: 0
SINUS PRESSURE: 0
ABDOMINAL PAIN: 0
CONSTIPATION: 0
EYE DISCHARGE: 0
SHORTNESS OF BREATH: 0
EYE REDNESS: 0
FACIAL SWELLING: 0
DIARRHEA: 0
WHEEZING: 0

## 2023-05-22 ASSESSMENT — PAIN DESCRIPTION - DESCRIPTORS
DESCRIPTORS: SHARP;DISCOMFORT
DESCRIPTORS: SHARP

## 2023-05-22 ASSESSMENT — PAIN DESCRIPTION - ONSET
ONSET: ON-GOING
ONSET: ON-GOING

## 2023-05-22 ASSESSMENT — PAIN SCALES - GENERAL
PAINLEVEL_OUTOF10: 10

## 2023-05-22 ASSESSMENT — PAIN DESCRIPTION - ORIENTATION
ORIENTATION: LEFT
ORIENTATION: LEFT

## 2023-05-22 ASSESSMENT — PAIN DESCRIPTION - PAIN TYPE: TYPE: SURGICAL PAIN;INTRACTABLE PAIN

## 2023-05-22 ASSESSMENT — PAIN DESCRIPTION - LOCATION
LOCATION: HIP;GROIN
LOCATION: HIP;GROIN

## 2023-05-22 ASSESSMENT — LIFESTYLE VARIABLES
HOW OFTEN DO YOU HAVE A DRINK CONTAINING ALCOHOL: NEVER
HOW MANY STANDARD DRINKS CONTAINING ALCOHOL DO YOU HAVE ON A TYPICAL DAY: PATIENT DOES NOT DRINK

## 2023-05-22 ASSESSMENT — PAIN DESCRIPTION - FREQUENCY
FREQUENCY: CONTINUOUS
FREQUENCY: CONTINUOUS

## 2023-05-22 ASSESSMENT — PAIN - FUNCTIONAL ASSESSMENT: PAIN_FUNCTIONAL_ASSESSMENT: PREVENTS OR INTERFERES WITH MANY ACTIVE NOT PASSIVE ACTIVITIES

## 2023-05-22 NOTE — H&P
mmol/L    Potassium 3.7 3.7 - 5.3 mmol/L    Chloride 114 (H) 98 - 107 mmol/L    CO2 17 (L) 20 - 31 mmol/L    Anion Gap 10 9 - 17 mmol/L       Imaging/Diagnostics:  XR PELVIS (1-2 VIEWS)    Result Date: 5/22/2023  No immediate complications are seen status post left total hip arthroplasty. XR PELVIS (1-2 VIEWS)    Result Date: 5/16/2023  Single-view exams of the pelvis and left hip reveal a left hip arthroplasty without evidence for malalignment or fracture. XR HIP LEFT (1 VIEW)    Result Date: 5/22/2023  1. No evident acute findings in the left hip nor evident complication of left hip total arthroplasty. Consider CT if there is concern for acute fracture. 2. Suspicion for at least mild left sacroiliac joint degenerative changes. XR HIP LEFT (1 VIEW)    Result Date: 5/16/2023  Single-view exams of the pelvis and left hip reveal a left hip arthroplasty without evidence for malalignment or fracture. XR HIP LEFT (2-3 VIEWS)    Result Date: 5/20/2023  1. Left hip arthroplasty hardware without significant periprosthetic lucency or acute osseous abnormality. 2. Small amount of soft tissue gas about the left thigh/hip likely related to recent surgery. XR FEMUR LEFT (MIN 2 VIEWS)    Result Date: 5/22/2023  No immediate complications are seen status post left total hip arthroplasty.        Assessment :      Hospital Problems             Last Modified POA    * (Principal) Intractable back pain 5/22/2023 Yes    CAD (coronary artery disease) 5/22/2023 Yes    Chronic diarrhea 5/22/2023 Yes    Primary localized osteoarthritis of left hip 5/22/2023 Yes    Acute blood loss anemia 5/22/2023 Yes       Plan:     Patient status observation in the  Med/Surge    Intractable back/hip pain status post recent total hip arthroplasty, left  Pain control with Dilaudid, patient has little to no relief from morphine  Add Ativan  Consult orthopedics  CT left hip, patient is unable to position for appropriate imaging with

## 2023-05-22 NOTE — ACP (ADVANCE CARE PLANNING)
completed    Follow-up plan:    [] Schedule follow-up conversation to continue planning  [] Referred individual to Provider for additional questions/concerns   [] Advised patient/agent/surrogate to review completed ACP document and update if needed with changes in condition, patient preferences or care setting    [] This note routed to one or more involved healthcare providers

## 2023-05-22 NOTE — ED PROVIDER NOTES
[address] [place] [address] ED  EMERGENCY DEPARTMENT ENCOUNTER      Pt Name: John Adorno  MRN: [id number]  [place] 1955  Date of evaluation: 5/22/2023  Provider: Joesph Mar MD    CHIEF COMPLAINT       Chief Complaint   Patient presents with    Hip Pain     Left           HISTORY OF PRESENT ILLNESS  (Location/Symptom, Timing/Onset, Context/Setting, Quality, Duration, Modifying Factors, Severity.)   John Adorno is a 79 y.o. female who presents to the emergency department for pain to the left hip. She had left hip replacement almost a week ago, outpatient. She has been on Norco.  A few days ago she was in this emergency department and had a negative Doppler and she was switched to Percocet. The pain is severe and its worse if she moves. She has not injured herself in any fashion. Nursing Notes were reviewed. ALLERGIES     Keflex [cephalexin], Atenolol, Codeine, Percocet [oxycodone-acetaminophen], Phenergan [promethazine hcl], Sodium hypochlorite, Erythromycin, and Prednisone    CURRENT MEDICATIONS       Previous Medications    ALBUTEROL SULFATE HFA (PROVENTIL;VENTOLIN;PROAIR) 108 (90 BASE) MCG/ACT INHALER    Inhale 2 puffs into the lungs every 6 hours as needed for Wheezing    ASPIR-LOW 81 MG EC TABLET    Take 1 tablet by mouth nightly    ATORVASTATIN (LIPITOR) 80 MG TABLET    Take 1 tablet by mouth at bedtime    BREZTRI AEROSPHERE 160-9-4.8 MCG/ACT AERO    Inhale 2 puffs into the lungs in the morning and at bedtime    CELECOXIB (CELEBREX) 100 MG CAPSULE    Take 1 capsule by mouth daily    DOCUSATE SODIUM (COLACE) 100 MG CAPSULE    Take 1 capsule by mouth 2 times daily    GABAPENTIN (NEURONTIN) 100 MG CAPSULE    Take 1 capsule by mouth 2 times daily. HYDROCODONE-ACETAMINOPHEN (NORCO) 5-325 MG PER TABLET    Take 1-2 tablets by mouth every 4 hours as needed for Pain for up to 7 days.  Max Daily Amount: 12 tablets    ISOSORBIDE MONONITRATE (IMDUR) 30 MG EXTENDED RELEASE TABLET    Take 1 tablet by mouth at

## 2023-05-22 NOTE — ED NOTES
ED to inpatient nurses report     Chief Complaint   Patient presents with    Hip Pain     Left        Present to ED from home  LOC: alert and orientated to name, place, date  Vital signs   Vitals:    05/22/23 1032   BP: (!) 143/70   Pulse: 90   Resp: 16   Temp: 98.8 °F (37.1 °C)   TempSrc: Oral   SpO2: 97%   Weight: 195 lb (88.5 kg)   Height: 5' 4\" (1.626 m)          LDAs:   Peripheral IV 05/22/23 Right Forearm (Active)     Mobility: Requires assistance * 1  Fall Risk: [place] 1 Fall Risk  Presents to emergency department  because of falls (Syncope, seizure, or loss of consciousness): No (05/22/23 1059)  Age > 79: No (05/22/23 1059)  Altered Mental Status, Intoxication with alcohol or substance confusion (Disorientation, impaired judgment, poor safety awaremess, or inability to follow instructions): No (05/22/23 1059)  Impaired Mobility: Ambulates or transfers with assistive devices or assistance; Unable to ambulate or transer.: Yes (05/22/23 1059)  Nursing Judgement: Yes (05/22/23 1059)  Pending ED orders: none  Present condition: stable  Code Status: full  Consults: IP CONSULT TO ORTHOPEDIC SURGERY  IP CONSULT TO HOSPITALIST  []  Hospitalist  Completed  [] yes [] no Who:   []  Medicine  Completed  [] yes [] No Who:   []  Cardiology  Completed  [] yes [] No Who:   []  GI   Completed  [] yes [] No Who:   []  Neurology  Completed  [] yes [] No Who:   []  Nephrology Completed  [] yes [] No Who:    []  Vascular  Completed  [] yes [] No Who:   []  Ortho  Completed  [] yes [] No Who:     []  Surgery  Completed  [] yes [] No Who:    []  Urology  Completed  [] yes [] No Who:    []  CT Surgery Completed  [] yes [] No Who:   []  Podiatry  Completed  [] yes [] No Who:    []  Other    Completed  [] yes [] No Who:  Interventions: pain control   Important Events: unable to complete 2nd x-ray due to pain.         Electronically signed by Shawn Thompson RN on 5/22/2023 at 1:05 PM       Shawn Thompson RN  05/22/23 1861

## 2023-05-22 NOTE — CONSENT
Informed Consent for Blood Component Transfusion Note    I have discussed with the patient the rationale for blood component transfusion; its benefits in treating or preventing fatigue, organ damage, or death; and its risk which includes mild transfusion reactions, rare risk of blood borne infection, or more serious but rare reactions. I have discussed the alternatives to transfusion, including the risk and consequences of not receiving transfusion. The patient had an opportunity to ask questions and had agreed to proceed with transfusion of blood components.     Electronically signed by LÓPZE Winkler NP on 5/22/23 at 2:58 PM EDT

## 2023-05-23 ENCOUNTER — APPOINTMENT (OUTPATIENT)
Dept: CT IMAGING | Age: 68
DRG: 478 | End: 2023-05-23
Payer: MEDICARE

## 2023-05-23 ENCOUNTER — APPOINTMENT (OUTPATIENT)
Dept: GENERAL RADIOLOGY | Age: 68
DRG: 478 | End: 2023-05-23
Payer: MEDICARE

## 2023-05-23 PROBLEM — K56.7 POSTOPERATIVE ILEUS (HCC): Status: ACTIVE | Noted: 2023-05-23

## 2023-05-23 PROBLEM — N13.30 HYDRONEPHROSIS: Status: ACTIVE | Noted: 2023-05-23

## 2023-05-23 PROBLEM — K91.89 POSTOPERATIVE ILEUS (HCC): Status: ACTIVE | Noted: 2023-05-23

## 2023-05-23 LAB
ABO/RH: NORMAL
ANION GAP SERPL CALCULATED.3IONS-SCNC: 10 MMOL/L (ref 9–17)
ANTIBODY SCREEN: NEGATIVE
ARM BAND NUMBER: NORMAL
BASOPHILS # BLD: 0.05 K/UL (ref 0–0.2)
BASOPHILS NFR BLD: 1 % (ref 0–2)
BLD PROD TYP BPU: NORMAL
BPU ID: NORMAL
BUN SERPL-MCNC: 23 MG/DL (ref 8–23)
BUN/CREAT SERPL: 18 (ref 9–20)
CALCIUM SERPL-MCNC: 8.7 MG/DL (ref 8.6–10.4)
CHLORIDE SERPL-SCNC: 105 MMOL/L (ref 98–107)
CO2 SERPL-SCNC: 21 MMOL/L (ref 20–31)
CREAT SERPL-MCNC: 1.26 MG/DL (ref 0.5–0.9)
CROSSMATCH RESULT: NORMAL
DISPENSE STATUS BLOOD BANK: NORMAL
EOSINOPHIL # BLD: 0.31 K/UL (ref 0–0.44)
EOSINOPHILS RELATIVE PERCENT: 6 % (ref 1–4)
ERYTHROCYTE [DISTWIDTH] IN BLOOD BY AUTOMATED COUNT: 15.1 % (ref 11.8–14.4)
EXPIRATION DATE: NORMAL
GFR SERPL CREATININE-BSD FRML MDRD: 47 ML/MIN/1.73M2
GLUCOSE BLD-MCNC: 77 MG/DL (ref 65–105)
GLUCOSE SERPL-MCNC: 82 MG/DL (ref 70–99)
HCT VFR BLD AUTO: 32.2 % (ref 36.3–47.1)
HCT VFR BLD AUTO: 33.9 % (ref 36.3–47.1)
HCT VFR BLD AUTO: 36.5 % (ref 36.3–47.1)
HCT VFR BLD AUTO: 36.5 % (ref 36.3–47.1)
HGB BLD-MCNC: 10.4 G/DL (ref 11.9–15.1)
HGB BLD-MCNC: 11.1 G/DL (ref 11.9–15.1)
HGB BLD-MCNC: 11.8 G/DL (ref 11.9–15.1)
HGB BLD-MCNC: 11.8 G/DL (ref 11.9–15.1)
IMM GRANULOCYTES # BLD AUTO: 0.04 K/UL (ref 0–0.3)
IMM GRANULOCYTES NFR BLD: 1 %
LYMPHOCYTES # BLD: 29 % (ref 24–43)
LYMPHOCYTES NFR BLD: 1.59 K/UL (ref 1.1–3.7)
MAGNESIUM SERPL-MCNC: 2.2 MG/DL (ref 1.6–2.6)
MCH RBC QN AUTO: 28.7 PG (ref 25.2–33.5)
MCHC RBC AUTO-ENTMCNC: 32.3 G/DL (ref 28.4–34.8)
MCV RBC AUTO: 89 FL (ref 82.6–102.9)
MONOCYTES NFR BLD: 0.5 K/UL (ref 0.1–1.2)
MONOCYTES NFR BLD: 9 % (ref 3–12)
NEUTROPHILS NFR BLD: 55 % (ref 36–65)
NEUTS SEG NFR BLD: 3.03 K/UL (ref 1.5–8.1)
NRBC AUTOMATED: 0 PER 100 WBC
PHOSPHATE SERPL-MCNC: 4.1 MG/DL (ref 2.6–4.5)
PLATELET # BLD AUTO: 257 K/UL (ref 138–453)
PMV BLD AUTO: 10.1 FL (ref 8.1–13.5)
POTASSIUM SERPL-SCNC: 4.6 MMOL/L (ref 3.7–5.3)
RBC # BLD AUTO: 3.62 M/UL (ref 3.95–5.11)
RBC # BLD: ABNORMAL 10*6/UL
SODIUM SERPL-SCNC: 136 MMOL/L (ref 135–144)
TRANSFUSION STATUS: NORMAL
UNIT DIVISION: 0
WBC OTHER # BLD: 5.5 K/UL (ref 3.5–11.3)

## 2023-05-23 PROCEDURE — 82947 ASSAY GLUCOSE BLOOD QUANT: CPT

## 2023-05-23 PROCEDURE — 36415 COLL VENOUS BLD VENIPUNCTURE: CPT

## 2023-05-23 PROCEDURE — 2580000003 HC RX 258: Performed by: INTERNAL MEDICINE

## 2023-05-23 PROCEDURE — 85025 COMPLETE CBC W/AUTO DIFF WBC: CPT

## 2023-05-23 PROCEDURE — G0378 HOSPITAL OBSERVATION PER HR: HCPCS

## 2023-05-23 PROCEDURE — 74176 CT ABD & PELVIS W/O CONTRAST: CPT

## 2023-05-23 PROCEDURE — 2580000003 HC RX 258: Performed by: NURSE PRACTITIONER

## 2023-05-23 PROCEDURE — 80048 BASIC METABOLIC PNL TOTAL CA: CPT

## 2023-05-23 PROCEDURE — 6360000002 HC RX W HCPCS: Performed by: NURSE PRACTITIONER

## 2023-05-23 PROCEDURE — 83735 ASSAY OF MAGNESIUM: CPT

## 2023-05-23 PROCEDURE — 99232 SBSQ HOSP IP/OBS MODERATE 35: CPT | Performed by: INTERNAL MEDICINE

## 2023-05-23 PROCEDURE — 6370000000 HC RX 637 (ALT 250 FOR IP): Performed by: NURSE PRACTITIONER

## 2023-05-23 PROCEDURE — 6360000002 HC RX W HCPCS: Performed by: INTERNAL MEDICINE

## 2023-05-23 PROCEDURE — 51798 US URINE CAPACITY MEASURE: CPT

## 2023-05-23 PROCEDURE — 2580000003 HC RX 258

## 2023-05-23 PROCEDURE — 85018 HEMOGLOBIN: CPT

## 2023-05-23 PROCEDURE — 85014 HEMATOCRIT: CPT

## 2023-05-23 PROCEDURE — 84100 ASSAY OF PHOSPHORUS: CPT

## 2023-05-23 PROCEDURE — 74018 RADEX ABDOMEN 1 VIEW: CPT

## 2023-05-23 PROCEDURE — 72131 CT LUMBAR SPINE W/O DYE: CPT

## 2023-05-23 RX ORDER — SODIUM CHLORIDE, SODIUM LACTATE, POTASSIUM CHLORIDE, CALCIUM CHLORIDE 600; 310; 30; 20 MG/100ML; MG/100ML; MG/100ML; MG/100ML
INJECTION, SOLUTION INTRAVENOUS CONTINUOUS
Status: DISCONTINUED | OUTPATIENT
Start: 2023-05-23 | End: 2023-05-27

## 2023-05-23 RX ORDER — ISOSORBIDE MONONITRATE 30 MG/1
30 TABLET, EXTENDED RELEASE ORAL DAILY
Status: DISCONTINUED | OUTPATIENT
Start: 2023-05-23 | End: 2023-05-27 | Stop reason: HOSPADM

## 2023-05-23 RX ORDER — SODIUM CHLORIDE 9 MG/ML
INJECTION, SOLUTION INTRAVENOUS CONTINUOUS
Status: DISCONTINUED | OUTPATIENT
Start: 2023-05-23 | End: 2023-05-23

## 2023-05-23 RX ORDER — OXYCODONE HYDROCHLORIDE AND ACETAMINOPHEN 5; 325 MG/1; MG/1
1 TABLET ORAL EVERY 6 HOURS PRN
Status: DISCONTINUED | OUTPATIENT
Start: 2023-05-23 | End: 2023-05-27 | Stop reason: HOSPADM

## 2023-05-23 RX ORDER — DROPERIDOL 2.5 MG/ML
0.62 INJECTION, SOLUTION INTRAMUSCULAR; INTRAVENOUS EVERY 6 HOURS PRN
Status: DISCONTINUED | OUTPATIENT
Start: 2023-05-23 | End: 2023-05-27 | Stop reason: HOSPADM

## 2023-05-23 RX ORDER — TAMSULOSIN HYDROCHLORIDE 0.4 MG/1
0.4 CAPSULE ORAL DAILY
Status: DISCONTINUED | OUTPATIENT
Start: 2023-05-23 | End: 2023-05-27 | Stop reason: HOSPADM

## 2023-05-23 RX ADMIN — ONDANSETRON 4 MG: 2 INJECTION INTRAMUSCULAR; INTRAVENOUS at 10:50

## 2023-05-23 RX ADMIN — CELECOXIB 100 MG: 100 CAPSULE ORAL at 08:22

## 2023-05-23 RX ADMIN — HYDROMORPHONE HYDROCHLORIDE 1 MG: 1 INJECTION, SOLUTION INTRAMUSCULAR; INTRAVENOUS; SUBCUTANEOUS at 08:22

## 2023-05-23 RX ADMIN — GABAPENTIN 300 MG: 300 CAPSULE ORAL at 08:15

## 2023-05-23 RX ADMIN — SERTRALINE HYDROCHLORIDE 100 MG: 100 TABLET ORAL at 08:15

## 2023-05-23 RX ADMIN — DROPERIDOL 0.62 MG: 2.5 INJECTION, SOLUTION INTRAMUSCULAR; INTRAVENOUS at 22:24

## 2023-05-23 RX ADMIN — HYDROMORPHONE HYDROCHLORIDE 1 MG: 1 INJECTION, SOLUTION INTRAMUSCULAR; INTRAVENOUS; SUBCUTANEOUS at 04:41

## 2023-05-23 RX ADMIN — SODIUM CHLORIDE, PRESERVATIVE FREE 10 ML: 5 INJECTION INTRAVENOUS at 08:16

## 2023-05-23 RX ADMIN — PANTOPRAZOLE SODIUM 40 MG: 40 TABLET, DELAYED RELEASE ORAL at 05:49

## 2023-05-23 RX ADMIN — ISOSORBIDE MONONITRATE 30 MG: 30 TABLET, EXTENDED RELEASE ORAL at 08:15

## 2023-05-23 RX ADMIN — SODIUM CHLORIDE, PRESERVATIVE FREE 10 ML: 5 INJECTION INTRAVENOUS at 04:59

## 2023-05-23 RX ADMIN — SODIUM CHLORIDE, POTASSIUM CHLORIDE, SODIUM LACTATE AND CALCIUM CHLORIDE: 600; 310; 30; 20 INJECTION, SOLUTION INTRAVENOUS at 16:48

## 2023-05-23 RX ADMIN — LORAZEPAM 2 MG: 2 INJECTION INTRAMUSCULAR; INTRAVENOUS at 13:25

## 2023-05-23 RX ADMIN — SODIUM CHLORIDE: 9 INJECTION, SOLUTION INTRAVENOUS at 13:32

## 2023-05-23 RX ADMIN — ONDANSETRON 4 MG: 2 INJECTION INTRAMUSCULAR; INTRAVENOUS at 16:47

## 2023-05-23 RX ADMIN — ONDANSETRON 4 MG: 2 INJECTION INTRAMUSCULAR; INTRAVENOUS at 04:59

## 2023-05-23 RX ADMIN — LISINOPRIL 5 MG: 5 TABLET ORAL at 08:15

## 2023-05-23 RX ADMIN — METOPROLOL SUCCINATE 25 MG: 25 TABLET, EXTENDED RELEASE ORAL at 08:15

## 2023-05-23 ASSESSMENT — PAIN DESCRIPTION - ORIENTATION
ORIENTATION: LOWER
ORIENTATION: LOWER
ORIENTATION: LEFT

## 2023-05-23 ASSESSMENT — PAIN DESCRIPTION - ONSET: ONSET: ON-GOING

## 2023-05-23 ASSESSMENT — PAIN DESCRIPTION - FREQUENCY
FREQUENCY: CONTINUOUS
FREQUENCY: CONTINUOUS

## 2023-05-23 ASSESSMENT — PAIN SCALES - GENERAL
PAINLEVEL_OUTOF10: 8
PAINLEVEL_OUTOF10: 10
PAINLEVEL_OUTOF10: 8

## 2023-05-23 ASSESSMENT — PAIN DESCRIPTION - DESCRIPTORS
DESCRIPTORS: ACHING;CRAMPING
DESCRIPTORS: SHARP;STABBING
DESCRIPTORS: ACHING;CRAMPING

## 2023-05-23 ASSESSMENT — PAIN DESCRIPTION - DIRECTION: RADIATING_TOWARDS: GROIN

## 2023-05-23 ASSESSMENT — PAIN - FUNCTIONAL ASSESSMENT: PAIN_FUNCTIONAL_ASSESSMENT: PREVENTS OR INTERFERES WITH MANY ACTIVE NOT PASSIVE ACTIVITIES

## 2023-05-23 ASSESSMENT — PAIN DESCRIPTION - LOCATION
LOCATION: HIP
LOCATION: ABDOMEN
LOCATION: ABDOMEN

## 2023-05-23 ASSESSMENT — PAIN DESCRIPTION - PAIN TYPE: TYPE: ACUTE PAIN

## 2023-05-23 NOTE — FLOWSHEET NOTE
Unit RBC completed without any signs or symptoms of adverse reaction noted or voiced. VSS.  Post transfusion H&H scheduled

## 2023-05-23 NOTE — CONSULTS
[facility]  GENERAL SURGERY  CONSULT NOTE    PATIENT: Tony Milan           : 1955  MRN: [id number]  ADMISSION DATE: 2023 10:30 AM   TODAY'S DATE: 23   LOS: 0     ATTENDING PHYSICIAN: Ange Banks DO    REASON FOR CONSULT:  SBO vs Illeus    HISTORY OF PRESENT ILLNESS:  79years old female with history of subtotal colectomy with ileorectal anastomosis, cholecystectomy, multiple previous hospitalization for small bowel obstruction, anastomotic stricture in the past, chronic history of diarrhea, CAD has had 9 stent placement in the past, CKD, hypertension, COPD presented to hospital with left hip pain and abdominal pain. Patient had left hip replacement last week, and she was suffering in pain after surgery. Patient was not able to move her left hip due to pain since surgery and it has been getting worse. Patient was taking Norco every 6 and went to the emergency room was prescribed with Percocet 2 tablets every 4 for pain management over the weekend. Since Saturday, patient has not yet passed gas or bowel movement, which is abnormal with the patient as she normally has diarrhea daily. During this hospitalization, Ativan was given for pain management, patient became disoriented and was not able to follow commands upon examination. Patient had extensive surgery in the past, and her partial small bowel obstruction were managed conservatively.     PAST MEDICAL HISTORY:        Diagnosis Date    Anxiety     Arthritis     CAD (coronary artery disease)     MI.   9 stents total    Chronic kidney disease     COPD (chronic obstructive pulmonary disease) (HCC)     Depression     Diverticulosis     resolved    GERD (gastroesophageal reflux disease)     Heart attack ([facility] 05/2018    x2    Heart palpitations     History of blood transfusion     Hyperlipidemia     Hypertension     PSVT (paroxysmal supraventricular tachycardia) ([facility]        PAST SURGICAL HISTORY:        Procedure Laterality Date
Orthopedic Surgery Consult  (Dr. Monico Feliz)      CC/Reason for consult:  Left hip pain    HPI:      The patient is a 79 y.o. female with hx of recent ANTONIO on 5/16/23. Pt had a minor fall/trauma on Saturday and was instructed to go to [facility]. Rebecca's. XRs were taken which showed no acute fractures or dislocations. Pt was switched from [address] [facility] to Percocet and patient was discharged home. Today, patient reports the pain improved somewhat after ED visit but it has continued to worsen. No new falls/trauma. Pt reports sharp pain mainly to SI joint region but also reports pain to groin with movement of the leg. Pt does have hx of low back pain/SI pain prior to surgery. This fells similar. Pt has been minimally ambulatory since surgery. Pt denies pain elsewhere. Pt reports radiating pain from left buttock to anterior thigh intermittently. Pt has a prevena in place to left anterior hip incision with minimal output. Past Medical History:    Past Medical History:   Diagnosis Date    Anxiety     Arthritis     CAD (coronary artery disease)     MI.   9 stents total    Chronic kidney disease     COPD (chronic obstructive pulmonary disease) (HCC)     Depression     Diverticulosis     resolved    GERD (gastroesophageal reflux disease)     Heart attack ([address] 05/2018    x2    Heart palpitations     History of blood transfusion     Hyperlipidemia     Hypertension     PSVT (paroxysmal supraventricular tachycardia) ([address]        Past Surgical History:    Past Surgical History:   Procedure Laterality Date    ABDOMEN SURGERY      APPENDECTOMY      CARDIAC SURGERY      stents placed    CAROTID STENT PLACEMENT      x9    CHOLECYSTECTOMY      COLECTOMY  2003    total    COLONOSCOPY  07/30/2003    Dr Matthew Villa diverticulosis and signs of diverticulitis    COLONOSCOPY  05/21/2010    stricture at about  35 cm from the anus.   Could not advance even gastroscope    COLONOSCOPY  10/17/2014    small bowel and rectum normal    SMALL INTESTINE SURGERY
--   05/22/23 1631 (!) 147/76 98.8 °F (37.1 °C) Oral 81 18 97 % --   05/22/23 1556 (!) 126/56 -- -- 69 16 95 % --   05/22/23 1419 (!) 132/54 -- -- 81 -- 97 % --     Constitutional: Patient in no acute distress. Neuro: Alert and oriented to person, place and time. Psych: mood and affect normal  HEENT negative  Lungs: Respiratory effort is normal  Cardiovascular: Normal peripheral pulses  Abdomen: Soft, non-tender, non-distended with no CVA, flank pain or hepatosplenomegaly. No hernias. Left hip pain no right flank pain  Lymphatics: No palpable lymphadenopathy. Bladder non-tender and not distended.,  Bladder scan will be performed to check residual  Pelvic exam: deferred  Rectal exam not indicated    LABS:   Recent Labs     05/22/23  1403 05/22/23  2202 05/23/23  0346 05/23/23  1007   WBC 5.0  --  5.5  --    HGB 6.7* 10.3* 10.4* 11.8*   HCT 22.6* 32.3* 32.2* 36.5   MCV 97.0  --  89.0  --      --  257  --      Recent Labs     05/22/23  1403 05/23/23  0346    136   K 3.7 4.6   * 105   CO2 17* 21   BUN 21 23   CREATININE 1.13* 1.26*       Additional Lab/culture results:    Urinalysis: No results for input(s): COLORU, PHUR, LABCAST, WBCUA, RBCUA, MUCUS, TRICHOMONAS, YEAST, BACTERIA, CLARITYU, SPECGRAV, LEUKOCYTESUR, UROBILINOGEN, BILIRUBINUR, BLOODU in the last 72 hours. Invalid input(s): NITRATE, GLUCOSEUKETONESUAMORPHOUS     -----------------------------------------------------------------  Imaging Results:SOFT TISSUES/RETROPERITONEUM: No paraspinal mass is seen. Bibasilar  subsegmental atelectasis. Moderate right hydroureteronephrosis without  definitive distal obstructing stone identified.   There is diffuse gas and  fluid-filled dilation of the small bowel and fluid-filled rectum      Assessment and Plan   Impression:    Patient Active Problem List   Diagnosis    Diarrhea    Smoker    Acute kidney injury ([facility]    Coronary artery disease involving native coronary artery of native heart

## 2023-05-24 ENCOUNTER — ANESTHESIA EVENT (OUTPATIENT)
Dept: OPERATING ROOM | Age: 68
End: 2023-05-24
Payer: MEDICARE

## 2023-05-24 ENCOUNTER — ANESTHESIA (OUTPATIENT)
Dept: OPERATING ROOM | Age: 68
End: 2023-05-24
Payer: MEDICARE

## 2023-05-24 ENCOUNTER — APPOINTMENT (OUTPATIENT)
Dept: GENERAL RADIOLOGY | Age: 68
DRG: 478 | End: 2023-05-24
Payer: MEDICARE

## 2023-05-24 LAB
ANION GAP SERPL CALCULATED.3IONS-SCNC: 15 MMOL/L (ref 9–17)
BACTERIA URNS QL MICRO: ABNORMAL
BILIRUB UR QL STRIP: ABNORMAL
BUN SERPL-MCNC: 26 MG/DL (ref 8–23)
BUN/CREAT SERPL: 24 (ref 9–20)
CALCIUM SERPL-MCNC: 8.8 MG/DL (ref 8.6–10.4)
CASTS #/AREA URNS LPF: ABNORMAL /LPF
CASTS #/AREA URNS LPF: ABNORMAL /LPF
CHLORIDE SERPL-SCNC: 103 MMOL/L (ref 98–107)
CLARITY UR: CLEAR
CO2 SERPL-SCNC: 21 MMOL/L (ref 20–31)
COLOR UR: YELLOW
CREAT SERPL-MCNC: 1.1 MG/DL (ref 0.5–0.9)
EPI CELLS #/AREA URNS HPF: ABNORMAL /HPF (ref 0–5)
GFR SERPL CREATININE-BSD FRML MDRD: 55 ML/MIN/1.73M2
GLUCOSE SERPL-MCNC: 90 MG/DL (ref 70–99)
GLUCOSE UR STRIP-MCNC: NEGATIVE MG/DL
HCT VFR BLD AUTO: 33.4 % (ref 36.3–47.1)
HGB BLD-MCNC: 11 G/DL (ref 11.9–15.1)
HGB UR QL STRIP.AUTO: ABNORMAL
KETONES UR STRIP-MCNC: ABNORMAL MG/DL
LEUKOCYTE ESTERASE UR QL STRIP: NEGATIVE
NITRITE UR QL STRIP: NEGATIVE
PH UR STRIP: 6 [PH] (ref 5–8)
POTASSIUM SERPL-SCNC: 4.1 MMOL/L (ref 3.7–5.3)
PROT UR STRIP-MCNC: NEGATIVE MG/DL
RBC #/AREA URNS HPF: ABNORMAL /HPF (ref 0–2)
SODIUM SERPL-SCNC: 139 MMOL/L (ref 135–144)
SP GR UR STRIP: 1.03 (ref 1–1.03)
UROBILINOGEN UR STRIP-ACNC: NORMAL
WBC #/AREA URNS HPF: ABNORMAL /HPF (ref 0–5)

## 2023-05-24 PROCEDURE — 2580000003 HC RX 258

## 2023-05-24 PROCEDURE — 97530 THERAPEUTIC ACTIVITIES: CPT

## 2023-05-24 PROCEDURE — 97163 PT EVAL HIGH COMPLEX 45 MIN: CPT

## 2023-05-24 PROCEDURE — 80048 BASIC METABOLIC PNL TOTAL CA: CPT

## 2023-05-24 PROCEDURE — G0378 HOSPITAL OBSERVATION PER HR: HCPCS

## 2023-05-24 PROCEDURE — 81001 URINALYSIS AUTO W/SCOPE: CPT

## 2023-05-24 PROCEDURE — 6360000002 HC RX W HCPCS: Performed by: NURSE PRACTITIONER

## 2023-05-24 PROCEDURE — 3700000001 HC ADD 15 MINUTES (ANESTHESIA): Performed by: UROLOGY

## 2023-05-24 PROCEDURE — 2500000003 HC RX 250 WO HCPCS: Performed by: NURSE ANESTHETIST, CERTIFIED REGISTERED

## 2023-05-24 PROCEDURE — 85014 HEMATOCRIT: CPT

## 2023-05-24 PROCEDURE — 6360000002 HC RX W HCPCS: Performed by: UROLOGY

## 2023-05-24 PROCEDURE — 3209999900 FLUORO FOR SURGICAL PROCEDURES

## 2023-05-24 PROCEDURE — 3600000002 HC SURGERY LEVEL 2 BASE: Performed by: UROLOGY

## 2023-05-24 PROCEDURE — 6360000002 HC RX W HCPCS

## 2023-05-24 PROCEDURE — 97166 OT EVAL MOD COMPLEX 45 MIN: CPT

## 2023-05-24 PROCEDURE — 2580000003 HC RX 258: Performed by: NURSE PRACTITIONER

## 2023-05-24 PROCEDURE — 6360000004 HC RX CONTRAST MEDICATION: Performed by: UROLOGY

## 2023-05-24 PROCEDURE — 7100000000 HC PACU RECOVERY - FIRST 15 MIN: Performed by: UROLOGY

## 2023-05-24 PROCEDURE — 85018 HEMOGLOBIN: CPT

## 2023-05-24 PROCEDURE — 6360000002 HC RX W HCPCS: Performed by: INTERNAL MEDICINE

## 2023-05-24 PROCEDURE — 7100000001 HC PACU RECOVERY - ADDTL 15 MIN: Performed by: UROLOGY

## 2023-05-24 PROCEDURE — 2500000003 HC RX 250 WO HCPCS: Performed by: UROLOGY

## 2023-05-24 PROCEDURE — 3700000000 HC ANESTHESIA ATTENDED CARE: Performed by: UROLOGY

## 2023-05-24 PROCEDURE — BT1D1ZZ FLUOROSCOPY OF RIGHT KIDNEY, URETER AND BLADDER USING LOW OSMOLAR CONTRAST: ICD-10-PCS | Performed by: UROLOGY

## 2023-05-24 PROCEDURE — 97535 SELF CARE MNGMENT TRAINING: CPT

## 2023-05-24 PROCEDURE — 6360000002 HC RX W HCPCS: Performed by: NURSE ANESTHETIST, CERTIFIED REGISTERED

## 2023-05-24 PROCEDURE — C9113 INJ PANTOPRAZOLE SODIUM, VIA: HCPCS | Performed by: INTERNAL MEDICINE

## 2023-05-24 PROCEDURE — C1758 CATHETER, URETERAL: HCPCS | Performed by: UROLOGY

## 2023-05-24 PROCEDURE — 36415 COLL VENOUS BLD VENIPUNCTURE: CPT

## 2023-05-24 PROCEDURE — C2617 STENT, NON-COR, TEM W/O DEL: HCPCS | Performed by: UROLOGY

## 2023-05-24 PROCEDURE — 6370000000 HC RX 637 (ALT 250 FOR IP): Performed by: UROLOGY

## 2023-05-24 PROCEDURE — 2500000003 HC RX 250 WO HCPCS: Performed by: INTERNAL MEDICINE

## 2023-05-24 PROCEDURE — 0T768DZ DILATION OF RIGHT URETER WITH INTRALUMINAL DEVICE, VIA NATURAL OR ARTIFICIAL OPENING ENDOSCOPIC: ICD-10-PCS | Performed by: UROLOGY

## 2023-05-24 PROCEDURE — 3600000012 HC SURGERY LEVEL 2 ADDTL 15MIN: Performed by: UROLOGY

## 2023-05-24 PROCEDURE — 97167 OT EVAL HIGH COMPLEX 60 MIN: CPT

## 2023-05-24 PROCEDURE — 2709999900 HC NON-CHARGEABLE SUPPLY: Performed by: UROLOGY

## 2023-05-24 PROCEDURE — 99232 SBSQ HOSP IP/OBS MODERATE 35: CPT | Performed by: INTERNAL MEDICINE

## 2023-05-24 PROCEDURE — C1769 GUIDE WIRE: HCPCS | Performed by: UROLOGY

## 2023-05-24 PROCEDURE — 97110 THERAPEUTIC EXERCISES: CPT

## 2023-05-24 DEVICE — URETERAL STENT WITH SIDE HOLES 7FX26CM
Type: IMPLANTABLE DEVICE | Site: URETER | Status: FUNCTIONAL
Brand: TRIA™ SOFT

## 2023-05-24 RX ORDER — METOPROLOL TARTRATE 5 MG/5ML
2.5 INJECTION INTRAVENOUS EVERY 6 HOURS
Status: DISCONTINUED | OUTPATIENT
Start: 2023-05-24 | End: 2023-05-27

## 2023-05-24 RX ORDER — LIDOCAINE HYDROCHLORIDE 20 MG/ML
INJECTION, SOLUTION EPIDURAL; INFILTRATION; INTRACAUDAL; PERINEURAL PRN
Status: DISCONTINUED | OUTPATIENT
Start: 2023-05-24 | End: 2023-05-24 | Stop reason: SDUPTHER

## 2023-05-24 RX ORDER — PROPOFOL 10 MG/ML
INJECTION, EMULSION INTRAVENOUS PRN
Status: DISCONTINUED | OUTPATIENT
Start: 2023-05-24 | End: 2023-05-24 | Stop reason: SDUPTHER

## 2023-05-24 RX ORDER — PANTOPRAZOLE SODIUM 40 MG/10ML
40 INJECTION, POWDER, LYOPHILIZED, FOR SOLUTION INTRAVENOUS DAILY
Status: DISCONTINUED | OUTPATIENT
Start: 2023-05-24 | End: 2023-05-26 | Stop reason: CLARIF

## 2023-05-24 RX ORDER — CIPROFLOXACIN 2 MG/ML
INJECTION, SOLUTION INTRAVENOUS PRN
Status: DISCONTINUED | OUTPATIENT
Start: 2023-05-24 | End: 2023-05-24 | Stop reason: SDUPTHER

## 2023-05-24 RX ORDER — ENOXAPARIN SODIUM 100 MG/ML
40 INJECTION SUBCUTANEOUS DAILY
Status: DISCONTINUED | OUTPATIENT
Start: 2023-05-24 | End: 2023-05-27

## 2023-05-24 RX ORDER — LIDOCAINE HYDROCHLORIDE 20 MG/ML
JELLY TOPICAL PRN
Status: DISCONTINUED | OUTPATIENT
Start: 2023-05-24 | End: 2023-05-24 | Stop reason: ALTCHOICE

## 2023-05-24 RX ADMIN — OXYCODONE HYDROCHLORIDE AND ACETAMINOPHEN 1 TABLET: 5; 325 TABLET ORAL at 22:32

## 2023-05-24 RX ADMIN — HYDROMORPHONE HYDROCHLORIDE 0.5 MG: 1 INJECTION, SOLUTION INTRAMUSCULAR; INTRAVENOUS; SUBCUTANEOUS at 05:05

## 2023-05-24 RX ADMIN — CIPROFLOXACIN 400 MG: 2 INJECTION, SOLUTION INTRAVENOUS at 16:30

## 2023-05-24 RX ADMIN — PROPOFOL 50 MCG/KG/MIN: 10 INJECTION, EMULSION INTRAVENOUS at 16:14

## 2023-05-24 RX ADMIN — LIDOCAINE HYDROCHLORIDE 80 MG: 20 INJECTION, SOLUTION EPIDURAL; INFILTRATION; INTRACAUDAL; PERINEURAL at 16:10

## 2023-05-24 RX ADMIN — SODIUM CHLORIDE, POTASSIUM CHLORIDE, SODIUM LACTATE AND CALCIUM CHLORIDE: 600; 310; 30; 20 INJECTION, SOLUTION INTRAVENOUS at 00:12

## 2023-05-24 RX ADMIN — ONDANSETRON 4 MG: 2 INJECTION INTRAMUSCULAR; INTRAVENOUS at 16:51

## 2023-05-24 RX ADMIN — METOPROLOL TARTRATE 2.5 MG: 5 INJECTION INTRAVENOUS at 10:24

## 2023-05-24 RX ADMIN — SODIUM CHLORIDE, POTASSIUM CHLORIDE, SODIUM LACTATE AND CALCIUM CHLORIDE: 600; 310; 30; 20 INJECTION, SOLUTION INTRAVENOUS at 07:18

## 2023-05-24 RX ADMIN — PANTOPRAZOLE SODIUM 40 MG: 40 INJECTION, POWDER, FOR SOLUTION INTRAVENOUS at 10:24

## 2023-05-24 RX ADMIN — HYDROMORPHONE HYDROCHLORIDE 0.5 MG: 1 INJECTION, SOLUTION INTRAMUSCULAR; INTRAVENOUS; SUBCUTANEOUS at 18:28

## 2023-05-24 RX ADMIN — PROPOFOL 20 MG: 10 INJECTION, EMULSION INTRAVENOUS at 16:12

## 2023-05-24 RX ADMIN — SODIUM CHLORIDE, PRESERVATIVE FREE 10 ML: 5 INJECTION INTRAVENOUS at 10:25

## 2023-05-24 RX ADMIN — ENOXAPARIN SODIUM 40 MG: 100 INJECTION SUBCUTANEOUS at 10:25

## 2023-05-24 RX ADMIN — PROPOFOL 30 MG: 10 INJECTION, EMULSION INTRAVENOUS at 16:09

## 2023-05-24 RX ADMIN — ONDANSETRON 4 MG: 2 INJECTION INTRAMUSCULAR; INTRAVENOUS at 10:24

## 2023-05-24 RX ADMIN — METOPROLOL TARTRATE 2.5 MG: 5 INJECTION INTRAVENOUS at 20:44

## 2023-05-24 RX ADMIN — METOPROLOL TARTRATE 2.5 MG: 5 INJECTION INTRAVENOUS at 15:30

## 2023-05-24 ASSESSMENT — PAIN DESCRIPTION - ORIENTATION
ORIENTATION: LEFT

## 2023-05-24 ASSESSMENT — PAIN DESCRIPTION - DESCRIPTORS
DESCRIPTORS: SHARP
DESCRIPTORS: SHARP
DESCRIPTORS: ACHING;SHARP
DESCRIPTORS: SHARP
DESCRIPTORS: SHARP;DISCOMFORT

## 2023-05-24 ASSESSMENT — PAIN SCALES - GENERAL
PAINLEVEL_OUTOF10: 10
PAINLEVEL_OUTOF10: 10
PAINLEVEL_OUTOF10: 3
PAINLEVEL_OUTOF10: 10
PAINLEVEL_OUTOF10: 9
PAINLEVEL_OUTOF10: 9
PAINLEVEL_OUTOF10: 8

## 2023-05-24 ASSESSMENT — PAIN DESCRIPTION - DIRECTION
RADIATING_TOWARDS: GROIN
RADIATING_TOWARDS: GROIN

## 2023-05-24 ASSESSMENT — PAIN DESCRIPTION - LOCATION
LOCATION: HIP
LOCATION: HIP;BACK
LOCATION: HIP
LOCATION: HIP

## 2023-05-24 ASSESSMENT — PAIN - FUNCTIONAL ASSESSMENT
PAIN_FUNCTIONAL_ASSESSMENT: PREVENTS OR INTERFERES WITH MANY ACTIVE NOT PASSIVE ACTIVITIES
PAIN_FUNCTIONAL_ASSESSMENT: PREVENTS OR INTERFERES WITH MANY ACTIVE NOT PASSIVE ACTIVITIES

## 2023-05-24 ASSESSMENT — PAIN DESCRIPTION - PAIN TYPE
TYPE: ACUTE PAIN
TYPE: CHRONIC PAIN
TYPE: ACUTE PAIN

## 2023-05-24 ASSESSMENT — PAIN DESCRIPTION - FREQUENCY
FREQUENCY: CONTINUOUS

## 2023-05-24 ASSESSMENT — PAIN DESCRIPTION - ONSET
ONSET: ON-GOING

## 2023-05-24 ASSESSMENT — LIFESTYLE VARIABLES: SMOKING_STATUS: 1

## 2023-05-24 NOTE — FLOWSHEET NOTE
2045 - NGT inserted into R nare without difficulty with copious amounts green drainage. Pt also vomiting large amounts and ngt removed by pt until vomiting subsided. NGT reinserted to right nare again and connected to LIWS with green drainage coming out. Pt states tube doesn't feel right. Tube at 60cm measured prior to insertion.      Message sent to [facility] for abd xray to check placement

## 2023-05-24 NOTE — ANESTHESIA PRE PROCEDURE
Date/Time    PROTIME 9.5 01/29/2020 08:46 PM    INR 0.9 01/29/2020 08:46 PM    APTT 27.6 01/29/2020 08:46 PM       HCG (If Applicable):   Lab Results   Component Value Date    HCG NEGATIVE 12/15/2015        ABGs: No results found for: PHART, PO2ART, QSK5UKJ, OVR2HDV, BEART, W4SYPTIR     Type & Screen (If Applicable):  No results found for: LABABO, LABRH    Drug/Infectious Status (If Applicable):  No results found for: HIV, HEPCAB    COVID-19 Screening (If Applicable): No results found for: COVID19        Anesthesia Evaluation  Patient summary reviewed and Nursing notes reviewed no history of anesthetic complications:   Airway: Mallampati: II  TM distance: >3 FB   Neck ROM: full  Mouth opening: > = 3 FB   Dental: normal exam         Pulmonary:normal exam    (+) COPD:  current smoker                           Cardiovascular:  Exercise tolerance: no interval change,   (+) hypertension:, past MI:, CAD:, CABG/stent:, CHF:,           Rate: normal                    Neuro/Psych:   (+) psychiatric history:            GI/Hepatic/Renal:   (+) GERD:,           Endo/Other:                     Abdominal:             Vascular: Other Findings:           Anesthesia Plan      MAC     ASA 3       Induction: intravenous. MIPS: Prophylactic antiemetics administered. Anesthetic plan and risks discussed with patient. Plan discussed with CRNA.     Attending anesthesiologist reviewed and agrees with Preprocedure content                Myrna Soto DO   5/24/2023

## 2023-05-24 NOTE — OP NOTE
Operative Note      Patient: Julia Manrique  YOB: 1955  MRN: [id number]    Date of Procedure: 5/24/2023    Pre-Op Diagnosis Codes: * Hydronephrosis, unspecified hydronephrosis type [N13.30]    Post-Op Diagnosis: Same       Procedure(s):  CYSTOSCOPY PYELOGRAM STENT PLACEMENT    Surgeon(s):  Asad Ziegler MD    Assistant:   * No surgical staff found *    Anesthesia: General    Estimated Blood Loss (mL): Minimal    Complications: None    Specimens:   * No specimens in log *    Implants:  * No implants in log *      Drains:   NG/OG/NJ/NE Tube Nasogastric 14 fr Right nostril (Active)   Surrounding Skin Clean, dry & intact 05/24/23 1331   Securement device Adhesive based hernandes 05/24/23 1331   Status Suction-low intermittent 05/24/23 1331   Placement Verified X-Ray (Initial) 05/24/23 1331   NG/OG/NJ/NE External Measurement (cm) 60 cm 05/24/23 1331   Drainage Appearance Brown 05/24/23 1331   Output (mL) 550 ml 05/24/23 0830       [REMOVED] Negative Pressure Wound Therapy Hip Left (Removed)   Wound Type Surgical 05/24/23 1331   Canister changed? No 05/16/23 1008   Dressing Status Clean, dry & intact 05/16/23 1624   Drainage Amount None 05/16/23 1624   Wound Assessment Dry 05/24/23 1331   [place]-wound Assessment Ecchymosis 05/24/23 1331   Odor None 05/16/23 1624       [REMOVED] External Urinary Catheter (Removed)   Urine Color Yellow 05/22/23 2339   Urine Appearance Cloudy 05/22/23 2339   Output (mL) 50 mL 05/22/23 2339       Findings: indications: 20-year-old female with hydronephrosis noted on CT imaging scheduled for cystoscopy and placement of stent retrograde pyelogram      Detailed Description of Procedure:   Patient brought to the operating room, positioned in dorsal lithotomy, proper patient identification procedure identification prepping and draping. .    We entered the bladder with the cystoscope, examination of the interior the bladder revealed no tumors ulcers or stones, trigone is normal, ureteral

## 2023-05-25 ENCOUNTER — APPOINTMENT (OUTPATIENT)
Dept: MRI IMAGING | Age: 68
DRG: 478 | End: 2023-05-25
Payer: MEDICARE

## 2023-05-25 PROBLEM — M79.81 NONTRAUMATIC PSOAS HEMATOMA: Status: ACTIVE | Noted: 2023-05-25

## 2023-05-25 PROBLEM — N13.1 HYDRONEPHROSIS WITH URETERAL STRICTURE, NOT ELSEWHERE CLASSIFIED: Status: ACTIVE | Noted: 2023-05-23

## 2023-05-25 LAB
ANION GAP SERPL CALCULATED.3IONS-SCNC: 15 MMOL/L (ref 9–17)
BUN SERPL-MCNC: 26 MG/DL (ref 8–23)
BUN/CREAT SERPL: 19 (ref 9–20)
CALCIUM SERPL-MCNC: 8.4 MG/DL (ref 8.6–10.4)
CHLORIDE SERPL-SCNC: 102 MMOL/L (ref 98–107)
CO2 SERPL-SCNC: 24 MMOL/L (ref 20–31)
CREAT SERPL-MCNC: 1.34 MG/DL (ref 0.5–0.9)
GFR SERPL CREATININE-BSD FRML MDRD: 43 ML/MIN/1.73M2
GLUCOSE SERPL-MCNC: 86 MG/DL (ref 70–99)
MAGNESIUM SERPL-MCNC: 2 MG/DL (ref 1.6–2.6)
POTASSIUM SERPL-SCNC: 3.5 MMOL/L (ref 3.7–5.3)
SODIUM SERPL-SCNC: 141 MMOL/L (ref 135–144)
WBC OTHER # BLD: 7.2 K/UL (ref 3.5–11.3)

## 2023-05-25 PROCEDURE — 6360000004 HC RX CONTRAST MEDICATION: Performed by: ORTHOPAEDIC SURGERY

## 2023-05-25 PROCEDURE — A9579 GAD-BASE MR CONTRAST NOS,1ML: HCPCS | Performed by: ORTHOPAEDIC SURGERY

## 2023-05-25 PROCEDURE — 83735 ASSAY OF MAGNESIUM: CPT

## 2023-05-25 PROCEDURE — 97530 THERAPEUTIC ACTIVITIES: CPT

## 2023-05-25 PROCEDURE — G0378 HOSPITAL OBSERVATION PER HR: HCPCS

## 2023-05-25 PROCEDURE — 80048 BASIC METABOLIC PNL TOTAL CA: CPT

## 2023-05-25 PROCEDURE — 6360000002 HC RX W HCPCS: Performed by: UROLOGY

## 2023-05-25 PROCEDURE — 97116 GAIT TRAINING THERAPY: CPT

## 2023-05-25 PROCEDURE — 97110 THERAPEUTIC EXERCISES: CPT

## 2023-05-25 PROCEDURE — 85048 AUTOMATED LEUKOCYTE COUNT: CPT

## 2023-05-25 PROCEDURE — C9113 INJ PANTOPRAZOLE SODIUM, VIA: HCPCS | Performed by: UROLOGY

## 2023-05-25 PROCEDURE — 2580000003 HC RX 258: Performed by: ORTHOPAEDIC SURGERY

## 2023-05-25 PROCEDURE — 2580000003 HC RX 258: Performed by: UROLOGY

## 2023-05-25 PROCEDURE — 2500000003 HC RX 250 WO HCPCS: Performed by: UROLOGY

## 2023-05-25 PROCEDURE — 99232 SBSQ HOSP IP/OBS MODERATE 35: CPT | Performed by: INTERNAL MEDICINE

## 2023-05-25 PROCEDURE — 36415 COLL VENOUS BLD VENIPUNCTURE: CPT

## 2023-05-25 PROCEDURE — 6370000000 HC RX 637 (ALT 250 FOR IP): Performed by: UROLOGY

## 2023-05-25 PROCEDURE — 1200000000 HC SEMI PRIVATE

## 2023-05-25 PROCEDURE — 72158 MRI LUMBAR SPINE W/O & W/DYE: CPT

## 2023-05-25 RX ORDER — SODIUM CHLORIDE 0.9 % (FLUSH) 0.9 %
10 SYRINGE (ML) INJECTION ONCE
Status: COMPLETED | OUTPATIENT
Start: 2023-05-25 | End: 2023-05-25

## 2023-05-25 RX ADMIN — SODIUM BICARBONATE 650 MG: 650 TABLET ORAL at 21:58

## 2023-05-25 RX ADMIN — SODIUM BICARBONATE 650 MG: 650 TABLET ORAL at 11:24

## 2023-05-25 RX ADMIN — ONDANSETRON 4 MG: 2 INJECTION INTRAMUSCULAR; INTRAVENOUS at 00:31

## 2023-05-25 RX ADMIN — SODIUM CHLORIDE, POTASSIUM CHLORIDE, SODIUM LACTATE AND CALCIUM CHLORIDE: 600; 310; 30; 20 INJECTION, SOLUTION INTRAVENOUS at 00:35

## 2023-05-25 RX ADMIN — OXYCODONE HYDROCHLORIDE AND ACETAMINOPHEN 1 TABLET: 5; 325 TABLET ORAL at 11:23

## 2023-05-25 RX ADMIN — METOPROLOL TARTRATE 2.5 MG: 5 INJECTION INTRAVENOUS at 16:12

## 2023-05-25 RX ADMIN — ISOSORBIDE MONONITRATE 30 MG: 30 TABLET, EXTENDED RELEASE ORAL at 11:24

## 2023-05-25 RX ADMIN — HYDROMORPHONE HYDROCHLORIDE 0.5 MG: 1 INJECTION, SOLUTION INTRAMUSCULAR; INTRAVENOUS; SUBCUTANEOUS at 07:04

## 2023-05-25 RX ADMIN — SERTRALINE HYDROCHLORIDE 100 MG: 100 TABLET ORAL at 11:22

## 2023-05-25 RX ADMIN — SODIUM CHLORIDE, POTASSIUM CHLORIDE, SODIUM LACTATE AND CALCIUM CHLORIDE: 600; 310; 30; 20 INJECTION, SOLUTION INTRAVENOUS at 15:32

## 2023-05-25 RX ADMIN — ASPIRIN 81 MG: 81 TABLET, COATED ORAL at 22:01

## 2023-05-25 RX ADMIN — HYDROMORPHONE HYDROCHLORIDE 0.5 MG: 1 INJECTION, SOLUTION INTRAMUSCULAR; INTRAVENOUS; SUBCUTANEOUS at 13:53

## 2023-05-25 RX ADMIN — OXYCODONE HYDROCHLORIDE AND ACETAMINOPHEN 1 TABLET: 5; 325 TABLET ORAL at 17:29

## 2023-05-25 RX ADMIN — HYDROMORPHONE HYDROCHLORIDE 0.5 MG: 1 INJECTION, SOLUTION INTRAMUSCULAR; INTRAVENOUS; SUBCUTANEOUS at 00:30

## 2023-05-25 RX ADMIN — ENOXAPARIN SODIUM 40 MG: 100 INJECTION SUBCUTANEOUS at 11:24

## 2023-05-25 RX ADMIN — HYDROMORPHONE HYDROCHLORIDE 0.5 MG: 1 INJECTION, SOLUTION INTRAMUSCULAR; INTRAVENOUS; SUBCUTANEOUS at 20:30

## 2023-05-25 RX ADMIN — SODIUM CHLORIDE, PRESERVATIVE FREE 10 ML: 5 INJECTION INTRAVENOUS at 09:48

## 2023-05-25 RX ADMIN — PANTOPRAZOLE SODIUM 40 MG: 40 INJECTION, POWDER, FOR SOLUTION INTRAVENOUS at 11:25

## 2023-05-25 RX ADMIN — METOPROLOL TARTRATE 2.5 MG: 5 INJECTION INTRAVENOUS at 11:25

## 2023-05-25 RX ADMIN — CELECOXIB 100 MG: 100 CAPSULE ORAL at 11:23

## 2023-05-25 RX ADMIN — GADOTERIDOL 19 ML: 279.3 INJECTION, SOLUTION INTRAVENOUS at 09:45

## 2023-05-25 RX ADMIN — METOPROLOL TARTRATE 2.5 MG: 5 INJECTION INTRAVENOUS at 21:58

## 2023-05-25 RX ADMIN — TAMSULOSIN HYDROCHLORIDE 0.4 MG: 0.4 CAPSULE ORAL at 11:24

## 2023-05-25 ASSESSMENT — PAIN SCALES - GENERAL
PAINLEVEL_OUTOF10: 10

## 2023-05-25 ASSESSMENT — PAIN DESCRIPTION - LOCATION
LOCATION: BACK
LOCATION: BACK
LOCATION: HIP
LOCATION: ABDOMEN;BACK
LOCATION: ABDOMEN
LOCATION: BACK

## 2023-05-25 ASSESSMENT — PAIN DESCRIPTION - ORIENTATION
ORIENTATION: LEFT
ORIENTATION: RIGHT;LOWER

## 2023-05-25 ASSESSMENT — PAIN DESCRIPTION - DESCRIPTORS
DESCRIPTORS: SHARP
DESCRIPTORS: ACHING;STABBING

## 2023-05-25 ASSESSMENT — PAIN - FUNCTIONAL ASSESSMENT: PAIN_FUNCTIONAL_ASSESSMENT: PREVENTS OR INTERFERES SOME ACTIVE ACTIVITIES AND ADLS

## 2023-05-26 ENCOUNTER — APPOINTMENT (OUTPATIENT)
Dept: CT IMAGING | Age: 68
DRG: 478 | End: 2023-05-26
Payer: MEDICARE

## 2023-05-26 LAB
ANION GAP SERPL CALCULATED.3IONS-SCNC: 9 MMOL/L (ref 9–17)
BUN SERPL-MCNC: 19 MG/DL (ref 8–23)
BUN/CREAT SERPL: 16 (ref 9–20)
CALCIUM SERPL-MCNC: 8 MG/DL (ref 8.6–10.4)
CHLORIDE SERPL-SCNC: 104 MMOL/L (ref 98–107)
CO2 SERPL-SCNC: 27 MMOL/L (ref 20–31)
CREAT SERPL-MCNC: 1.17 MG/DL (ref 0.5–0.9)
GFR SERPL CREATININE-BSD FRML MDRD: 51 ML/MIN/1.73M2
GLUCOSE SERPL-MCNC: 78 MG/DL (ref 70–99)
INR PPP: 1.1
MAGNESIUM SERPL-MCNC: 1.9 MG/DL (ref 1.6–2.6)
PARTIAL THROMBOPLASTIN TIME: 28.3 SEC (ref 23.9–33.8)
POTASSIUM SERPL-SCNC: 3.3 MMOL/L (ref 3.7–5.3)
PROTHROMBIN TIME: 13.9 SEC (ref 11.5–14.2)
SODIUM SERPL-SCNC: 140 MMOL/L (ref 135–144)

## 2023-05-26 PROCEDURE — C9113 INJ PANTOPRAZOLE SODIUM, VIA: HCPCS | Performed by: UROLOGY

## 2023-05-26 PROCEDURE — 36415 COLL VENOUS BLD VENIPUNCTURE: CPT

## 2023-05-26 PROCEDURE — 2500000003 HC RX 250 WO HCPCS: Performed by: UROLOGY

## 2023-05-26 PROCEDURE — 0QB03ZX EXCISION OF LUMBAR VERTEBRA, PERCUTANEOUS APPROACH, DIAGNOSTIC: ICD-10-PCS | Performed by: RADIOLOGY

## 2023-05-26 PROCEDURE — 97535 SELF CARE MNGMENT TRAINING: CPT

## 2023-05-26 PROCEDURE — 20206 BIOPSY MUSCLE PERQ NEEDLE: CPT

## 2023-05-26 PROCEDURE — 88307 TISSUE EXAM BY PATHOLOGIST: CPT

## 2023-05-26 PROCEDURE — 6360000002 HC RX W HCPCS: Performed by: RADIOLOGY

## 2023-05-26 PROCEDURE — 6360000002 HC RX W HCPCS: Performed by: UROLOGY

## 2023-05-26 PROCEDURE — 85610 PROTHROMBIN TIME: CPT

## 2023-05-26 PROCEDURE — 87205 SMEAR GRAM STAIN: CPT

## 2023-05-26 PROCEDURE — 83735 ASSAY OF MAGNESIUM: CPT

## 2023-05-26 PROCEDURE — 6360000002 HC RX W HCPCS: Performed by: INTERNAL MEDICINE

## 2023-05-26 PROCEDURE — 2709999900 CT NEEDLE BIOPSY MUSCLE PERCUTANEOUS

## 2023-05-26 PROCEDURE — 2580000003 HC RX 258: Performed by: UROLOGY

## 2023-05-26 PROCEDURE — 85730 THROMBOPLASTIN TIME PARTIAL: CPT

## 2023-05-26 PROCEDURE — 80048 BASIC METABOLIC PNL TOTAL CA: CPT

## 2023-05-26 PROCEDURE — 77012 CT SCAN FOR NEEDLE BIOPSY: CPT

## 2023-05-26 PROCEDURE — 6360000004 HC RX CONTRAST MEDICATION: Performed by: INTERNAL MEDICINE

## 2023-05-26 PROCEDURE — 2580000003 HC RX 258: Performed by: INTERNAL MEDICINE

## 2023-05-26 PROCEDURE — 1200000000 HC SEMI PRIVATE

## 2023-05-26 PROCEDURE — 99232 SBSQ HOSP IP/OBS MODERATE 35: CPT | Performed by: INTERNAL MEDICINE

## 2023-05-26 PROCEDURE — 6370000000 HC RX 637 (ALT 250 FOR IP): Performed by: UROLOGY

## 2023-05-26 PROCEDURE — 87176 TISSUE HOMOGENIZATION CULTR: CPT

## 2023-05-26 PROCEDURE — 87070 CULTURE OTHR SPECIMN AEROBIC: CPT

## 2023-05-26 PROCEDURE — 87075 CULTR BACTERIA EXCEPT BLOOD: CPT

## 2023-05-26 RX ORDER — SODIUM CHLORIDE 0.9 % (FLUSH) 0.9 %
10 SYRINGE (ML) INJECTION PRN
Status: DISCONTINUED | OUTPATIENT
Start: 2023-05-26 | End: 2023-05-27 | Stop reason: HOSPADM

## 2023-05-26 RX ORDER — MIDAZOLAM HYDROCHLORIDE 5 MG/ML
INJECTION INTRAMUSCULAR; INTRAVENOUS PRN
Status: COMPLETED | OUTPATIENT
Start: 2023-05-26 | End: 2023-05-26

## 2023-05-26 RX ORDER — FENTANYL CITRATE 0.05 MG/ML
INJECTION, SOLUTION INTRAMUSCULAR; INTRAVENOUS PRN
Status: COMPLETED | OUTPATIENT
Start: 2023-05-26 | End: 2023-05-26

## 2023-05-26 RX ORDER — 0.9 % SODIUM CHLORIDE 0.9 %
80 INTRAVENOUS SOLUTION INTRAVENOUS ONCE
Status: COMPLETED | OUTPATIENT
Start: 2023-05-26 | End: 2023-05-26

## 2023-05-26 RX ADMIN — TAMSULOSIN HYDROCHLORIDE 0.4 MG: 0.4 CAPSULE ORAL at 12:34

## 2023-05-26 RX ADMIN — OXYCODONE HYDROCHLORIDE AND ACETAMINOPHEN 1 TABLET: 5; 325 TABLET ORAL at 12:09

## 2023-05-26 RX ADMIN — HYDROMORPHONE HYDROCHLORIDE 0.5 MG: 1 INJECTION, SOLUTION INTRAMUSCULAR; INTRAVENOUS; SUBCUTANEOUS at 15:57

## 2023-05-26 RX ADMIN — SODIUM BICARBONATE 650 MG: 650 TABLET ORAL at 22:28

## 2023-05-26 RX ADMIN — SODIUM CHLORIDE 80 ML: 9 INJECTION, SOLUTION INTRAVENOUS at 11:19

## 2023-05-26 RX ADMIN — ASPIRIN 81 MG: 81 TABLET, COATED ORAL at 22:36

## 2023-05-26 RX ADMIN — ISOSORBIDE MONONITRATE 30 MG: 30 TABLET, EXTENDED RELEASE ORAL at 12:08

## 2023-05-26 RX ADMIN — HYDROMORPHONE HYDROCHLORIDE 0.5 MG: 1 INJECTION, SOLUTION INTRAMUSCULAR; INTRAVENOUS; SUBCUTANEOUS at 09:14

## 2023-05-26 RX ADMIN — FENTANYL CITRATE 25 MCG: 0.05 INJECTION, SOLUTION INTRAMUSCULAR; INTRAVENOUS at 10:41

## 2023-05-26 RX ADMIN — SERTRALINE HYDROCHLORIDE 100 MG: 100 TABLET ORAL at 12:09

## 2023-05-26 RX ADMIN — HYDROMORPHONE HYDROCHLORIDE 0.5 MG: 1 INJECTION, SOLUTION INTRAMUSCULAR; INTRAVENOUS; SUBCUTANEOUS at 04:36

## 2023-05-26 RX ADMIN — SODIUM CHLORIDE, POTASSIUM CHLORIDE, SODIUM LACTATE AND CALCIUM CHLORIDE: 600; 310; 30; 20 INJECTION, SOLUTION INTRAVENOUS at 00:11

## 2023-05-26 RX ADMIN — METOPROLOL TARTRATE 2.5 MG: 5 INJECTION INTRAVENOUS at 04:36

## 2023-05-26 RX ADMIN — SODIUM CHLORIDE, POTASSIUM CHLORIDE, SODIUM LACTATE AND CALCIUM CHLORIDE: 600; 310; 30; 20 INJECTION, SOLUTION INTRAVENOUS at 12:41

## 2023-05-26 RX ADMIN — OXYCODONE HYDROCHLORIDE AND ACETAMINOPHEN 1 TABLET: 5; 325 TABLET ORAL at 02:25

## 2023-05-26 RX ADMIN — OXYCODONE HYDROCHLORIDE AND ACETAMINOPHEN 1 TABLET: 5; 325 TABLET ORAL at 18:14

## 2023-05-26 RX ADMIN — CELECOXIB 100 MG: 100 CAPSULE ORAL at 12:08

## 2023-05-26 RX ADMIN — POTASSIUM CHLORIDE 40 MEQ: 1500 TABLET, EXTENDED RELEASE ORAL at 15:53

## 2023-05-26 RX ADMIN — IOPAMIDOL 75 ML: 755 INJECTION, SOLUTION INTRAVENOUS at 11:19

## 2023-05-26 RX ADMIN — FENTANYL CITRATE 25 MCG: 0.05 INJECTION, SOLUTION INTRAMUSCULAR; INTRAVENOUS at 10:56

## 2023-05-26 RX ADMIN — SODIUM BICARBONATE 650 MG: 650 TABLET ORAL at 12:34

## 2023-05-26 RX ADMIN — SODIUM CHLORIDE, PRESERVATIVE FREE 10 ML: 5 INJECTION INTRAVENOUS at 11:19

## 2023-05-26 RX ADMIN — PANTOPRAZOLE SODIUM 40 MG: 40 INJECTION, POWDER, FOR SOLUTION INTRAVENOUS at 12:34

## 2023-05-26 RX ADMIN — MIDAZOLAM HYDROCHLORIDE 0.5 MG: 5 INJECTION INTRAMUSCULAR; INTRAVENOUS at 10:56

## 2023-05-26 RX ADMIN — HYDROMORPHONE HYDROCHLORIDE 0.5 MG: 1 INJECTION, SOLUTION INTRAMUSCULAR; INTRAVENOUS; SUBCUTANEOUS at 22:40

## 2023-05-26 RX ADMIN — MIDAZOLAM HYDROCHLORIDE 0.5 MG: 5 INJECTION INTRAMUSCULAR; INTRAVENOUS at 10:42

## 2023-05-26 RX ADMIN — METOPROLOL TARTRATE 2.5 MG: 5 INJECTION INTRAVENOUS at 15:52

## 2023-05-26 RX ADMIN — METOPROLOL TARTRATE 2.5 MG: 5 INJECTION INTRAVENOUS at 12:34

## 2023-05-26 RX ADMIN — METOPROLOL TARTRATE 2.5 MG: 5 INJECTION INTRAVENOUS at 22:37

## 2023-05-26 RX ADMIN — SODIUM CHLORIDE, POTASSIUM CHLORIDE, SODIUM LACTATE AND CALCIUM CHLORIDE: 600; 310; 30; 20 INJECTION, SOLUTION INTRAVENOUS at 20:35

## 2023-05-26 ASSESSMENT — PAIN - FUNCTIONAL ASSESSMENT
PAIN_FUNCTIONAL_ASSESSMENT: PREVENTS OR INTERFERES SOME ACTIVE ACTIVITIES AND ADLS
PAIN_FUNCTIONAL_ASSESSMENT: PREVENTS OR INTERFERES SOME ACTIVE ACTIVITIES AND ADLS

## 2023-05-26 ASSESSMENT — PAIN SCALES - GENERAL
PAINLEVEL_OUTOF10: 10
PAINLEVEL_OUTOF10: 8
PAINLEVEL_OUTOF10: 9
PAINLEVEL_OUTOF10: 9

## 2023-05-26 ASSESSMENT — PAIN DESCRIPTION - ORIENTATION
ORIENTATION: LEFT

## 2023-05-26 ASSESSMENT — PAIN DESCRIPTION - LOCATION
LOCATION: HIP
LOCATION: BACK;HIP
LOCATION: BACK
LOCATION: BACK
LOCATION: HIP;BACK

## 2023-05-26 ASSESSMENT — PAIN DESCRIPTION - DESCRIPTORS
DESCRIPTORS: ACHING
DESCRIPTORS: THROBBING
DESCRIPTORS: ACHING

## 2023-05-26 NOTE — PRE SEDATION
Sedation Pre-Procedure Note    Patient Name: Farrukh Guadarrama   YOB: 1955  Room/Bed: 2012/2012-02  Medical Record Number: [id number]  Date: 5/26/2023   Time: 11:43 AM       Indication:  L psoas mass/abscess    Consent: I have discussed with the patient and/or the patient representative the indication, alternatives, and the possible risks and/or complications of the planned procedure and the anesthesia methods. The patient and/or patient representative appear to understand and agree to proceed. Vital Signs:   Vitals:    05/26/23 1105   BP: (!) 150/75   Pulse: 86   Resp: 22   Temp:    SpO2: 96%       Past Medical History:   has a past medical history of Anxiety, Arthritis, CAD (coronary artery disease), Chronic kidney disease, COPD (chronic obstructive pulmonary disease) ([facility] [address] Depression, Diverticulosis, GERD (gastroesophageal reflux disease), Heart attack ([facility] Heart palpitations, History of blood transfusion, Hyperlipidemia, Hypertension, and PSVT (paroxysmal supraventricular tachycardia) ([facility] Past Surgical History:   has a past surgical history that includes Appendectomy; Abdomen surgery; Small intestine surgery; Cholecystectomy; colectomy (2003); Upper gastrointestinal endoscopy (10/17/2014); Colonoscopy (07/30/2003); Colonoscopy (05/21/2010); Colonoscopy (10/17/2014); Tubal ligation; Carotid stent placement; Upper gastrointestinal endoscopy (N/A, 04/25/2019); Cardiac surgery; Total hip arthroplasty (Left, 5/16/2023); and Cystoscopy (Right, 5/24/2023).     Medications:   Scheduled Meds:    sodium chloride  80 mL IntraVENous Once    metoprolol  2.5 mg IntraVENous Q6H    pantoprazole  40 mg IntraVENous Daily    enoxaparin  40 mg SubCUTAneous Daily    isosorbide mononitrate  30 mg Oral Daily    tamsulosin  0.4 mg Oral Daily    [Held by provider] gabapentin  300 mg Oral TID    aspirin  81 mg Oral Nightly    [Held by provider] atorvastatin  80 mg Oral Nightly    Budeson-Glycopyrrol-Formoterol  2 puff

## 2023-05-26 NOTE — POST SEDATION
Sedation Post Procedure Note    Patient Name: Guille Stephens   YOB: 1955  Room/Bed: 2012/2012-02  Medical Record Number: [id number]  Date: 5/26/2023   Time: 11:45 AM         Physicians/Assistants: Monica Mai MD, MD    Procedure Performed:  CT biopsy    Post-Sedation Vital Signs:  Vitals:    05/26/23 1105   BP: (!) 150/75   Pulse: 86   Resp: 22   Temp:    SpO2: 96%      Vital signs were reviewed and were stable after the procedure (see flow sheet for vitals)            Post-Sedation Exam: Lungs: clear and Cardiovascular: regular rate and rhythm           Complications: none    Electronically signed by Monica Mai MD on 5/26/2023 at 11:45 AM

## 2023-05-26 NOTE — BRIEF OP NOTE
Brief Postoperative Note    Kathleen Molina  YOB: 1955  [id number]    Pre-operative Diagnosis: L psoas mass or abscess    Post-operative Diagnosis: Same    Procedure: CT biopsy    Anesthesia: Local and Moderate Sedation    Surgeons/Assistants: Dr. Pratibha Crisostomo    Estimated Blood Loss: less than 50     Complications: None    Specimens: Was Obtained: 3 x 18g cores into formalin and 3 for culture    Findings: See radiology report for details.     Electronically signed by Rose Nair MD on 5/26/2023 at 11:45 AM

## 2023-05-26 NOTE — ANESTHESIA POSTPROCEDURE EVALUATION
Department of Anesthesiology  Postprocedure Note    Patient: Alejandrina Dawkins  MRN: [id number]  YOB: 1955  Date of evaluation: 5/26/2023      Procedure Summary     Date: 05/24/23 Room / Location: [facility] - INPATIENT    Anesthesia Start: 1601 Anesthesia Stop: 1647    Procedure: [address] (Right) Diagnosis:       Hydronephrosis, unspecified hydronephrosis type      (Hydronephrosis, unspecified hydronephrosis type [N13.30])    Surgeons: Ernestine Ann MD Responsible Provider: Flores Lemos MD    Anesthesia Type: MAC ASA Status: 3          Anesthesia Type: No value filed.     Bernard Phase I: Bernard Score: 10    Bernard Phase II:        Anesthesia Post Evaluation    Patient location during evaluation: PACU  Patient participation: complete - patient participated  Level of consciousness: awake  Airway patency: patent  Nausea & Vomiting: no nausea and no vomiting  Complications: no  Cardiovascular status: hemodynamically stable  Respiratory status: acceptable  Hydration status: stable  Multimodal analgesia pain management approach

## 2023-05-27 VITALS
SYSTOLIC BLOOD PRESSURE: 150 MMHG | HEART RATE: 73 BPM | WEIGHT: 213 LBS | TEMPERATURE: 98.6 F | HEIGHT: 64 IN | OXYGEN SATURATION: 94 % | DIASTOLIC BLOOD PRESSURE: 66 MMHG | BODY MASS INDEX: 36.37 KG/M2 | RESPIRATION RATE: 17 BRPM

## 2023-05-27 LAB — POTASSIUM SERPL-SCNC: 3.8 MMOL/L (ref 3.7–5.3)

## 2023-05-27 PROCEDURE — 2580000003 HC RX 258: Performed by: INTERNAL MEDICINE

## 2023-05-27 PROCEDURE — 6360000002 HC RX W HCPCS: Performed by: UROLOGY

## 2023-05-27 PROCEDURE — 6370000000 HC RX 637 (ALT 250 FOR IP): Performed by: UROLOGY

## 2023-05-27 PROCEDURE — 97116 GAIT TRAINING THERAPY: CPT

## 2023-05-27 PROCEDURE — 99232 SBSQ HOSP IP/OBS MODERATE 35: CPT | Performed by: INTERNAL MEDICINE

## 2023-05-27 PROCEDURE — 6370000000 HC RX 637 (ALT 250 FOR IP): Performed by: NURSE PRACTITIONER

## 2023-05-27 PROCEDURE — 97530 THERAPEUTIC ACTIVITIES: CPT | Performed by: NURSE PRACTITIONER

## 2023-05-27 PROCEDURE — 2580000003 HC RX 258: Performed by: UROLOGY

## 2023-05-27 PROCEDURE — 97535 SELF CARE MNGMENT TRAINING: CPT | Performed by: NURSE PRACTITIONER

## 2023-05-27 PROCEDURE — 84132 ASSAY OF SERUM POTASSIUM: CPT

## 2023-05-27 PROCEDURE — 97530 THERAPEUTIC ACTIVITIES: CPT

## 2023-05-27 PROCEDURE — 6370000000 HC RX 637 (ALT 250 FOR IP): Performed by: INTERNAL MEDICINE

## 2023-05-27 PROCEDURE — 2500000003 HC RX 250 WO HCPCS: Performed by: UROLOGY

## 2023-05-27 PROCEDURE — 36415 COLL VENOUS BLD VENIPUNCTURE: CPT

## 2023-05-27 PROCEDURE — 97110 THERAPEUTIC EXERCISES: CPT

## 2023-05-27 RX ORDER — TAMSULOSIN HYDROCHLORIDE 0.4 MG/1
0.4 CAPSULE ORAL DAILY
Qty: 30 CAPSULE | Refills: 3 | Status: SHIPPED | OUTPATIENT
Start: 2023-05-28

## 2023-05-27 RX ORDER — OXYCODONE HYDROCHLORIDE AND ACETAMINOPHEN 5; 325 MG/1; MG/1
1 TABLET ORAL EVERY 4 HOURS PRN
Qty: 30 TABLET | Refills: 0 | Status: SHIPPED | OUTPATIENT
Start: 2023-05-27 | End: 2023-06-01

## 2023-05-27 RX ADMIN — OXYCODONE HYDROCHLORIDE AND ACETAMINOPHEN 1 TABLET: 5; 325 TABLET ORAL at 03:03

## 2023-05-27 RX ADMIN — NALOXEGOL OXALATE 12.5 MG: 12.5 TABLET, FILM COATED ORAL at 05:31

## 2023-05-27 RX ADMIN — SERTRALINE HYDROCHLORIDE 100 MG: 100 TABLET ORAL at 08:41

## 2023-05-27 RX ADMIN — GABAPENTIN 300 MG: 300 CAPSULE ORAL at 15:56

## 2023-05-27 RX ADMIN — SODIUM BICARBONATE 650 MG: 650 TABLET ORAL at 08:39

## 2023-05-27 RX ADMIN — METOPROLOL TARTRATE 2.5 MG: 5 INJECTION INTRAVENOUS at 04:34

## 2023-05-27 RX ADMIN — SODIUM CHLORIDE, POTASSIUM CHLORIDE, SODIUM LACTATE AND CALCIUM CHLORIDE: 600; 310; 30; 20 INJECTION, SOLUTION INTRAVENOUS at 04:34

## 2023-05-27 RX ADMIN — TAMSULOSIN HYDROCHLORIDE 0.4 MG: 0.4 CAPSULE ORAL at 08:39

## 2023-05-27 RX ADMIN — ENOXAPARIN SODIUM 40 MG: 100 INJECTION SUBCUTANEOUS at 08:40

## 2023-05-27 RX ADMIN — HYDROMORPHONE HYDROCHLORIDE 0.5 MG: 1 INJECTION, SOLUTION INTRAMUSCULAR; INTRAVENOUS; SUBCUTANEOUS at 05:25

## 2023-05-27 RX ADMIN — ISOSORBIDE MONONITRATE 30 MG: 30 TABLET, EXTENDED RELEASE ORAL at 08:41

## 2023-05-27 RX ADMIN — OXYCODONE HYDROCHLORIDE AND ACETAMINOPHEN 1 TABLET: 5; 325 TABLET ORAL at 09:49

## 2023-05-27 RX ADMIN — HYDROMORPHONE HYDROCHLORIDE 0.5 MG: 1 INJECTION, SOLUTION INTRAMUSCULAR; INTRAVENOUS; SUBCUTANEOUS at 11:36

## 2023-05-27 RX ADMIN — OXYCODONE HYDROCHLORIDE AND ACETAMINOPHEN 1 TABLET: 5; 325 TABLET ORAL at 15:56

## 2023-05-27 RX ADMIN — PANTOPRAZOLE SODIUM 40 MG: 40 TABLET, DELAYED RELEASE ORAL at 05:30

## 2023-05-27 RX ADMIN — METOPROLOL TARTRATE 2.5 MG: 5 INJECTION INTRAVENOUS at 09:50

## 2023-05-27 RX ADMIN — CELECOXIB 100 MG: 100 CAPSULE ORAL at 08:41

## 2023-05-27 ASSESSMENT — PAIN DESCRIPTION - ORIENTATION
ORIENTATION: LEFT
ORIENTATION: LEFT
ORIENTATION: MID;LOWER
ORIENTATION: LEFT
ORIENTATION: LOWER
ORIENTATION: LOWER

## 2023-05-27 ASSESSMENT — PAIN DESCRIPTION - DESCRIPTORS
DESCRIPTORS: SORE
DESCRIPTORS: ACHING
DESCRIPTORS: SORE
DESCRIPTORS: STABBING
DESCRIPTORS: SORE
DESCRIPTORS: ACHING

## 2023-05-27 ASSESSMENT — PAIN DESCRIPTION - LOCATION
LOCATION: HIP;LEG
LOCATION: BACK
LOCATION: BACK
LOCATION: BACK;BUTTOCKS
LOCATION: HIP
LOCATION: HIP
LOCATION: BACK;HIP

## 2023-05-27 ASSESSMENT — PAIN SCALES - GENERAL
PAINLEVEL_OUTOF10: 10
PAINLEVEL_OUTOF10: 8
PAINLEVEL_OUTOF10: 10
PAINLEVEL_OUTOF10: 9
PAINLEVEL_OUTOF10: 10

## 2023-05-27 ASSESSMENT — PAIN - FUNCTIONAL ASSESSMENT
PAIN_FUNCTIONAL_ASSESSMENT: PREVENTS OR INTERFERES SOME ACTIVE ACTIVITIES AND ADLS

## 2023-05-27 NOTE — DISCHARGE SUMMARY
[facility]  Office: [address], DO, Leif Colin DO, Nellie Salguero, DO, Mary Green, DO, Santi Landeros MD, Shon Robison MD, Soha Guthrie MD, Helga Perez MD,  Sparkle Bearden MD, Geovanni Lynn MD, Sonja Gonzales DO, Sherri Gillette MD,  Marge Hope MD, Mars Riggs MD, Ese Sellers DO, Nitin Gore MD, Claritza Virk MD, Maddy Ramirez DO, Rhianna Malik MD, Courtney Riedel, MD, Cryil Flood MD, Emelyn Baez MD,  Ines Hoyos DO, Anisa Su MD,  Boris Patel, CNP,  Gordon Ocampo, CNP, Olga Enriquez, CNP, Claude Mehta, CNP,  Nehemiah Dye, [facility], Teresa Epperson, CNP, Shyann Harvey, CNP, Ismael Tejada, CNP, Carmen Fernandez, CNP, Bridgette Moody, CNP, Milly Mcdonald PA-C, Korey Perea, CNS, Praful Milton, CNP, Jacqualine Prime, [facility]    Discharge Summary     Patient ID: Farrukh Guadarrama  :  1955   MRN: [id number]     ACCOUNT:  [de-identified]   Patient's PCP: Fabián Burr MD  Admit Date: 2023   Discharge Date: 2023     Length of Stay: 2  Code Status:  Full Code  Admitting Physician: Zuly Edwards DO  Discharge Physician: Zuly Edwards DO     Active Discharge Diagnoses:     Hospital Problem Lists:  Principal Problem:    Intractable back pain  Active Problems:    Coronary artery disease involving native coronary artery of native heart without angina pectoris    SBO (small bowel obstruction)    Chronic diarrhea    Primary localized osteoarthritis of left hip    Acute blood loss anemia    Postoperative ileus (HCC)    Hydronephrosis with ureteral stricture, not elsewhere classified    Nontraumatic psoas hematoma  Resolved Problems:    * No resolved hospital problems.  *      Admission Condition:  fair     Discharged Condition: stable    Hospital Stay:     Hospital Course:  Farrukh Guadarrama is a 79 y.o. female who was admitted for the management

## 2023-05-27 NOTE — CARE COORDINATION
Discharge planning    Spoke with patient and daughter. Patient is refusing SNF placement and wants to go home, Daughter lives with the patient and takes care of her. She is there 24/7. Patient has all DME needed. May wants home care but they are still thinking about it. Continue to follow.
Social Work-Met with pt to discuss dc options. Pt would like to return home. She would like to see how she does with therapy tomorrow morning before finalizing decision.  Sudha Dueñas
choice of provider and agrees   with the discharge plan. [x] Yes [] No    Freedom of choice list was provided with basic dialogue that supports the patient's individualized plan of care/goals, treatment preferences and shares the quality data associated with the providers.  [x] Yes [] No                  Electronically signed by: GUILLERMO Rogers LSW on 5/24/2023 at 2:55 PM
[phone] Fax: [phone]

## 2023-05-27 NOTE — DISCHARGE INSTR - COC
Continuity of Care Form    Patient Name: John Adorno   :  1955  MRN:  [id number]    Admit date:  2023  Discharge date:  ***    Code Status Order: Full Code   Advance Directives:     Admitting Physician:  Mariely Shields DO  PCP: Marisol Monroy MD    Discharging Nurse: [facility] Unit/Room#:   Discharging Unit Phone Number: ***    Emergency Contact:   Extended Emergency Contact Information  Primary Emergency Contact: Marc Cordero [address] Phone: [phone]  Relation: Child  Secondary Emergency Contact: marissa blair Phone: [phone]  Relation: None    Past Surgical History:  Past Surgical History:   Procedure Laterality Date     [facility]      stents placed    CAROTID STENT PLACEMENT      x9    CHOLECYSTECTOMY      COLECTOMY  2003    total    COLONOSCOPY  2003    Dr Susan Sanabria diverticulosis and signs of diverticulitis    COLONOSCOPY  2010    stricture at about  35 cm from the anus.   Could not advance even gastroscope    COLONOSCOPY  10/17/2014    small bowel and rectum normal    CT NEEDLE BIOPSY MUSCLE  2023    CT NEEDLE BIOPSY MUSCLE 2023 [facility] CT SCAN    CYSTOSCOPY Right 2023    CYSTOSCOPY PYELOGRAM STENT PLACEMENT performed by Bubba Burns MD at [address] [place] [address]      entire large intestine removed, entire small intestine intact    TOTAL HIP ARTHROPLASTY Left 2023    LEFT HIP TOTAL ARTHROPLASTY ANTERIOR APPROACH   EVY BIOMET performed by Linda Colmenares MD at [facility].  10/17/2014    normal    UPPER GASTROINTESTINAL ENDOSCOPY N/A 2019    EGD ESOPHAGOGASTRODUODENOSCOPY performed by Summer Lynn MD at [address] [place] [address]       Immunization History:   Immunization History   Administered Date(s) Administered    COVID-19, MODERNA BLUE border, Primary or Immunocompromised, (age 12y+), IM,

## 2023-05-27 NOTE — PLAN OF CARE
Problem: Discharge Planning  Goal: Discharge to home or other facility with appropriate resources  5/24/2023 0032 by Raenette Lesch, RN  Outcome: Progressing  Flowsheets (Taken 5/23/2023 2000)  Discharge to home or other facility with appropriate resources:   Identify barriers to discharge with patient and caregiver   Arrange for needed discharge resources and transportation as appropriate   Identify discharge learning needs (meds, wound care, etc)   Refer to discharge planning if patient needs post-hospital services based on physician order or complex needs related to functional status, cognitive ability or social support system     Problem: Pain  Goal: Verbalizes/displays adequate comfort level or baseline comfort level  5/24/2023 0032 by Raenette Lesch, RN  Outcome: Progressing     Problem: Skin/Tissue Integrity  Goal: Absence of new skin breakdown  Description: 1. Monitor for areas of redness and/or skin breakdown  2. Assess vascular access sites hourly  3. Every 4-6 hours minimum:  Change oxygen saturation probe site  4. Every 4-6 hours:  If on nasal continuous positive airway pressure, respiratory therapy assess nares and determine need for appliance change or resting period.   5/24/2023 0032 by Raenette Lesch, RN  Outcome: Progressing     Problem: Safety - Adult  Goal: Free from fall injury  5/24/2023 0032 by Raenette Lesch, RN  Outcome: Progressing  Flowsheets (Taken 5/23/2023 2200)  Free From Fall Injury:   Kelly Gruber family/caregiver on patient safety   Based on caregiver fall risk screen, instruct family/caregiver to ask for assistance with transferring infant if caregiver noted to have fall risk factors
Problem: Discharge Planning  Goal: Discharge to home or other facility with appropriate resources  5/24/2023 1217 by John Paul Jean RN  Outcome: Progressing  Flowsheets (Taken 5/24/2023 0830)  Discharge to home or other facility with appropriate resources:   Identify barriers to discharge with patient and caregiver   Arrange for needed discharge resources and transportation as appropriate   Identify discharge learning needs (meds, wound care, etc)   Refer to discharge planning if patient needs post-hospital services based on physician order or complex needs related to functional status, cognitive ability or social support system  5/24/2023 0032 by Allison Davalos RN  Outcome: Progressing  Flowsheets (Taken 5/23/2023 2000)  Discharge to home or other facility with appropriate resources:   Identify barriers to discharge with patient and caregiver   Arrange for needed discharge resources and transportation as appropriate   Identify discharge learning needs (meds, wound care, etc)   Refer to discharge planning if patient needs post-hospital services based on physician order or complex needs related to functional status, cognitive ability or social support system     Problem: Pain  Goal: Verbalizes/displays adequate comfort level or baseline comfort level  5/24/2023 1217 by John Paul Jean RN  Outcome: Progressing  5/24/2023 0032 by Allison Davalos RN  Outcome: Progressing     Problem: Skin/Tissue Integrity  Goal: Absence of new skin breakdown  Description: 1. Monitor for areas of redness and/or skin breakdown  2. Assess vascular access sites hourly  3. Every 4-6 hours minimum:  Change oxygen saturation probe site  4. Every 4-6 hours:  If on nasal continuous positive airway pressure, respiratory therapy assess nares and determine need for appliance change or resting period.   5/24/2023 1217 by John Paul Jean RN  Outcome: Progressing  5/24/2023 0032 by Allison Davalos RN  Outcome: Progressing     Problem: Safety -
Problem: Discharge Planning  Goal: Discharge to home or other facility with appropriate resources  5/26/2023 1318 by Anisa Valenzuela RN  Outcome: Progressing  5/26/2023 0300 by Margaret Rogers RN  Outcome: Progressing  Flowsheets (Taken 5/24/2023 0830 by Mirza Hernández RN)  Discharge to home or other facility with appropriate resources:   Identify barriers to discharge with patient and caregiver   Arrange for needed discharge resources and transportation as appropriate   Identify discharge learning needs (meds, wound care, etc)   Refer to discharge planning if patient needs post-hospital services based on physician order or complex needs related to functional status, cognitive ability or social support system     Problem: Pain  Goal: Verbalizes/displays adequate comfort level or baseline comfort level  5/26/2023 1318 by Anisa Valenzuela RN  Outcome: Progressing  5/26/2023 0300 by Margaret Rogers RN  Outcome: Progressing  Note: Pain level assessment complete.    Patient educated on pain scale and control interventions  PRN pain medication given per patient request  Patient instructed to call out with new onset of pain or unrelieved pain
Problem: Discharge Planning  Goal: Discharge to home or other facility with appropriate resources  5/27/2023 1059 by Barbara Agarwal RN  Outcome: Progressing     Problem: Pain  Goal: Verbalizes/displays adequate comfort level or baseline comfort level  5/27/2023 1059 by Barbara Agarwal RN  Outcome: Progressing     Problem: Skin/Tissue Integrity  Goal: Absence of new skin breakdown  Description: 1. Monitor for areas of redness and/or skin breakdown  2. Assess vascular access sites hourly  3. Every 4-6 hours minimum:  Change oxygen saturation probe site  4. Every 4-6 hours:  If on nasal continuous positive airway pressure, respiratory therapy assess nares and determine need for appliance change or resting period.   5/27/2023 1059 by Barbara Agarwal RN  Outcome: Progressing     Problem: Safety - Adult  Goal: Free from fall injury  5/27/2023 1059 by Barbara Agarwal RN  Outcome: Progressing     Problem: ABCDS Injury Assessment  Goal: Absence of physical injury  5/27/2023 1059 by Barbara Agarwal RN  Outcome: Progressing
Problem: Discharge Planning  Goal: Discharge to home or other facility with appropriate resources  Outcome: Progressing
Problem: Discharge Planning  Goal: Discharge to home or other facility with appropriate resources  Outcome: Progressing  Flowsheets (Taken 5/22/2023 1930)  Discharge to home or other facility with appropriate resources:   Identify barriers to discharge with patient and caregiver   Arrange for needed discharge resources and transportation as appropriate   Identify discharge learning needs (meds, wound care, etc)   Refer to discharge planning if patient needs post-hospital services based on physician order or complex needs related to functional status, cognitive ability or social support system     Problem: Pain  Goal: Verbalizes/displays adequate comfort level or baseline comfort level  Outcome: Progressing     Problem: Skin/Tissue Integrity  Goal: Absence of new skin breakdown  Description: 1. Monitor for areas of redness and/or skin breakdown  2. Assess vascular access sites hourly  3. Every 4-6 hours minimum:  Change oxygen saturation probe site  4. Every 4-6 hours:  If on nasal continuous positive airway pressure, respiratory therapy assess nares and determine need for appliance change or resting period.   Outcome: Progressing     Problem: Safety - Adult  Goal: Free from fall injury  Outcome: Progressing
Problem: Discharge Planning  Goal: Discharge to home or other facility with appropriate resources  Outcome: Progressing  Flowsheets (Taken 5/24/2023 0830 by Pao Moody RN)  Discharge to home or other facility with appropriate resources:   Identify barriers to discharge with patient and caregiver   Arrange for needed discharge resources and transportation as appropriate   Identify discharge learning needs (meds, wound care, etc)   Refer to discharge planning if patient needs post-hospital services based on physician order or complex needs related to functional status, cognitive ability or social support system     Problem: Pain  Goal: Verbalizes/displays adequate comfort level or baseline comfort level  Outcome: Progressing  Note: Pain level assessment complete. Patient educated on pain scale and control interventions  PRN pain medication given per patient request  Patient instructed to call out with new onset of pain or unrelieved pain       Problem: Skin/Tissue Integrity  Goal: Absence of new skin breakdown  Description: 1. Monitor for areas of redness and/or skin breakdown  2. Assess vascular access sites hourly  3. Every 4-6 hours minimum:  Change oxygen saturation probe site  4. Every 4-6 hours:  If on nasal continuous positive airway pressure, respiratory therapy assess nares and determine need for appliance change or resting period.   Outcome: Progressing  Note: Skin assessment completed and documented  Mark scale updated  Relieve pressure to bony prominences  Avoid shearing  Assess s/sx of infection   Turns self  Heels elevated  Structural intactness and normal physiological function of skin and mucous membranes       Problem: Safety - Adult  Goal: Free from fall injury  Outcome: Progressing  Note: Proper pt identification  Hourly rounding performed  Anticipatory needs met  Non-skid socks worn  Proper transferring technique  2/4 side rails up  Personal necessities within reach  Bed low and
Problem: Discharge Planning  Goal: Discharge to home or other facility with appropriate resources  Outcome: Progressing  Flowsheets (Taken 5/24/2023 0830 by Reid Wood RN)  Discharge to home or other facility with appropriate resources:   Identify barriers to discharge with patient and caregiver   Arrange for needed discharge resources and transportation as appropriate   Identify discharge learning needs (meds, wound care, etc)   Refer to discharge planning if patient needs post-hospital services based on physician order or complex needs related to functional status, cognitive ability or social support system     Problem: Pain  Goal: Verbalizes/displays adequate comfort level or baseline comfort level  Outcome: Progressing  Note: Pain level assessment complete. Patient educated on pain scale and control interventions  PRN pain medication given per patient request  Patient instructed to call out with new onset of pain or unrelieved pain       Problem: Skin/Tissue Integrity  Goal: Absence of new skin breakdown  Description: 1. Monitor for areas of redness and/or skin breakdown  2. Assess vascular access sites hourly  3. Every 4-6 hours minimum:  Change oxygen saturation probe site  4. Every 4-6 hours:  If on nasal continuous positive airway pressure, respiratory therapy assess nares and determine need for appliance change or resting period.   Outcome: Progressing  Note: Skin assessment completed and documented  Mark scale updated  Relieve pressure to bony prominences  Avoid shearing  Assess s/sx of infection   Air mattress in place  Turns self  Heels elevated  Structural intactness and normal physiological function of skin and mucous membranes       Problem: Safety - Adult  Goal: Free from fall injury  Outcome: Progressing  Note: Proper pt identification  Hourly rounding performed  Anticipatory needs met  Non-skid socks worn  Proper transferring technique  2/4 side rails up  Personal necessities within
Problem: Pain  Goal: Verbalizes/displays adequate comfort level or baseline comfort level  Outcome: Progressing  Flowsheets (Taken 5/25/2023 0505)  Verbalizes/displays adequate comfort level or baseline comfort level:   Encourage patient to monitor pain and request assistance   Assess pain using appropriate pain scale   Administer analgesics based on type and severity of pain and evaluate response   Implement non-pharmacological measures as appropriate and evaluate response   Consider cultural and social influences on pain and pain management     Problem: Skin/Tissue Integrity  Goal: Absence of new skin breakdown  Description: 1. Monitor for areas of redness and/or skin breakdown  2. Assess vascular access sites hourly  3. Every 4-6 hours minimum:  Change oxygen saturation probe site  4. Every 4-6 hours:  If on nasal continuous positive airway pressure, respiratory therapy assess nares and determine need for appliance change or resting period.   Outcome: Progressing     Problem: Safety - Adult  Goal: Free from fall injury  Outcome: Progressing  Flowsheets (Taken 5/25/2023 0505)  Free From Fall Injury: Instruct family/caregiver on patient safety     Problem: ABCDS Injury Assessment  Goal: Absence of physical injury  Outcome: Progressing  Flowsheets (Taken 5/25/2023 0505)  Absence of Physical Injury: Implement safety measures based on patient assessment
Adult  Goal: Free from fall injury  5/23/2023 1427 by Jose Holly RN  Outcome: Progressing  5/23/2023 0055 by Ambrose Oneill RN  Outcome: Progressing

## 2023-05-28 LAB
MICROORGANISM SPEC CULT: NORMAL
MICROORGANISM/AGENT SPEC: NORMAL
MICROORGANISM/AGENT SPEC: NORMAL
SPECIMEN DESCRIPTION: NORMAL

## 2023-05-30 LAB — SURGICAL PATHOLOGY REPORT: NORMAL

## 2023-05-30 NOTE — PROGRESS NOTES
[address]  Occupational Therapy Not Seen    DATE: 2023    NAME: Philipp Harris  MRN: [id number]   : 1955    Patient not seen this date for Occupational Therapy due to:      [] Cancel by RN or physician due to:    [] Hemodialysis    [] Critical Lab Value Level     [] Blood transfusion in progress    [] Acute or unstable cardiovascular status   _MAP < 55 or more than >115  _HR < 40 or > 130    [] Acute or unstable pulmonary status   -FiO2 > 60%   _RR < 5 or >40    _O2 sats < 85%    [] Strict Bedrest    [] Off Unit for surgery or procedure    [] Off Unit for testing       [] Pending imaging to R/O fracture    [x] Refusal by Patient: Due to 10/10 pain. [] Other      [] OT being discontinued at this time. Patient independent. No further needs. [] OT being discontinued at this time as the patient has been transferred to hospice care. No further needs.       SHANIKA Miller
A unit of PRBCs was started per facility protocol.   Will cont to monitor
[facility]   Urology Progress Note            Subjective: Patient seen in conjunction with nursing staff, family at bedside,    Follow-up hydronephrosis    Patient Vitals for the past 24 hrs:   BP Temp Temp src Pulse Resp SpO2 Weight   05/26/23 1154 (!) 145/65 98.1 °F (36.7 °C) Oral 79 16 95 % --   05/26/23 1105 (!) 150/75 -- -- 86 22 96 % --   05/26/23 1100 139/64 -- -- 85 13 96 % --   05/26/23 1050 (!) 152/66 -- -- 84 15 96 % --   05/26/23 1045 133/63 -- -- 85 16 96 % --   05/26/23 1040 (!) 149/68 -- -- 84 15 96 % --   05/26/23 1035 (!) 146/59 -- -- 84 22 96 % --   05/26/23 1030 (!) 151/69 -- -- 82 22 98 % --   05/26/23 0733 (!) 148/71 97.9 °F (36.6 °C) Oral 71 17 99 % --   05/26/23 0543 -- -- -- -- -- -- 209 lb 8 oz (95 kg)   05/26/23 0427 137/69 98.2 °F (36.8 °C) Oral 74 16 96 % --   05/25/23 2158 -- -- -- 76 -- -- --   05/25/23 1948 111/62 98.4 °F (36.9 °C) Oral 86 16 93 % --     No intake or output data in the 24 hours ending 05/26/23 1751    Recent Labs     05/23/23 1834 05/23/23  2139 05/24/23  0339 05/25/23  1646   WBC  --   --   --  7.2   HGB 11.8* 11.1* 11.0*  --    HCT 36.5 33.9* 33.4*  --      Recent Labs     05/23/23  1834 05/24/23  0339 05/25/23  0700 05/26/23  0603   NA  --  139 141 140   K  --  4.1 3.5* 3.3*   CL  --  103 102 104   CO2  --  21 24 27   PHOS 4.1  --   --   --    BUN  --  26* 26* 19   CREATININE  --  1.10* 1.34* 1.17*       Recent Labs     05/24/23  1633   COLORU Yellow   PHUR 6.0   WBCUA 0 TO 2   RBCUA 0 TO 2   BACTERIA RARE*   SPECGRAV 1.033*   LEUKOCYTESUR NEGATIVE   UROBILINOGEN Normal   BILIRUBINUR Presumptive positive. Unable to confirm due to unavailability of reagent. *       Additional Lab/culture results:    Physical Exam: Patient alert oriented not in acute distress, patient sitting in bed, family at bedside, patient just returned from psoas muscle biopsy, tolerating the ureteral stent well    Interval Imaging Findings:    Impression:
[address] SURGERY  PROGRESS NOTE      Patient Name: Gualberto Kolb  MRN: [id number]   Date of admission: 2023  Length of Stay: 0      Subjective:  Patient is seen and examined this morning. No acute event overnight, afebrile. Her pain is well controlled this morning compared to last 2 days, she had 4-5 bowel movement since yesterday and no longer feeling nauseated all no vomiting. And she had 1.2 L output last 24 hours. Vitals:                                                                            Temp  Av.1 °F (36.7 °C)  Min: 96.8 °F (36 °C)  Max: 98.8 °F (70.0 °C)  Systolic (92XHE), XFP:017 , Min:135 , MUP:772   Diastolic (60AXJ), JLD:09, Min:67, Max:84  Pulse  Av.3  Min: 77  Max: 102  Resp  Av.5  Min: 16  Max: 21  SpO2: 90 % SpO2  Av.7 %  Min: 90 %  Max: 97 %     I/O's  I/O last 3 completed shifts: In: 3327 [I.V.:3327]  Out: 1850 [Emesis/NG output:1550; Stool:300]        General: Altered mental status after Ativan  EENT: Conjunctiva moist without icterus. Ears are symmetric. Normal auditory acuity. Mouth/Throat: Oral mucus membranes are moist.  Cardiovascular:  Regular rate and rhythm. Warm, well perfused. Respiratory:  Equal chest rise bilaterally. No wheezes. Abdomen: Tender to palpate over the left side of the abdomen. Otherwise, her abdomen is soft, nondistended, no rebound or guarding. Previous surgical scar is dry and intact. No signs of infection. NG tube in place  neuro: Altered mental status  Extremities: Left hip pain with limited motion  Skin:  No rashes or lesions.   Psych: Very agitated with abdominal status    Labs:  CBC:  Recent Labs     23  1403 23  2202 23  0346 23  1007 23  1834 23  2139 23  0339   WBC 5.0  --  5.5  --   --   --   --    RBC 2.33*  --  3.62*  --   --   --   --    HGB 6.7*   < > 10.4*   < > 11.8* 11.1* 11.0*   HCT 22.6*   < > 32.2*   < > 36.5 33.9* 33.4*   MCV 97.0  --  89.0  --   --
[address] SURGERY  PROGRESS NOTE      Patient Name: Kiah Onofre  MRN: [id number]   Date of admission: 2023  Length of Stay: 0      Subjective:  Patient is seen and examined this morning. Afebrile. Patient was finally more awake yesterday around 8:00PM.  There was 2 attempts for NG tube placement. Patient vomited after first attempt and she pulled her NG tube out. And the nurse was able to replace the NG tube around 8:45 PM.  There was 850 cc NG output. Patient had 3 liquid bowel movement and passing gas yesterday. Her abdomen is more soft and she stated her abdominal pain has resolved. Vitals:                                                                            Temp  Av.9 °F (37.2 °C)  Min: 98.1 °F (36.7 °C)  Max: 100 °F (76.5 °C)  Systolic (67JBE), GILMA:095 , Min:139 , BBS:110   Diastolic (24IMW), HVZ:22, Min:57, Max:83  Pulse  Av.1  Min: 81  Max: 110  Resp  Av.9  Min: 12  Max: 20  SpO2: 96 % SpO2  Av.5 %  Min: 91 %  Max: 98 %     I/O's  I/O last 3 completed shifts: In: 0787 [P.O.:480; I.V.:564.4; Blood:246.7]  Out: 50 [Urine:50]  Date 23 0000 - 23 2359   Shift 5886-6969 7458-7506 7157-8308 24 Hour Total   INTAKE   I.V.(mL/kg) 1241. 3(13.5)   1241. 3(13.5)   Shift Total(mL/kg) 1241. 3(13.5)   1241. 3(13.5)   OUTPUT   Emesis/NG output(mL/kg) 300(3.3)   300(3.3)   Shift Total(mL/kg) 300(3.3)   300(3.3)   Weight (kg) 91.6 91.6 91.6 91.6       General: Altered mental status after Ativan  EENT: Conjunctiva moist without icterus. Ears are symmetric. Normal auditory acuity. Mouth/Throat: Oral mucus membranes are moist.  Cardiovascular:  Regular rate and rhythm. Warm, well perfused. Respiratory:  Equal chest rise bilaterally. No wheezes. Abdomen: Tender to palpate over the left side of the abdomen. Otherwise, her abdomen is soft, nondistended, no rebound or guarding. Previous surgical scar is dry and intact. No signs of infection.  NG tube in place  neuro:
[address] SURGERY  PROGRESS NOTE      Patient Name: Tony Milan  MRN: [id number]   Date of admission: 2023  Length of Stay: 1      Subjective:  Patient is seen and examined this morning. No acute event overnight, afebrile. NG tube was clamped yesterday, no nausea or vomiting. We will remove NG tube this morning, and patient continues to have bowel movement and passing gas. No abdominal pain. Vitals:                                                                            Temp  Av.1 °F (36.7 °C)  Min: 97.5 °F (36.4 °C)  Max: 98.4 °F (63.7 °C)  Systolic (31JWK), IQX:706 , Min:111 , MZJ:144   Diastolic (92USP), CLF:47, Min:62, Max:79  Pulse  Av.7  Min: 74  Max: 100  Resp  Av.2  Min: 16  Max: 17  SpO2: 96 % SpO2  Av.8 %  Min: 93 %  Max: 97 %     I/O's  I/O last 3 completed shifts: In: 1521.3 [I.V.:1521.3]  Out: 1750 [Urine:200; Emesis/NG output:1250; Stool:300]        General: Altered mental status after Ativan  EENT: Conjunctiva moist without icterus. Ears are symmetric. Normal auditory acuity. Mouth/Throat: Oral mucus membranes are moist.  Cardiovascular:  Regular rate and rhythm. Warm, well perfused. Respiratory:  Equal chest rise bilaterally. No wheezes. Abdomen: Her abdomen is soft, nontender to palpate nondistended, no rebound or guarding. Previous surgical scar is dry and intact. neuro: Altered mental status  Extremities: Left hip pain with limited motion  Skin:  No rashes or lesions.   Psych: Very agitated with abdominal status    Labs:  CBC:  Recent Labs     23  1834 23  2139 23  0339 23  1646 23  0603   WBC  --   --   --  7.2  --    HGB 11.8* 11.1* 11.0*  --   --    HCT 36.5 33.9* 33.4*  --   --    INR  --   --   --   --  1.1         Chem:  Recent Labs     23  1834 23  0339 23  0700 23  0603   NA  --  139 141 140   K  --  4.1 3.5* 3.3*   CL  --  103 102 104   CO2  --  21 24 27   GLUCOSE  --  90 86 78
I attended the patient during the first 15 minutes of the blood transfusion and did not recognize a potential blood reaction. Her daughter was at the bedside.  Will cont to monitor
Occupational Therapy  DATE: 2023    NAME: Marion Hammond  MRN: [id number]   : 1955    Patient not seen this date for Occupational Therapy due to:      [x] Cancel by RN or physician due to: Hold per RN Haydee Rosenthal pt not medically appropriate for participation in therapy evaluation at this time. OT to continue to follow. [] Hemodialysis    [] Critical Lab Value Level     [] Blood transfusion in progress    [] Acute or unstable cardiovascular status   _MAP < 55 or more than >115  _HR < 40 or > 130    [] Acute or unstable pulmonary status   -FiO2 > 60%   _RR < 5 or >40    _O2 sats < 85%    [] Strict Bedrest    [] Off Unit for surgery or procedure    [] Off Unit for testing       [] Pending imaging to R/O fracture    [] Refusal by Patient      [] Other      [] OT being discontinued at this time. Patient independent. No further needs. [] OT being discontinued at this time as the patient has been transferred to hospice care. No further needs.       Monica Garcia, OT
Occupational Therapy  Facility/Department: [facility]  Daily Treatment Note  NAME: Juli Cespedes  : 1955  MRN: [id number]    Date of Service: 2023    Discharge Recommendations:  Patient would benefit from continued therapy after discharge   Pt is currently functional below baseline and recommend comprehensive and intensive skilled therapy by a multidisciplinary team. Would expect patient to be able to tolerate 3 hours of therapy per day and able to tolerate at least one hour up in chair. Please refer to AM-PAC score for current mobility/adl level. Patient Diagnosis(es): The primary encounter diagnosis was Left hip pain. Diagnoses of Unable to ambulate, Hydronephrosis, unspecified hydronephrosis type, and Mass of psoas muscle were also pertinent to this visit. Assessment    [facility]: 19  Assessment: Pt progressing towards goals with noted improvement in cognition as well. Pt would benefit from continued skilled OT services to address deficits in areas of functional balance, functional reach, ADL completion, safety awareness, ADL transfers, bed mobility, UE strength, and functional mobility, all to ensure safe return home to [facility] and decrease caregiver burden. Activity Tolerance: Patient tolerated treatment well;Patient limited by pain  Discharge Recommendations: Patient would benefit from continued therapy after discharge      Plan   Occupational Therapy Plan  Times Per Week: 5-6x/wk 1-2x/day as thu  Current Treatment Recommendations: Strengthening;Balance training;Functional mobility training;Pain management;Self-Care / ADL;Endurance training;Positioning;Equipment evaluation, education, & procurement;Patient/Caregiver education & training; Safety education & training     Restrictions: IV, telemetry, POD #9 LTHA     Subjective   Subjective  Subjective: Pt tearful and overwhelmed. Writer offering comfort and reassurance. Pain: Requesting pain meds.   Orientation  Overall Orientation Status:
Occupational Therapy  Facility/Department: [facility]  Daily Treatment Note  NAME: Kathleen Molina  : 1955  MRN: [id number]    Date of Service: 2023    Discharge Recommendations:  Patient would benefit from continued therapy after discharge   Patient Diagnosis(es): The primary encounter diagnosis was Left hip pain. Diagnoses of Unable to ambulate, Hydronephrosis, unspecified hydronephrosis type, and Mass of psoas muscle were also pertinent to this visit. Assessment    Activity Tolerance: Treatment limited secondary to medical complications; Patient limited by pain  Discharge Recommendations: Patient would benefit from continued therapy after discharge      Plan   Occupational Therapy Plan  Times Per Week: 5-6x/wk 1-2x/day as thu  Current Treatment Recommendations: Strengthening;Balance training;Functional mobility training;Pain management;Self-Care / ADL;Endurance training;Positioning;Equipment evaluation, education, & procurement;Patient/Caregiver education & training; Safety education & training     Restrictions   Restrictions/Precautions  Restrictions/Precautions: General Precautions, Contact Precautions, Fall Risk, Surgical Protocols, NPO  Position Activity Restriction  Hip Precautions: Anterior hip precautions  Other position/activity restrictions:  IV R forearm, WBAT, up w/ assist     Subjective   Subjective  Subjective: \"This pain is just too much. \"  Orientation  Overall Orientation Status: Within Functional Limits  Cognition  Overall Cognitive Status: Exceptions        Objective    Vitals     Bed Mobility Training  Bed Mobility Training: No  Transfer Training  Transfer Training: No      Safety Devices  Type of Devices: All fall risk precautions in place;Call light within reach;Gait belt; Heels elevated for pressure relief;Nurse notified; Patient at risk for falls; Left in bed;Bed alarm in place   Access Code: [email or web address]  URL: [email or web address]  Date: 2023  Prepared by: Anastacio Winter
Orthopedic Progress Note    Patient:  Hilario Aaron  YOB: 1955     79 y.o. female    Subjective:  Patient seen and examined. Resting comfortably in bed. No acute issue overnight per patient or nursing  Still has NG tube however has had 5 bowel movements since yesterday. Started on clear liquids per general surgery  Went for cystoscopy and stenting with Urology yesterday. Complaining lower back pain that radiates to the left hip pain  Denies fever, HA, CP, SOB, N/V    Vitals reviewed    Objective:   Vitals:    05/25/23 1202   BP: (!) 144/62   Pulse: 87   Resp: 16   Temp: 98.1 °F (36.7 °C)   SpO2: 93%       Gen: NAD, cooperative    Cardiovascular: Regular rate    Respiratory: Chest symmetric, no accessory muscle use, normal respirations, no audible wheezes    MSK: LLE: Dressings to the anterior hip are C/D/I. No strike through bleeding. Thigh is soft and compressible. Severe low back/posterior hip pain with passive ROM of the hip. Compartments soft. EHL/FHL/TA/GSC motor intact. Sensation intact to sural/saph/SPN/DPN distribution. DP/PT pulses 2+. Recent Labs     05/23/23  0346 05/23/23  1007 05/24/23  0339 05/25/23  0700   WBC 5.5  --   --   --    HGB 10.4*   < > 11.0*  --    HCT 32.2*   < > 33.4*  --      --   --   --      --  139 141   K 4.6  --  4.1 3.5*   BUN 23  --  26* 26*   CREATININE 1.26*  --  1.10* 1.34*   GLUCOSE 82  --  90 86    < > = values in this interval not displayed. DVT ppx: ASA, Lovenox    See rec for complete list    Impression/plan: 79 y.o. female who is s/p Left ANTONIO on 5/16/23, with: Intractable low back pain with radiculopathy  RADHA and hydronephrosis  Small bowel obstruction  Left psoas mass         - Lumbar MRI demonstrating left psoas edema and heterogenous mass. - Recommend IR Asp/Bx. Consult placed. - WBAT LLE  - Dressings on left hip. Please maintain. Ok to reinforce as needed.    - Medical management per primary team/gen
Orthopedic Progress Note    Patient:  Joseluis Sampson, 79 y.o. female  YOB: 1955       Subjective:  Patient seen and examined. Main complaints of low back pain radiating into her left hip/thigh region in L2-3 distribution. Denies fever, HA, CP, SOB. Afebrile overnight. NGT placed by nursing and general surgery. Patient did have a bowel movement today. Attempting mobilization w/ PT but patient is very painful. No new numbness or tingling to the LLE. No bowel or bladder incontinence. Objective:   Vitals:    05/24/23 1127   BP: 135/67   Pulse: 91   Resp: 18   Temp: 98.6 °F (37 °C)   SpO2:      Gen: NAD, cooperative    Cardiovascular: Regular rate    Respiratory: Symmetric chest rise. No accessory muscle use    Spine: TTP over the lumbar spine and paraspinal musculature. No step offs or deformities appreciated. SILT over S1-L5 distribution without deficit, sensation 2/2. Quads/Hamstrings, TA/EHL/EDL, FHL/TP/FDL motor 5/5 bilaterally. LLE: Pain in the low back with log roll of the hip. Leg lengths symmetric. Ryann Plater removed and incision is clean, dry, and intact without dehiscence or signs of infection. No motor or sensory deficit as documented above. DP and PT pulses 2+    Assessment/Plan: 79 y.o. female who is POD8 from a L ANTONIO, with: Intractable low back pain with radiculopathy  RADHA  Post op ileus vs chronic short bowel syndrome      - Lumbar MRI w/wo contrast ordered  - WBAT LLE  - Dressings removed today.  Okay to leave open to air  - Medical management per primary team/gen surg  - CT scan review, no periprosthetic fracture evident  - DVT ppx, Lovenox  - Pain control  - PT/OT  - Dispo pending lumbar MRI and medical clearance  - Will continue to follow    Electronically signed by Ashley Muhammad DO, on 5/24/2023 at 2:59 PM.
Orthopedic Progress Note    Patient:  Ophelia Dukes, 79 y.o. female  YOB: 1955       Subjective:  Patient seen and examined. Patient went for psoas mass biopsy today. Feeling better since the biopsy. Pain controlled on current regimen, pain symptoms improving. No acute issues overnight. Denies fever, HA, CP, SOB, N/V. No incontinence. Having bowel movements. Objective:   Vitals:    05/26/23 1154   BP: (!) 145/65   Pulse: 79   Resp: 16   Temp: 98.1 °F (36.7 °C)   SpO2: 95%     Gen: NAD, cooperative    Cardiovascular: Regular rate    Respiratory: Symmetric chest rise. No accessory muscle use    LLE: Dressings to the anterior hip are C/D/I. No strike through bleeding. Thigh is soft and compressible. Severe low back/posterior hip pain with passive ROM of the hip, improved from yesterday. Compartments soft. EHL/FHL/TA/GSC motor intact. Sensation intact to sural/saph/SPN/DPN distribution. DP/PT pulses 2+. Assessment/Plan: 79 y.o. female who is s/p Left ANTONIO on 5/16/23, with:      Intractable low back pain with radiculopathy  RADHA and hydronephrosis  Small bowel obstruction  Left psoas mass       - Will follow up cultures/biopsy results  - WBAT LLE  - Maintain dressings  - Medical management per primary team/gen surg/urology   - DVT ppx, Lovenox  - Pain control  - Encourage PT/OT  - Will continue to follow    Electronically signed by Den Sanon DO on 5/26/2023 at 3:52 PM.
Patient discharged home with daughter. Follow instructions and education given. Patient and daughter agreeable to outpatient plan. IV removed. Patient wheeled to car by staff.
Physical Therapy  DATE: 2023    NAME: Ilya Adame  MRN: [id number]   : 1955    Patient not seen this date for Physical Therapy due to:      [x] Cancel by RN or physician due to:    [] Hemodialysis    [] Critical Lab Value Level     [] Blood transfusion in progress    [] Acute or unstable cardiovascular status   _MAP < 55 or more than >115  _HR < 40 or > 130    [] Acute or unstable pulmonary status   -FiO2 > 60%   _RR < 5 or >40    _O2 sats < 85%    [] Strict Bedrest    [x] Off Unit for surgery or procedure for Biopsy    [] Off Unit for testing       [] Pending imaging to R/O fracture    [] Refusal by Patient      [] Other      [] PT being discontinued at this time. Patient independent. No further needs. [] PT being discontinued at this time as the patient has been transferred to hospice care. No further needs.       Hilario Kerr, PTA
Physical Therapy  DATE: 2023    NAME: Vincent Chou  MRN: [id number]   : 1955    Patient not seen this date for Physical Therapy due to:      [x] Cancel by RN - medical issues - not appropriate today for therapy. [] Hemodialysis    [] Critical Lab Value Level     [] Blood transfusion in progress    [] Acute or unstable cardiovascular status   _MAP < 55 or more than >115  _HR < 40 or > 130    [] Acute or unstable pulmonary status   -FiO2 > 60%   _RR < 5 or >40    _O2 sats < 85%    [] Strict Bedrest    [] Off Unit for surgery or procedure    [] Off Unit for testing       [] Pending imaging to R/O fracture    [] Refusal by Patient      [] Other      [] PT being discontinued at this time. Patient independent. No further needs. [] PT being discontinued at this time as the patient has been transferred to hospice care. No further needs.       Astrid Perez, PT
Physical Therapy  Facility/Department: [facility]  Physical Therapy Initial Assessment    Name: Den Kendall  : 1955  MRN: [id number]  Date of Service: 2023    Discharge Recommendations:    Pt is currently functional below baseline and recommend comprehensive and intensive skilled therapy by a multidisciplinary team. Would expect patient to be able to tolerate 3 hours of therapy per day and able to tolerate at least one hour up in chair. Please refer to AM-PAC score for current mobility/adl level. Den Kendall is a 79 y.o. female who presents to the emergency department for pain to the left hip. She had left hip replacement almost a week ago, outpatient. She has been on Norco.  A few days ago she was in this emergency department and had a negative Doppler and she was switched to Percocet. The pain is severe and its worse if she moves. She has not injured herself in any fashion. Patient Diagnosis(es): The primary encounter diagnosis was Left hip pain. A diagnosis of Unable to ambulate was also pertinent to this visit. Past Medical History:  has a past medical history of Anxiety, Arthritis, CAD (coronary artery disease), Chronic kidney disease, COPD (chronic obstructive pulmonary disease) ([address] Depression, Diverticulosis, GERD (gastroesophageal reflux disease), Heart attack ([address] Heart palpitations, History of blood transfusion, Hyperlipidemia, Hypertension, and PSVT (paroxysmal supraventricular tachycardia) ([address] Past Surgical History:  has a past surgical history that includes Appendectomy; Abdomen surgery; Small intestine surgery; Cholecystectomy; colectomy (); Upper gastrointestinal endoscopy (10/17/2014); Colonoscopy (2003); Colonoscopy (2010); Colonoscopy (10/17/2014); Tubal ligation; Carotid stent placement; Upper gastrointestinal endoscopy (N/A, 2019); Cardiac surgery; and Total hip arthroplasty (Left, 2023).     Assessment   Body Structures,
Physical Therapy  Facility/Department: [facility]  Daily Treatment Note  NAME: Vincent Chou  : 1955  MRN: [id number]    Date of Service: 2023    Discharge Recommendations:  Patient would benefit from continued therapy after discharge        Patient Diagnosis(es): The primary encounter diagnosis was Left hip pain. Diagnoses of Unable to ambulate, Hydronephrosis, unspecified hydronephrosis type, and Mass of psoas muscle were also pertinent to this visit. Assessment   Assessment: Pt mobility is limited by severe pain. Pt with poor tolerance to L LE  exercises  this session. AM-PAC score of 18/24 for current level of function  Activity Tolerance: Patient limited by pain     Plan    Physcial Therapy Plan  General Plan: 5-7 times per week  Current Treatment Recommendations: Strengthening;ROM;Balance training;Functional mobility training;Transfer training;Gait training; Endurance training;Home exercise program;Safety education & training;Patient/Caregiver education & training; Therapeutic activities     Restrictions  Restrictions/Precautions  Restrictions/Precautions: General Precautions, Contact Precautions, Fall Risk, Surgical Protocols, NPO  Position Activity Restriction  Hip Precautions: Anterior hip precautions  Other position/activity restrictions: NG tube, small wound vac L hip,  IV R forearm, WBAT, up w/ assist     Subjective    Subjective  Subjective: Pt c/o 10/10 pain however agreeable to PT session (Nurse gives approval for PT session.)  Orientation  Overall Orientation Status: Within Functional Limits  Orientation Level: Oriented X4  Cognition  Overall Cognitive Status: WFL     Objective      Bed Mobility Training  Bed Mobility Training: Yes  Overall Level of Assistance: Contact-guard assistance;Stand-by assistance  Interventions: Safety awareness training;Verbal cues  Rolling: Stand-by assistance  Supine to Sit: Contact-guard assistance;Stand-by assistance  Sit to Supine: Contact-guard
Physician Progress Note      Avery Klein  CSN #:                  [id number]  :                       1955  ADMIT DATE:       2023 10:30 AM  DISCH DATE:  Citlalli Sec[place]us  PROVIDER #:        Audra Banks DO          QUERY TEXT:    Pt admitted with Lower back and L hip pain. Pt noted to have   radiculopathy/postoperative hematoma/Non-traumatic psoas hematoma. If   possible, please document in progress notes and discharge summary if you are   evaluating and/or treating any of the following: The medical record reflects the following:  Risk Factors: s/p ANTONIO  Clinical Indicators: H&P  \"Intractable back/hip pain\" IM PN    \"Nontraumatic psoas hematoma. MRI of today, completed since morning visit,   shows left iliopsoas fluid collection, likely postoperative hematoma. \"; MRI   LUMBAR SPINE-->Left psoas muscle edema and enhancement with a heterogeneous   masslike focus containing central fluid and enhancement of uncertain etiology. Differential considerations include infectious process versus a neoplastic   process and tissue sampling may be helpful in further characterization;  Treatment: Dilaudid, ortho consult, MRI LUMBAR, IR consult for Left psoas mass   aspiration/biopsy  Options provided:  -- Lower back/ hip pain due to radiculopathy  -- Lower back/ hip pain due to Nontraumatic psoas hematoma  -- Lower back/ hip pain due to postoperative hematoma  -- Other - I will add my own diagnosis  -- Disagree - Not applicable / Not valid  -- Disagree - Clinically unable to determine / Unknown  -- Refer to Clinical Documentation Reviewer    PROVIDER RESPONSE TEXT:    This patient has lower back/hip pain due to radiculopathy.     Query created by: Harman Titus on 2023 11:26 AM      Electronically signed by:  Lisbeth Brink DO 2023 2:32 PM
Physician Progress Note      PATIENTPearlene Notice  CSN #:                  [id number]  :                       1955  ADMIT DATE:       2023 10:30 AM  DISCH DATE:        2023 5:56 PM  RESPONDING  PROVIDER #:        Sudhakar Miller DO          QUERY TEXT:    Patient admitted with postoperative hematoma. Noted documentation of Acute   Kidney Injury in Orthopedic Surgery PN on . In order to support the   diagnosis of RADHA, please include additional clinical indicators in your   documentation. ? Or please document if the diagnosis of RADHA has been ruled out   after further study. The medical record reflects the following:  Risk Factors: HTN, CKD  Clinical Indicators:  Ortho progress note--> \"RADHA\"; Creat   1.13-->1.26-->1.10-->1.34-->1.17; Treatment: IVF, I/O Q8H, BMP x5    Defined by Kidney Disease Improving Global Outcomes (KDIGO) clinical practice   guideline for acute kidney injury:  -Increase in SCr by greater than or equal to 0.3 mg/dl within 48 hours; or  -Increase or decrease in SCr to greater than or equal to 1.5 times baseline,   which is known or presumed to have occurred within the prior 7 days; or  -Urine volume < 0.5ml/kg/h for 6 hours. Options provided:  -- Acute kidney injury ruled out after study  -- Acute kidney injury evidenced by, Please document evidence as well as a   numerical baseline creatinine, if known. -- Other - I will add my own diagnosis  -- Disagree - Not applicable / Not valid  -- Disagree - Clinically unable to determine / Unknown  -- Refer to Clinical Documentation Reviewer    PROVIDER RESPONSE TEXT:    Acute kidney injury was ruled out after study.     Query created by: Clarice Solis on 2023 11:05 AM      Electronically signed by:  Sudhakar Miller DO 2023 5:57 PM
Pt admitted to room 2012. Oriented to room, call light and bed mechanics. Side rails up x2. Call light within reach. Orders reviewed. Her daughter is at the bedside.
Pt admitted to room. Oriented to room, call light and bed mechanics. Side rails up x2. Call light within reach. Orders reviewed.
Pt back on floor from Ir after cysto ,
Pt is refusing NG tube at this time despite disusing the advantages.
Pt off the floor to IR for cytso and stent placement.
Pt/family requested that MRI be done tomorrow instead. MRI tech informed.
Ruth Alcantar   Urology Progress Note            Subjective:patient seen at the change of shift follow-up hydronephrosis status post stent placement    Patient Vitals for the past 24 hrs:   BP Temp Temp src Pulse Resp SpO2 Weight   05/25/23 2158 -- -- -- 76 -- -- --   05/25/23 1948 111/62 98.4 °F (36.9 °C) Oral 86 16 93 % --   05/25/23 1557 (!) 145/79 97.5 °F (36.4 °C) Oral 100 17 97 % --   05/25/23 1202 (!) 144/62 98.1 °F (36.7 °C) Oral 87 16 93 % --   05/25/23 0807 132/63 98.2 °F (36.8 °C) Oral 91 16 95 % --   05/25/23 0524 -- -- -- -- -- -- 203 lb 3.2 oz (92.2 kg)   05/25/23 0334 (!) 140/75 97.5 °F (36.4 °C) -- 77 18 90 % --       Intake/Output Summary (Last 24 hours) at 5/26/2023 0003  Last data filed at 5/25/2023 3827  Gross per 24 hour   Intake --   Output 300 ml   Net -300 ml       Recent Labs     05/23/23  0346 05/23/23  1007 05/23/23  1834 05/23/23  2139 05/24/23  0339 05/25/23  1646   WBC 5.5  --   --   --   --  7.2   HGB 10.4*   < > 11.8* 11.1* 11.0*  --    HCT 32.2*   < > 36.5 33.9* 33.4*  --    MCV 89.0  --   --   --   --   --      --   --   --   --   --     < > = values in this interval not displayed. Recent Labs     05/23/23  0346 05/23/23  1834 05/24/23  0339 05/25/23  0700     --  139 141   K 4.6  --  4.1 3.5*     --  103 102   CO2 21  --  21 24   PHOS  --  4.1  --   --    BUN 23  --  26* 26*   CREATININE 1.26*  --  1.10* 1.34*       Recent Labs     05/24/23  1633   COLORU Yellow   PHUR 6.0   WBCUA 0 TO 2   RBCUA 0 TO 2   BACTERIA RARE*   SPECGRAV 1.033*   LEUKOCYTESUR NEGATIVE   UROBILINOGEN Normal   BILIRUBINUR Presumptive positive. Unable to confirm due to unavailability of reagent. *       Additional Lab/culture results:    Physical Exam: patient's GI symptoms improving, cystoscopy and stent placement completed yesterday uneventfully    Interval Imaging Findings:    Impression:    Patient Active Problem List   Diagnosis    Diarrhea
[facility]  Office: [address], DO, Anthonyoctavio Tavarez, DO, Ijeoma Kirt, DO, Mauri Abbott Blood, DO, Donna Bender MD, Maude Fox MD, Bari Luo MD, Patel Jeff MD,  Kilo Gutierrez MD, Lisset Reyes MD, Andi Diaz, DO, Jasmeet Martinez MD,  Rickey Roque MD, Allan Springer MD, Trinity Hernandez, DO, Agustin Agosto MD, Natalie Haywood MD, Tram Morfin, DO, Taiwo Brizuela MD, Meryle Cinnamon, MD, Chery Correa MD, Aung Pedraza MD,  Cresencio Clay, DO, Shanice Escoto MD,  Gladys Ahn, CNP,  Ariel Carrillo, CNP, Camden Bragg, CNP, Peggy Valdivia, CNP,  Lydia Galindo, DNP, Mallory Quintana, CNP, Travis Milton, CNP, Che Quintana, CNP, Larry Lee, CNP, Celso Ling, CNP, Calista Thomas PA-C, Clyde Delarosa, CNS, Jeannette Mota, CNP, Bonita Vides, Gaviria [facility]    Progress Note    5/25/2023    2:21 PM    Name:   Fan Armas  MRN:     [id number]     Acct:      [de-identified]   Room:   2012/2012-02  IP Day:  0  Admit Date:  5/22/2023 10:30 AM    PCP:   Timothy Barrera MD  Code Status:  Full Code    Subjective:     C/C:   Chief Complaint   Patient presents with    Hip Pain     Left       Interval History Status: improved. Patient with continued back pain, has been waxing waning, scheduled for MRI today. Denies any chest pain, shortness of breath, nausea vomiting, fevers chills or acute complaints. Brief History: This is a 80-year-old female that underwent left hip arthroplasty approximately 1 week ago and was released from the hospital the same day. She had initially done well with therapy but has been having steady decline in muscle strength and functioning since then. She is reports left low back and hip pain radiating into the left groin and knee area. She has had weakness but no numbness or tingling associate with her symptoms.   She otherwise denies any other acute
[facility]  Office: [address], DO, Rashel Jaime, DO, Alfreda dNiayes, DO, Janie Green, DO, Roberta Santiago MD, Precious Villafana MD, Jennifer Eden MD, Tammy Macedo MD,  Mahin Diaz MD, Girish Clemente MD, Albert Marr, DO, Jewels Reed MD,  Sebastian Quinn MD, John Rosenthal MD, Meryl Ramirez, DO, Kavitha Ferris MD, Joyce Ivory MD, Kelvin Zeng, DO, Nicolas Caba MD, Aletha Solo MD, Alfredito Magaña MD, Zara Gonzalez MD,  Jayda Lawton, DO, Harper Jacobs MD,  Trey Mosqueda, CNP,  Roverto Tamez, CNP, Larissa White, CNP, Leti Lomeli, CNP,  Otis Bray, [facility], Dennis Mccauley, CNP, Maria Esther Hallman, CNP, Esa Houser, CNP, Edyta Thompson, CNP, Srinivas Franz, CNP, Pan Carroll PA-C, Ashley Soriano, CNS, Abdiel Taylor, CNP, Laura Cota, [facility]    Progress Note    5/24/2023    1:42 PM    Name:   Candido Russ  MRN:     [id number]     Acct:      [de-identified]   Room:   2012/2012-02  IP Day:  0  Admit Date:  5/22/2023 10:30 AM    PCP:   Skye Khalil MD  Code Status:  Full Code    Subjective:     C/C:   Chief Complaint   Patient presents with    Hip Pain     Left       Interval History Status: improved. Patient reports decreasing pain, more alert today. Had episode of confusion with hallucinations after lorazepam yesterday. Denies any chest pain, shortness of breath, nausea vomiting, fevers or chills or acute complaints. Has NG tube in for decompression of the SBO    Brief History: This is a 60-year-old female that underwent left hip arthroplasty approximately 1 week ago and was released from the hospital the same day. She had initially done well with therapy but has been having steady decline in muscle strength and functioning since then. She is reports left low back and hip pain radiating into the left groin and knee area.   She has had weakness but no
[facility]  Office: [address], DO, Anthony Narrow, DO, Ijeoma Jcde, DO, Mauri Abbott Blood, DO, Donna Bender MD, Maude Fox MD, Bari Luo MD, Patel Jeff MD,  Kilo Gutierrez MD, Lisset Reyes MD, Andi Diaz, DO, Jasmeet Martinez MD,  Rickey Roque MD, Allan Springer MD, Trinity Hernandez, DO, Agustin Agosto MD, aNtalie Haywood MD, Tram Morfin, DO, Taiwo Brizuela MD, Meryle Cinnamon, MD, Chery Correa MD, Aung Pedraza MD,  Cresencio Clay, DO, Shanice Escoto MD,  Gladys Ahn, CNP,  Ariel Carrillo, CNP, Camden Bragg, CNP, Peggy Valdivia, CNP,  Lydia Galindo, DNP, Mallory Quintana, CNP, Travis Milton, CNP, Che Quintana, CNP, Larry Lee, CNP, Celso Ling, CNP, Calista Thomas PA-C, Clyde Delarosa, CNS, Jeannette Mota, CNP, Bonita Vides, Gaviria [facility]    Progress Note    5/27/2023    7:43 AM    Name:   Fan Armas  MRN:     [id number]     Acct:      [de-identified]   Room:   2012/2012-02  IP Day:  2  Admit Date:  5/22/2023 10:30 AM    PCP:   Timothy Barrera MD  Code Status:  Full Code    Subjective:     C/C:   Chief Complaint   Patient presents with    Hip Pain     Left       Interval History Status: improved. Back pain persist, gradually improving. Better activity tolerance. Denies any chest pain, shortness of breath, nausea vomiting, fevers or chills or acute complaints. Brief History: This is a 70-year-old female that underwent left hip arthroplasty approximately 1 week ago and was released from the hospital the same day. She had initially done well with therapy but has been having steady decline in muscle strength and functioning since then. She is reports left low back and hip pain radiating into the left groin and knee area. She has had weakness but no numbness or tingling associate with her symptoms.   She otherwise denies any other acute
The pt was returned to her room in stable condition.
The pt was take off the unit per bed for a procedure in stable condition. Her daugther is at the bedside.
Writer paged Dr Sara Carbajal about Prevena Wound vac and intractable pain to hip. He asked to remove negative wound pressure and put on dry dressing. No MRI at this time due to metal in Left hip . Will follow up later today.
Modifier  CK       Assessment  Assessment: Patient with progression in ambulation and tolerance to activities as compared with prior treatment. Patient would benefit from continued skilled PT treatment to maximize return to PLOF  Activity Tolerance: Treatment limited secondary to medical complications; Patient limited by pain    PLAN OF CARE/SAFETY  Physcial Therapy Plan  General Plan: 5-7 times per week  Current Treatment Recommendations: Strengthening;ROM;Balance training;Functional mobility training;Transfer training;Gait training; Endurance training;Home exercise program;Safety education & training;Patient/Caregiver education & training; Therapeutic activities  Safety Devices  Type of Devices: All fall risk precautions in place;Call light within reach; Chair alarm in place;Gait belt; Heels elevated for pressure relief;Nurse notified; Left in chair;Patient at risk for falls    EDUCATION  Education  Education Given To: Patient  Education Provided:  Mobility Training;Transfer Training  Education Method: Demonstration;Verbal;Teach Back  Barriers to Learning: None  Education Outcome: Verbalized understanding;Continued education needed        Therapy Time   Individual Concurrent Group Co-treatment   Time In [facility]         Time Out [address] [place] [address], 05/25/23 at 12:51 PM
amount of fluid and gas also present at the level of the joint line anteriorly which is likely postoperative. 2. No fracture identified. 3. Mild degenerative changes of the right hip. Physical Examination:        General appearance:  alert, cooperative and no distress  Mental Status:  oriented to person, place and time and normal affect  Lungs:  clear to auscultation bilaterally, normal effort  Heart:  regular rate and rhythm, no murmur  Abdomen:  soft, nontender, nondistended, normal bowel sounds, no masses, hepatomegaly, splenomegaly  Extremities:  no edema, redness, tenderness in the calves  Skin:  no gross lesions, rashes, induration    Assessment:        Hospital Problems             Last Modified POA    * (Principal) Intractable back pain 5/22/2023 Yes    Coronary artery disease involving native coronary artery of native heart without angina pectoris 5/23/2023 Yes    Chronic diarrhea 5/22/2023 Yes    Primary localized osteoarthritis of left hip 5/22/2023 Yes    Acute blood loss anemia 5/22/2023 Yes    Postoperative ileus ([address] 5/23/2023 Clinically Undetermined    Hydronephrosis 5/23/2023 Clinically Undetermined       Plan:        CT scan lumbar spine to evaluate some weakness, scan ordered this morning on rounds, subsequent report shows dialysis abdominal and right-sided hydronephrosis. Flomax initiated as well as urology consultation requested. CT scan abdomen pelvis ordered due to the hydronephrosis, nonobstructing stone as well as evidence of ileus.   Continue postop PT and OT as able  GI and DVT prophylaxis  Continue home cardiac medications  Trend H&H, transfuse as needed  Orthopedic evaluation  Discussed with family at [address] [place] [address]  5/23/2023  12:30 PM
Status:  oriented to person, place and time and normal affect  Lungs:  clear to auscultation bilaterally, normal effort  Heart:  regular rate and rhythm, no murmur  Abdomen:  soft, nontender, nondistended, normal bowel sounds, no masses, hepatomegaly, splenomegaly  Extremities:  no edema, redness, tenderness in the calves  Skin:  no gross lesions, rashes, induration    Assessment:        Hospital Problems             Last Modified POA    * (Principal) Intractable back pain 5/22/2023 Yes    Coronary artery disease involving native coronary artery of native heart without angina pectoris 5/23/2023 Yes    SBO (small bowel obstruction) 5/24/2023 Yes    Chronic diarrhea 5/22/2023 Yes    Primary localized osteoarthritis of left hip 5/22/2023 Yes    Acute blood loss anemia 5/22/2023 Yes    Postoperative ileus ([address] 5/23/2023 Clinically Undetermined    Hydronephrosis with ureteral stricture, not elsewhere classified 5/25/2023 Clinically Undetermined    Nontraumatic psoas hematoma 5/25/2023 Yes       Plan:        Biopsy of iliopsoas lesions today, hematoma, versus mass, versus abscess  Continue PT and OT  Continue efforts for pain control  Start Movantik to avoid further GI/bowel obstruction issues related to need for narcotics  GI and DVT prophylaxis  Monitor H&H, trend labs  See orders for details    Lugenia Lesches, DO  5/26/2023  9:05 AM
possible emesis. Vision  Vision: Impaired  Vision Exceptions: Wears glasses at all times  Hearing  Hearing: Exceptions to [facility]  Hearing Exceptions: Hard of hearing/hearing concerns (Hopi in R ear)    Cognition  Overall Cognitive Status: Exceptions  Initiation: Requires cues for some  Cognition Comment: Pt. is \"coming out of\" affects of ativan with dilaudid and physiological trauma of hip/back pain and small bowel obstruction- all compounded to create a \"metabolic encepholophy\" per Dr. Coker Heart. Pt. is no longer hallucinating and is alert and holding appropriate conversation. She is very lethargic and in pain. Orientation  Overall Orientation Status: Within Functional Limits  Perception  Overall Perceptual Status: Impaired  Initiation: Cues to initiate tasks (verb cues/encouragement to initiate some tasks d/t pain/guarding of back/L hip)        Education Given To: Patient; Family  Education Provided: Role of Therapy;Plan of Care;Family Education;Equipment;Transfer Training; Fall Prevention Strategies; Energy Conservation  Education Provided Comments: Pt/dgtr edu on david stedy use, getting OOB as much as possible, safe transfer techs, and taking rest breaks as needed.   Education Method: Verbal  Barriers to Learning: Cognition  Education Outcome: Verbalized understanding;Continued education needed             AM-PAC Score 12/24       Goals  Short Term Goals  Time Frame for Short Term Goals: by d/c, pt will  Short Term Goal 1: reassess for functional mob  Short Term Goal 2: demo SBA/CGA with bed mob using hand rails as needed  Short Term Goal 3: demo min A with ADL transfers using AD as needed with safe transfer techs  Short Term Goal 4: demo min A with toileting tasks using AD/BSC grab bars as needed with fall prevention strategies and safe transfer techs  Short Term Goal 5: demo SBA with UB ADLs and min A with LB ADLs at appropriate level using AE as needed  Long Term Goals  Time Frame for Long Term Goals : pt/family

## 2023-06-09 ENCOUNTER — APPOINTMENT (OUTPATIENT)
Dept: GENERAL RADIOLOGY | Age: 68
End: 2023-06-09
Attending: UROLOGY
Payer: MEDICARE

## 2023-06-09 ENCOUNTER — ANESTHESIA (OUTPATIENT)
Dept: OPERATING ROOM | Age: 68
End: 2023-06-09
Payer: MEDICARE

## 2023-06-09 ENCOUNTER — HOSPITAL ENCOUNTER (OUTPATIENT)
Age: 68
Setting detail: OUTPATIENT SURGERY
Discharge: HOME | End: 2023-06-09
Attending: UROLOGY | Admitting: UROLOGY
Payer: MEDICARE

## 2023-06-09 ENCOUNTER — ANESTHESIA EVENT (OUTPATIENT)
Dept: OPERATING ROOM | Age: 68
End: 2023-06-09
Payer: MEDICARE

## 2023-06-09 VITALS
RESPIRATION RATE: 13 BRPM | SYSTOLIC BLOOD PRESSURE: 128 MMHG | OXYGEN SATURATION: 95 % | WEIGHT: 188 LBS | BODY MASS INDEX: 33.31 KG/M2 | HEART RATE: 72 BPM | TEMPERATURE: 97.9 F | DIASTOLIC BLOOD PRESSURE: 65 MMHG | HEIGHT: 63 IN

## 2023-06-09 DIAGNOSIS — N20.0 KIDNEY STONES: ICD-10-CM

## 2023-06-09 DIAGNOSIS — N13.30 HYDRONEPHROSIS, UNSPECIFIED HYDRONEPHROSIS TYPE: ICD-10-CM

## 2023-06-09 LAB
BACTERIA URNS QL MICRO: ABNORMAL
BILIRUB UR QL STRIP: NEGATIVE
CLARITY UR: ABNORMAL
COLOR UR: YELLOW
EPI CELLS #/AREA URNS HPF: ABNORMAL /HPF (ref 0–5)
GLUCOSE UR STRIP-MCNC: NEGATIVE MG/DL
HGB UR QL STRIP.AUTO: ABNORMAL
KETONES UR STRIP-MCNC: NEGATIVE MG/DL
LEUKOCYTE ESTERASE UR QL STRIP: ABNORMAL
MUCOUS THREADS URNS QL MICRO: ABNORMAL
NITRITE UR QL STRIP: POSITIVE
PH UR STRIP: 6 [PH] (ref 5–8)
PROT UR STRIP-MCNC: ABNORMAL MG/DL
RBC #/AREA URNS HPF: ABNORMAL /HPF (ref 0–2)
SP GR UR STRIP: 1.02 (ref 1–1.03)
UROBILINOGEN UR STRIP-ACNC: NORMAL
WBC #/AREA URNS HPF: ABNORMAL /HPF (ref 0–5)

## 2023-06-09 PROCEDURE — 87186 SC STD MICRODIL/AGAR DIL: CPT

## 2023-06-09 PROCEDURE — 6370000000 HC RX 637 (ALT 250 FOR IP): Performed by: UROLOGY

## 2023-06-09 PROCEDURE — 6360000002 HC RX W HCPCS: Performed by: UROLOGY

## 2023-06-09 PROCEDURE — 3600000002 HC SURGERY LEVEL 2 BASE: Performed by: UROLOGY

## 2023-06-09 PROCEDURE — C1758 CATHETER, URETERAL: HCPCS | Performed by: UROLOGY

## 2023-06-09 PROCEDURE — C1769 GUIDE WIRE: HCPCS | Performed by: UROLOGY

## 2023-06-09 PROCEDURE — 81001 URINALYSIS AUTO W/SCOPE: CPT

## 2023-06-09 PROCEDURE — 6360000002 HC RX W HCPCS: Performed by: NURSE ANESTHETIST, CERTIFIED REGISTERED

## 2023-06-09 PROCEDURE — 7100000011 HC PHASE II RECOVERY - ADDTL 15 MIN: Performed by: UROLOGY

## 2023-06-09 PROCEDURE — 87086 URINE CULTURE/COLONY COUNT: CPT

## 2023-06-09 PROCEDURE — 3600000012 HC SURGERY LEVEL 2 ADDTL 15MIN: Performed by: UROLOGY

## 2023-06-09 PROCEDURE — 3700000000 HC ANESTHESIA ATTENDED CARE: Performed by: UROLOGY

## 2023-06-09 PROCEDURE — 2580000003 HC RX 258: Performed by: ANESTHESIOLOGY

## 2023-06-09 PROCEDURE — 3700000001 HC ADD 15 MINUTES (ANESTHESIA): Performed by: UROLOGY

## 2023-06-09 PROCEDURE — 2709999900 HC NON-CHARGEABLE SUPPLY: Performed by: UROLOGY

## 2023-06-09 PROCEDURE — C2617 STENT, NON-COR, TEM W/O DEL: HCPCS | Performed by: UROLOGY

## 2023-06-09 PROCEDURE — 2500000003 HC RX 250 WO HCPCS: Performed by: NURSE ANESTHETIST, CERTIFIED REGISTERED

## 2023-06-09 PROCEDURE — 7100000010 HC PHASE II RECOVERY - FIRST 15 MIN: Performed by: UROLOGY

## 2023-06-09 PROCEDURE — 87077 CULTURE AEROBIC IDENTIFY: CPT

## 2023-06-09 PROCEDURE — 3209999900 FLUORO FOR SURGICAL PROCEDURES

## 2023-06-09 DEVICE — URETERAL STENT WITH SIDE HOLES 6FX24CM
Type: IMPLANTABLE DEVICE | Status: FUNCTIONAL
Brand: TRIA™ SOFT

## 2023-06-09 RX ORDER — SODIUM CHLORIDE 0.9 % (FLUSH) 0.9 %
5-40 SYRINGE (ML) INJECTION PRN
Status: DISCONTINUED | OUTPATIENT
Start: 2023-06-09 | End: 2023-06-09 | Stop reason: HOSPADM

## 2023-06-09 RX ORDER — ONDANSETRON 2 MG/ML
4 INJECTION INTRAMUSCULAR; INTRAVENOUS
Status: DISCONTINUED | OUTPATIENT
Start: 2023-06-09 | End: 2023-06-09 | Stop reason: HOSPADM

## 2023-06-09 RX ORDER — CIPROFLOXACIN 2 MG/ML
400 INJECTION, SOLUTION INTRAVENOUS ONCE
Status: COMPLETED | OUTPATIENT
Start: 2023-06-09 | End: 2023-06-09

## 2023-06-09 RX ORDER — PHENAZOPYRIDINE HYDROCHLORIDE 200 MG/1
200 TABLET, FILM COATED ORAL ONCE
Status: COMPLETED | OUTPATIENT
Start: 2023-06-09 | End: 2023-06-09

## 2023-06-09 RX ORDER — ONDANSETRON 2 MG/ML
INJECTION INTRAMUSCULAR; INTRAVENOUS PRN
Status: DISCONTINUED | OUTPATIENT
Start: 2023-06-09 | End: 2023-06-09 | Stop reason: SDUPTHER

## 2023-06-09 RX ORDER — SULFAMETHOXAZOLE AND TRIMETHOPRIM 400; 80 MG/1; MG/1
1 TABLET ORAL 2 TIMES DAILY
Qty: 10 TABLET | Refills: 0 | Status: SHIPPED | OUTPATIENT
Start: 2023-06-09 | End: 2023-06-14

## 2023-06-09 RX ORDER — SODIUM CHLORIDE 0.9 % (FLUSH) 0.9 %
5-40 SYRINGE (ML) INJECTION EVERY 12 HOURS SCHEDULED
Status: DISCONTINUED | OUTPATIENT
Start: 2023-06-09 | End: 2023-06-09 | Stop reason: HOSPADM

## 2023-06-09 RX ORDER — SODIUM CHLORIDE 9 MG/ML
INJECTION, SOLUTION INTRAVENOUS CONTINUOUS
Status: DISCONTINUED | OUTPATIENT
Start: 2023-06-09 | End: 2023-06-09

## 2023-06-09 RX ORDER — HYDROMORPHONE HYDROCHLORIDE 1 MG/ML
0.5 INJECTION, SOLUTION INTRAMUSCULAR; INTRAVENOUS; SUBCUTANEOUS EVERY 5 MIN PRN
Status: DISCONTINUED | OUTPATIENT
Start: 2023-06-09 | End: 2023-06-09 | Stop reason: HOSPADM

## 2023-06-09 RX ORDER — PROPOFOL 10 MG/ML
INJECTION, EMULSION INTRAVENOUS PRN
Status: DISCONTINUED | OUTPATIENT
Start: 2023-06-09 | End: 2023-06-09 | Stop reason: SDUPTHER

## 2023-06-09 RX ORDER — FENTANYL CITRATE 50 UG/ML
25 INJECTION, SOLUTION INTRAMUSCULAR; INTRAVENOUS EVERY 5 MIN PRN
Status: DISCONTINUED | OUTPATIENT
Start: 2023-06-09 | End: 2023-06-09 | Stop reason: HOSPADM

## 2023-06-09 RX ORDER — SODIUM CHLORIDE 9 MG/ML
INJECTION, SOLUTION INTRAVENOUS PRN
Status: DISCONTINUED | OUTPATIENT
Start: 2023-06-09 | End: 2023-06-09 | Stop reason: HOSPADM

## 2023-06-09 RX ORDER — LIDOCAINE HYDROCHLORIDE 20 MG/ML
INJECTION, SOLUTION EPIDURAL; INFILTRATION; INTRACAUDAL; PERINEURAL PRN
Status: DISCONTINUED | OUTPATIENT
Start: 2023-06-09 | End: 2023-06-09 | Stop reason: SDUPTHER

## 2023-06-09 RX ORDER — SODIUM CHLORIDE, SODIUM LACTATE, POTASSIUM CHLORIDE, CALCIUM CHLORIDE 600; 310; 30; 20 MG/100ML; MG/100ML; MG/100ML; MG/100ML
INJECTION, SOLUTION INTRAVENOUS CONTINUOUS
Status: DISCONTINUED | OUTPATIENT
Start: 2023-06-09 | End: 2023-06-09 | Stop reason: HOSPADM

## 2023-06-09 RX ORDER — PHENAZOPYRIDINE HYDROCHLORIDE 100 MG/1
100 TABLET, FILM COATED ORAL 3 TIMES DAILY PRN
Qty: 12 TABLET | Refills: 0 | Status: SHIPPED | OUTPATIENT
Start: 2023-06-09 | End: 2023-06-13

## 2023-06-09 RX ORDER — LIDOCAINE HYDROCHLORIDE 20 MG/ML
JELLY TOPICAL PRN
Status: DISCONTINUED | OUTPATIENT
Start: 2023-06-09 | End: 2023-06-09 | Stop reason: ALTCHOICE

## 2023-06-09 RX ORDER — LIDOCAINE HYDROCHLORIDE 10 MG/ML
1 INJECTION, SOLUTION EPIDURAL; INFILTRATION; INTRACAUDAL; PERINEURAL
Status: DISCONTINUED | OUTPATIENT
Start: 2023-06-10 | End: 2023-06-09 | Stop reason: HOSPADM

## 2023-06-09 RX ORDER — FENTANYL CITRATE 50 UG/ML
INJECTION, SOLUTION INTRAMUSCULAR; INTRAVENOUS PRN
Status: DISCONTINUED | OUTPATIENT
Start: 2023-06-09 | End: 2023-06-09 | Stop reason: SDUPTHER

## 2023-06-09 RX ADMIN — LIDOCAINE HYDROCHLORIDE 50 MG: 20 INJECTION, SOLUTION EPIDURAL; INFILTRATION; INTRACAUDAL; PERINEURAL at 14:18

## 2023-06-09 RX ADMIN — FENTANYL CITRATE 25 MCG: 50 INJECTION INTRAMUSCULAR; INTRAVENOUS at 14:07

## 2023-06-09 RX ADMIN — PHENAZOPYRIDINE 200 MG: 200 TABLET ORAL at 14:51

## 2023-06-09 RX ADMIN — PROPOFOL 40 MG: 10 INJECTION, EMULSION INTRAVENOUS at 14:13

## 2023-06-09 RX ADMIN — LIDOCAINE HYDROCHLORIDE 50 MG: 20 INJECTION, SOLUTION EPIDURAL; INFILTRATION; INTRACAUDAL; PERINEURAL at 14:05

## 2023-06-09 RX ADMIN — PROPOFOL 60 MG: 10 INJECTION, EMULSION INTRAVENOUS at 14:05

## 2023-06-09 RX ADMIN — CIPROFLOXACIN 400 MG: 2 INJECTION, SOLUTION INTRAVENOUS at 14:06

## 2023-06-09 RX ADMIN — ONDANSETRON 4 MG: 2 INJECTION INTRAMUSCULAR; INTRAVENOUS at 14:10

## 2023-06-09 RX ADMIN — PROPOFOL 40 MG: 10 INJECTION, EMULSION INTRAVENOUS at 14:20

## 2023-06-09 RX ADMIN — PROPOFOL 40 MG: 10 INJECTION, EMULSION INTRAVENOUS at 14:25

## 2023-06-09 RX ADMIN — SODIUM CHLORIDE, POTASSIUM CHLORIDE, SODIUM LACTATE AND CALCIUM CHLORIDE: 600; 310; 30; 20 INJECTION, SOLUTION INTRAVENOUS at 11:50

## 2023-06-09 RX ADMIN — SODIUM CHLORIDE, POTASSIUM CHLORIDE, SODIUM LACTATE AND CALCIUM CHLORIDE: 600; 310; 30; 20 INJECTION, SOLUTION INTRAVENOUS at 13:55

## 2023-06-09 RX ADMIN — FENTANYL CITRATE 25 MCG: 50 INJECTION INTRAMUSCULAR; INTRAVENOUS at 14:01

## 2023-06-09 ASSESSMENT — PAIN DESCRIPTION - DESCRIPTORS
DESCRIPTORS: ACHING
DESCRIPTORS: BURNING

## 2023-06-09 ASSESSMENT — LIFESTYLE VARIABLES: SMOKING_STATUS: 1

## 2023-06-09 ASSESSMENT — PAIN - FUNCTIONAL ASSESSMENT
PAIN_FUNCTIONAL_ASSESSMENT: 0-10
PAIN_FUNCTIONAL_ASSESSMENT: 0-10

## 2023-06-09 ASSESSMENT — PAIN DESCRIPTION - PAIN TYPE: TYPE: SURGICAL PAIN

## 2023-06-09 ASSESSMENT — PAIN SCALES - GENERAL: PAINLEVEL_OUTOF10: 4

## 2023-06-09 ASSESSMENT — PAIN DESCRIPTION - ORIENTATION: ORIENTATION: MID

## 2023-06-09 ASSESSMENT — PAIN DESCRIPTION - LOCATION: LOCATION: GROIN

## 2023-06-09 NOTE — ANESTHESIA PRE PROCEDURE
ALKPHOS 109 06/24/2022 04:13 PM    AST 29 06/24/2022 04:13 PM    ALT 11 06/24/2022 04:13 PM       POC Tests:   No results for input(s): POCGLU, POCNA, POCK, POCCL, POCBUN, POCHEMO, POCHCT in the last 72 hours. Coags:   Lab Results   Component Value Date/Time    PROTIME 13.9 05/26/2023 06:03 AM    INR 1.1 05/26/2023 06:03 AM    APTT 28.3 05/26/2023 06:03 AM       HCG (If Applicable):   Lab Results   Component Value Date    HCG NEGATIVE 12/15/2015        ABGs: No results found for: PHART, PO2ART, ICW6RDG, DYF1AZO, BEART, X0UUTQOA     Type & Screen (If Applicable):  No results found for: LABABO, LABRH    Drug/Infectious Status (If Applicable):  No results found for: HIV, HEPCAB    COVID-19 Screening (If Applicable): No results found for: COVID19        Anesthesia Evaluation  Patient summary reviewed and Nursing notes reviewed no history of anesthetic complications:   Airway: Mallampati: II  TM distance: >3 FB   Neck ROM: full  Mouth opening: > = 3 FB   Dental: normal exam         Pulmonary:normal exam    (+) COPD:  current smoker                           Cardiovascular:  Exercise tolerance: no interval change,   (+) hypertension:, past MI:, CAD:, CABG/stent:, CHF:,     (-)  angina        Rate: normal                    Neuro/Psych:   (+) psychiatric history:            GI/Hepatic/Renal:   (+) GERD: well controlled, morbid obesity          Endo/Other: Negative Endo/Other ROS                    Abdominal:             Vascular: Other Findings:             Anesthesia Plan      MAC, general and TIVA     ASA 3       Induction: intravenous. MIPS: Postoperative opioids intended and Prophylactic antiemetics administered. Anesthetic plan and risks discussed with patient. Plan discussed with CRNA.     Attending anesthesiologist reviewed and agrees with Kelvin Abrams MD   6/9/2023

## 2023-06-09 NOTE — H&P
Interval H&P Note    Pt Name: Dian Irizarry  MRN: [id number]  YOB: 1955  Date of evaluation: 6/9/2023      [x] I have reviewed in epic the Urology Consult by Dr Sonia Garibay dated 5/23/23 attached below for the Interval History and Physical note. [x] I have examined  Dian Irizarry, a 78 yo female with multiple comorbidities managed by PCP Dr Bianca Wright and specialist.   There are no changes to the patient who is scheduled for CYSTOSCOPY RIGHT URETERAL STENT EXCHANGE by Anjana Perez MD for Kidney stones; Hydronephrosis, unspecified hydronephrosis type. Patient f/u visit with Dr Sami Bender on 5/31/23 she was improving He discussed stent exchange and the patient arrived for her procedure. The patient  is c/o lower abdominal pain with watery diarrhea She denies bloody or tarry appearance to the stools, but stated \"I feel dehydrated. \" Denies fever, chills, wheezing, cough, increased SOB, chest pain, open sores or wounds. Hx CAD with past MI and angioplasties with multiple stents placed (7 in 2019 and two previously). Currently follows with Dr Tanika Pastrana with last visit 3/23/23 . Current anticoagulants with Last ASA 81mg 6/3/23, Celebrex 6/8/23 and Xarelto 6/7/23     Vital signs: BP 98/76   Pulse 92   Temp 96.8 °F (36 °C)   Resp 20   Ht 5' 3\" (1.6 m)   Wt 188 lb (85.3 kg)   SpO2 97%   BMI 33.30 kg/m²     Allergies:  Ativan [lorazepam], Keflex [cephalexin], Atenolol, Codeine, Percocet [oxycodone-acetaminophen], Phenergan [promethazine hcl], Sodium hypochlorite, Erythromycin, and Prednisone    Medications:    Prior to Admission medications    Medication Sig Start Date End Date Taking?  Authorizing Provider   tamsulosin (FLOMAX) 0.4 MG capsule Take 1 capsule by mouth daily 5/28/23   Maricarmen Rather Orlop, DO   ondansetron (ZOFRAN-ODT) 4 MG disintegrating tablet Take 1 tablet by mouth 3 times daily as needed for Nausea or Vomiting 5/20/23   Mary Caba MD   XARELTO 2.5 MG TABS tablet Take 1 tablet by mouth 2

## 2023-06-09 NOTE — ANESTHESIA POSTPROCEDURE EVALUATION
Department of Anesthesiology  Postprocedure Note    Patient: Harriet Rice  MRN: [id number]  YOB: 1955  Date of evaluation: 6/9/2023      Procedure Summary     Date: 06/09/23 Room / Location: [facility] - INPATIENT    Anesthesia Start: 1401 Anesthesia Stop: 1432    Procedure: CYSTOSCOPY  RIGHT  URETERAL STENT  EXCHANGE (Right) Diagnosis:       Kidney stones      Hydronephrosis, unspecified hydronephrosis type      (Kidney stones [N20.0])      (Hydronephrosis, unspecified hydronephrosis type [N13.30])    Surgeons: Kelli Martin MD Responsible Provider: January Rogers MD    Anesthesia Type: MAC, general, TIVA ASA Status: 3          Anesthesia Type: No value filed.     Bernard Phase I:      Bernard Phase II: Bernard Score: 10      Anesthesia Post Evaluation    Patient location during evaluation: PACU  Patient participation: complete - patient participated  Level of consciousness: awake  Airway patency: patent  Nausea & Vomiting: no nausea and no vomiting  Complications: no  Cardiovascular status: hemodynamically stable  Respiratory status: acceptable  Hydration status: stable  Multimodal analgesia pain management approach

## 2023-06-09 NOTE — OP NOTE
Findings: Indications: This patient was seen in consultation with flank pain and hydronephrosis, the patient was scheduled for cystoscopy and stent placement the patient is scheduled today for stent exchange she has been doing well, the hematoma previously noted has improved significantly      Detailed Description of Procedure:   Patient was brought to the operating room, positioned in dorsolithotomy, proper patient identification procedure identification prepping and draping. .  The bladder with the cystoscope, chronic cystitis changes identified,     the stent was identified in the right ureter, this was gently grasped and retrieved, under fluoroscopic guidance. A Glidewire was backloaded into the stent into the renal pelvis. This was followed by insertion of a number 6 ureteral stent, 24 cm long, the stent positioned in the renal pelvis with the distal end in the bladder. The stent is on a string. At the completion the bladder was emptied the scope removed the string was taped to the suprapubic area. Patient returned to recovery in stable condition. Discussed status of the patient with the patient and her daughter at bedside.       Recommendations: Patient will return to the office on Monday for stent removal        Electronically signed by Natasha Benedict MD on 6/9/2023 at 2:35 PM

## 2023-06-10 LAB
MICROORGANISM SPEC CULT: ABNORMAL
SPECIMEN DESCRIPTION: ABNORMAL

## (undated) DEVICE — BLANKET WRM W29.9XL79.1IN UP BODY FORC AIR MISTRAL-AIR

## (undated) DEVICE — SYRINGE MED 30ML STD CLR PLAS LUERLOCK TIP N CTRL DISP

## (undated) DEVICE — NEEDLE, QUINCKE, 18GX3.5": Brand: MEDLINE

## (undated) DEVICE — MARKER,SKIN,WI/RULER AND LABELS: Brand: MEDLINE

## (undated) DEVICE — ZIPPERED TOGA, 2X LARGE: Brand: FLYTE, SURGICOOL

## (undated) DEVICE — GLOVE SURG SZ 7 CRM LTX FREE POLYISOPRENE POLYMER BEAD ANTI

## (undated) DEVICE — 3M™ IOBAN™ 2 ANTIMICROBIAL INCISE DRAPE 6651EZ: Brand: IOBAN™ 2

## (undated) DEVICE — Device

## (undated) DEVICE — STRIP,CLOSURE,WOUND,MEDI-STRIP,1/2X4: Brand: MEDLINE

## (undated) DEVICE — 2108 SERIES SAGITTAL BLADE, NO OFFSET  (18.6 X 1.24 X 105MM)

## (undated) DEVICE — BLOCK BITE 60FR RUBBER ADLT DENTAL

## (undated) DEVICE — SOLUTION IRRIG 3000ML 0.9% SOD CHL USP UROMATIC PLAS CONT

## (undated) DEVICE — SYRINGE 20ML LL S/C 50

## (undated) DEVICE — APPLICATOR MEDICATED 26 CC SOLUTION HI LT ORNG CHLORAPREP

## (undated) DEVICE — GLOVE SURG SZ 75 CRM LTX FREE POLYISOPRENE POLYMER BEAD ANTI

## (undated) DEVICE — GAUZE,SPONGE,4"X4",16PLY,STRL,LF,10/TRAY: Brand: MEDLINE

## (undated) DEVICE — RADIFOCUS GLIDEWIRE: Brand: GLIDEWIRE

## (undated) DEVICE — MASTISOL ADHESIVE LIQ 2/3ML

## (undated) DEVICE — STOCKINETTE,DOUBLE PLY,4X48,STERILE: Brand: MEDLINE

## (undated) DEVICE — NEEDLE SPINAL 22GA L3.5IN SPINOCAN

## (undated) DEVICE — DRAPE,T,LIMB,BILATERAL,STERILE: Brand: MEDLINE

## (undated) DEVICE — SUTURE MCRYL SZ 3-0 L27IN ABSRB UD PS-2 3/8 CIR REV CUT NDL MCP427H

## (undated) DEVICE — CATHETER URET 5FR L70CM TIP 8FR OPN END CONE TIP INJ HUB

## (undated) DEVICE — SUTURE 2-0 STRATAFIX MONOCRYL 45CM CT-1

## (undated) DEVICE — 3M™ STERI-STRIP™ COMPOUND BENZOIN TINCTURE 40 BAGS/CARTON 4 CARTONS/CASE C1544: Brand: 3M™ STERI-STRIP™

## (undated) DEVICE — BNDG,ELSTC,MATRIX,STRL,6"X5YD,LF,HOOK&LP: Brand: MEDLINE

## (undated) DEVICE — CONTAINER,SPECIMEN,OR STERILE,4OZ: Brand: MEDLINE

## (undated) DEVICE — 3M™ IOBAN™ 2 ANTIMICROBIAL INCISE DRAPE 6650EZ: Brand: IOBAN™ 2

## (undated) DEVICE — DRAPE,REIN 53X77,STERILE: Brand: MEDLINE

## (undated) DEVICE — COVER,MAYO STAND,XL,STERILE: Brand: MEDLINE

## (undated) DEVICE — STOCKINETTE,IMPERVIOUS,12X48,STERILE: Brand: MEDLINE

## (undated) DEVICE — BANDAGE COBAN 6 IN WND 6INX5YD FOAM

## (undated) DEVICE — DRAPE,U/ SHT,SPLIT,PLAS,STERIL: Brand: MEDLINE

## (undated) DEVICE — GAUZE,SPONGE,FLUFF,6"X6.75",STRL,5/TRAY: Brand: MEDLINE

## (undated) DEVICE — GOWN,SIRUS,NONRNF,SETINSLV,XL,20/CS: Brand: MEDLINE

## (undated) DEVICE — PREVENA PEEL & PLACE SYSTEM KIT- 13 CM: Brand: PREVENA™ PEEL & PLACE™

## (undated) DEVICE — ADHESIVE SKIN CLOSURE TOP 36 CC HI VISC DERMBND MINI

## (undated) DEVICE — SUTURE STRATAFIX SYMMETRIC PDS + SZ 1 L18IN ABSRB VLT L48MM SXPP1A400

## (undated) DEVICE — SOLUTION IRRIG 3000ML STRL H2O USP UROMATIC PLAS CONT

## (undated) DEVICE — JELLY,LUBE,STERILE,FLIP TOP,TUBE,2-OZ: Brand: MEDLINE

## (undated) DEVICE — SUTURE VCRL SZ 0 L27IN ABSRB UD L36MM CP-1 1/2 CIR REV CUT J267H

## (undated) DEVICE — WHISTLE TIP URETERAL CATHETER (RIGHT): Brand: COOK

## (undated) DEVICE — PADDING UNDERCAST W6INXL4YD RAYON POLY SYN NONADHESIVE

## (undated) DEVICE — GLOVE SURG SZ 8 L12IN FNGR THK79MIL GRN LTX FREE

## (undated) DEVICE — C-ARM: Brand: UNBRANDED

## (undated) DEVICE — SHEET, ORTHO, SPLIT, STERILE: Brand: MEDLINE

## (undated) DEVICE — DRESSING HYDROFIBER AQUACEL AG ADVANTAGE 3.5X10 IN